# Patient Record
Sex: FEMALE | Race: AMERICAN INDIAN OR ALASKA NATIVE | HISPANIC OR LATINO | URBAN - METROPOLITAN AREA
[De-identification: names, ages, dates, MRNs, and addresses within clinical notes are randomized per-mention and may not be internally consistent; named-entity substitution may affect disease eponyms.]

---

## 2017-03-20 ENCOUNTER — EMERGENCY (EMERGENCY)
Facility: HOSPITAL | Age: 27
LOS: 1 days | Discharge: HOME | End: 2017-03-20
Attending: EMERGENCY MEDICINE | Admitting: EMERGENCY MEDICINE
Payer: COMMERCIAL

## 2017-03-20 VITALS
DIASTOLIC BLOOD PRESSURE: 86 MMHG | HEART RATE: 91 BPM | SYSTOLIC BLOOD PRESSURE: 125 MMHG | TEMPERATURE: 98 F | RESPIRATION RATE: 18 BRPM | OXYGEN SATURATION: 96 %

## 2017-03-20 VITALS
HEART RATE: 100 BPM | SYSTOLIC BLOOD PRESSURE: 110 MMHG | OXYGEN SATURATION: 97 % | RESPIRATION RATE: 18 BRPM | DIASTOLIC BLOOD PRESSURE: 62 MMHG | TEMPERATURE: 98 F | WEIGHT: 214.95 LBS

## 2017-03-20 DIAGNOSIS — M54.5 LOW BACK PAIN: ICD-10-CM

## 2017-03-20 DIAGNOSIS — Z79.899 OTHER LONG TERM (CURRENT) DRUG THERAPY: ICD-10-CM

## 2017-03-20 DIAGNOSIS — Z79.2 LONG TERM (CURRENT) USE OF ANTIBIOTICS: ICD-10-CM

## 2017-03-20 DIAGNOSIS — Z79.1 LONG TERM (CURRENT) USE OF NON-STEROIDAL ANTI-INFLAMMATORIES (NSAID): ICD-10-CM

## 2017-03-20 DIAGNOSIS — E11.9 TYPE 2 DIABETES MELLITUS WITHOUT COMPLICATIONS: ICD-10-CM

## 2017-03-20 DIAGNOSIS — H72.92 UNSPECIFIED PERFORATION OF TYMPANIC MEMBRANE, LEFT EAR: ICD-10-CM

## 2017-03-20 DIAGNOSIS — H71.92 UNSPECIFIED CHOLESTEATOMA, LEFT EAR: Chronic | ICD-10-CM

## 2017-03-20 DIAGNOSIS — J45.909 UNSPECIFIED ASTHMA, UNCOMPLICATED: ICD-10-CM

## 2017-03-20 LAB — HIV 1+2 AB+HIV1 P24 AG SERPL QL IA: SIGNIFICANT CHANGE UP

## 2017-03-20 PROCEDURE — 99283 EMERGENCY DEPT VISIT LOW MDM: CPT | Mod: 25

## 2017-03-20 PROCEDURE — 36415 COLL VENOUS BLD VENIPUNCTURE: CPT

## 2017-03-20 PROCEDURE — 99284 EMERGENCY DEPT VISIT MOD MDM: CPT

## 2017-03-20 PROCEDURE — 87389 HIV-1 AG W/HIV-1&-2 AB AG IA: CPT

## 2017-03-20 PROCEDURE — 96372 THER/PROPH/DIAG INJ SC/IM: CPT

## 2017-03-20 RX ORDER — CYCLOBENZAPRINE HYDROCHLORIDE 10 MG/1
1 TABLET, FILM COATED ORAL
Qty: 14 | Refills: 0
Start: 2017-03-20

## 2017-03-20 RX ORDER — CIPROFLOXACIN 6 MG/ML
1 SUSPENSION INTRATYMPANIC
Qty: 1 | Refills: 0
Start: 2017-03-20 | End: 2017-03-28

## 2017-03-20 RX ORDER — CYCLOBENZAPRINE HYDROCHLORIDE 10 MG/1
1 TABLET, FILM COATED ORAL
Qty: 14 | Refills: 0 | OUTPATIENT
Start: 2017-03-20

## 2017-03-20 RX ORDER — KETOROLAC TROMETHAMINE 30 MG/ML
60 SYRINGE (ML) INJECTION ONCE
Qty: 0 | Refills: 0 | Status: DISCONTINUED | OUTPATIENT
Start: 2017-03-20 | End: 2017-03-20

## 2017-03-20 RX ORDER — CIPROFLOXACIN 6 MG/ML
1 SUSPENSION INTRATYMPANIC
Qty: 1 | Refills: 0 | OUTPATIENT
Start: 2017-03-20 | End: 2017-03-27

## 2017-03-20 RX ADMIN — Medication 60 MILLIGRAM(S): at 19:05

## 2017-03-20 RX ADMIN — Medication 60 MILLIGRAM(S): at 19:32

## 2017-03-20 NOTE — ED PROVIDER NOTE - MEDICAL DECISION MAKING DETAILS
25 y/o f no pmh presents with low back pain x 1 week, fluid draining from left ear x 1 day; no neuro deficits, back pain worse with movement, likely musculoskeletal, given toradol in ED, will have pt continue ibuprofen, flexeril as outpatient.  Pt has left TM perforation with no preceding pain, hx cholesteatoma, will give course of cipro otic drops, referred to ENT and primary care for possible physical therapy.  Referral coordinator involved to expedite both appointments.

## 2017-03-20 NOTE — ED ADULT TRIAGE NOTE - CHIEF COMPLAINT QUOTE
lower back pain x1 week and left ear pain since yesterday. Patient states "I felt like something popped in my ear."

## 2017-03-20 NOTE — ED PROVIDER NOTE - MUSCULOSKELETAL, MLM
Spine appears normal, no midline tenderness, range of motion is not limited, no muscle or joint tenderness

## 2017-03-20 NOTE — ED PROVIDER NOTE - NEUROLOGICAL, MLM
Alert and oriented, no focal deficits, no motor or sensory deficits. strength 5/5 b/l upper and lower ext, sensation intact distally b/l upper and lower ext

## 2017-03-20 NOTE — ED PROVIDER NOTE - OBJECTIVE STATEMENT
27 y/o f presents c/o pain to low back for the past week.  Pt stating gradual onset, worse with movement.  Pt has been taking ibuprofen with mild improvement.  Pt also reporting fluid draining from left ear for the past day, stating she heard a "pop", has had a perforated ear drum in the past.  Pt stating she has no pmd, also asking for an HIV test, stating "it's been a while since my last one."  Pt denies numbness/tingling to ext, weakness, saddle anesthesia, bowel/bladder incontinence, ear pain, fever, throat pain, all other ROS negative.

## 2017-04-07 ENCOUNTER — APPOINTMENT (OUTPATIENT)
Dept: INTERNAL MEDICINE | Facility: CLINIC | Age: 27
End: 2017-04-07

## 2018-05-28 ENCOUNTER — EMERGENCY (EMERGENCY)
Facility: HOSPITAL | Age: 28
LOS: 1 days | Discharge: HOME | End: 2018-05-28
Admitting: EMERGENCY MEDICINE
Payer: COMMERCIAL

## 2018-05-28 VITALS
DIASTOLIC BLOOD PRESSURE: 98 MMHG | RESPIRATION RATE: 18 BRPM | OXYGEN SATURATION: 99 % | SYSTOLIC BLOOD PRESSURE: 151 MMHG | TEMPERATURE: 99 F | HEART RATE: 69 BPM

## 2018-05-28 VITALS
OXYGEN SATURATION: 99 % | RESPIRATION RATE: 18 BRPM | HEART RATE: 69 BPM | TEMPERATURE: 99 F | DIASTOLIC BLOOD PRESSURE: 98 MMHG | SYSTOLIC BLOOD PRESSURE: 151 MMHG

## 2018-05-28 DIAGNOSIS — J45.909 UNSPECIFIED ASTHMA, UNCOMPLICATED: ICD-10-CM

## 2018-05-28 DIAGNOSIS — H71.92 UNSPECIFIED CHOLESTEATOMA, LEFT EAR: Chronic | ICD-10-CM

## 2018-05-28 DIAGNOSIS — Z79.899 OTHER LONG TERM (CURRENT) DRUG THERAPY: ICD-10-CM

## 2018-05-28 DIAGNOSIS — Z79.1 LONG TERM (CURRENT) USE OF NON-STEROIDAL ANTI-INFLAMMATORIES (NSAID): ICD-10-CM

## 2018-05-28 DIAGNOSIS — H92.02 OTALGIA, LEFT EAR: ICD-10-CM

## 2018-05-28 DIAGNOSIS — Z79.2 LONG TERM (CURRENT) USE OF ANTIBIOTICS: ICD-10-CM

## 2018-05-28 DIAGNOSIS — Z79.891 LONG TERM (CURRENT) USE OF OPIATE ANALGESIC: ICD-10-CM

## 2018-05-28 DIAGNOSIS — E11.9 TYPE 2 DIABETES MELLITUS WITHOUT COMPLICATIONS: ICD-10-CM

## 2018-05-28 DIAGNOSIS — H66.92 OTITIS MEDIA, UNSPECIFIED, LEFT EAR: ICD-10-CM

## 2018-05-28 PROCEDURE — 99283 EMERGENCY DEPT VISIT LOW MDM: CPT

## 2018-05-28 PROCEDURE — 99283 EMERGENCY DEPT VISIT LOW MDM: CPT | Mod: 25

## 2018-05-28 PROCEDURE — 96372 THER/PROPH/DIAG INJ SC/IM: CPT

## 2018-05-28 RX ORDER — OXYCODONE AND ACETAMINOPHEN 5; 325 MG/1; MG/1
1 TABLET ORAL ONCE
Qty: 0 | Refills: 0 | Status: DISCONTINUED | OUTPATIENT
Start: 2018-05-28 | End: 2018-05-28

## 2018-05-28 RX ORDER — CEFTRIAXONE 500 MG/1
2000 INJECTION, POWDER, FOR SOLUTION INTRAMUSCULAR; INTRAVENOUS ONCE
Qty: 0 | Refills: 0 | Status: COMPLETED | OUTPATIENT
Start: 2018-05-28 | End: 2018-05-28

## 2018-05-28 RX ADMIN — CEFTRIAXONE 2000 MILLIGRAM(S): 500 INJECTION, POWDER, FOR SOLUTION INTRAMUSCULAR; INTRAVENOUS at 18:54

## 2018-05-28 RX ADMIN — OXYCODONE AND ACETAMINOPHEN 1 TABLET(S): 5; 325 TABLET ORAL at 18:03

## 2018-05-28 NOTE — ED PROVIDER NOTE - MEDICAL DECISION MAKING DETAILS
28 y/o female presenting with ear pain. Ear TM bulging, erythematous, pus-filled behind TM. no tragus/auricle tenderness. no mastoid tenderness. do not suspect mastoiditis. on neomycin without improvement. chronic hx of ear infection with dm - will give ceft IM. Advised ENT fu.

## 2018-05-28 NOTE — ED ADULT TRIAGE NOTE - CHIEF COMPLAINT QUOTE
pt having left ear pain x 3 days ,went to urgent care and received neomycin drops but pain is still very bad

## 2018-05-28 NOTE — ED ADULT NURSE NOTE - OBJECTIVE STATEMENT
pt having left ear pain x a few days ,is presently taking ear drops and ibuprofen but pain is persisting

## 2018-05-28 NOTE — ED PROVIDER NOTE - OBJECTIVE STATEMENT
26 y/o female with a PMHx of DM2, PCOS, cholesteatoma is present with left ear pain x3 days. Pt reports chronic hx of ear infections. Pt [place] [facility] [place] and given ear drops. Pt reports pain has worsened. She denies the following: ringing, dc, fever, chills, vertigo.

## 2018-06-02 ENCOUNTER — EMERGENCY (EMERGENCY)
Facility: HOSPITAL | Age: 28
LOS: 1 days | Discharge: HOME | End: 2018-06-02
Attending: EMERGENCY MEDICINE | Admitting: EMERGENCY MEDICINE
Payer: COMMERCIAL

## 2018-06-02 VITALS
DIASTOLIC BLOOD PRESSURE: 87 MMHG | HEART RATE: 103 BPM | TEMPERATURE: 98 F | OXYGEN SATURATION: 97 % | SYSTOLIC BLOOD PRESSURE: 126 MMHG | RESPIRATION RATE: 18 BRPM

## 2018-06-02 VITALS
DIASTOLIC BLOOD PRESSURE: 81 MMHG | OXYGEN SATURATION: 97 % | WEIGHT: 207.9 LBS | HEIGHT: 63 IN | HEART RATE: 102 BPM | SYSTOLIC BLOOD PRESSURE: 135 MMHG | TEMPERATURE: 99 F | RESPIRATION RATE: 18 BRPM

## 2018-06-02 DIAGNOSIS — H71.92 UNSPECIFIED CHOLESTEATOMA, LEFT EAR: Chronic | ICD-10-CM

## 2018-06-02 DIAGNOSIS — J45.909 UNSPECIFIED ASTHMA, UNCOMPLICATED: ICD-10-CM

## 2018-06-02 DIAGNOSIS — J40 BRONCHITIS, NOT SPECIFIED AS ACUTE OR CHRONIC: ICD-10-CM

## 2018-06-02 DIAGNOSIS — Z79.899 OTHER LONG TERM (CURRENT) DRUG THERAPY: ICD-10-CM

## 2018-06-02 DIAGNOSIS — E11.9 TYPE 2 DIABETES MELLITUS WITHOUT COMPLICATIONS: ICD-10-CM

## 2018-06-02 DIAGNOSIS — R06.2 WHEEZING: ICD-10-CM

## 2018-06-02 PROCEDURE — 93005 ELECTROCARDIOGRAM TRACING: CPT

## 2018-06-02 PROCEDURE — 99284 EMERGENCY DEPT VISIT MOD MDM: CPT | Mod: 25

## 2018-06-02 PROCEDURE — 94640 AIRWAY INHALATION TREATMENT: CPT

## 2018-06-02 PROCEDURE — 99285 EMERGENCY DEPT VISIT HI MDM: CPT | Mod: 25

## 2018-06-02 PROCEDURE — 93010 ELECTROCARDIOGRAM REPORT: CPT

## 2018-06-02 RX ORDER — AZITHROMYCIN 500 MG/1
1 TABLET, FILM COATED ORAL
Qty: 3 | Refills: 0
Start: 2018-06-02 | End: 2018-06-04

## 2018-06-02 RX ORDER — IPRATROPIUM/ALBUTEROL SULFATE 18-103MCG
3 AEROSOL WITH ADAPTER (GRAM) INHALATION ONCE
Qty: 0 | Refills: 0 | Status: COMPLETED | OUTPATIENT
Start: 2018-06-02 | End: 2018-06-02

## 2018-06-02 RX ORDER — ALPRAZOLAM 0.25 MG
0.5 TABLET ORAL ONCE
Qty: 0 | Refills: 0 | Status: DISCONTINUED | OUTPATIENT
Start: 2018-06-02 | End: 2018-06-02

## 2018-06-02 RX ADMIN — Medication 3 MILLILITER(S): at 20:08

## 2018-06-02 RX ADMIN — Medication 3 MILLILITER(S): at 21:23

## 2018-06-02 RX ADMIN — Medication 60 MILLIGRAM(S): at 19:40

## 2018-06-02 RX ADMIN — Medication 3 MILLILITER(S): at 19:43

## 2018-06-02 RX ADMIN — Medication 0.5 MILLIGRAM(S): at 19:39

## 2018-06-02 NOTE — ED PROVIDER NOTE - MEDICAL DECISION MAKING DETAILS
will contiue steroids and inhalers and add abs and refer to PCP I have discussed the discharge plan with the patient. The patient agrees with the plan, as discussed.  The patient understands Emergency Department diagnosis is a preliminary diagnosis often based on limited information and that the patient must adhere to the follow-up plan as discussed.  The patient understands that if the symptoms worsen or if prescribed medications do not have the desired/planned effect that the patient may return to the Emergency Department at any time for further evaluation and treatment.

## 2018-06-02 NOTE — ED ADULT TRIAGE NOTE - CHIEF COMPLAINT QUOTE
" I am having asthma mixed with anxiety and cough worse this morning ." History of asthma . With peak flow of 350% .

## 2018-06-02 NOTE — ED PROVIDER NOTE - OBJECTIVE STATEMENT
pt to ed with HX of asthma  co mild wheezes BL for few days using inhaler often with no relief pt compliant with maintenance inhaler and long Hx of asthma no fever no dizzy no headache no chills no NVD no chest pain no SOB no shakes no aches no other  injury no other complaints no cough no other complaints alos has anxiety

## 2018-06-02 NOTE — ED PROVIDER NOTE - ATTENDING CONTRIBUTION TO CARE
Case was discussed with me. 28yo female with h/o asthma recently in ER for possible viral symptoms presenting with asthma exacerbation/ bronchitis symptoms. Pt well appearing, lungs clear. VS unremarkable. Pt give nebs, reassessed and discharged.

## 2018-06-02 NOTE — ED ADULT NURSE NOTE - OBJECTIVE STATEMENT
Pt presents to ED c/o asthma exacerbation and cold symptoms. Pt reports ear infection last week, treated with abx ear drops and "anti inflammatory shot" with improvement presents today reporting x3 days of cough, SOB, and chest tightness. Pt reports using her home nebulizer and pump with some improvement. Pt also endorses x4 episodes clear vomiting this morning. Pt denies fevers, sore throat, CP, abdominal pain. Pt presents in NAD speaking full sentences ambulatory through triage.  in triage.

## 2018-10-19 ENCOUNTER — EMERGENCY (EMERGENCY)
Facility: HOSPITAL | Age: 28
LOS: 1 days | Discharge: HOME | End: 2018-10-19
Admitting: EMERGENCY MEDICINE
Payer: COMMERCIAL

## 2018-10-19 VITALS
DIASTOLIC BLOOD PRESSURE: 93 MMHG | RESPIRATION RATE: 16 BRPM | TEMPERATURE: 98 F | WEIGHT: 200.62 LBS | SYSTOLIC BLOOD PRESSURE: 148 MMHG | HEART RATE: 79 BPM | OXYGEN SATURATION: 98 % | HEIGHT: 63 IN

## 2018-10-19 DIAGNOSIS — H71.92 UNSPECIFIED CHOLESTEATOMA, LEFT EAR: Chronic | ICD-10-CM

## 2018-10-19 PROCEDURE — 82962 GLUCOSE BLOOD TEST: CPT

## 2018-10-19 PROCEDURE — 99284 EMERGENCY DEPT VISIT MOD MDM: CPT

## 2018-10-19 PROCEDURE — 99283 EMERGENCY DEPT VISIT LOW MDM: CPT

## 2018-10-19 RX ORDER — OFLOXACIN OTIC SOLUTION 3 MG/ML
5 SOLUTION/ DROPS AURICULAR (OTIC)
Qty: 5 | Refills: 0
Start: 2018-10-19 | End: 2018-10-25

## 2018-10-19 RX ORDER — IBUPROFEN 200 MG
600 TABLET ORAL ONCE
Qty: 0 | Refills: 0 | Status: COMPLETED | OUTPATIENT
Start: 2018-10-19 | End: 2018-10-19

## 2018-10-19 RX ORDER — CIPROFLOXACIN AND DEXAMETHASONE 3; 1 MG/ML; MG/ML
4 SUSPENSION/ DROPS AURICULAR (OTIC)
Qty: 5 | Refills: 0
Start: 2018-10-19 | End: 2018-10-25

## 2018-10-19 RX ORDER — IBUPROFEN 200 MG
1 TABLET ORAL
Qty: 12 | Refills: 0
Start: 2018-10-19 | End: 2018-10-22

## 2018-10-19 RX ADMIN — Medication 600 MILLIGRAM(S): at 15:49

## 2018-10-19 NOTE — ED ADULT NURSE NOTE - OBJECTIVE STATEMENT
PT came to ED complaining of bilateral ear pain, worse on left side, with cough x1 weeks. Pt reports chronic ear infections, denies any throat pain or swelling.  Pt reports drainage from left ear, no drainage found at this time. PT chest pain, SOB, denies fever, chills, D/N/V.  Pt is ambulatory with even gait.

## 2018-10-19 NOTE — ED ADULT TRIAGE NOTE - CHIEF COMPLAINT QUOTE
Pt CO Bilat Ear pain and dry cough x1 week.  Pt states "I'm very prone to ear infections, especially on the left side, but right now I feel pain in both."  Pt denies N/V/d, SOB, Fevers, CP and Dizziness.

## 2018-10-19 NOTE — ED ADULT NURSE NOTE - NSIMPLEMENTINTERV_GEN_ALL_ED
Implemented All Universal Safety Interventions:  [place] to call system. Call bell, personal items and telephone within reach. Instruct patient to call for assistance. Room bathroom lighting operational. Non-slip footwear when patient is off stretcher. Physically safe environment: no spills, clutter or unnecessary equipment. Stretcher in lowest position, wheels locked, appropriate side rails in place.

## 2018-10-19 NOTE — ED PROVIDER NOTE - OBJECTIVE STATEMENT
The pt is a 29 y/o F, who presents to ED c/o L ear pain x 3-4 d. Pt states pain to touch, + discharge, now R ear starting to ache, + dry cough. Has been taking tyl w/o relief. Denies fevers, chills, cp, sob, n/v, sore throat

## 2018-10-19 NOTE — ED PROVIDER NOTE - MEDICAL DECISION MAKING DETAILS
pt w/AOE of L ear, will tx w/gtts, no AOM hence no indication for po abx, supportive care discussed, f/u w/ent, pt understands and agrees w/plan

## 2018-10-19 NOTE — ED PROVIDER NOTE - ENMT, MLM
Airway patent, Nasal mucosa clear. Mouth with normal mucosa. Throat has no vesicles, no oropharyngeal exudates and uvula is midline. L ear canal w/swelling and exudates, TM clear; R ear wnl. no cervical lymphadenopathy b/l

## 2018-10-23 DIAGNOSIS — H60.92 UNSPECIFIED OTITIS EXTERNA, LEFT EAR: ICD-10-CM

## 2018-10-23 DIAGNOSIS — Z79.899 OTHER LONG TERM (CURRENT) DRUG THERAPY: ICD-10-CM

## 2018-10-23 DIAGNOSIS — E11.9 TYPE 2 DIABETES MELLITUS WITHOUT COMPLICATIONS: ICD-10-CM

## 2018-10-23 DIAGNOSIS — J45.909 UNSPECIFIED ASTHMA, UNCOMPLICATED: ICD-10-CM

## 2018-10-23 DIAGNOSIS — H92.02 OTALGIA, LEFT EAR: ICD-10-CM

## 2018-11-01 ENCOUNTER — EMERGENCY (EMERGENCY)
Facility: HOSPITAL | Age: 28
LOS: 1 days | Discharge: HOME | End: 2018-11-01
Attending: EMERGENCY MEDICINE | Admitting: EMERGENCY MEDICINE
Payer: COMMERCIAL

## 2018-11-01 VITALS
DIASTOLIC BLOOD PRESSURE: 90 MMHG | TEMPERATURE: 98 F | RESPIRATION RATE: 18 BRPM | HEART RATE: 89 BPM | SYSTOLIC BLOOD PRESSURE: 150 MMHG | OXYGEN SATURATION: 96 %

## 2018-11-01 DIAGNOSIS — R21 RASH AND OTHER NONSPECIFIC SKIN ERUPTION: ICD-10-CM

## 2018-11-01 DIAGNOSIS — J45.909 UNSPECIFIED ASTHMA, UNCOMPLICATED: ICD-10-CM

## 2018-11-01 DIAGNOSIS — H71.92 UNSPECIFIED CHOLESTEATOMA, LEFT EAR: Chronic | ICD-10-CM

## 2018-11-01 DIAGNOSIS — Z79.1 LONG TERM (CURRENT) USE OF NON-STEROIDAL ANTI-INFLAMMATORIES (NSAID): ICD-10-CM

## 2018-11-01 DIAGNOSIS — E11.9 TYPE 2 DIABETES MELLITUS WITHOUT COMPLICATIONS: ICD-10-CM

## 2018-11-01 DIAGNOSIS — Z79.2 LONG TERM (CURRENT) USE OF ANTIBIOTICS: ICD-10-CM

## 2018-11-01 DIAGNOSIS — R05 COUGH: ICD-10-CM

## 2018-11-01 DIAGNOSIS — Z79.51 LONG TERM (CURRENT) USE OF INHALED STEROIDS: ICD-10-CM

## 2018-11-01 DIAGNOSIS — Z79.891 LONG TERM (CURRENT) USE OF OPIATE ANALGESIC: ICD-10-CM

## 2018-11-01 PROCEDURE — 71046 X-RAY EXAM CHEST 2 VIEWS: CPT | Mod: 26

## 2018-11-01 PROCEDURE — 99283 EMERGENCY DEPT VISIT LOW MDM: CPT | Mod: 25

## 2018-11-01 PROCEDURE — 71046 X-RAY EXAM CHEST 2 VIEWS: CPT

## 2018-11-01 PROCEDURE — 99283 EMERGENCY DEPT VISIT LOW MDM: CPT

## 2018-11-01 NOTE — ED PROVIDER NOTE - DIAGNOSTIC INTERPRETATION
ER Physician: Cale Sung  CHEST XRAY INTERPRETATION: lungs clear, heart shadow normal, bony structures intact

## 2018-11-01 NOTE — ED ADULT NURSE NOTE - OBJECTIVE STATEMENT
29 y/o female with hx of PCOS, Depression, HTN, DM, asthma arrived to [facility] ER reporting rash to mouth, arms, and hands for the past week accompanied with productive cough. 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 Upon assessment, abdomen soft, rhonchi to lung fields, breathing is equal and unlabored, no visible injuries noted. Pt denies chest pain, headache, dizziness, blurred vision, slurred speech, nausea, vomitin 27 y/o female with hx of PCOS, Depression, HTN, DM, asthma arrived to [facility] ER reporting rash to mouth, arms, and hands for the past week accompanied with productive cough. 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 Upon assessment, abdomen soft, rhonchi to lung fields, breathing is equal and unlabored, no visible injuries noted. Pt denies chest pain, headache, dizziness, blurred vision, slurred speech, nausea, vomiting, diarrhea, fever, chills, LOC, SOB, weakness, fatigue, and palpitations. Care in progress.

## 2018-11-01 NOTE — ED PROVIDER NOTE - PHYSICAL EXAMINATION
CON: ao x 3, HENMT: clear oropharynx, soft neck, HEAD: atraumatic, CV: rrr, equal pulses b/l, RESP: cta b/l, SKIN: maculopapular rash noted to bl forearm, and anterior neck, no petechiae, no vesicles, no bullae, no erythema or lymphatic streaking, no fluctuance/induration or tenderness, MSK: no deformities, NEURO: no gross motor or sensory deficit

## 2018-11-01 NOTE — ED ADULT TRIAGE NOTE - ARRIVAL INFO ADDITIONAL COMMENTS
pt c/o 1 week of rash on inner forearms, neck and around mouth since starting a new job.  also c/o dry cough.

## 2018-11-01 NOTE — ED PROVIDER NOTE - MEDICAL DECISION MAKING DETAILS
maculopapular rash w/o tony involvement, no oropharyngeal involvement, possible exposure at work, ?contact dermatitis, d/w pt regarding avoid contact (pt states once halloween is over, she'll have less exposure from the objects), no evidence of infection, also cough x 1 mo, subacute, afebrile, lungs clear, no sx of gerd/reflux, no hx of asthma, will check xray, f/u pmd outpatient maculopapular rash w/o tony involvement, no oropharyngeal involvement, possible exposure at work, ?contact dermatitis, d/w pt regarding avoid contact (pt states once halloween is over, she'll have less exposure from the objects), no evidence of infection, also cough x 1 mo, subacute, afebrile, lungs clear, no sx of gerd/reflux, hx of asthma, ?cough variant asthma, also pt recently started acei, possibly acei cough, will check xray, f/u pmd outpatient, d/w pmd regarding htn and meds

## 2018-11-01 NOTE — ED PROVIDER NOTE - OBJECTIVE STATEMENT
28 yof pw cough x 1 mo, almost daily, worse after 5pm, improves w/ drinking some hot tea, no change w/ food or lying down.  nonproductive.  no leg swelling.  also days of rash to forearm and neck, states working at [employer] and has been carrying props/objects, not wearing long sleeves.  taking benadryl but has exposure daily at work.

## 2018-11-01 NOTE — ED ADULT NURSE NOTE - NS ED NURSE DC INFO COMPLEXITY
Detail Level: Detailed
Simple: Patient demonstrates quick and easy understanding/Verbalized Understanding
Quality 226: Preventive Care And Screening: Tobacco Use: Screening And Cessation Intervention: Patient screened for tobacco and is an ex-smoker
Quality 128: Preventive Care And Screening: Body Mass Index (Bmi) Screening And Follow-Up Plan: BMI is documented within normal parameters and no follow-up plan is required.

## 2019-01-10 ENCOUNTER — EMERGENCY (EMERGENCY)
Facility: HOSPITAL | Age: 29
LOS: 1 days | Discharge: HOME | End: 2019-01-10
Attending: EMERGENCY MEDICINE | Admitting: EMERGENCY MEDICINE
Payer: COMMERCIAL

## 2019-01-10 VITALS
SYSTOLIC BLOOD PRESSURE: 112 MMHG | OXYGEN SATURATION: 97 % | RESPIRATION RATE: 18 BRPM | HEART RATE: 70 BPM | TEMPERATURE: 99 F | DIASTOLIC BLOOD PRESSURE: 76 MMHG

## 2019-01-10 VITALS
OXYGEN SATURATION: 100 % | DIASTOLIC BLOOD PRESSURE: 81 MMHG | SYSTOLIC BLOOD PRESSURE: 134 MMHG | WEIGHT: 195.11 LBS | TEMPERATURE: 99 F | HEART RATE: 87 BPM | RESPIRATION RATE: 16 BRPM

## 2019-01-10 DIAGNOSIS — Z79.2 LONG TERM (CURRENT) USE OF ANTIBIOTICS: ICD-10-CM

## 2019-01-10 DIAGNOSIS — Z79.899 OTHER LONG TERM (CURRENT) DRUG THERAPY: ICD-10-CM

## 2019-01-10 DIAGNOSIS — R11.0 NAUSEA: ICD-10-CM

## 2019-01-10 DIAGNOSIS — N93.9 ABNORMAL UTERINE AND VAGINAL BLEEDING, UNSPECIFIED: ICD-10-CM

## 2019-01-10 DIAGNOSIS — D72.829 ELEVATED WHITE BLOOD CELL COUNT, UNSPECIFIED: ICD-10-CM

## 2019-01-10 DIAGNOSIS — Z79.1 LONG TERM (CURRENT) USE OF NON-STEROIDAL ANTI-INFLAMMATORIES (NSAID): ICD-10-CM

## 2019-01-10 DIAGNOSIS — R10.2 PELVIC AND PERINEAL PAIN: ICD-10-CM

## 2019-01-10 DIAGNOSIS — H71.92 UNSPECIFIED CHOLESTEATOMA, LEFT EAR: Chronic | ICD-10-CM

## 2019-01-10 DIAGNOSIS — Z79.891 LONG TERM (CURRENT) USE OF OPIATE ANALGESIC: ICD-10-CM

## 2019-01-10 DIAGNOSIS — E11.9 TYPE 2 DIABETES MELLITUS WITHOUT COMPLICATIONS: ICD-10-CM

## 2019-01-10 DIAGNOSIS — J45.909 UNSPECIFIED ASTHMA, UNCOMPLICATED: ICD-10-CM

## 2019-01-10 LAB
ANION GAP SERPL CALC-SCNC: 14 MMOL/L — SIGNIFICANT CHANGE UP (ref 5–17)
APPEARANCE UR: CLEAR — SIGNIFICANT CHANGE UP
BACTERIA # UR AUTO: PRESENT /HPF
BASOPHILS NFR BLD AUTO: 0.2 % — SIGNIFICANT CHANGE UP (ref 0–2)
BILIRUB UR-MCNC: NEGATIVE — SIGNIFICANT CHANGE UP
BUN SERPL-MCNC: 12 MG/DL — SIGNIFICANT CHANGE UP (ref 7–23)
CALCIUM SERPL-MCNC: 9.4 MG/DL — SIGNIFICANT CHANGE UP (ref 8.4–10.5)
CHLORIDE SERPL-SCNC: 103 MMOL/L — SIGNIFICANT CHANGE UP (ref 96–108)
CO2 SERPL-SCNC: 24 MMOL/L — SIGNIFICANT CHANGE UP (ref 22–31)
COLOR SPEC: YELLOW — SIGNIFICANT CHANGE UP
COMMENT - URINE: SIGNIFICANT CHANGE UP
CREAT SERPL-MCNC: 0.69 MG/DL — SIGNIFICANT CHANGE UP (ref 0.5–1.3)
DIFF PNL FLD: ABNORMAL
EOSINOPHIL NFR BLD AUTO: 0.4 % — SIGNIFICANT CHANGE UP (ref 0–6)
EPI CELLS # UR: SIGNIFICANT CHANGE UP /HPF (ref 0–5)
GLUCOSE SERPL-MCNC: 118 MG/DL — HIGH (ref 70–99)
GLUCOSE UR QL: NEGATIVE — SIGNIFICANT CHANGE UP
HCG UR QL: NEGATIVE — SIGNIFICANT CHANGE UP
HCT VFR BLD CALC: 45.6 % — HIGH (ref 34.5–45)
HGB BLD-MCNC: 14.9 G/DL — SIGNIFICANT CHANGE UP (ref 11.5–15.5)
KETONES UR-MCNC: NEGATIVE — SIGNIFICANT CHANGE UP
LEUKOCYTE ESTERASE UR-ACNC: NEGATIVE — SIGNIFICANT CHANGE UP
LYMPHOCYTES # BLD AUTO: 11.7 % — LOW (ref 13–44)
MCHC RBC-ENTMCNC: 29.2 PG — SIGNIFICANT CHANGE UP (ref 27–34)
MCHC RBC-ENTMCNC: 32.7 G/DL — SIGNIFICANT CHANGE UP (ref 32–36)
MCV RBC AUTO: 89.4 FL — SIGNIFICANT CHANGE UP (ref 80–100)
MONOCYTES NFR BLD AUTO: 5.7 % — SIGNIFICANT CHANGE UP (ref 2–14)
NEUTROPHILS NFR BLD AUTO: 82 % — HIGH (ref 43–77)
NITRITE UR-MCNC: NEGATIVE — SIGNIFICANT CHANGE UP
PH UR: 6 — SIGNIFICANT CHANGE UP (ref 5–8)
PLATELET # BLD AUTO: 368 K/UL — SIGNIFICANT CHANGE UP (ref 150–400)
POTASSIUM SERPL-MCNC: 4.1 MMOL/L — SIGNIFICANT CHANGE UP (ref 3.5–5.3)
POTASSIUM SERPL-SCNC: 4.1 MMOL/L — SIGNIFICANT CHANGE UP (ref 3.5–5.3)
PROT UR-MCNC: NEGATIVE MG/DL — SIGNIFICANT CHANGE UP
RBC # BLD: 5.1 M/UL — SIGNIFICANT CHANGE UP (ref 3.8–5.2)
RBC # FLD: 14.4 % — SIGNIFICANT CHANGE UP (ref 10.3–16.9)
RBC CASTS # UR COMP ASSIST: < 5 /HPF — SIGNIFICANT CHANGE UP
SODIUM SERPL-SCNC: 141 MMOL/L — SIGNIFICANT CHANGE UP (ref 135–145)
SP GR SPEC: 1.02 — SIGNIFICANT CHANGE UP (ref 1–1.03)
UROBILINOGEN FLD QL: 0.2 E.U./DL — SIGNIFICANT CHANGE UP
WBC # BLD: 13.6 K/UL — HIGH (ref 3.8–10.5)
WBC # FLD AUTO: 13.6 K/UL — HIGH (ref 3.8–10.5)
WBC UR QL: < 5 /HPF — SIGNIFICANT CHANGE UP

## 2019-01-10 PROCEDURE — 99284 EMERGENCY DEPT VISIT MOD MDM: CPT | Mod: 25

## 2019-01-10 PROCEDURE — 76856 US EXAM PELVIC COMPLETE: CPT | Mod: 26

## 2019-01-10 PROCEDURE — 80048 BASIC METABOLIC PNL TOTAL CA: CPT

## 2019-01-10 PROCEDURE — 76830 TRANSVAGINAL US NON-OB: CPT

## 2019-01-10 PROCEDURE — 96374 THER/PROPH/DIAG INJ IV PUSH: CPT | Mod: XU

## 2019-01-10 PROCEDURE — 85025 COMPLETE CBC W/AUTO DIFF WBC: CPT

## 2019-01-10 PROCEDURE — 74177 CT ABD & PELVIS W/CONTRAST: CPT | Mod: 26

## 2019-01-10 PROCEDURE — 74177 CT ABD & PELVIS W/CONTRAST: CPT

## 2019-01-10 PROCEDURE — 76830 TRANSVAGINAL US NON-OB: CPT | Mod: 26

## 2019-01-10 PROCEDURE — 81001 URINALYSIS AUTO W/SCOPE: CPT

## 2019-01-10 PROCEDURE — 36415 COLL VENOUS BLD VENIPUNCTURE: CPT

## 2019-01-10 PROCEDURE — 81025 URINE PREGNANCY TEST: CPT

## 2019-01-10 PROCEDURE — 99284 EMERGENCY DEPT VISIT MOD MDM: CPT

## 2019-01-10 PROCEDURE — 76856 US EXAM PELVIC COMPLETE: CPT

## 2019-01-10 RX ORDER — KETOROLAC TROMETHAMINE 30 MG/ML
30 SYRINGE (ML) INJECTION ONCE
Qty: 0 | Refills: 0 | Status: DISCONTINUED | OUTPATIENT
Start: 2019-01-10 | End: 2019-01-10

## 2019-01-10 RX ADMIN — Medication 30 MILLIGRAM(S): at 21:38

## 2019-01-10 NOTE — ED PROVIDER NOTE - PROGRESS NOTE DETAILS
patient refused pelvic by me and requested female.  reanna mckeon performed pelvic: scant blood in vault, no discharge, no cmt or adenexal tenderness, no masses

## 2019-01-10 NOTE — ED PROVIDER NOTE - NSFOLLOWUPINSTRUCTIONS_ED_ALL_ED_FT
Please see you primary care provider or gyn in 2-3 days.  Return to the ER if symptoms worsen or other concerns.      Dysfunctional Uterine Bleeding  Dysfunctional uterine bleeding is abnormal bleeding from the uterus. Dysfunctional uterine bleeding includes:    A period that comes earlier or later than usual.  A period that is lighter, heavier, or has blood clots.  Bleeding between periods.  Skipping one or more periods.  Bleeding after sexual intercourse.  Bleeding after menopause.    Follow these instructions at home:  Pay attention to any changes in your symptoms. Follow these instructions to help with your condition:    Eating and drinking     Eat well-balanced meals. Include foods that are high in iron, such as liver, meat, shellfish, green leafy vegetables, and eggs.  If you become constipated:    Drink plenty of water.  Eat fruits and vegetables that are high in water and fiber, such as spinach, carrots, raspberries, apples, and ari.    Medicines     Take over-the-counter and prescription medicines only as told by your health care provider.  Do not change medicines without talking with your health care provider.  Aspirin or medicines that contain aspirin may make the bleeding worse. Do not take those medicines:    During the week before your period.  During your period.    If you were prescribed iron pills, take them as told by your health care provider. Iron pills help to replace iron that your body loses because of this condition.  Activity     If you need to change your sanitary pad or tampon more than one time every 2 hours:    Lie in bed with your feet raised (elevated).  Place a cold pack on your lower abdomen.  Rest as much as possible until the bleeding stops or slows down.    Do not try to lose weight until the bleeding has stopped and your blood iron level is back to normal.  Other Instructions     For two months, write down:    When your period starts.  When your period ends.  When any abnormal bleeding occurs.  What problems you notice.    Keep all follow up visits as told by your health care provider. This is important.  Contact a health care provider if:  You get light-headed or weak.  You have nausea and vomiting.  You cannot eat or drink without vomiting.  You feel dizzy or have diarrhea while you are taking medicines.  You are taking birth control pills or hormones, and you want to change them or stop taking them.  Get help right away if:  You develop a fever or chills.  You need to change your sanitary pad or tampon more than one time per hour.  Your bleeding becomes heavier, or your flow contains clots more often.  You develop pain in your abdomen.  You lose consciousness.  You develop a rash.  This information is not intended to replace advice given to you by your health care provider. Make sure you discuss any questions you have with your health care provider.      Acute Abdominal Pain    WHAT YOU NEED TO KNOW:    The cause of your abdominal pain may not be found. If a cause is found, treatment will depend on what the cause is.     DISCHARGE INSTRUCTIONS:    Return to the emergency department if:     You vomit blood or cannot stop vomiting.      You have blood in your bowel movement or it looks like tar.       You have bleeding from your rectum.       Your abdomen is larger than usual, more painful, and hard.       You have severe pain in your abdomen.       You stop passing gas and having bowel movements.       You feel weak, dizzy, or faint.    Contact your healthcare provider if:     You have a fever.      You have new signs and symptoms.      Your symptoms do not get better with treatment.       You have questions or concerns about your condition or care.    Medicines may be given to decrease pain, treat an infection, and manage your symptoms. Take your medicine as directed. Call your healthcare provider if you think your medicine is not helping or if you have side effects. Tell him if you are allergic to any medicine. Keep a list of the medicines, vitamins, and herbs you take. Include the amounts, and when and why you take them. Bring the list or the pill bottles to follow-up visits. Carry your medicine list with you in case of an emergency.    Manage your symptoms:     Apply heat on your abdomen for 20 to 30 minutes every 2 hours for as many days as directed. Heat helps decrease pain and muscle spasms.       Manage your stress. Stress may cause abdominal pain. Your healthcare provider may recommend relaxation techniques and deep breathing exercises to help decrease your stress. Your healthcare provider may recommend you talk to someone about your stress or anxiety, such as a counselor or a trusted friend. Get plenty of sleep and exercise regularly.       Limit or do not drink alcohol. Alcohol can make your abdominal pain worse. Ask your healthcare provider if it is safe for you to drink alcohol. Also ask how much is safe for you to drink.       Do not smoke. Nicotine and other chemicals in cigarettes can damage your esophagus and stomach. Ask your healthcare provider for information if you currently smoke and need help to quit. E-cigarettes or smokeless tobacco still contain nicotine. Talk to your healthcare provider before you use these products.     Make changes to the food you eat as directed: Do not eat foods that cause abdominal pain or other symptoms. Eat small meals more often.     Eat more high-fiber foods if you are constipated. High-fiber foods include fruits, vegetables, whole-grain foods, and legumes.       Do not eat foods that cause gas if you have bloating. Examples include broccoli, cabbage, and cauliflower. Do not drink soda or carbonated drinks, because these may also cause gas.       Do not eat foods or drinks that contain sorbitol or fructose if you have diarrhea and bloating. Some examples are fruit juices, candy, jelly, and sugar-free gum.       Do not eat high-fat foods, such as fried foods, cheeseburgers, hot dogs, and desserts.      Limit or do not drink caffeine. Caffeine may make symptoms, such as heart burn or nausea, worse.       Drink plenty of liquids to prevent dehydration from diarrhea or vomiting. Ask your healthcare provider how much liquid to drink each day and which liquids are best for you.     Follow up with your healthcare provider as directed: Write down your questions so you remember to ask them during your visits.        SEEK IMMEDIATE MEDICAL CARE IF YOU HAVE ANY OF THE FOLLOWING SYMPTOMS: worsening abdominal pain, uncontrollable vomiting, profuse diarrhea, inability to have bowel movements or pass gas, black or bloody stools, fever accompanying chest pain or back pain, or fainting. These symptoms may represent a serious problem that is an emergency. Do not wait to see if the symptoms will go away. Get medical help right away. Call 911 and do not drive yourself to the hospital.

## 2019-01-10 NOTE — ED ADULT NURSE NOTE - OBJECTIVE STATEMENT
Pt states she has been having vaginal bleeding today and have been having intermittent pelvic pain for almost a week. Pt denies fever and chills. PT c/o nipples being sore and painful as well.

## 2019-01-10 NOTE — ED PROVIDER NOTE - MEDICAL DECISION MAKING DETAILS
pelvic pain and abnormal vaginal bleeding.  pelvic us done and suggestive of menses.  labs with mild leukocytosis so ct done and neg for appy/ acute patholog.y  pelvic by pa not suggestive of pid.  plan nsaids.  gyn f/u.  return precautions discussed

## 2019-01-10 NOTE — ED ADULT TRIAGE NOTE - OTHER COMPLAINTS
reports intermittent pain for a week with vaginal bleeding starting today. no lightheadedness, no dizziness, no cp, no sob.

## 2019-01-10 NOTE — ED ADULT NURSE NOTE - NSIMPLEMENTINTERV_GEN_ALL_ED
Implemented All Universal Safety Interventions:  [place] to call system. Call bell, personal items and telephone within reach. Instruct patient to call for assistance. Room bathroom lighting operational. Non-slip footwear when patient is off stretcher. Physically safe environment: no spills, clutter or unnecessary equipment. Stretcher in lowest position, wheels locked, appropriate side rails in place.

## 2019-01-10 NOTE — ED PROVIDER NOTE - NSFOLLOWUPCLINICS_GEN_ALL_ED_FT
[facility] [address] [place] [address]  Family Medicine  [address] [place] [address]  Phone: [phone]  Fax: [phone]  Follow Up Time:
Handoff

## 2019-01-10 NOTE — ED PROVIDER NOTE - OBJECTIVE STATEMENT
here with lower abdominal pain.  Initial mild for past week, intermittent, then today became more severe.  Lower abdomen, mostly in the middle.  Constant, worse with movement.  Denies fever/chills.  Has history of pcos and says "I think I ruptured a cyst."  Has not had normal menses for > 10 years but started with vaginal bleeding.  Sexually active 1 partner, no condoms.  Also some nausea in the am and nipples feel sore

## 2019-03-07 ENCOUNTER — EMERGENCY (EMERGENCY)
Facility: HOSPITAL | Age: 29
LOS: 1 days | Discharge: HOME | End: 2019-03-07
Attending: EMERGENCY MEDICINE | Admitting: EMERGENCY MEDICINE
Payer: COMMERCIAL

## 2019-03-07 VITALS
HEART RATE: 100 BPM | RESPIRATION RATE: 18 BRPM | OXYGEN SATURATION: 98 % | SYSTOLIC BLOOD PRESSURE: 138 MMHG | TEMPERATURE: 99 F | WEIGHT: 195.99 LBS | DIASTOLIC BLOOD PRESSURE: 96 MMHG

## 2019-03-07 DIAGNOSIS — J45.901 UNSPECIFIED ASTHMA WITH (ACUTE) EXACERBATION: ICD-10-CM

## 2019-03-07 DIAGNOSIS — Z79.52 LONG TERM (CURRENT) USE OF SYSTEMIC STEROIDS: ICD-10-CM

## 2019-03-07 DIAGNOSIS — Z79.1 LONG TERM (CURRENT) USE OF NON-STEROIDAL ANTI-INFLAMMATORIES (NSAID): ICD-10-CM

## 2019-03-07 DIAGNOSIS — H71.92 UNSPECIFIED CHOLESTEATOMA, LEFT EAR: Chronic | ICD-10-CM

## 2019-03-07 DIAGNOSIS — E11.9 TYPE 2 DIABETES MELLITUS WITHOUT COMPLICATIONS: ICD-10-CM

## 2019-03-07 DIAGNOSIS — Z79.2 LONG TERM (CURRENT) USE OF ANTIBIOTICS: ICD-10-CM

## 2019-03-07 DIAGNOSIS — Z79.899 OTHER LONG TERM (CURRENT) DRUG THERAPY: ICD-10-CM

## 2019-03-07 DIAGNOSIS — Z79.891 LONG TERM (CURRENT) USE OF OPIATE ANALGESIC: ICD-10-CM

## 2019-03-07 LAB
FLU A RESULT: SIGNIFICANT CHANGE UP
FLU A RESULT: SIGNIFICANT CHANGE UP
FLUAV AG NPH QL: SIGNIFICANT CHANGE UP
FLUBV AG NPH QL: SIGNIFICANT CHANGE UP
HCG UR QL: NEGATIVE — SIGNIFICANT CHANGE UP
RSV RESULT: SIGNIFICANT CHANGE UP
RSV RNA RESP QL NAA+PROBE: SIGNIFICANT CHANGE UP

## 2019-03-07 PROCEDURE — 71046 X-RAY EXAM CHEST 2 VIEWS: CPT | Mod: 26

## 2019-03-07 PROCEDURE — 94640 AIRWAY INHALATION TREATMENT: CPT

## 2019-03-07 PROCEDURE — 81025 URINE PREGNANCY TEST: CPT

## 2019-03-07 PROCEDURE — 87631 RESP VIRUS 3-5 TARGETS: CPT

## 2019-03-07 PROCEDURE — 71046 X-RAY EXAM CHEST 2 VIEWS: CPT

## 2019-03-07 PROCEDURE — 99285 EMERGENCY DEPT VISIT HI MDM: CPT | Mod: 25

## 2019-03-07 PROCEDURE — 99284 EMERGENCY DEPT VISIT MOD MDM: CPT | Mod: 25

## 2019-03-07 RX ORDER — ALBUTEROL 90 UG/1
2 AEROSOL, METERED ORAL
Qty: 1 | Refills: 0
Start: 2019-03-07 | End: 2019-03-13

## 2019-03-07 RX ORDER — IPRATROPIUM/ALBUTEROL SULFATE 18-103MCG
3 AEROSOL WITH ADAPTER (GRAM) INHALATION ONCE
Qty: 0 | Refills: 0 | Status: COMPLETED | OUTPATIENT
Start: 2019-03-07 | End: 2019-03-07

## 2019-03-07 RX ADMIN — Medication 3 MILLILITER(S): at 17:51

## 2019-03-07 RX ADMIN — Medication 60 MILLIGRAM(S): at 17:51

## 2019-03-07 NOTE — ED ADULT NURSE NOTE - NSIMPLEMENTINTERV_GEN_ALL_ED
Implemented All Universal Safety Interventions:  [place] to call system. Call bell, personal items and telephone within reach. Instruct patient to call for assistance. Room bathroom lighting operational. Non-slip footwear when patient is off stretcher. Physically safe environment: no spills, clutter or unnecessary equipment. Stretcher in lowest position, wheels locked, appropriate side rails in place.

## 2019-03-07 NOTE — ED PROVIDER NOTE - OBJECTIVE STATEMENT
29 yo F with hx of asthma- no intubations- no recent admits in the past 6 months - pos steroids hx of PCOS, DM, chronic back pain with mild  dry cough x 2 days- no walter sob-  slight wheezing - no fevers or chills- no pleuritic CP  no leg pain or calf tenderness - no walter chest pain  - 27 yo F with hx of asthma- no intubations- no recent admits in the past 6 months - pos steroids hx of PCOS, DM, chronic back pain with mild  dry cough x 2 days- no walter sob-  slight wheezing - no fevers or chills- no pleuritic CP  no leg pain or calf tenderness - no walter chest pain  no flu shot this year

## 2019-03-07 NOTE — ED ADULT TRIAGE NOTE - CHIEF COMPLAINT QUOTE
pt states " I've been coming down with a cold for the past few days, I have asthma and that just makes it worst, and its causing me chest pain, also have cramps I just got my period"

## 2019-03-07 NOTE — ED PROVIDER NOTE - CLINICAL SUMMARY MEDICAL DECISION MAKING FREE TEXT BOX
asthma exac  resolving  CXR neg  flu neg  rec fluids steroids proventil and steroid inhaler 27 yo F witj asthma exacerbation- sx  resolving no further wheeezing sats wnl /  CXR neg  flu neg -possibly bronchitis- no consilidation on CXR-  rec increased fluids steroids proventil and steroid inhaler pulm referral provided return precautions dw pt including temp >100.4F chills, chest pain diff breathing

## 2019-03-07 NOTE — ED PROVIDER NOTE - CARE PROVIDER_API CALL
Farzad Izaguirre (MD)  Critical Care Medicine; Internal Medicine; Pulmonary Disease; Sleep Medicine  [address] [id number]  Phone: [phone]  Fax: [phone]  Follow Up Time:

## 2019-03-07 NOTE — ED ADULT NURSE NOTE - OBJECTIVE STATEMENT
27 y/o female c/o chest discomfort, productive cough, and SoB with exertion x5 days. AOx3, states had cold a week ago but cough and SoB has not improved. Hx asthma and HTN, presents to ED with bilat wheezing and midsternal chest discomfort. Denies sick contacts, N/V, fevers, chills, dizziness. Plan of care explained.

## 2019-03-07 NOTE — ED PROVIDER NOTE - NSFOLLOWUPINSTRUCTIONS_ED_ALL_ED_FT
return to ER if diff breathing, chest pain, temperature > 100.4F return to ER if diff breathing, chest pain, temperature > 100.4F    Bronchospasm, Adult  ImageBronchospasm is when airways in the lungs get smaller. When this happens, it can be hard to breathe. You may cough. You may also make a whistling sound when you breathe (wheeze).    Follow these instructions at home:  Medicines     Take over-the-counter and prescription medicines only as told by your doctor.  If you need to use an inhaler or nebulizer to take your medicine, ask your doctor how to use it.  If you were given a spacer, always use it with your inhaler.  Lifestyle     Change your heating and air conditioning filter. Do this at least once a month.  Try not to use fireplaces and wood stoves.  Do not smoke. Do not allow smoking in your home.  Try not to use things that have a strong smell, like perfume.  Get rid of pests (such as roaches and mice) and their poop.  Remove any mold from your home.  Keep your house clean. Get rid of dust.   Use cleaning products that have no smell.   Replace carpet with wood, tile, or vinyl cristal.  Use allergy-proof pillows, mattress covers, and box spring covers.  Wash bed sheets and blankets every week. Use hot water. Dry them in a dryer.  Use blankets that are made of polyester or cotton.  Wash your hands often.  Keep pets out of your bedroom.  When you exercise, try not to breathe in cold air.  General instructions     Have a plan for getting medical care. Know these things:    When to call your doctor.  When to call local emergency services (911 in the [place]  Where to go in an emergency.    Stay up to date on your shots (immunizations).  When you have an episode:    Stay calm.  Relax.  Breathe slowly.    Contact a doctor if:  Your muscles ache.  Your chest hurts.  The color of the mucus you cough up (sputum) changes from clear or white to yellow, green, gray, or bloody.  The mucus you cough up gets thicker.  You have a fever.  Get help right away if:  The whistling sound gets worse, even after you take your medicines.  Your coughing gets worse.  You find it even harder to breathe.  Your chest hurts very much.  Summary  Bronchospasm is when airways in the lungs get smaller.  When this happens, it can be hard to breathe. You may cough. You may also make a whistling sound when you breathe.  Stay away from things that cause you to have episodes. These include smoke or dust.  This information is not intended to replace advice given to you by your health care provider. Make sure you discuss any questions you have with your health care provider.    Document Released: 10/15/2010 Document Revised: 12/21/2017 Document Reviewed: 12/21/2017  Narrable Interactive Patient Education © 2019 [employer]

## 2019-06-18 NOTE — ED ADULT NURSE NOTE - CAS DISCH TRANSFER METHOD
Medication:nicotine patch    Last Seen:4/3/19    Last Refilled:2/22/17    Next Appointment:7/8/19, advised 3 month follow up             Transportation service

## 2019-09-17 ENCOUNTER — EMERGENCY (EMERGENCY)
Facility: HOSPITAL | Age: 29
LOS: 1 days | Discharge: HOME | End: 2019-09-17
Attending: EMERGENCY MEDICINE | Admitting: EMERGENCY MEDICINE
Payer: MEDICAID

## 2019-09-17 VITALS
OXYGEN SATURATION: 99 % | TEMPERATURE: 98 F | HEART RATE: 72 BPM | RESPIRATION RATE: 17 BRPM | SYSTOLIC BLOOD PRESSURE: 134 MMHG | DIASTOLIC BLOOD PRESSURE: 99 MMHG

## 2019-09-17 VITALS
OXYGEN SATURATION: 99 % | HEART RATE: 68 BPM | SYSTOLIC BLOOD PRESSURE: 132 MMHG | RESPIRATION RATE: 16 BRPM | DIASTOLIC BLOOD PRESSURE: 88 MMHG | TEMPERATURE: 99 F

## 2019-09-17 DIAGNOSIS — N94.6 DYSMENORRHEA, UNSPECIFIED: ICD-10-CM

## 2019-09-17 DIAGNOSIS — H71.92 UNSPECIFIED CHOLESTEATOMA, LEFT EAR: Chronic | ICD-10-CM

## 2019-09-17 DIAGNOSIS — N93.9 ABNORMAL UTERINE AND VAGINAL BLEEDING, UNSPECIFIED: ICD-10-CM

## 2019-09-17 LAB
ALBUMIN SERPL ELPH-MCNC: 4.7 G/DL — SIGNIFICANT CHANGE UP (ref 3.3–5)
ALP SERPL-CCNC: 64 U/L — SIGNIFICANT CHANGE UP (ref 40–120)
ALT FLD-CCNC: 15 U/L — SIGNIFICANT CHANGE UP (ref 10–45)
ANION GAP SERPL CALC-SCNC: 13 MMOL/L — SIGNIFICANT CHANGE UP (ref 5–17)
APPEARANCE UR: CLEAR — SIGNIFICANT CHANGE UP
AST SERPL-CCNC: 15 U/L — SIGNIFICANT CHANGE UP (ref 10–40)
BASOPHILS # BLD AUTO: 0.05 K/UL — SIGNIFICANT CHANGE UP (ref 0–0.2)
BASOPHILS NFR BLD AUTO: 0.4 % — SIGNIFICANT CHANGE UP (ref 0–2)
BILIRUB SERPL-MCNC: 0.4 MG/DL — SIGNIFICANT CHANGE UP (ref 0.2–1.2)
BILIRUB UR-MCNC: NEGATIVE — SIGNIFICANT CHANGE UP
BUN SERPL-MCNC: 13 MG/DL — SIGNIFICANT CHANGE UP (ref 7–23)
CALCIUM SERPL-MCNC: 9.9 MG/DL — SIGNIFICANT CHANGE UP (ref 8.4–10.5)
CHLORIDE SERPL-SCNC: 105 MMOL/L — SIGNIFICANT CHANGE UP (ref 96–108)
CO2 SERPL-SCNC: 23 MMOL/L — SIGNIFICANT CHANGE UP (ref 22–31)
COLOR SPEC: YELLOW — SIGNIFICANT CHANGE UP
CREAT SERPL-MCNC: 0.7 MG/DL — SIGNIFICANT CHANGE UP (ref 0.5–1.3)
DIFF PNL FLD: ABNORMAL
EOSINOPHIL # BLD AUTO: 0.26 K/UL — SIGNIFICANT CHANGE UP (ref 0–0.5)
EOSINOPHIL NFR BLD AUTO: 2.2 % — SIGNIFICANT CHANGE UP (ref 0–6)
GLUCOSE SERPL-MCNC: 147 MG/DL — HIGH (ref 70–99)
GLUCOSE UR QL: NEGATIVE — SIGNIFICANT CHANGE UP
HCG SERPL-ACNC: <0 MIU/ML — SIGNIFICANT CHANGE UP
HCT VFR BLD CALC: 44.8 % — SIGNIFICANT CHANGE UP (ref 34.5–45)
HGB BLD-MCNC: 14.7 G/DL — SIGNIFICANT CHANGE UP (ref 11.5–15.5)
HIV 1+2 AB+HIV1 P24 AG SERPL QL IA: SIGNIFICANT CHANGE UP
IMM GRANULOCYTES NFR BLD AUTO: 0.4 % — SIGNIFICANT CHANGE UP (ref 0–1.5)
KETONES UR-MCNC: NEGATIVE — SIGNIFICANT CHANGE UP
LEUKOCYTE ESTERASE UR-ACNC: NEGATIVE — SIGNIFICANT CHANGE UP
LYMPHOCYTES # BLD AUTO: 19.4 % — SIGNIFICANT CHANGE UP (ref 13–44)
LYMPHOCYTES # BLD AUTO: 2.33 K/UL — SIGNIFICANT CHANGE UP (ref 1–3.3)
MCHC RBC-ENTMCNC: 29.2 PG — SIGNIFICANT CHANGE UP (ref 27–34)
MCHC RBC-ENTMCNC: 32.8 GM/DL — SIGNIFICANT CHANGE UP (ref 32–36)
MCV RBC AUTO: 88.9 FL — SIGNIFICANT CHANGE UP (ref 80–100)
MONOCYTES # BLD AUTO: 0.71 K/UL — SIGNIFICANT CHANGE UP (ref 0–0.9)
MONOCYTES NFR BLD AUTO: 5.9 % — SIGNIFICANT CHANGE UP (ref 2–14)
NEUTROPHILS # BLD AUTO: 8.64 K/UL — HIGH (ref 1.8–7.4)
NEUTROPHILS NFR BLD AUTO: 71.7 % — SIGNIFICANT CHANGE UP (ref 43–77)
NITRITE UR-MCNC: NEGATIVE — SIGNIFICANT CHANGE UP
NRBC # BLD: 0 /100 WBCS — SIGNIFICANT CHANGE UP (ref 0–0)
PH UR: 5.5 — SIGNIFICANT CHANGE UP (ref 5–8)
PLATELET # BLD AUTO: 383 K/UL — SIGNIFICANT CHANGE UP (ref 150–400)
POTASSIUM SERPL-MCNC: 4.1 MMOL/L — SIGNIFICANT CHANGE UP (ref 3.5–5.3)
POTASSIUM SERPL-SCNC: 4.1 MMOL/L — SIGNIFICANT CHANGE UP (ref 3.5–5.3)
PROT SERPL-MCNC: 8.8 G/DL — HIGH (ref 6–8.3)
PROT UR-MCNC: NEGATIVE MG/DL — SIGNIFICANT CHANGE UP
RBC # BLD: 5.04 M/UL — SIGNIFICANT CHANGE UP (ref 3.8–5.2)
RBC # FLD: 13.5 % — SIGNIFICANT CHANGE UP (ref 10.3–14.5)
SODIUM SERPL-SCNC: 141 MMOL/L — SIGNIFICANT CHANGE UP (ref 135–145)
SP GR SPEC: 1.02 — SIGNIFICANT CHANGE UP (ref 1–1.03)
UROBILINOGEN FLD QL: 0.2 E.U./DL — SIGNIFICANT CHANGE UP
WBC # BLD: 12.04 K/UL — HIGH (ref 3.8–10.5)
WBC # FLD AUTO: 12.04 K/UL — HIGH (ref 3.8–10.5)

## 2019-09-17 PROCEDURE — 87389 HIV-1 AG W/HIV-1&-2 AB AG IA: CPT

## 2019-09-17 PROCEDURE — 96374 THER/PROPH/DIAG INJ IV PUSH: CPT

## 2019-09-17 PROCEDURE — 84702 CHORIONIC GONADOTROPIN TEST: CPT

## 2019-09-17 PROCEDURE — 81001 URINALYSIS AUTO W/SCOPE: CPT

## 2019-09-17 PROCEDURE — 76856 US EXAM PELVIC COMPLETE: CPT | Mod: 26

## 2019-09-17 PROCEDURE — 76830 TRANSVAGINAL US NON-OB: CPT

## 2019-09-17 PROCEDURE — 36415 COLL VENOUS BLD VENIPUNCTURE: CPT

## 2019-09-17 PROCEDURE — 76856 US EXAM PELVIC COMPLETE: CPT

## 2019-09-17 PROCEDURE — 80053 COMPREHEN METABOLIC PANEL: CPT

## 2019-09-17 PROCEDURE — 87086 URINE CULTURE/COLONY COUNT: CPT

## 2019-09-17 PROCEDURE — 99284 EMERGENCY DEPT VISIT MOD MDM: CPT | Mod: 25

## 2019-09-17 PROCEDURE — 99284 EMERGENCY DEPT VISIT MOD MDM: CPT

## 2019-09-17 PROCEDURE — 76830 TRANSVAGINAL US NON-OB: CPT | Mod: 26

## 2019-09-17 PROCEDURE — 85025 COMPLETE CBC W/AUTO DIFF WBC: CPT

## 2019-09-17 RX ORDER — CIPROFLOXACIN AND DEXAMETHASONE 3; 1 MG/ML; MG/ML
4 SUSPENSION/ DROPS AURICULAR (OTIC)
Qty: 5 | Refills: 0
Start: 2019-09-17 | End: 2019-09-23

## 2019-09-17 RX ORDER — TRAMADOL HYDROCHLORIDE 50 MG/1
1 TABLET ORAL
Qty: 9 | Refills: 0
Start: 2019-09-17

## 2019-09-17 RX ORDER — IBUPROFEN 200 MG
1 TABLET ORAL
Qty: 30 | Refills: 0
Start: 2019-09-17

## 2019-09-17 RX ORDER — KETOROLAC TROMETHAMINE 30 MG/ML
30 SYRINGE (ML) INJECTION ONCE
Refills: 0 | Status: DISCONTINUED | OUTPATIENT
Start: 2019-09-17 | End: 2019-09-17

## 2019-09-17 RX ADMIN — Medication 30 MILLIGRAM(S): at 19:11

## 2019-09-17 NOTE — ED PROVIDER NOTE - CARE PROVIDER_API CALL
Jose Mejia)  Obstetrics and Gynecology  [address] [address]  Phone: [phone]  Fax: [phone]  Follow Up Time:     Bianca Keith)  [facility] Obstetrics and Gynecology  [address] [place] [id number]  Phone: [phone]  Fax: [phone]  Follow Up Time:

## 2019-09-17 NOTE — ED ADULT TRIAGE NOTE - ARRIVAL INFO ADDITIONAL COMMENTS
walked into Ed with c/o of left side cramping, vaingal bleeding ( today 5 pads with clots present. Onset was a month ago. She states her menstruals were irregular in jan, then in feb she had no menstrual period, she was spotting in may and then was bleeding continuously since august until now. HX of PCOS, asthma. Denies pregnancy. denies fever, chills, dysuria,.

## 2019-09-17 NOTE — ED PROVIDER NOTE - CARE PROVIDERS DIRECT ADDRESSES
[email or web address]

## 2019-09-17 NOTE — ED PROVIDER NOTE - NSFOLLOWUPINSTRUCTIONS_ED_ALL_ED_FT
Menorrhagia    WHAT YOU NEED TO KNOW:    Menorrhagia is heavy menstrual bleeding for more than 7 days or severe menstrual bleeding for less than 7 days. Your menstrual bleeding and cramping are so heavy that you have trouble doing your usual daily activities. Your monthly period may also occur more often, and you may bleed between periods. Menorrhagia is common in adolescence and around menopause.    DISCHARGE INSTRUCTIONS:    Call 911 for any of the following:     You have chest pain and shortness of breath.       Your heart is fluttering or beating faster than usual for you.     Return to the emergency department if:     You feel dizzy when you stand.       You feel confused.       You have severe abdominal pain, nausea, and vomiting.       Your skin or the whites of your eyes turn yellow.    Contact your healthcare provider if:     You need to change your pad or tampon more than 1 time per hour, for several hours in a row.      You feel more weak and tired than usual.       You have new coldness in your hands and feet.       You have questions or concerns about your condition or care.    Medicines: You may need any of the following:     Iron supplements may be given if your blood iron level decreases because of heavy bleeding.      NSAIDs, such as ibuprofen, treat menstrual cramps. This medicine is available with or without a doctor's order. NSAIDs can cause stomach bleeding or kidney problems in certain people. If you take blood thinner medicine, always ask your healthcare provider if NSAIDs are safe for you. Always read the medicine label and follow directions.      Hormones help slow or stop your bleeding and make your monthly periods more regular. This medicine may be given as birth control pills or an intrauterine device (IUD).      Take your medicine as directed. Contact your healthcare provider if you think your medicine is not helping or if you have side effects. Tell him of her if you are allergic to any medicine. Keep a list of the medicines, vitamins, and herbs you take. Include the amounts, and when and why you take them. Bring the list or the pill bottles to follow-up visits. Carry your medicine list with you in case of an emergency.    Manage your symptoms:     Keep a supply of pads or tampons with you at all times. If possible, stay close to a bathroom.      Apply heat on your abdomen for 20 to 30 minutes every 2 hours for as many days as directed. Heat helps decrease pain and cramps.    Follow up with your healthcare provider as directed: You may need regular pelvic exams with Pap smears to monitor your condition. Write down your questions so you remember to ask them during your visits.

## 2019-09-17 NOTE — ED PROVIDER NOTE - PATIENT PORTAL LINK FT
You can access the FollowMyHealth Patient Portal offered by Health system by registering at the following website: [email or web address]. By joining [facility]’s FollowMyHealth portal, you will also be able to view your health information using other applications (apps) compatible with our system.

## 2019-09-17 NOTE — ED PROVIDER NOTE - ATTENDING CONTRIBUTION TO CARE
28 y/o f hx PCOS presents w left pelvic pain, persistent vaginal bleeding over the past 1.5 months.  VSS,  Well appearing, nad, nc/at, lung cta, heart reg, abd soft, nt, ext no gross deformity, no gross neuro deficits plan for labs, US and reeval, if no acute process, fu with gyn.

## 2019-09-17 NOTE — ED PROVIDER NOTE - CLINICAL SUMMARY MEDICAL DECISION MAKING FREE TEXT BOX
28 y/o f hx PCOS presents c/o left pelvic pain, persistent vaginal bleeding over the past 1.5 months.  VSS, labs wnl, stable h/h, u/s shows no ovarian cyst/torsion.  Will d/c, refer to GYN as outpatient.

## 2019-09-17 NOTE — ED ADULT NURSE NOTE - OBJECTIVE STATEMENT
30 y/o female w/ hx of PCOS presents to ED c/o vaginal bleeding that began 1.5 months ago associated w/ lower abdominal pain, worsening this AM. Denies fever, chills, nausea, vomiting, diarrhea, dysuria, hematuria, and any other associated sx.

## 2019-09-17 NOTE — ED PROVIDER NOTE - OBJECTIVE STATEMENT
30 y/o f hx PCOS presents c/o vaginal bleeding over the past 1.5 months, left pelvic pain.  Pt stating pain worsening this morning, she took midol without improvement.  Denies fever, chills, n/v/d, dysuria, all other ROS negative.

## 2019-09-18 LAB
CULTURE RESULTS: SIGNIFICANT CHANGE UP
SPECIMEN SOURCE: SIGNIFICANT CHANGE UP

## 2020-01-03 ENCOUNTER — EMERGENCY (EMERGENCY)
Facility: HOSPITAL | Age: 30
LOS: 1 days | Discharge: HOME | End: 2020-01-03
Admitting: EMERGENCY MEDICINE
Payer: MEDICAID

## 2020-01-03 VITALS
SYSTOLIC BLOOD PRESSURE: 125 MMHG | OXYGEN SATURATION: 98 % | RESPIRATION RATE: 18 BRPM | TEMPERATURE: 98 F | HEART RATE: 71 BPM | DIASTOLIC BLOOD PRESSURE: 81 MMHG

## 2020-01-03 VITALS
RESPIRATION RATE: 18 BRPM | TEMPERATURE: 98 F | SYSTOLIC BLOOD PRESSURE: 119 MMHG | DIASTOLIC BLOOD PRESSURE: 81 MMHG | HEART RATE: 93 BPM

## 2020-01-03 DIAGNOSIS — H71.92 UNSPECIFIED CHOLESTEATOMA, LEFT EAR: Chronic | ICD-10-CM

## 2020-01-03 LAB
ALBUMIN SERPL ELPH-MCNC: 4.4 G/DL — SIGNIFICANT CHANGE UP (ref 3.3–5)
ALP SERPL-CCNC: 63 U/L — SIGNIFICANT CHANGE UP (ref 40–120)
ALT FLD-CCNC: 28 U/L — SIGNIFICANT CHANGE UP (ref 10–45)
ANION GAP SERPL CALC-SCNC: 16 MMOL/L — SIGNIFICANT CHANGE UP (ref 5–17)
APTT BLD: 36.5 SEC — HIGH (ref 27.5–36.3)
AST SERPL-CCNC: 24 U/L — SIGNIFICANT CHANGE UP (ref 10–40)
BASOPHILS # BLD AUTO: 0.04 K/UL — SIGNIFICANT CHANGE UP (ref 0–0.2)
BASOPHILS NFR BLD AUTO: 0.3 % — SIGNIFICANT CHANGE UP (ref 0–2)
BILIRUB SERPL-MCNC: 0.4 MG/DL — SIGNIFICANT CHANGE UP (ref 0.2–1.2)
BUN SERPL-MCNC: 12 MG/DL — SIGNIFICANT CHANGE UP (ref 7–23)
CALCIUM SERPL-MCNC: 9.4 MG/DL — SIGNIFICANT CHANGE UP (ref 8.4–10.5)
CHLORIDE SERPL-SCNC: 104 MMOL/L — SIGNIFICANT CHANGE UP (ref 96–108)
CO2 SERPL-SCNC: 21 MMOL/L — LOW (ref 22–31)
CREAT SERPL-MCNC: 0.71 MG/DL — SIGNIFICANT CHANGE UP (ref 0.5–1.3)
D DIMER BLD IA.RAPID-MCNC: <150 NG/ML DDU — SIGNIFICANT CHANGE UP
EOSINOPHIL # BLD AUTO: 0.88 K/UL — HIGH (ref 0–0.5)
EOSINOPHIL NFR BLD AUTO: 7.1 % — HIGH (ref 0–6)
GLUCOSE SERPL-MCNC: 163 MG/DL — HIGH (ref 70–99)
HCG SERPL-ACNC: <0 MIU/ML — SIGNIFICANT CHANGE UP
HCT VFR BLD CALC: 46.2 % — HIGH (ref 34.5–45)
HGB BLD-MCNC: 14.5 G/DL — SIGNIFICANT CHANGE UP (ref 11.5–15.5)
IMM GRANULOCYTES NFR BLD AUTO: 0.4 % — SIGNIFICANT CHANGE UP (ref 0–1.5)
INR BLD: 2.51 — HIGH (ref 0.88–1.16)
LYMPHOCYTES # BLD AUTO: 2.6 K/UL — SIGNIFICANT CHANGE UP (ref 1–3.3)
LYMPHOCYTES # BLD AUTO: 21.1 % — SIGNIFICANT CHANGE UP (ref 13–44)
MCHC RBC-ENTMCNC: 28 PG — SIGNIFICANT CHANGE UP (ref 27–34)
MCHC RBC-ENTMCNC: 31.4 GM/DL — LOW (ref 32–36)
MCV RBC AUTO: 89.2 FL — SIGNIFICANT CHANGE UP (ref 80–100)
MONOCYTES # BLD AUTO: 0.64 K/UL — SIGNIFICANT CHANGE UP (ref 0–0.9)
MONOCYTES NFR BLD AUTO: 5.2 % — SIGNIFICANT CHANGE UP (ref 2–14)
NEUTROPHILS # BLD AUTO: 8.12 K/UL — HIGH (ref 1.8–7.4)
NEUTROPHILS NFR BLD AUTO: 65.9 % — SIGNIFICANT CHANGE UP (ref 43–77)
NRBC # BLD: 0 /100 WBCS — SIGNIFICANT CHANGE UP (ref 0–0)
PLATELET # BLD AUTO: 364 K/UL — SIGNIFICANT CHANGE UP (ref 150–400)
POTASSIUM SERPL-MCNC: 3.8 MMOL/L — SIGNIFICANT CHANGE UP (ref 3.5–5.3)
POTASSIUM SERPL-SCNC: 3.8 MMOL/L — SIGNIFICANT CHANGE UP (ref 3.5–5.3)
PROT SERPL-MCNC: 8 G/DL — SIGNIFICANT CHANGE UP (ref 6–8.3)
PROTHROM AB SERPL-ACNC: 29.5 SEC — HIGH (ref 10–12.9)
RBC # BLD: 5.18 M/UL — SIGNIFICANT CHANGE UP (ref 3.8–5.2)
RBC # FLD: 13.3 % — SIGNIFICANT CHANGE UP (ref 10.3–14.5)
SODIUM SERPL-SCNC: 141 MMOL/L — SIGNIFICANT CHANGE UP (ref 135–145)
TROPONIN T SERPL-MCNC: <0.01 NG/ML — SIGNIFICANT CHANGE UP (ref 0–0.01)
WBC # BLD: 12.33 K/UL — HIGH (ref 3.8–10.5)
WBC # FLD AUTO: 12.33 K/UL — HIGH (ref 3.8–10.5)

## 2020-01-03 PROCEDURE — 84702 CHORIONIC GONADOTROPIN TEST: CPT

## 2020-01-03 PROCEDURE — 99285 EMERGENCY DEPT VISIT HI MDM: CPT

## 2020-01-03 PROCEDURE — 93005 ELECTROCARDIOGRAM TRACING: CPT

## 2020-01-03 PROCEDURE — 96374 THER/PROPH/DIAG INJ IV PUSH: CPT

## 2020-01-03 PROCEDURE — 93010 ELECTROCARDIOGRAM REPORT: CPT

## 2020-01-03 PROCEDURE — 84484 ASSAY OF TROPONIN QUANT: CPT

## 2020-01-03 PROCEDURE — 36415 COLL VENOUS BLD VENIPUNCTURE: CPT

## 2020-01-03 PROCEDURE — 85025 COMPLETE CBC W/AUTO DIFF WBC: CPT

## 2020-01-03 PROCEDURE — 99284 EMERGENCY DEPT VISIT MOD MDM: CPT | Mod: 25

## 2020-01-03 PROCEDURE — 71046 X-RAY EXAM CHEST 2 VIEWS: CPT

## 2020-01-03 PROCEDURE — 85610 PROTHROMBIN TIME: CPT

## 2020-01-03 PROCEDURE — 85730 THROMBOPLASTIN TIME PARTIAL: CPT

## 2020-01-03 PROCEDURE — 80053 COMPREHEN METABOLIC PANEL: CPT

## 2020-01-03 PROCEDURE — 71046 X-RAY EXAM CHEST 2 VIEWS: CPT | Mod: 26

## 2020-01-03 PROCEDURE — 85379 FIBRIN DEGRADATION QUANT: CPT

## 2020-01-03 RX ORDER — IBUPROFEN 200 MG
1 TABLET ORAL
Qty: 16 | Refills: 0
Start: 2020-01-03

## 2020-01-03 RX ORDER — KETOROLAC TROMETHAMINE 30 MG/ML
30 SYRINGE (ML) INJECTION ONCE
Refills: 0 | Status: DISCONTINUED | OUTPATIENT
Start: 2020-01-03 | End: 2020-01-03

## 2020-01-03 RX ADMIN — Medication 30 MILLIGRAM(S): at 06:59

## 2020-01-03 RX ADMIN — Medication 30 MILLIGRAM(S): at 06:47

## 2020-01-03 NOTE — ED ADULT NURSE NOTE - OBJECTIVE STATEMENT
Patient aox3 and ambulatory upon arrival. Patient c/o R upper ribcage/trunk pain when taking a deep breath or coughing/sneezing x2days. Patient states "I have had a cough for a few weeks, but on the 1st, my R ribcage started really hurting when I cough or sneeze." Patient displays the area posteriorly to R breast; nontender to touch and no deformity noted. Patient denies accident/injury/fall. Patient denies radiation of pain. Patient denies dizziness, N/V/D, CP. Patient c/o productive cough, no other symptoms associated. PMH per patient: htn, dm. Patient smokes marijuana daily.

## 2020-01-03 NOTE — ED PROVIDER NOTE - OBJECTIVE STATEMENT
28 yo fem with Hx HTN, DM, asthma, depression, PCOS  c/o pain "in the right ribs" - located specifically under right breast and lateral aspect of the right chest - for the past 3 days.  Pain only occurs with deep breath, and she denies having any SOB.  No cough or hemoptysis.  No pain in other areas of chest or in the upper abdomen.  No fever or chills, palpitations, sweats, nausea/vomiting, changes in bowel or bladder habits.  No hx of trauma.  She has taken Motrin and Tramadol which helped the pain briefly.    No hx of DVT/PE, hemoptysis, malignancy, estrogen use, or recent immobilization.    PMHx as above  PSHx cholesteatoma surgery  Meds Januvia, sertraline, albuterol, lisinopril  Social: no tobacco smoking or vaping.  occasional marijuana.  no cocaine or amphetamine use  Family: no hx of DVT/PE, CAD, or sudden death in immediate family.

## 2020-01-03 NOTE — ED ADULT NURSE NOTE - CHPI ED NUR SYMPTOMS NEG
no nausea/no tingling/no vomiting/no weakness/no dizziness/no chills/no fever/no decreased eating/drinking

## 2020-01-03 NOTE — ED PROVIDER NOTE - PATIENT PORTAL LINK FT
You can access the FollowMyHealth Patient Portal offered by [facility] by registering at the following website: [email or web address]. By joining [facility]’s FollowMyHealth portal, you will also be able to view your health information using other applications (apps) compatible with our system.

## 2020-01-03 NOTE — ED ADULT TRIAGE NOTE - CHIEF COMPLAINT QUOTE
pain to right lower rib since 2 days ago - worst if coughing/sneezing/sudden movement;  denies injury/bumping or fall; took Motrin then Tramadol PO 1 tab tonight with temporary relief

## 2020-01-03 NOTE — ED PROVIDER NOTE - CLINICAL SUMMARY MEDICAL DECISION MAKING FREE TEXT BOX
30 yo fem with right lateral chest pain only on deep breathing x 3 days.  No abdominal pain and LFTs normal.  Character and location of pain low suspicion for ACS. Normal EKG.  HEART score 2 (obesity, DM, HTN).  Low clinical suspicion for PE, PERC negative, and d-dimer negative.  CXR clear, no significant pleural effusion.  Suspect chest wall pain.  Consider shingles (no rash).

## 2020-01-03 NOTE — ED PROVIDER NOTE - NSFOLLOWUPINSTRUCTIONS_ED_ALL_ED_FT
Tylenol if needed for pain, use as directed.  Follow up with your doctor in 1-2 days.  Return to the Emergency Department if you have any new or worsening symptoms, or if you have any concerns.  ==========================  THORACIC PAIN - AfterCare(R) Instructions(ER/ED)     Thoracic Pain    WHAT YOU NEED TO KNOW:    Thoracic pain is discomfort in any area between your neck and your abdomen. Thoracic pain may be caused by health conditions that affect your gastrointestinal system, lungs, bones, or muscles. It can also be caused by trauma, panic attacks, or anxiety related to stress.    DISCHARGE INSTRUCTIONS:    Return to the emergency department if:     You have a period of thoracic pain that lasts longer than 5 minutes.       Your thoracic pain gets worse.       You have a history of angina (pressure or squeezing chest pain) and your usual medicine does not work.       You have thoracic pain with shortness of breath, sweating, dizziness, vomiting, or nausea.      You have thoracic pain that spreads to your arm, neck, back, jaw, or stomach.    Contact your healthcare provider or specialist if:     Your thoracic pain is not relieved by resting, heat, or medicines.       You have questions or concerns about your condition or care.     Medicines: You may need any of the following:     Acetaminophen decreases pain. It is available without a prescription. Ask how much to take and how often to take it. Follow directions. Acetaminophen can cause liver damage if not taken correctly.      NSAIDs, such as ibuprofen, help decrease swelling, pain, and fever. This medicine is available with or without a doctor's order. NSAIDs can cause stomach bleeding or kidney problems in certain people. If you take blood thinner medicine, always ask if NSAIDs are safe for you. Always read the medicine label and follow directions. Do not give these medicines to children under 6 months of age without direction from your child's healthcare provider.      Take your medicine as directed. Contact your healthcare provider if you think your medicine is not helping or if you have side effects. Tell him of her if you are allergic to any medicine. Keep a list of the medicines, vitamins, and herbs you take. Include the amounts, and when and why you take them. Bring the list or the pill bottles to follow-up visits. Carry your medicine list with you in case of an emergency.    Follow up with your healthcare provider or specialist as directed: Write down your questions so you remember to ask them during your visits.     Self-care:     Apply heat to the area. Heat helps decrease pain and muscle spasms. Apply heat on the area for 20 to 30 minutes every 2 hours for as many days as directed.       Limit physical activity that causes pain. Rest as needed. Ask your healthcare provider how long you should limit activity.

## 2020-01-03 NOTE — ED PROVIDER NOTE - NS ED ROS FT
CONSTITUTIONAL: No fever, chills, or weakness  NEURO: No headache, no dizziness, no syncope; No focal weakness/tingling/numbness  EYES: No visual changes  ENT: No rhinorrhea or sore throat  PULM: HPI  CV: HPI  GI: No abdominal pain, vomiting, or diarrhea  : No dysuria, hematuria, frequency  MSK: No neck pain or back pain, no joint pain  SKIN: no rash or unusual bruising

## 2020-01-09 DIAGNOSIS — Z79.52 LONG TERM (CURRENT) USE OF SYSTEMIC STEROIDS: ICD-10-CM

## 2020-01-09 DIAGNOSIS — Z79.899 OTHER LONG TERM (CURRENT) DRUG THERAPY: ICD-10-CM

## 2020-01-09 DIAGNOSIS — R07.81 PLEURODYNIA: ICD-10-CM

## 2020-01-09 DIAGNOSIS — R07.1 CHEST PAIN ON BREATHING: ICD-10-CM

## 2020-01-09 DIAGNOSIS — J45.909 UNSPECIFIED ASTHMA, UNCOMPLICATED: ICD-10-CM

## 2021-02-01 ENCOUNTER — OUTPATIENT (OUTPATIENT)
Dept: OUTPATIENT SERVICES | Facility: HOSPITAL | Age: 31
LOS: 1 days | End: 2021-02-01
Payer: MEDICAID

## 2021-02-01 DIAGNOSIS — H71.92 UNSPECIFIED CHOLESTEATOMA, LEFT EAR: Chronic | ICD-10-CM

## 2021-02-13 ENCOUNTER — TRANSCRIPTION ENCOUNTER (OUTPATIENT)
Age: 31
End: 2021-02-13

## 2021-02-14 ENCOUNTER — INPATIENT (INPATIENT)
Facility: HOSPITAL | Age: 31
LOS: 0 days | Discharge: HOME | DRG: 661 | End: 2021-02-15
Attending: UROLOGY | Admitting: UROLOGY
Payer: MEDICAID

## 2021-02-14 ENCOUNTER — TRANSCRIPTION ENCOUNTER (OUTPATIENT)
Age: 31
End: 2021-02-14

## 2021-02-14 VITALS
SYSTOLIC BLOOD PRESSURE: 188 MMHG | HEART RATE: 98 BPM | OXYGEN SATURATION: 98 % | HEIGHT: 63 IN | TEMPERATURE: 98 F | DIASTOLIC BLOOD PRESSURE: 105 MMHG | RESPIRATION RATE: 18 BRPM

## 2021-02-14 DIAGNOSIS — H71.92 UNSPECIFIED CHOLESTEATOMA, LEFT EAR: Chronic | ICD-10-CM

## 2021-02-14 DIAGNOSIS — N23 UNSPECIFIED RENAL COLIC: ICD-10-CM

## 2021-02-14 PROBLEM — I10 ESSENTIAL (PRIMARY) HYPERTENSION: Chronic | Status: ACTIVE | Noted: 2020-01-03

## 2021-02-14 LAB
ANION GAP SERPL CALC-SCNC: 13 MMOL/L — SIGNIFICANT CHANGE UP (ref 5–17)
APPEARANCE UR: CLEAR — SIGNIFICANT CHANGE UP
APTT BLD: 33.4 SEC — SIGNIFICANT CHANGE UP (ref 27.5–35.5)
BACTERIA # UR AUTO: SIGNIFICANT CHANGE UP /HPF
BASOPHILS # BLD AUTO: 0.05 K/UL — SIGNIFICANT CHANGE UP (ref 0–0.2)
BASOPHILS NFR BLD AUTO: 0.3 % — SIGNIFICANT CHANGE UP (ref 0–2)
BILIRUB UR-MCNC: NEGATIVE — SIGNIFICANT CHANGE UP
BLD GP AB SCN SERPL QL: NEGATIVE — SIGNIFICANT CHANGE UP
BUN SERPL-MCNC: 22 MG/DL — SIGNIFICANT CHANGE UP (ref 7–23)
CALCIUM SERPL-MCNC: 9.7 MG/DL — SIGNIFICANT CHANGE UP (ref 8.4–10.5)
CHLORIDE SERPL-SCNC: 102 MMOL/L — SIGNIFICANT CHANGE UP (ref 96–108)
CO2 SERPL-SCNC: 22 MMOL/L — SIGNIFICANT CHANGE UP (ref 22–31)
COLOR SPEC: YELLOW — SIGNIFICANT CHANGE UP
CREAT SERPL-MCNC: 1.12 MG/DL — SIGNIFICANT CHANGE UP (ref 0.5–1.3)
DIFF PNL FLD: ABNORMAL
EOSINOPHIL # BLD AUTO: 0.31 K/UL — SIGNIFICANT CHANGE UP (ref 0–0.5)
EOSINOPHIL NFR BLD AUTO: 1.8 % — SIGNIFICANT CHANGE UP (ref 0–6)
EPI CELLS # UR: SIGNIFICANT CHANGE UP /HPF (ref 0–5)
GLUCOSE SERPL-MCNC: 176 MG/DL — HIGH (ref 70–99)
GLUCOSE UR QL: NEGATIVE — SIGNIFICANT CHANGE UP
HCT VFR BLD CALC: 42.1 % — SIGNIFICANT CHANGE UP (ref 34.5–45)
HGB BLD-MCNC: 13.9 G/DL — SIGNIFICANT CHANGE UP (ref 11.5–15.5)
IMM GRANULOCYTES NFR BLD AUTO: 0.6 % — SIGNIFICANT CHANGE UP (ref 0–1.5)
INR BLD: 0.99 — SIGNIFICANT CHANGE UP (ref 0.88–1.16)
KETONES UR-MCNC: NEGATIVE — SIGNIFICANT CHANGE UP
LEUKOCYTE ESTERASE UR-ACNC: ABNORMAL
LYMPHOCYTES # BLD AUTO: 13.9 % — SIGNIFICANT CHANGE UP (ref 13–44)
LYMPHOCYTES # BLD AUTO: 2.42 K/UL — SIGNIFICANT CHANGE UP (ref 1–3.3)
MCHC RBC-ENTMCNC: 29.3 PG — SIGNIFICANT CHANGE UP (ref 27–34)
MCHC RBC-ENTMCNC: 33 GM/DL — SIGNIFICANT CHANGE UP (ref 32–36)
MCV RBC AUTO: 88.8 FL — SIGNIFICANT CHANGE UP (ref 80–100)
MONOCYTES # BLD AUTO: 1.19 K/UL — HIGH (ref 0–0.9)
MONOCYTES NFR BLD AUTO: 6.8 % — SIGNIFICANT CHANGE UP (ref 2–14)
NEUTROPHILS # BLD AUTO: 13.34 K/UL — HIGH (ref 1.8–7.4)
NEUTROPHILS NFR BLD AUTO: 76.6 % — SIGNIFICANT CHANGE UP (ref 43–77)
NITRITE UR-MCNC: NEGATIVE — SIGNIFICANT CHANGE UP
NRBC # BLD: 0 /100 WBCS — SIGNIFICANT CHANGE UP (ref 0–0)
PH UR: 6 — SIGNIFICANT CHANGE UP (ref 5–8)
PLATELET # BLD AUTO: 390 K/UL — SIGNIFICANT CHANGE UP (ref 150–400)
POTASSIUM SERPL-MCNC: 4.3 MMOL/L — SIGNIFICANT CHANGE UP (ref 3.5–5.3)
POTASSIUM SERPL-SCNC: 4.3 MMOL/L — SIGNIFICANT CHANGE UP (ref 3.5–5.3)
PROT UR-MCNC: NEGATIVE MG/DL — SIGNIFICANT CHANGE UP
PROTHROM AB SERPL-ACNC: 11.9 SEC — SIGNIFICANT CHANGE UP (ref 10.6–13.6)
RBC # BLD: 4.74 M/UL — SIGNIFICANT CHANGE UP (ref 3.8–5.2)
RBC # FLD: 12.2 % — SIGNIFICANT CHANGE UP (ref 10.3–14.5)
RBC CASTS # UR COMP ASSIST: < 5 /HPF — SIGNIFICANT CHANGE UP
RH IG SCN BLD-IMP: POSITIVE — SIGNIFICANT CHANGE UP
SARS-COV-2 RNA SPEC QL NAA+PROBE: SIGNIFICANT CHANGE UP
SODIUM SERPL-SCNC: 137 MMOL/L — SIGNIFICANT CHANGE UP (ref 135–145)
SP GR SPEC: 1.01 — SIGNIFICANT CHANGE UP (ref 1–1.03)
UROBILINOGEN FLD QL: 0.2 E.U./DL — SIGNIFICANT CHANGE UP
WBC # BLD: 17.42 K/UL — HIGH (ref 3.8–10.5)
WBC # FLD AUTO: 17.42 K/UL — HIGH (ref 3.8–10.5)
WBC UR QL: ABNORMAL /HPF

## 2021-02-14 PROCEDURE — G1004: CPT

## 2021-02-14 PROCEDURE — 52332 CYSTOSCOPY AND TREATMENT: CPT | Mod: RT

## 2021-02-14 PROCEDURE — 74420 UROGRAPHY RTRGR +-KUB: CPT | Mod: 26

## 2021-02-14 PROCEDURE — 99285 EMERGENCY DEPT VISIT HI MDM: CPT

## 2021-02-14 PROCEDURE — 74176 CT ABD & PELVIS W/O CONTRAST: CPT | Mod: 26,ME

## 2021-02-14 RX ORDER — ONDANSETRON 8 MG/1
4 TABLET, FILM COATED ORAL EVERY 6 HOURS
Refills: 0 | Status: DISCONTINUED | OUTPATIENT
Start: 2021-02-14 | End: 2021-02-15

## 2021-02-14 RX ORDER — PHENAZOPYRIDINE HCL 100 MG
200 TABLET ORAL THREE TIMES A DAY
Refills: 0 | Status: DISCONTINUED | OUTPATIENT
Start: 2021-02-14 | End: 2021-02-15

## 2021-02-14 RX ORDER — DEXTROSE 50 % IN WATER 50 %
12.5 SYRINGE (ML) INTRAVENOUS ONCE
Refills: 0 | Status: DISCONTINUED | OUTPATIENT
Start: 2021-02-14 | End: 2021-02-15

## 2021-02-14 RX ORDER — OXYBUTYNIN CHLORIDE 5 MG
5 TABLET ORAL ONCE
Refills: 0 | Status: COMPLETED | OUTPATIENT
Start: 2021-02-14 | End: 2021-02-14

## 2021-02-14 RX ORDER — MORPHINE SULFATE 50 MG/1
4 CAPSULE, EXTENDED RELEASE ORAL ONCE
Refills: 0 | Status: DISCONTINUED | OUTPATIENT
Start: 2021-02-14 | End: 2021-02-14

## 2021-02-14 RX ORDER — METHOCARBAMOL 500 MG/1
750 TABLET, FILM COATED ORAL ONCE
Refills: 0 | Status: COMPLETED | OUTPATIENT
Start: 2021-02-14 | End: 2021-02-14

## 2021-02-14 RX ORDER — TAMSULOSIN HYDROCHLORIDE 0.4 MG/1
1 CAPSULE ORAL
Qty: 5 | Refills: 0
Start: 2021-02-14 | End: 2021-02-18

## 2021-02-14 RX ORDER — OXYBUTYNIN CHLORIDE 5 MG
5 TABLET ORAL EVERY 8 HOURS
Refills: 0 | Status: DISCONTINUED | OUTPATIENT
Start: 2021-02-14 | End: 2021-02-15

## 2021-02-14 RX ORDER — INFLUENZA VIRUS VACCINE 15; 15; 15; 15 UG/.5ML; UG/.5ML; UG/.5ML; UG/.5ML
0.5 SUSPENSION INTRAMUSCULAR ONCE
Refills: 0 | Status: DISCONTINUED | OUTPATIENT
Start: 2021-02-14 | End: 2021-02-15

## 2021-02-14 RX ORDER — SODIUM CHLORIDE 9 MG/ML
1000 INJECTION INTRAMUSCULAR; INTRAVENOUS; SUBCUTANEOUS
Refills: 0 | Status: DISCONTINUED | OUTPATIENT
Start: 2021-02-14 | End: 2021-02-15

## 2021-02-14 RX ORDER — TAMSULOSIN HYDROCHLORIDE 0.4 MG/1
0.4 CAPSULE ORAL ONCE
Refills: 0 | Status: COMPLETED | OUTPATIENT
Start: 2021-02-14 | End: 2021-02-14

## 2021-02-14 RX ORDER — SODIUM CHLORIDE 9 MG/ML
1000 INJECTION, SOLUTION INTRAVENOUS
Refills: 0 | Status: DISCONTINUED | OUTPATIENT
Start: 2021-02-14 | End: 2021-02-15

## 2021-02-14 RX ORDER — CEFTRIAXONE 500 MG/1
1000 INJECTION, POWDER, FOR SOLUTION INTRAMUSCULAR; INTRAVENOUS ONCE
Refills: 0 | Status: COMPLETED | OUTPATIENT
Start: 2021-02-14 | End: 2021-02-14

## 2021-02-14 RX ORDER — OXYCODONE HYDROCHLORIDE 5 MG/1
10 TABLET ORAL EVERY 4 HOURS
Refills: 0 | Status: DISCONTINUED | OUTPATIENT
Start: 2021-02-14 | End: 2021-02-15

## 2021-02-14 RX ORDER — MORPHINE SULFATE 50 MG/1
4 CAPSULE, EXTENDED RELEASE ORAL EVERY 6 HOURS
Refills: 0 | Status: DISCONTINUED | OUTPATIENT
Start: 2021-02-14 | End: 2021-02-14

## 2021-02-14 RX ORDER — TRAMADOL HYDROCHLORIDE 50 MG/1
1 TABLET ORAL
Qty: 12 | Refills: 0
Start: 2021-02-14 | End: 2021-02-16

## 2021-02-14 RX ORDER — GLUCAGON INJECTION, SOLUTION 0.5 MG/.1ML
1 INJECTION, SOLUTION SUBCUTANEOUS ONCE
Refills: 0 | Status: DISCONTINUED | OUTPATIENT
Start: 2021-02-14 | End: 2021-02-15

## 2021-02-14 RX ORDER — INSULIN LISPRO 100/ML
VIAL (ML) SUBCUTANEOUS
Refills: 0 | Status: DISCONTINUED | OUTPATIENT
Start: 2021-02-14 | End: 2021-02-15

## 2021-02-14 RX ORDER — ACETAMINOPHEN 500 MG
650 TABLET ORAL EVERY 6 HOURS
Refills: 0 | Status: DISCONTINUED | OUTPATIENT
Start: 2021-02-14 | End: 2021-02-15

## 2021-02-14 RX ORDER — OXYCODONE AND ACETAMINOPHEN 5; 325 MG/1; MG/1
1 TABLET ORAL ONCE
Refills: 0 | Status: DISCONTINUED | OUTPATIENT
Start: 2021-02-14 | End: 2021-02-14

## 2021-02-14 RX ORDER — LIDOCAINE 4 G/100G
1 CREAM TOPICAL ONCE
Refills: 0 | Status: COMPLETED | OUTPATIENT
Start: 2021-02-14 | End: 2021-02-14

## 2021-02-14 RX ORDER — OXYCODONE HYDROCHLORIDE 5 MG/1
5 TABLET ORAL EVERY 4 HOURS
Refills: 0 | Status: DISCONTINUED | OUTPATIENT
Start: 2021-02-14 | End: 2021-02-15

## 2021-02-14 RX ORDER — SODIUM CHLORIDE 9 MG/ML
1000 INJECTION INTRAMUSCULAR; INTRAVENOUS; SUBCUTANEOUS ONCE
Refills: 0 | Status: COMPLETED | OUTPATIENT
Start: 2021-02-14 | End: 2021-02-14

## 2021-02-14 RX ORDER — KETOROLAC TROMETHAMINE 30 MG/ML
15 SYRINGE (ML) INJECTION ONCE
Refills: 0 | Status: DISCONTINUED | OUTPATIENT
Start: 2021-02-14 | End: 2021-02-14

## 2021-02-14 RX ORDER — IBUPROFEN 200 MG
1 TABLET ORAL
Qty: 30 | Refills: 0
Start: 2021-02-14

## 2021-02-14 RX ORDER — TAMSULOSIN HYDROCHLORIDE 0.4 MG/1
0.4 CAPSULE ORAL AT BEDTIME
Refills: 0 | Status: DISCONTINUED | OUTPATIENT
Start: 2021-02-14 | End: 2021-02-15

## 2021-02-14 RX ORDER — DEXTROSE 50 % IN WATER 50 %
25 SYRINGE (ML) INTRAVENOUS ONCE
Refills: 0 | Status: DISCONTINUED | OUTPATIENT
Start: 2021-02-14 | End: 2021-02-15

## 2021-02-14 RX ORDER — ONDANSETRON 8 MG/1
4 TABLET, FILM COATED ORAL ONCE
Refills: 0 | Status: COMPLETED | OUTPATIENT
Start: 2021-02-14 | End: 2021-02-14

## 2021-02-14 RX ORDER — DEXTROSE 50 % IN WATER 50 %
15 SYRINGE (ML) INTRAVENOUS ONCE
Refills: 0 | Status: DISCONTINUED | OUTPATIENT
Start: 2021-02-14 | End: 2021-02-15

## 2021-02-14 RX ADMIN — METHOCARBAMOL 750 MILLIGRAM(S): 500 TABLET, FILM COATED ORAL at 05:52

## 2021-02-14 RX ADMIN — ONDANSETRON 4 MILLIGRAM(S): 8 TABLET, FILM COATED ORAL at 09:01

## 2021-02-14 RX ADMIN — ONDANSETRON 4 MILLIGRAM(S): 8 TABLET, FILM COATED ORAL at 17:47

## 2021-02-14 RX ADMIN — CEFTRIAXONE 100 MILLIGRAM(S): 500 INJECTION, POWDER, FOR SOLUTION INTRAMUSCULAR; INTRAVENOUS at 12:07

## 2021-02-14 RX ADMIN — Medication 650 MILLIGRAM(S): at 17:47

## 2021-02-14 RX ADMIN — SODIUM CHLORIDE 1000 MILLILITER(S): 9 INJECTION INTRAMUSCULAR; INTRAVENOUS; SUBCUTANEOUS at 05:13

## 2021-02-14 RX ADMIN — SODIUM CHLORIDE 1000 MILLILITER(S): 9 INJECTION INTRAMUSCULAR; INTRAVENOUS; SUBCUTANEOUS at 07:12

## 2021-02-14 RX ADMIN — LIDOCAINE 1 PATCH: 4 CREAM TOPICAL at 05:52

## 2021-02-14 RX ADMIN — Medication 2: at 22:01

## 2021-02-14 RX ADMIN — MORPHINE SULFATE 4 MILLIGRAM(S): 50 CAPSULE, EXTENDED RELEASE ORAL at 08:03

## 2021-02-14 RX ADMIN — Medication 15 MILLIGRAM(S): at 06:42

## 2021-02-14 RX ADMIN — Medication 5 MILLIGRAM(S): at 18:40

## 2021-02-14 RX ADMIN — MORPHINE SULFATE 4 MILLIGRAM(S): 50 CAPSULE, EXTENDED RELEASE ORAL at 05:43

## 2021-02-14 RX ADMIN — OXYCODONE HYDROCHLORIDE 10 MILLIGRAM(S): 5 TABLET ORAL at 22:01

## 2021-02-14 RX ADMIN — TAMSULOSIN HYDROCHLORIDE 0.4 MILLIGRAM(S): 0.4 CAPSULE ORAL at 07:00

## 2021-02-14 RX ADMIN — LIDOCAINE 1 PATCH: 4 CREAM TOPICAL at 15:45

## 2021-02-14 RX ADMIN — LIDOCAINE 1 PATCH: 4 CREAM TOPICAL at 05:57

## 2021-02-14 RX ADMIN — TAMSULOSIN HYDROCHLORIDE 0.4 MILLIGRAM(S): 0.4 CAPSULE ORAL at 22:01

## 2021-02-14 RX ADMIN — OXYCODONE AND ACETAMINOPHEN 1 TABLET(S): 5; 325 TABLET ORAL at 07:00

## 2021-02-14 NOTE — ED PROVIDER NOTE - PROGRESS NOTE DETAILS
Kleabrahamfish: received s/o pending reassessment. Pain improving but still in pain. Has no fevers. given proximal 5mm stone,  consulted. Will reassess. Klepfish: Pt currently feeling better. still w/ intermittent pain and intermittent vomiting. Will admit gu for further care.  requesting antibiotics. ceftriaxone given.

## 2021-02-14 NOTE — ED PROVIDER NOTE - CARE PROVIDER_API CALL
Ron Triana)  Urology  [address]  [place], [place] [address]  Phone: [phone]  Fax: [phone]  Follow Up Time:

## 2021-02-14 NOTE — H&P ADULT - NSICDXPASTMEDICALHX_GEN_ALL_CORE_FT
PAST MEDICAL HISTORY:  Asthma     Chronic back pain     Diabetes     HTN (hypertension)     PCOS (polycystic ovarian syndrome)

## 2021-02-14 NOTE — H&P ADULT - HISTORY OF PRESENT ILLNESS
30F who presents with a chief complaint of right flank pain. She started having flank pain yesterday that has been intermittent. She has back spasms but this felt different. She has never had stones. She has been on Motrin and Amoxicillin after her wisdom tooth removal for around 10 days. Her pain after being in the ER is controlled. She has no fever, chills, nausea or vomiting.    She has no family history of any stones. She drinks Pepsi often      30F who presents with a chief complaint of right flank pain. She started having flank pain yesterday that has been intermittent. It radiates to her right flank.  She has back spasms but this felt different. She has never had stones. She has been on Motrin and Amoxicillin after her wisdom tooth removal for around 10 days. She takes the Motrin every 4-6  hours. This did not  help her pain so she came in. Her pain after being in the ER is controlled. She has no fever, chills, nausea or vomiting.    She has no family history of any stones. She drinks Pepsi often

## 2021-02-14 NOTE — DISCHARGE NOTE PROVIDER - NSDCCPCAREPLAN_GEN_ALL_CORE_FT
PRINCIPAL DISCHARGE DIAGNOSIS  Diagnosis: Ureteral colic  Assessment and Plan of Treatment:        PRINCIPAL DISCHARGE DIAGNOSIS  Diagnosis: Ureteral colic  Assessment and Plan of Treatment:       SECONDARY DISCHARGE DIAGNOSES  Diagnosis: Ureteral colic  Assessment and Plan of Treatment: Ureteral colic    Diagnosis: Diabetes  Assessment and Plan of Treatment: Diabetes

## 2021-02-14 NOTE — CONSULT NOTE ADULT - SUBJECTIVE AND OBJECTIVE BOX
Patient is a 30y old  Female who presents with a chief complaint of right flank pain. She started having flank pain yesterday  She has no fever, chills, nausea or vomiting   She has been on Motrin and Amoxicillin after her wisdom tooth removal.       Vital Signs Last 24 Hrs  T(C): 36.9 (14 Feb 2021 05:01), Max: 36.9 (14 Feb 2021 05:01)  T(F): 98.5 (14 Feb 2021 05:01), Max: 98.5 (14 Feb 2021 05:01)  HR: 77 (14 Feb 2021 06:01) (77 - 98)  BP: 116/74 (14 Feb 2021 06:01) (116/74 - 188/105)  BP(mean): --  RR: 17 (14 Feb 2021 06:01) (17 - 18)  SpO2: 98% (14 Feb 2021 06:01) (98% - 98%)  I&O's Summary      PE:  Gen: NAD  Abd: soft, nt/nd  Back: No CVAT     LABS:                        13.9   17.42 )-----------( 390      ( 14 Feb 2021 05:26 )             42.1     02-14    137  |  102  |  22  ----------------------------<  176<H>  4.3   |  22  |  1.12    Ca    9.7      14 Feb 2021 05:26      PT/INR - ( 14 Feb 2021 08:16 )   PT: 11.9 sec;   INR: 0.99          PTT - ( 14 Feb 2021 08:16 )  PTT:33.4 sec  Cultures      A/P

## 2021-02-14 NOTE — DISCHARGE NOTE PROVIDER - CARE PROVIDER_API CALL
Robb Maddox)  Urology  [address] [address]  Phone: [phone]  Fax: [phone]  Follow Up Time:

## 2021-02-14 NOTE — ED PROVIDER NOTE - OBJECTIVE STATEMENT
Pt w/ PMHx DM on Metformin, HTN no on meds p/w R sided back pain, onset yesterday. The pain is now radiating to the L back and abd. No n/v/d/c. No F/U/D or hematuria. No f/c. Pt reports it feels like a back spasm, but she's never had this one as severe. No hx renal colic. LMP 2 weeks ago, does not suspect pregnancy. No midline pain. no pain in the legs. No LE weakness / numbness, nor bowel / bladder dysfunction. No heavy lifting, no clear inciting factors. Pt reports she took NSAIDs twice s/p onset of the pain, most recently 30 min PTA, w/o any change in the pain Pt w/ PMHx DM on Metformin, HTN no on meds p/w R sided back pain, onset yesterday. The pain is now radiating to the L back and abd. No n/v/d/c. No F/U/D or hematuria. No f/c. Pt reports it feels like a back spasm, but she's never had this one as severe. No hx renal colic. LMP 2 weeks ago, does not suspect pregnancy. No midline pain. no pain in the legs. No LE weakness / numbness, nor bowel / bladder dysfunction. No heavy lifting, no clear inciting factors. Pt reports she took NSAIDs twice s/p onset of the pain yesterday, w/o any change in the pain

## 2021-02-14 NOTE — H&P ADULT - PROBLEM SELECTOR PLAN 1
Admit to [facility]  Discussed her CT scan findings, discussed rationale for stent placement.  She was advised of the risks/benefits of the procedure  NPO  IV fluids  Pre op labs  COVID testing   OOB/AMB, IS

## 2021-02-14 NOTE — ED ADULT NURSE NOTE - PMH
Asthma    Chronic back pain    Diabetes    HTN (hypertension)    PCOS (polycystic ovarian syndrome)

## 2021-02-14 NOTE — DISCHARGE NOTE PROVIDER - NSDCFUADDINST_GEN_ALL_CORE_FT
If any fever > 101, chills, nausea, severe pain, retention please call the office or go to the ER    Stone instructions  Make sure you hydrate well, enough that your urine is clear.     you have a ureteral stent in place. It is common to feel a slight amount of flank fullness, urgency to void, frequency or hematuria as a result of the stent.     Pain Control: Generally, Tylenol or Extra-Strength Tylenol or an anti-inflammatory will be sufficient to control any discomfort you may have    You will be given antibiotics to finish course of

## 2021-02-14 NOTE — DISCHARGE NOTE PROVIDER - HOSPITAL COURSE
30F here for 5mm proximal right ureteral stone with mild hydro s/p cystoscopy with right stent placement on 2/14/2021. She tolerated the procedure well. VSS hemodynamically stable for discharge. She was able to urinate and pain was improved Patient is a 30y old  Female who presents with a chief complaint of Right renal stone  (14 Feb 2021 16:54)      HPI:  30F who presents with a chief complaint of right flank pain. She started having flank pain yesterday that has been intermittent. It radiates to her right flank.  She has back spasms but this felt different. She has never had stones. She has been on Motrin and Amoxicillin after her wisdom tooth removal for around 10 days. She takes the Motrin every 4-6  hours. This did not  help her pain so she came in. Her pain after being in the ER is controlled. She has no fever, chills, nausea or vomiting.    She has no family history of any stones. She drinks Pepsi often.      (14 Feb 2021 11:40)  2/14: patient underwent Cystoscopy and right stent placement. Patient tolerated procedure well.    2/15: Patient VSS, no acute events overnight.  Patient meets discharge criteria and cleared to be sent home on Bactrim, and Pyridium, and Flomax.       Vital Signs Last 24 Hrs  T(C): 37 (15 Feb 2021 05:01), Max: 37.1 (14 Feb 2021 20:49)  T(F): 98.6 (15 Feb 2021 05:01), Max: 98.8 (14 Feb 2021 20:49)  HR: 88 (15 Feb 2021 05:01) (55 - 88)  BP: 142/84 (15 Feb 2021 05:01) (99/58 - 142/84)  BP(mean): 73 (14 Feb 2021 15:00) (73 - 91)  RR: 17 (15 Feb 2021 05:01) (14 - 21)  SpO2: 99% (15 Feb 2021 05:01) (97% - 100%)  I&O's Summary    14 Feb 2021 07:01  -  15 Feb 2021 07:00  --------------------------------------------------------  IN: 1000 mL / OUT: 1100 mL / NET: -100 mL        LABS:                        12.1   9.51  )-----------( 319      ( 15 Feb 2021 07:10 )             37.7     02-15    139  |  104  |  17  ----------------------------<  168<H>  4.5   |  25  |  0.84    Ca    9.2      15 Feb 2021 07:10  Phos  3.8     02-15  Mg     2.0     02-15      PT/INR - ( 14 Feb 2021 08:16 )   PT: 11.9 sec;   INR: 0.99          PTT - ( 14 Feb 2021 08:16 )  PTT:33.4 sec  Cultures

## 2021-02-14 NOTE — ED PROVIDER NOTE - PHYSICAL EXAMINATION
Limited PE performed in the setting of the COVID10 pandemic, in efforts to limit exposure and cross-contamination  Constitutional: Well appearing, well nourished, awake, alert, oriented to person, place, time/situation and appears uncomfortable  ENMT: Airway patent.   Eyes: Clear bilaterally  Cardiac: Normal rate, regular rhythm.   Respiratory: No increased WOB, tachypnea, hypoxia, or accessory mm use. Pt speaks in full sentences.   Gastrointestinal: Abd soft, NT, ND. No guarding, rebound, or rigidity. No pulsatile abd masses. No CVAT  Musculoskeletal: Range of motion is not limited. no midline or paraspinal mm ttp.   Neuro: Alert and oriented x 3, face symmetric and speech fluent. Nml gross motor movement, grossly non focal. normal gait.   Skin: Skin normal color for race, warm, dry and intact. No evidence of rash.  Psych: Alert and oriented to person, place, time/situation. normal mood and affect. no apparent risk to self or others.

## 2021-02-14 NOTE — ED ADULT NURSE NOTE - OBJECTIVE STATEMENT
Pt presents to ED C/O right flank pain starting about 6pm yesterday. Pt states " I took Motrin, tried walking around but the pain keeps coming back, it feels like its moving to my left side and to my abdomen" Pt denies N/V/D. Made comfortable, family at bedside, RN continuing to monitor.

## 2021-02-14 NOTE — DISCHARGE NOTE PROVIDER - NSDCCPGOAL_GEN_ALL_CORE_FT
To get better and follow your care plan as instructed. To get better and follow your care plan as instructed. Ambulation, Hydration, and return to activity of daily living.

## 2021-02-14 NOTE — ED PROVIDER NOTE - CLINICAL SUMMARY MEDICAL DECISION MAKING FREE TEXT BOX
Pt p/w R sided back pain radiating to the L and abd. No GI or  complaints. No clear reproduction of pain. DDx includes but not limited to renal colic, less likely UTI /  pyelo, MSK pain, other pathology. Check labs, UA, CT, a/p, IVF, analgesia. Dispo pending w/u and clinical status

## 2021-02-14 NOTE — PROGRESS NOTE ADULT - SUBJECTIVE AND OBJECTIVE BOX
Patient is a 30y old Female who presents with a chief complaint of Right renal stone s/p cystoscopy and right stent placement on 2/14/2021. She reports nausea and some stent discomfort. She has not tried to urinate Denies fever, chills, SOB or CP     Vital Signs Last 24 Hrs  T(C): 36.4 (14 Feb 2021 15:50), Max: 36.9 (14 Feb 2021 05:01)  T(F): 97.5 (14 Feb 2021 15:50), Max: 98.5 (14 Feb 2021 05:01)  HR: 58 (14 Feb 2021 15:50) (55 - 98)  BP: 135/85 (14 Feb 2021 15:50) (99/58 - 188/105)  BP(mean): 73 (14 Feb 2021 15:00) (73 - 91)  RR: 17 (14 Feb 2021 15:50) (14 - 21)  SpO2: 98% (14 Feb 2021 15:50) (97% - 100%)    I&O's Summary      Gen:    Abd:    :                          13.9   17.42 )-----------( 390      ( 14 Feb 2021 05:26 )             42.1     02-14    137  |  102  |  22  ----------------------------<  176<H>  4.3   |  22  |  1.12    Ca    9.7      14 Feb 2021 05:26      cultures    A/P  30y old Female who presents with a chief complaint of Right renal stone s/p cystoscopy and right stent placement on 2/14/2021.     -Pain control  -Nausea control   -Flomax, ditropan PRN   -Regular diet   -PVR   -OOB/AMB, IS Patient is a 30y old Female who presents with a chief complaint of Right renal stone s/p cystoscopy and right stent placement on 2/14/2021. She reports nausea and some stent discomfort. She has not tried to urinate Denies fever, chills, SOB or CP     2/14: Patient underwent right stent placement and cystoscopy.  She tolerated procedure well.    2/15: No acute events overnight.  She is cleared for discharge to home today.    Vital Signs Last 24 Hrs  T(C): 36.4 (14 Feb 2021 15:50), Max: 36.9 (14 Feb 2021 05:01)  T(F): 97.5 (14 Feb 2021 15:50), Max: 98.5 (14 Feb 2021 05:01)  HR: 58 (14 Feb 2021 15:50) (55 - 98)  BP: 135/85 (14 Feb 2021 15:50) (99/58 - 188/105)  BP(mean): 73 (14 Feb 2021 15:00) (73 - 91)  RR: 17 (14 Feb 2021 15:50) (14 - 21)  SpO2: 98% (14 Feb 2021 15:50) (97% - 100%)    I&O's Summary      Gen: NAD, alert and oriented x 3    Abd: Soft nt/nd. Negative CVAT B/L     : Negative SP tenderness.                           13.9   17.42 )-----------( 390      ( 14 Feb 2021 05:26 )             42.1     02-14    137  |  102  |  22  ----------------------------<  176<H>  4.3   |  22  |  1.12    Ca    9.7      14 Feb 2021 05:26      cultures    A/P  30y old Female who presents with a chief complaint of Right renal stone s/p cystoscopy and right stent placement on 2/14/2021. She is cleared for discharge to home today.

## 2021-02-14 NOTE — DISCHARGE NOTE PROVIDER - NSDCCAREPROVSEEN_GEN_ALL_CORE_FT
Pt c/o neck pain since Wednesday, and dizziness since this am. Pt is A+Ox3 clear speaking. Emergency Department Nursing Plan of Care       The Nursing Plan of Care is developed from the Nursing assessment and Emergency Department Attending provider initial evaluation. The plan of care may be reviewed in the ED Provider note.     The Plan of Care was developed with the following considerations:   Patient / Family readiness to learn indicated by:verbalized understanding  Persons(s) to be included in education: patient  Barriers to Learning/Limitations:No    Signed     Peter Robledo RN    7/19/2019   7:15 AM
Pt for DC home. Plan of care accepted by pt. Pt left unit steady gait. Patient (s)  given copy of dc instructions and 0 script(s). Patient (s)  verbalized understanding of instructions and script (s). Patient given a current medication reconciliation form and verbalized understanding of their medications. Patient (s) verbalized understanding of the importance of discussing medications with  his or her physician or clinic they will be following up with. Patient alert and oriented and in no acute distress. Patient discharged home ambulatory with self.
Dewey Nunez

## 2021-02-14 NOTE — H&P ADULT - ASSESSMENT
30F here with 5 mm proximal right ureteral calculus with mild right hydroureteronephrosis.        30F here with right flank pain with 5 mm proximal right ureteral calculus with mild right hydroureteronephrosis.

## 2021-02-14 NOTE — DISCHARGE NOTE PROVIDER - NSDCMRMEDTOKEN_GEN_ALL_CORE_FT
Azithromycin 3 Day Dose Pack 500 mg oral tablet: 1 tab(s) orally once a day   Bactrim  mg-160 mg oral tablet: 1 tab(s) orally 2 times a day  Ciprodex 0.3%-0.1% otic suspension: 4 drop(s) in each affected ear 2 times a day   Ciprodex 0.3%-0.1% otic suspension: 4 drop(s) in each affected ear 2 times a day   ciprofloxacin otic 0.2% otic solution: 1 applicatorful to each affected ear every 12 hours  Cortisporin-TC 0.3%-1%-0.33%-0.05% otic suspension: 5 drop(s) to each affected ear 4 times a day  cyclobenzaprine 10 mg oral tablet: 1 tab(s) orally 2 times a day  cyclobenzaprine 10 mg oral tablet: 1 tab(s) orally 3 times a day  Deltasone 20 mg oral tablet: 2 tab(s) orally once a day   Flomax 0.4 mg oral capsule: 1 cap(s) orally once a day    mg oral tablet: 1 tab(s) orally every 8 hours, As Needed - for moderate pain  ibuprofen 600 mg oral tablet: 1 tab(s) orally every 6 hours, As Needed for Pain  ibuprofen 600 mg oral tablet: 1 tab(s) orally 4 times a day, As Needed -for moderate pain. Take with food  ibuprofen 800 mg oral tablet: 1 tab(s) orally every 8 hours  ibuprofen 800 mg oral tablet:  orally   naproxen 375 mg oral tablet: 1 tab(s) orally 2 times a day   ofloxacin 0.3% otic solution: 5 drop(s) in each affected ear 2 times a day   oxyCODONE-acetaminophen 5 mg-325 mg oral tablet: 1 tab(s) orally 2 times a day MDD:2  predniSONE 20 mg oral tablet: 2 tab(s) orally once a day   ProAir HFA 90 mcg/inh inhalation aerosol: 2 puff(s) inhaled 4 times a day   Proventil:  inhaled   Skelaxin 800 mg oral tablet: 1 tab(s) orally 3 times a day   traMADol 50 mg oral tablet: 1 tab(s) orally every 6 hours, As Needed -for severe pain MDD:4  traMADol 50 mg oral tablet: 1 tab(s) orally 3 times a day, As Needed -for severe pain MDD:3   Bactrim  mg-160 mg oral tablet: 1 tab(s) orally 2 times a day  Bactrim  mg-160 mg oral tablet: 1 tab(s) orally every 12 hours MDD:2  Flomax 0.4 mg oral capsule: 1 cap(s) orally once a day (at bedtime) MDD:1  Flomax 0.4 mg oral capsule: 1 cap(s) orally once a day   Pyridium 100 mg oral tablet: 2 tab(s) orally every 8 hours MDD:6

## 2021-02-14 NOTE — ED PROVIDER NOTE - NS ED ROS FT
Constitutional: No fever or chills.   Eyes: No pain, blurry vision, or discharge.  ENMT: No hearing changes, pain, discharge or infections. No neck pain or stiffness.  Cardiac: No chest pain, SOB or edema. No chest pain with exertion.  Respiratory: No cough or respiratory distress. No hemoptysis. No history of asthma or RAD.  GI: No nausea, vomiting, diarrhea or abdominal pain.  : Se HPI  MS: See HPI  Neuro: No headache or weakness. No LOC.  Skin: No skin rash.   Endocrine: No history of thyroid disease or diabetes.  Except as documented in the HPI, all other systems are negative.

## 2021-02-14 NOTE — ED PROVIDER NOTE - NSFOLLOWUPINSTRUCTIONS_ED_ALL_ED_FT
You were evaluated in the ED for flank pain. Your CT showed a 5 mm stone in the proximal right ureter. Stones less than 6 mm will typically pass on their own. You may continue to have pain as the stone courses further down the ureter and into the bladder.     You are being prescribed:   1. High-dose Ibuprofen, to be taken first for pain  2. Tramadol, pain medication, to be taken for more severe pain  3. Flomax, to help facilitate passage of the stone. Once daily medication, you were given the first dose here in the ED.    Follow up with a urologist. Return to the ED for severe pain that is not relieved with the medications prescribed, fever, inability to urinate, or other concerning symptoms       Renal Colic    WHAT YOU NEED TO KNOW:    Renal colic is severe pain in your lower back or sides. The pain is usually on one side, but may be on both sides of your lower back. Renal colic may start quickly, come and go, and become worse over time. Renal colic is caused by a blockage in your urinary tract. The most common cause of a blockage is a kidney stone. Blood clots, ureter spasms, and dead tissue may also block your urinary tract.    Female Urinary System                DISCHARGE INSTRUCTIONS:    Return to the emergency department if:   •You cannot stop vomiting.      •You see new or increased bleeding when you urinate.      •You are urinating less than usual, or not at all.      •Your pain is not getting better even after treatment.      Call your doctor if:   •You have fever.      •You need to urinate more often than usual, or right away.      •You see a stone in your urine strainer after you urinate.      •You have questions or concerns about your condition or care.      Medicines:   •Medicines may help decrease pain and muscle spasms. You may also need medicine to calm your stomach and stop vomiting.      •Take your medicine as directed. Contact your healthcare provider if you think your medicine is not helping or if you have side effects. Tell him of her if you are allergic to any medicine. Keep a list of the medicines, vitamins, and herbs you take. Include the amounts, and when and why you take them. Bring the list or the pill bottles to follow-up visits. Carry your medicine list with you in case of an emergency.      Manage your symptoms:   •Drink liquids as directed. This will help decrease pain and flush blockages from your urinary tract. Ask how much liquid to drink each day and which liquids are best for you. You may need to drink about 3 liters (12 glasses) of liquids each day. Half of your total daily liquids should be water. Limit coffee, tea, and soda to 2 cups daily. Your urine should be pale and clear.      •Strain your urine every time you urinate. Urinate into a strainer (funnel with a fine mesh on the bottom) or glass jar to collect kidney stones. Give the kidney stones to your healthcare provider at your next visit.  Look for Stones in the Filter           •Eat a variety of healthy foods. Healthy foods include fruits, vegetables, whole-grain breads, low-fat dairy products, beans, lean meats, and fish. You may need to increase the amount of citrus fruit you eat, such as oranges. Ask your healthcare provider how much salt, calcium, and protein you should eat.  Healthy Foods           •Avoid activity in hot temperatures. Heat may cause you to become dehydrated and urinate less.      Follow up with your doctor as directed: You may need to return for tests to check if your blockage has cleared. Write down your questions so you remember to ask them during your visits.

## 2021-02-15 ENCOUNTER — TRANSCRIPTION ENCOUNTER (OUTPATIENT)
Age: 31
End: 2021-02-15

## 2021-02-15 VITALS
HEART RATE: 66 BPM | DIASTOLIC BLOOD PRESSURE: 91 MMHG | SYSTOLIC BLOOD PRESSURE: 144 MMHG | RESPIRATION RATE: 18 BRPM | OXYGEN SATURATION: 98 % | TEMPERATURE: 98 F

## 2021-02-15 DIAGNOSIS — E11.9 TYPE 2 DIABETES MELLITUS WITHOUT COMPLICATIONS: ICD-10-CM

## 2021-02-15 LAB
A1C WITH ESTIMATED AVERAGE GLUCOSE RESULT: 8.3 % — HIGH (ref 4–5.6)
ANION GAP SERPL CALC-SCNC: 10 MMOL/L — SIGNIFICANT CHANGE UP (ref 5–17)
BASOPHILS # BLD AUTO: 0.04 K/UL — SIGNIFICANT CHANGE UP (ref 0–0.2)
BASOPHILS NFR BLD AUTO: 0.4 % — SIGNIFICANT CHANGE UP (ref 0–2)
BUN SERPL-MCNC: 17 MG/DL — SIGNIFICANT CHANGE UP (ref 7–23)
CALCIUM SERPL-MCNC: 9.2 MG/DL — SIGNIFICANT CHANGE UP (ref 8.4–10.5)
CHLORIDE SERPL-SCNC: 104 MMOL/L — SIGNIFICANT CHANGE UP (ref 96–108)
CO2 SERPL-SCNC: 25 MMOL/L — SIGNIFICANT CHANGE UP (ref 22–31)
CREAT SERPL-MCNC: 0.84 MG/DL — SIGNIFICANT CHANGE UP (ref 0.5–1.3)
CULTURE RESULTS: NO GROWTH — SIGNIFICANT CHANGE UP
EOSINOPHIL # BLD AUTO: 0.38 K/UL — SIGNIFICANT CHANGE UP (ref 0–0.5)
EOSINOPHIL NFR BLD AUTO: 4 % — SIGNIFICANT CHANGE UP (ref 0–6)
ESTIMATED AVERAGE GLUCOSE: 192 MG/DL — HIGH (ref 68–114)
GLUCOSE SERPL-MCNC: 168 MG/DL — HIGH (ref 70–99)
HCT VFR BLD CALC: 37.7 % — SIGNIFICANT CHANGE UP (ref 34.5–45)
HGB BLD-MCNC: 12.1 G/DL — SIGNIFICANT CHANGE UP (ref 11.5–15.5)
IMM GRANULOCYTES NFR BLD AUTO: 0.4 % — SIGNIFICANT CHANGE UP (ref 0–1.5)
LYMPHOCYTES # BLD AUTO: 1.99 K/UL — SIGNIFICANT CHANGE UP (ref 1–3.3)
LYMPHOCYTES # BLD AUTO: 20.9 % — SIGNIFICANT CHANGE UP (ref 13–44)
MAGNESIUM SERPL-MCNC: 2 MG/DL — SIGNIFICANT CHANGE UP (ref 1.6–2.6)
MCHC RBC-ENTMCNC: 29.2 PG — SIGNIFICANT CHANGE UP (ref 27–34)
MCHC RBC-ENTMCNC: 32.1 GM/DL — SIGNIFICANT CHANGE UP (ref 32–36)
MCV RBC AUTO: 91.1 FL — SIGNIFICANT CHANGE UP (ref 80–100)
MONOCYTES # BLD AUTO: 0.54 K/UL — SIGNIFICANT CHANGE UP (ref 0–0.9)
MONOCYTES NFR BLD AUTO: 5.7 % — SIGNIFICANT CHANGE UP (ref 2–14)
NEUTROPHILS # BLD AUTO: 6.52 K/UL — SIGNIFICANT CHANGE UP (ref 1.8–7.4)
NEUTROPHILS NFR BLD AUTO: 68.6 % — SIGNIFICANT CHANGE UP (ref 43–77)
NRBC # BLD: 0 /100 WBCS — SIGNIFICANT CHANGE UP (ref 0–0)
PHOSPHATE SERPL-MCNC: 3.8 MG/DL — SIGNIFICANT CHANGE UP (ref 2.5–4.5)
PLATELET # BLD AUTO: 319 K/UL — SIGNIFICANT CHANGE UP (ref 150–400)
POTASSIUM SERPL-MCNC: 4.5 MMOL/L — SIGNIFICANT CHANGE UP (ref 3.5–5.3)
POTASSIUM SERPL-SCNC: 4.5 MMOL/L — SIGNIFICANT CHANGE UP (ref 3.5–5.3)
RBC # BLD: 4.14 M/UL — SIGNIFICANT CHANGE UP (ref 3.8–5.2)
RBC # FLD: 12.3 % — SIGNIFICANT CHANGE UP (ref 10.3–14.5)
SODIUM SERPL-SCNC: 139 MMOL/L — SIGNIFICANT CHANGE UP (ref 135–145)
SPECIMEN SOURCE: SIGNIFICANT CHANGE UP
WBC # BLD: 9.51 K/UL — SIGNIFICANT CHANGE UP (ref 3.8–10.5)
WBC # FLD AUTO: 9.51 K/UL — SIGNIFICANT CHANGE UP (ref 3.8–10.5)

## 2021-02-15 PROCEDURE — 36415 COLL VENOUS BLD VENIPUNCTURE: CPT

## 2021-02-15 PROCEDURE — 74176 CT ABD & PELVIS W/O CONTRAST: CPT

## 2021-02-15 PROCEDURE — 99238 HOSP IP/OBS DSCHRG MGMT 30/<: CPT

## 2021-02-15 PROCEDURE — 82962 GLUCOSE BLOOD TEST: CPT

## 2021-02-15 PROCEDURE — 96375 TX/PRO/DX INJ NEW DRUG ADDON: CPT

## 2021-02-15 PROCEDURE — 86900 BLOOD TYPING SEROLOGIC ABO: CPT

## 2021-02-15 PROCEDURE — 86850 RBC ANTIBODY SCREEN: CPT

## 2021-02-15 PROCEDURE — 96374 THER/PROPH/DIAG INJ IV PUSH: CPT

## 2021-02-15 PROCEDURE — U0005: CPT

## 2021-02-15 PROCEDURE — 76000 FLUOROSCOPY <1 HR PHYS/QHP: CPT

## 2021-02-15 PROCEDURE — 83735 ASSAY OF MAGNESIUM: CPT

## 2021-02-15 PROCEDURE — 96376 TX/PRO/DX INJ SAME DRUG ADON: CPT

## 2021-02-15 PROCEDURE — 83036 HEMOGLOBIN GLYCOSYLATED A1C: CPT

## 2021-02-15 PROCEDURE — 96361 HYDRATE IV INFUSION ADD-ON: CPT

## 2021-02-15 PROCEDURE — 85730 THROMBOPLASTIN TIME PARTIAL: CPT

## 2021-02-15 PROCEDURE — 80048 BASIC METABOLIC PNL TOTAL CA: CPT

## 2021-02-15 PROCEDURE — 87635 SARS-COV-2 COVID-19 AMP PRB: CPT

## 2021-02-15 PROCEDURE — 85610 PROTHROMBIN TIME: CPT

## 2021-02-15 PROCEDURE — C2617: CPT

## 2021-02-15 PROCEDURE — 85025 COMPLETE CBC W/AUTO DIFF WBC: CPT

## 2021-02-15 PROCEDURE — 99285 EMERGENCY DEPT VISIT HI MDM: CPT | Mod: 25

## 2021-02-15 PROCEDURE — 86901 BLOOD TYPING SEROLOGIC RH(D): CPT

## 2021-02-15 PROCEDURE — 87086 URINE CULTURE/COLONY COUNT: CPT

## 2021-02-15 PROCEDURE — 81001 URINALYSIS AUTO W/SCOPE: CPT

## 2021-02-15 PROCEDURE — 84100 ASSAY OF PHOSPHORUS: CPT

## 2021-02-15 RX ORDER — PHENAZOPYRIDINE HCL 100 MG
2 TABLET ORAL
Qty: 12 | Refills: 0
Start: 2021-02-15 | End: 2021-02-16

## 2021-02-15 RX ORDER — TAMSULOSIN HYDROCHLORIDE 0.4 MG/1
1 CAPSULE ORAL
Qty: 5 | Refills: 0
Start: 2021-02-15 | End: 2021-02-19

## 2021-02-15 RX ORDER — IBUPROFEN 200 MG
600 TABLET ORAL ONCE
Refills: 0 | Status: COMPLETED | OUTPATIENT
Start: 2021-02-15 | End: 2021-02-15

## 2021-02-15 RX ORDER — AZTREONAM 2 G
1 VIAL (EA) INJECTION
Qty: 10 | Refills: 0
Start: 2021-02-15 | End: 2021-02-19

## 2021-02-15 RX ADMIN — Medication 650 MILLIGRAM(S): at 05:26

## 2021-02-15 RX ADMIN — Medication 2: at 09:29

## 2021-02-15 RX ADMIN — Medication 600 MILLIGRAM(S): at 09:51

## 2021-02-15 NOTE — PROGRESS NOTE ADULT - SUBJECTIVE AND OBJECTIVE BOX
INTERVAL HPI/OVERNIGHT EVENTS:  No acute events overnight.    VITALS:    T(F): 98.6 (02-15-21 @ 05:01), Max: 98.8 (21 @ 20:49)  HR: 88 (02-15-21 @ 05:01) (55 - 88)  BP: 142/84 (02-15-21 @ 05:01) (99/58 - 142/84)  RR: 17 (02-15-21 @ 05:01) (14 - 21)  SpO2: 99% (02-15-21 @ 05:01) (97% - 100%)  Wt(kg): --    I&O's Detail    2021 07:01  -  15 Feb 2021 05:53  --------------------------------------------------------  IN:    sodium chloride 0.9%: 1000 mL  Total IN: 1000 mL    OUT:    Voided (mL): 1100 mL  Total OUT: 1100 mL    Total NET: -100 mL          MEDICATIONS:    ANTIBIOTICS:      PAIN CONTROL:  acetaminophen   Tablet .. 650 milliGRAM(s) Oral every 6 hours PRN  ondansetron Injectable 4 milliGRAM(s) IV Push every 6 hours PRN  oxyCODONE    IR 5 milliGRAM(s) Oral every 4 hours PRN  oxyCODONE    IR 10 milliGRAM(s) Oral every 4 hours PRN       MEDS:  oxybutynin 5 milliGRAM(s) Oral every 8 hours PRN  phenazopyridine 200 milliGRAM(s) Oral three times a day PRN      HEME/ONC        PHYSICAL EXAM:  General: No acute distress.  Alert and Oriented  Abdominal Exam: soft, NT, ND   Exam: no SP discomfort      LABS:                        13.9   17.42 )-----------( 390      ( 2021 05:26 )             42.1     02-    137  |  102  |  22  ----------------------------<  176<H>  4.3   |  22  |  1.12    Ca    9.7      2021 05:26      PT/INR - ( 2021 08:16 )   PT: 11.9 sec;   INR: 0.99          PTT - ( 2021 08:16 )  PTT:33.4 sec  Urinalysis Basic - ( 2021 05:27 )    Color: Yellow / Appearance: Clear / S.010 / pH: x  Gluc: x / Ketone: NEGATIVE  / Bili: Negative / Urobili: 0.2 E.U./dL   Blood: x / Protein: NEGATIVE mg/dL / Nitrite: NEGATIVE   Leuk Esterase: Small / RBC: < 5 /HPF / WBC 5-10 /HPF   Sq Epi: x / Non Sq Epi: 0-5 /HPF / Bacteria: None /HPF        RADIOLOGY & ADDITIONAL TESTS:

## 2021-02-15 NOTE — DISCHARGE NOTE NURSING/CASE MANAGEMENT/SOCIAL WORK - NURSING SECTION COMPLETE
Patient/Caregiver provided printed discharge information.
Clear bilaterally, pupils equal, round and reactive to light.

## 2021-02-15 NOTE — DISCHARGE NOTE NURSING/CASE MANAGEMENT/SOCIAL WORK - PATIENT PORTAL LINK FT
You can access the FollowMyHealth Patient Portal offered by [facility] by registering at the following website: [email or web address]. By joining [facility]’s FollowMyHealth portal, you will also be able to view your health information using other applications (apps) compatible with our system.

## 2021-02-16 NOTE — HISTORY OF PRESENT ILLNESS
[FreeTextEntry1] : 30 year old woman with history of diabetes presented to ED with right flank pain found to have a 5 mm rihgt ureteral stone. She was afebrile with clean UA, however, she had persistent leukocytosis. So, a right ureteral stent was placed without issue. Patient was discharged once her WBC count improved. Her urine culture were negative. No fevers, chills, dysuria, hematuria or flank pain.

## 2021-02-17 ENCOUNTER — APPOINTMENT (OUTPATIENT)
Dept: UROLOGY | Facility: CLINIC | Age: 31
End: 2021-02-17
Payer: COMMERCIAL

## 2021-02-17 VITALS — HEART RATE: 90 BPM | SYSTOLIC BLOOD PRESSURE: 162 MMHG | TEMPERATURE: 98 F | DIASTOLIC BLOOD PRESSURE: 99 MMHG

## 2021-02-17 DIAGNOSIS — Z00.00 ENCOUNTER FOR GENERAL ADULT MEDICAL EXAMINATION W/OUT ABNORMAL FINDINGS: ICD-10-CM

## 2021-02-17 PROCEDURE — 99072 ADDL SUPL MATRL&STAF TM PHE: CPT

## 2021-02-17 PROCEDURE — 99214 OFFICE O/P EST MOD 30 MIN: CPT

## 2021-02-17 RX ORDER — TAMSULOSIN HYDROCHLORIDE 0.4 MG/1
0.4 CAPSULE ORAL
Refills: 0 | Status: ACTIVE | COMMUNITY

## 2021-02-17 RX ORDER — PHENAZOPYRIDINE 200 MG/1
TABLET, FILM COATED ORAL
Refills: 0 | Status: ACTIVE | COMMUNITY

## 2021-02-17 RX ORDER — TAMSULOSIN HCL 0.4 MG
0.4 CAPSULE ORAL
Refills: 0 | Status: ACTIVE | COMMUNITY

## 2021-02-17 RX ORDER — SULFAMETHOXAZOLE AND TRIMETHOPRIM 800; 160 MG/1; MG/1
800-160 TABLET ORAL
Refills: 0 | Status: ACTIVE | COMMUNITY

## 2021-02-17 NOTE — HISTORY OF PRESENT ILLNESS
[FreeTextEntry1] : 30 year old woman with history of diabetes presented to ED with right flank pain found to have a 5 mm rihgt ureteral stone. She was afebrile with clean UA, however, she had persistent leukocytosis. So, a right ureteral stent was placed without issue. Patient was discharged once her WBC count improved. Her urine culture was negative. No fevers, chills, dysuria, hematuria or flank pain. She has moderate stent related urinary symptoms.

## 2021-02-19 DIAGNOSIS — Z71.89 OTHER SPECIFIED COUNSELING: ICD-10-CM

## 2021-02-19 LAB
APPEARANCE: ABNORMAL
BACTERIA UR CULT: NORMAL
BILIRUB UR QL STRIP: NORMAL
BILIRUBIN URINE: NEGATIVE
BLOOD URINE: ABNORMAL
CLARITY UR: NORMAL
COLLECTION METHOD: NORMAL
COLOR: ABNORMAL
GLUCOSE QUALITATIVE U: NEGATIVE
GLUCOSE UR-MCNC: NORMAL
HCG UR QL: 0.2 EU/DL
HGB UR QL STRIP.AUTO: NORMAL
KETONES UR-MCNC: NORMAL
KETONES URINE: NEGATIVE
LEUKOCYTE ESTERASE UR QL STRIP: NORMAL
LEUKOCYTE ESTERASE URINE: ABNORMAL
NITRITE UR QL STRIP: POSITIVE
NITRITE URINE: NEGATIVE
PH UR STRIP: 5.5
PH URINE: 6
PROT UR STRIP-MCNC: 100
PROTEIN URINE: ABNORMAL
SP GR UR STRIP: 1.02
SPECIFIC GRAVITY URINE: 1.02
UROBILINOGEN URINE: NORMAL

## 2021-02-25 ENCOUNTER — APPOINTMENT (OUTPATIENT)
Dept: INTERNAL MEDICINE | Facility: CLINIC | Age: 31
End: 2021-02-25
Payer: COMMERCIAL

## 2021-02-25 VITALS
HEIGHT: 65 IN | HEART RATE: 103 BPM | TEMPERATURE: 97.5 F | WEIGHT: 192 LBS | OXYGEN SATURATION: 98 % | BODY MASS INDEX: 31.99 KG/M2 | SYSTOLIC BLOOD PRESSURE: 116 MMHG | DIASTOLIC BLOOD PRESSURE: 81 MMHG

## 2021-02-25 DIAGNOSIS — Z11.3 ENCOUNTER FOR SCREENING FOR INFECTIONS WITH A PREDOMINANTLY SEXUAL MODE OF TRANSMISSION: ICD-10-CM

## 2021-02-25 DIAGNOSIS — R63.4 ABNORMAL WEIGHT LOSS: ICD-10-CM

## 2021-02-25 DIAGNOSIS — R53.83 OTHER FATIGUE: ICD-10-CM

## 2021-02-25 PROCEDURE — 99204 OFFICE O/P NEW MOD 45 MIN: CPT | Mod: 25

## 2021-02-25 PROCEDURE — 99072 ADDL SUPL MATRL&STAF TM PHE: CPT

## 2021-02-26 ENCOUNTER — NON-APPOINTMENT (OUTPATIENT)
Age: 31
End: 2021-02-26

## 2021-02-26 DIAGNOSIS — E28.2 POLYCYSTIC OVARIAN SYNDROME: ICD-10-CM

## 2021-02-26 DIAGNOSIS — Z79.84 LONG TERM (CURRENT) USE OF ORAL HYPOGLYCEMIC DRUGS: ICD-10-CM

## 2021-02-26 DIAGNOSIS — E11.9 TYPE 2 DIABETES MELLITUS WITHOUT COMPLICATIONS: ICD-10-CM

## 2021-02-26 DIAGNOSIS — E66.9 OBESITY, UNSPECIFIED: ICD-10-CM

## 2021-02-26 DIAGNOSIS — N13.2 HYDRONEPHROSIS WITH RENAL AND URETERAL CALCULOUS OBSTRUCTION: ICD-10-CM

## 2021-02-26 DIAGNOSIS — M54.9 DORSALGIA, UNSPECIFIED: ICD-10-CM

## 2021-02-26 DIAGNOSIS — G89.29 OTHER CHRONIC PAIN: ICD-10-CM

## 2021-02-26 DIAGNOSIS — I10 ESSENTIAL (PRIMARY) HYPERTENSION: ICD-10-CM

## 2021-02-26 DIAGNOSIS — J45.909 UNSPECIFIED ASTHMA, UNCOMPLICATED: ICD-10-CM

## 2021-03-01 ENCOUNTER — TRANSCRIPTION ENCOUNTER (OUTPATIENT)
Age: 31
End: 2021-03-01

## 2021-03-01 LAB
ALBUMIN SERPL ELPH-MCNC: 4.4 G/DL
ALP BLD-CCNC: 76 U/L
ALT SERPL-CCNC: 15 U/L
ANION GAP SERPL CALC-SCNC: 14 MMOL/L
AST SERPL-CCNC: 14 U/L
BASOPHILS # BLD AUTO: 0.06 K/UL
BASOPHILS NFR BLD AUTO: 0.5 %
BILIRUB SERPL-MCNC: 0.2 MG/DL
BUN SERPL-MCNC: 16 MG/DL
CALCIUM SERPL-MCNC: 9.6 MG/DL
CHLORIDE SERPL-SCNC: 104 MMOL/L
CHOLEST SERPL-MCNC: 158 MG/DL
CO2 SERPL-SCNC: 20 MMOL/L
CREAT SERPL-MCNC: 0.82 MG/DL
CREAT SPEC-SCNC: 137 MG/DL
EOSINOPHIL # BLD AUTO: 0.62 K/UL
EOSINOPHIL NFR BLD AUTO: 5 %
ESTIMATED AVERAGE GLUCOSE: 206 MG/DL
GLUCOSE SERPL-MCNC: 273 MG/DL
HBA1C MFR BLD HPLC: 8.8 %
HCT VFR BLD CALC: 42.7 %
HDLC SERPL-MCNC: 34 MG/DL
HGB BLD-MCNC: 13.8 G/DL
HIV1+2 AB SPEC QL IA.RAPID: NONREACTIVE
IMM GRANULOCYTES NFR BLD AUTO: 0.5 %
LDLC SERPL CALC-MCNC: 88 MG/DL
LYMPHOCYTES # BLD AUTO: 2.32 K/UL
LYMPHOCYTES NFR BLD AUTO: 18.6 %
MAN DIFF?: NORMAL
MCHC RBC-ENTMCNC: 29.7 PG
MCHC RBC-ENTMCNC: 32.3 GM/DL
MCV RBC AUTO: 92 FL
MICROALBUMIN 24H UR DL<=1MG/L-MCNC: 56.9 MG/DL
MICROALBUMIN/CREAT 24H UR-RTO: 415 MG/G
MONOCYTES # BLD AUTO: 0.59 K/UL
MONOCYTES NFR BLD AUTO: 4.7 %
NEUTROPHILS # BLD AUTO: 8.85 K/UL
NEUTROPHILS NFR BLD AUTO: 70.7 %
NONHDLC SERPL-MCNC: 125 MG/DL
PLATELET # BLD AUTO: 395 K/UL
POTASSIUM SERPL-SCNC: 4.2 MMOL/L
PROT SERPL-MCNC: 7.8 G/DL
RBC # BLD: 4.64 M/UL
RBC # FLD: 12.6 %
SODIUM SERPL-SCNC: 138 MMOL/L
T PALLIDUM AB SER QL IA: NEGATIVE
TRIGL SERPL-MCNC: 181 MG/DL
TSH SERPL-ACNC: 0.99 UIU/ML
WBC # FLD AUTO: 12.5 K/UL

## 2021-03-01 RX ORDER — METFORMIN HYDROCHLORIDE 500 MG/1
500 TABLET, COATED ORAL
Qty: 14 | Refills: 0 | Status: ACTIVE | COMMUNITY
Start: 2021-03-01 | End: 1900-01-01

## 2021-03-03 ENCOUNTER — TRANSCRIPTION ENCOUNTER (OUTPATIENT)
Age: 31
End: 2021-03-03

## 2021-03-03 LAB — SARS-COV-2 N GENE NPH QL NAA+PROBE: NOT DETECTED

## 2021-03-04 ENCOUNTER — OUTPATIENT (OUTPATIENT)
Dept: OUTPATIENT SERVICES | Facility: HOSPITAL | Age: 31
LOS: 1 days | Discharge: HOME | End: 2021-03-04
Payer: MEDICAID

## 2021-03-04 ENCOUNTER — APPOINTMENT (OUTPATIENT)
Dept: UROLOGY | Facility: AMBULATORY SURGERY CENTER | Age: 31
End: 2021-03-04

## 2021-03-04 DIAGNOSIS — H71.92 UNSPECIFIED CHOLESTEATOMA, LEFT EAR: Chronic | ICD-10-CM

## 2021-03-04 LAB
C TRACH RRNA SPEC QL NAA+PROBE: NOT DETECTED
GLUCOSE BLDC GLUCOMTR-MCNC: 188 MG/DL — HIGH (ref 70–99)
N GONORRHOEA RRNA SPEC QL NAA+PROBE: NOT DETECTED
SOURCE AMPLIFICATION: NORMAL

## 2021-03-04 PROCEDURE — 52356 CYSTO/URETERO W/LITHOTRIPSY: CPT | Mod: RT

## 2021-03-04 PROCEDURE — 74420 UROGRAPHY RTRGR +-KUB: CPT | Mod: 26,RT

## 2021-03-04 RX ORDER — DIAZEPAM 5 MG
1 TABLET ORAL
Qty: 6 | Refills: 0
Start: 2021-03-04 | End: 2021-03-05

## 2021-03-04 RX ORDER — ACETAMINOPHEN 500 MG
1 TABLET ORAL
Qty: 20 | Refills: 0
Start: 2021-03-04 | End: 2021-03-08

## 2021-03-04 RX ORDER — DOCUSATE SODIUM 100 MG
1 CAPSULE ORAL
Qty: 42 | Refills: 0
Start: 2021-03-04 | End: 2021-03-24

## 2021-03-05 ENCOUNTER — APPOINTMENT (OUTPATIENT)
Dept: UROLOGY | Facility: CLINIC | Age: 31
End: 2021-03-05
Payer: MEDICAID

## 2021-03-05 VITALS
DIASTOLIC BLOOD PRESSURE: 79 MMHG | OXYGEN SATURATION: 98 % | HEART RATE: 84 BPM | SYSTOLIC BLOOD PRESSURE: 116 MMHG | TEMPERATURE: 98.7 F

## 2021-03-05 PROCEDURE — 99213 OFFICE O/P EST LOW 20 MIN: CPT

## 2021-03-05 PROCEDURE — 99072 ADDL SUPL MATRL&STAF TM PHE: CPT

## 2021-03-05 NOTE — ASSESSMENT
[FreeTextEntry1] : 31 yo female s/p right URS.  She will follow up with Dr. Maddox next week for stent removal.

## 2021-03-05 NOTE — PHYSICAL EXAM
[General Appearance - Well Developed] : well developed [Normal Appearance] : normal appearance [Abdomen Soft] : soft [Skin Color & Pigmentation] : normal skin color and pigmentation [Heart Rate And Rhythm] : Heart rate and rhythm were normal [] : no respiratory distress [Oriented To Time, Place, And Person] : oriented to person, place, and time [Normal Station and Gait] : the gait and station were normal for the patient's age [No Focal Deficits] : no focal deficits

## 2021-03-05 NOTE — HISTORY OF PRESENT ILLNESS
[FreeTextEntry1] : 31 yo female POD # 1 s/p right URS for definitive treatment of right ureteral stone with Dr. Maddox.  She had a beckham catheter left in place after her procedure and she presents today for beckham removal and TOV.  SHe has been feeling relatively well with only mild Beckham discomfort. \par \par TOV:\par -180 cc sterile salin instilled in bladder\par -Catheter removed in its entirety\par -Patient promptly voided 250 cc of urine without difficulty

## 2021-03-10 ENCOUNTER — APPOINTMENT (OUTPATIENT)
Dept: UROLOGY | Facility: CLINIC | Age: 31
End: 2021-03-10

## 2021-03-12 ENCOUNTER — APPOINTMENT (OUTPATIENT)
Dept: UROLOGY | Facility: CLINIC | Age: 31
End: 2021-03-12
Payer: MEDICAID

## 2021-03-12 PROCEDURE — 52310 CYSTOSCOPY AND TREATMENT: CPT

## 2021-03-12 PROCEDURE — 99072 ADDL SUPL MATRL&STAF TM PHE: CPT

## 2021-03-16 ENCOUNTER — RESULT CHARGE (OUTPATIENT)
Age: 31
End: 2021-03-16

## 2021-03-16 LAB
BILIRUB UR QL STRIP: NORMAL
CLARITY UR: CLEAR
COLLECTION METHOD: NORMAL
GLUCOSE UR-MCNC: NORMAL
HCG UR QL: 0.2 EU/DL
HGB UR QL STRIP.AUTO: NORMAL
KETONES UR-MCNC: NORMAL
LEUKOCYTE ESTERASE UR QL STRIP: NORMAL
NITRITE UR QL STRIP: NORMAL
PH UR STRIP: 6
PROT UR STRIP-MCNC: 300
SP GR UR STRIP: 1.03

## 2021-04-26 ENCOUNTER — APPOINTMENT (OUTPATIENT)
Dept: INTERNAL MEDICINE | Facility: CLINIC | Age: 31
End: 2021-04-26
Payer: COMMERCIAL

## 2021-04-26 VITALS
TEMPERATURE: 98.2 F | WEIGHT: 197 LBS | BODY MASS INDEX: 32.78 KG/M2 | DIASTOLIC BLOOD PRESSURE: 92 MMHG | SYSTOLIC BLOOD PRESSURE: 137 MMHG | OXYGEN SATURATION: 98 % | HEART RATE: 74 BPM

## 2021-04-26 DIAGNOSIS — Z51.89 ENCOUNTER FOR OTHER SPECIFIED AFTERCARE: ICD-10-CM

## 2021-04-26 DIAGNOSIS — M54.32 SCIATICA, LEFT SIDE: ICD-10-CM

## 2021-04-26 DIAGNOSIS — M54.5 LOW BACK PAIN: ICD-10-CM

## 2021-04-26 PROCEDURE — 99072 ADDL SUPL MATRL&STAF TM PHE: CPT

## 2021-04-26 PROCEDURE — 99214 OFFICE O/P EST MOD 30 MIN: CPT | Mod: GC,25

## 2021-04-26 PROCEDURE — 93000 ELECTROCARDIOGRAM COMPLETE: CPT

## 2021-04-27 LAB
ALBUMIN SERPL ELPH-MCNC: 4.5 G/DL
ALP BLD-CCNC: 69 U/L
ALT SERPL-CCNC: 16 U/L
ANION GAP SERPL CALC-SCNC: 13 MMOL/L
APPEARANCE: ABNORMAL
AST SERPL-CCNC: 14 U/L
BASOPHILS # BLD AUTO: 0.04 K/UL
BASOPHILS NFR BLD AUTO: 0.4 %
BILIRUB SERPL-MCNC: 0.2 MG/DL
BILIRUBIN URINE: NEGATIVE
BLOOD URINE: NEGATIVE
BUN SERPL-MCNC: 13 MG/DL
CALCIUM SERPL-MCNC: 9.8 MG/DL
CHLORIDE SERPL-SCNC: 104 MMOL/L
CO2 SERPL-SCNC: 22 MMOL/L
COLOR: NORMAL
CREAT SERPL-MCNC: 0.65 MG/DL
EOSINOPHIL # BLD AUTO: 0.75 K/UL
EOSINOPHIL NFR BLD AUTO: 8 %
ESTIMATED AVERAGE GLUCOSE: 186 MG/DL
GLUCOSE QUALITATIVE U: ABNORMAL
GLUCOSE SERPL-MCNC: 228 MG/DL
HBA1C MFR BLD HPLC: 8.1 %
HCT VFR BLD CALC: 43.9 %
HGB BLD-MCNC: 14.3 G/DL
IMM GRANULOCYTES NFR BLD AUTO: 0.5 %
KETONES URINE: NEGATIVE
LEUKOCYTE ESTERASE URINE: NEGATIVE
LYMPHOCYTES # BLD AUTO: 1.61 K/UL
LYMPHOCYTES NFR BLD AUTO: 17.2 %
MAN DIFF?: NORMAL
MCHC RBC-ENTMCNC: 29.9 PG
MCHC RBC-ENTMCNC: 32.6 GM/DL
MCV RBC AUTO: 91.8 FL
MONOCYTES # BLD AUTO: 0.48 K/UL
MONOCYTES NFR BLD AUTO: 5.1 %
NEUTROPHILS # BLD AUTO: 6.41 K/UL
NEUTROPHILS NFR BLD AUTO: 68.8 %
NITRITE URINE: NEGATIVE
PH URINE: 5.5
PLATELET # BLD AUTO: 358 K/UL
POTASSIUM SERPL-SCNC: 4.5 MMOL/L
PROT SERPL-MCNC: 7.7 G/DL
PROTEIN URINE: NEGATIVE
RBC # BLD: 4.78 M/UL
RBC # FLD: 12.9 %
SODIUM SERPL-SCNC: 139 MMOL/L
SPECIFIC GRAVITY URINE: 1.02
TSH SERPL-ACNC: 1.3 UIU/ML
UROBILINOGEN URINE: NORMAL
WBC # FLD AUTO: 9.34 K/UL

## 2021-05-03 ENCOUNTER — APPOINTMENT (OUTPATIENT)
Dept: OTOLARYNGOLOGY | Facility: CLINIC | Age: 31
End: 2021-05-03
Payer: COMMERCIAL

## 2021-05-03 VITALS
SYSTOLIC BLOOD PRESSURE: 147 MMHG | OXYGEN SATURATION: 98 % | HEIGHT: 65 IN | DIASTOLIC BLOOD PRESSURE: 88 MMHG | HEART RATE: 75 BPM | WEIGHT: 197 LBS | BODY MASS INDEX: 32.82 KG/M2 | TEMPERATURE: 98.1 F

## 2021-05-03 DIAGNOSIS — H90.72 MIXED CONDUCTIVE AND SENSORINEURAL HEARING LOSS, UNILATERAL, LEFT EAR, WITH UNRESTRICTED HEARING ON THE CONTRALATERAL SIDE: ICD-10-CM

## 2021-05-03 DIAGNOSIS — E04.9 NONTOXIC GOITER, UNSPECIFIED: ICD-10-CM

## 2021-05-03 DIAGNOSIS — H71.92 UNSPECIFIED CHOLESTEATOMA, LEFT EAR: ICD-10-CM

## 2021-05-03 PROCEDURE — 99072 ADDL SUPL MATRL&STAF TM PHE: CPT

## 2021-05-03 PROCEDURE — 92550 TYMPANOMETRY & REFLEX THRESH: CPT

## 2021-05-03 PROCEDURE — 99203 OFFICE O/P NEW LOW 30 MIN: CPT | Mod: 25

## 2021-05-03 PROCEDURE — 92557 COMPREHENSIVE HEARING TEST: CPT

## 2021-05-03 PROCEDURE — 69220 CLEAN OUT MASTOID CAVITY: CPT

## 2021-05-06 ENCOUNTER — RESULT REVIEW (OUTPATIENT)
Age: 31
End: 2021-05-06

## 2021-05-06 ENCOUNTER — APPOINTMENT (OUTPATIENT)
Dept: ULTRASOUND IMAGING | Facility: CLINIC | Age: 31
End: 2021-05-06
Payer: COMMERCIAL

## 2021-05-06 ENCOUNTER — OUTPATIENT (OUTPATIENT)
Dept: OUTPATIENT SERVICES | Facility: HOSPITAL | Age: 31
LOS: 1 days | End: 2021-05-06

## 2021-05-06 DIAGNOSIS — H71.92 UNSPECIFIED CHOLESTEATOMA, LEFT EAR: Chronic | ICD-10-CM

## 2021-05-06 PROBLEM — E04.9 THYROID ENLARGED: Status: ACTIVE | Noted: 2021-04-26

## 2021-05-06 PROBLEM — H90.72 MIXED CONDUCTIVE AND SENSORINEURAL HEARING LOSS OF LEFT EAR WITH UNRESTRICTED HEARING OF RIGHT EAR: Status: ACTIVE | Noted: 2021-05-06

## 2021-05-06 PROCEDURE — 76536 US EXAM OF HEAD AND NECK: CPT | Mod: 26

## 2021-05-06 NOTE — DATA REVIEWED
[de-identified] : no old copies available\par today: R nl, L mild-mod/sev CHL; type A AD, As AS, % AU

## 2021-05-06 NOTE — HISTORY OF PRESENT ILLNESS
[de-identified] : Has had surgery on the L ear for cholesteatoma at [facility] (Dr. Frankel) ~2013; has had some hearing loss afterwards which seems to be worsening in the last 2 yrs. She's had chronic yellow malodorous drainage ever since surgery and is concerned that the cholesteatoma has recurred; hasn't had a cleaning in several years. Hasn't tried any abx lately. \par Also has a pending sono for an enlarged thyroid on exam; denies a hx of thyroid probs.

## 2021-05-06 NOTE — PHYSICAL EXAM
[Binocular Microscopic Exam] : Binocular microscopic exam was performed [Normal] : no rashes [de-identified] : generous thyroid [FreeTextEntry7] : well healed postauric incision [FreeTextEntry8] : cleared of wet cerumen w/ suction [FreeTextEntry9] : shallow mastoid bowl cleared of wet debris w/ suction [de-identified] : shallow pars flaccida retraction [de-identified] : amorphous w/ ? posterior cartilage graft; no perf

## 2021-05-06 NOTE — ASSESSMENT
[FreeTextEntry1] : # Reassured her that she doesn't seem to have a cholesteatoma recurrence; keep ear dry & avoid instrumenting the bowl. Explained the need for regular mastoid bowl cleanings. \par \par # agree w/ neha, RTC as directed by Dr. Shaver

## 2021-05-06 NOTE — CONSULT LETTER
[Dear  ___] : Dear  [unfilled], [Consult Letter:] : I had the pleasure of evaluating your patient, [unfilled]. [Please see my note below.] : Please see my note below. [Consult Closing:] : Thank you very much for allowing me to participate in the care of this patient.  If you have any questions, please do not hesitate to contact me. [Sincerely,] : Sincerely, [FreeTextEntry3] : SINDY Holley Jr, MD, FAAOHNS\par Otolaryngologist\par [facility]

## 2021-06-01 PROCEDURE — G9005: CPT

## 2021-06-15 ENCOUNTER — APPOINTMENT (OUTPATIENT)
Dept: INTERNAL MEDICINE | Facility: CLINIC | Age: 31
End: 2021-06-15

## 2021-06-22 ENCOUNTER — APPOINTMENT (OUTPATIENT)
Dept: INTERNAL MEDICINE | Facility: CLINIC | Age: 31
End: 2021-06-22

## 2021-06-29 ENCOUNTER — APPOINTMENT (OUTPATIENT)
Dept: UROLOGY | Facility: CLINIC | Age: 31
End: 2021-06-29

## 2021-07-01 ENCOUNTER — APPOINTMENT (OUTPATIENT)
Dept: INTERNAL MEDICINE | Facility: CLINIC | Age: 31
End: 2021-07-01

## 2021-07-03 NOTE — ED PROVIDER NOTE - PRINCIPAL DIAGNOSIS
Pt c/o low back pain that radiates down right hip x1 week. Pt denies pain during urination.
Back pain

## 2021-10-21 ENCOUNTER — EMERGENCY (EMERGENCY)
Facility: HOSPITAL | Age: 31
LOS: 1 days | Discharge: HOME | End: 2021-10-21
Attending: EMERGENCY MEDICINE | Admitting: EMERGENCY MEDICINE
Payer: MEDICAID

## 2021-10-21 VITALS
DIASTOLIC BLOOD PRESSURE: 81 MMHG | WEIGHT: 188.94 LBS | TEMPERATURE: 99 F | HEIGHT: 63 IN | SYSTOLIC BLOOD PRESSURE: 123 MMHG | OXYGEN SATURATION: 99 % | RESPIRATION RATE: 16 BRPM | HEART RATE: 91 BPM

## 2021-10-21 DIAGNOSIS — E28.2 POLYCYSTIC OVARIAN SYNDROME: ICD-10-CM

## 2021-10-21 DIAGNOSIS — E78.5 HYPERLIPIDEMIA, UNSPECIFIED: ICD-10-CM

## 2021-10-21 DIAGNOSIS — J45.909 UNSPECIFIED ASTHMA, UNCOMPLICATED: ICD-10-CM

## 2021-10-21 DIAGNOSIS — E11.9 TYPE 2 DIABETES MELLITUS WITHOUT COMPLICATIONS: ICD-10-CM

## 2021-10-21 DIAGNOSIS — I10 ESSENTIAL (PRIMARY) HYPERTENSION: ICD-10-CM

## 2021-10-21 DIAGNOSIS — K57.92 DIVERTICULITIS OF INTESTINE, PART UNSPECIFIED, WITHOUT PERFORATION OR ABSCESS WITHOUT BLEEDING: ICD-10-CM

## 2021-10-21 DIAGNOSIS — H71.92 UNSPECIFIED CHOLESTEATOMA, LEFT EAR: Chronic | ICD-10-CM

## 2021-10-21 DIAGNOSIS — R10.32 LEFT LOWER QUADRANT PAIN: ICD-10-CM

## 2021-10-21 PROCEDURE — 99285 EMERGENCY DEPT VISIT HI MDM: CPT

## 2021-10-21 NOTE — ED ADULT TRIAGE NOTE - CHIEF COMPLAINT QUOTE
pt c/o lower back pain, lower abd pain/ "ovary pain" x 2 weeks.  1 episode emesis this am. denies vaginal bleeding/ urinary symptoms. pt with hx of kidney stones, reports pain feels similar.

## 2021-10-22 LAB
ALBUMIN SERPL ELPH-MCNC: 4.3 G/DL — SIGNIFICANT CHANGE UP (ref 3.3–5)
ALP SERPL-CCNC: 63 U/L — SIGNIFICANT CHANGE UP (ref 40–120)
ALT FLD-CCNC: 15 U/L — SIGNIFICANT CHANGE UP (ref 10–45)
ANION GAP SERPL CALC-SCNC: 11 MMOL/L — SIGNIFICANT CHANGE UP (ref 5–17)
APPEARANCE UR: CLEAR — SIGNIFICANT CHANGE UP
AST SERPL-CCNC: 14 U/L — SIGNIFICANT CHANGE UP (ref 10–40)
BACTERIA # UR AUTO: PRESENT /HPF
BASOPHILS # BLD AUTO: 0.04 K/UL — SIGNIFICANT CHANGE UP (ref 0–0.2)
BASOPHILS NFR BLD AUTO: 0.3 % — SIGNIFICANT CHANGE UP (ref 0–2)
BILIRUB SERPL-MCNC: 0.2 MG/DL — SIGNIFICANT CHANGE UP (ref 0.2–1.2)
BILIRUB UR-MCNC: NEGATIVE — SIGNIFICANT CHANGE UP
BUN SERPL-MCNC: 15 MG/DL — SIGNIFICANT CHANGE UP (ref 7–23)
CALCIUM SERPL-MCNC: 9.1 MG/DL — SIGNIFICANT CHANGE UP (ref 8.4–10.5)
CHLORIDE SERPL-SCNC: 100 MMOL/L — SIGNIFICANT CHANGE UP (ref 96–108)
CO2 SERPL-SCNC: 25 MMOL/L — SIGNIFICANT CHANGE UP (ref 22–31)
COLOR SPEC: YELLOW — SIGNIFICANT CHANGE UP
COMMENT - URINE: SIGNIFICANT CHANGE UP
CREAT SERPL-MCNC: 0.76 MG/DL — SIGNIFICANT CHANGE UP (ref 0.5–1.3)
DIFF PNL FLD: ABNORMAL
EOSINOPHIL # BLD AUTO: 0.2 K/UL — SIGNIFICANT CHANGE UP (ref 0–0.5)
EOSINOPHIL NFR BLD AUTO: 1.3 % — SIGNIFICANT CHANGE UP (ref 0–6)
EPI CELLS # UR: ABNORMAL /HPF (ref 0–5)
GLUCOSE SERPL-MCNC: 224 MG/DL — HIGH (ref 70–99)
GLUCOSE UR QL: NEGATIVE — SIGNIFICANT CHANGE UP
HCT VFR BLD CALC: 40.2 % — SIGNIFICANT CHANGE UP (ref 34.5–45)
HGB BLD-MCNC: 13.4 G/DL — SIGNIFICANT CHANGE UP (ref 11.5–15.5)
HYALINE CASTS # UR AUTO: ABNORMAL /LPF (ref 0–2)
IMM GRANULOCYTES NFR BLD AUTO: 0.4 % — SIGNIFICANT CHANGE UP (ref 0–1.5)
KETONES UR-MCNC: ABNORMAL MG/DL
LEUKOCYTE ESTERASE UR-ACNC: ABNORMAL
LIDOCAIN IGE QN: 41 U/L — SIGNIFICANT CHANGE UP (ref 7–60)
LYMPHOCYTES # BLD AUTO: 13.4 % — SIGNIFICANT CHANGE UP (ref 13–44)
LYMPHOCYTES # BLD AUTO: 2.02 K/UL — SIGNIFICANT CHANGE UP (ref 1–3.3)
MCHC RBC-ENTMCNC: 30.2 PG — SIGNIFICANT CHANGE UP (ref 27–34)
MCHC RBC-ENTMCNC: 33.3 GM/DL — SIGNIFICANT CHANGE UP (ref 32–36)
MCV RBC AUTO: 90.7 FL — SIGNIFICANT CHANGE UP (ref 80–100)
MONOCYTES # BLD AUTO: 0.73 K/UL — SIGNIFICANT CHANGE UP (ref 0–0.9)
MONOCYTES NFR BLD AUTO: 4.9 % — SIGNIFICANT CHANGE UP (ref 2–14)
NEUTROPHILS # BLD AUTO: 12 K/UL — HIGH (ref 1.8–7.4)
NEUTROPHILS NFR BLD AUTO: 79.7 % — HIGH (ref 43–77)
NITRITE UR-MCNC: NEGATIVE — SIGNIFICANT CHANGE UP
NRBC # BLD: 0 /100 WBCS — SIGNIFICANT CHANGE UP (ref 0–0)
PH UR: 6 — SIGNIFICANT CHANGE UP (ref 5–8)
PLATELET # BLD AUTO: 365 K/UL — SIGNIFICANT CHANGE UP (ref 150–400)
POTASSIUM SERPL-MCNC: 3.6 MMOL/L — SIGNIFICANT CHANGE UP (ref 3.5–5.3)
POTASSIUM SERPL-SCNC: 3.6 MMOL/L — SIGNIFICANT CHANGE UP (ref 3.5–5.3)
PROT SERPL-MCNC: 7.6 G/DL — SIGNIFICANT CHANGE UP (ref 6–8.3)
PROT UR-MCNC: NEGATIVE MG/DL — SIGNIFICANT CHANGE UP
RBC # BLD: 4.43 M/UL — SIGNIFICANT CHANGE UP (ref 3.8–5.2)
RBC # FLD: 13.2 % — SIGNIFICANT CHANGE UP (ref 10.3–14.5)
RBC CASTS # UR COMP ASSIST: < 5 /HPF — SIGNIFICANT CHANGE UP
SODIUM SERPL-SCNC: 136 MMOL/L — SIGNIFICANT CHANGE UP (ref 135–145)
SP GR SPEC: 1.02 — SIGNIFICANT CHANGE UP (ref 1–1.03)
UROBILINOGEN FLD QL: 0.2 E.U./DL — SIGNIFICANT CHANGE UP
WBC # BLD: 15.05 K/UL — HIGH (ref 3.8–10.5)
WBC # FLD AUTO: 15.05 K/UL — HIGH (ref 3.8–10.5)
WBC UR QL: < 5 /HPF — SIGNIFICANT CHANGE UP

## 2021-10-22 PROCEDURE — 74176 CT ABD & PELVIS W/O CONTRAST: CPT | Mod: 26,ME

## 2021-10-22 PROCEDURE — 96375 TX/PRO/DX INJ NEW DRUG ADDON: CPT

## 2021-10-22 PROCEDURE — 96374 THER/PROPH/DIAG INJ IV PUSH: CPT

## 2021-10-22 PROCEDURE — 36415 COLL VENOUS BLD VENIPUNCTURE: CPT

## 2021-10-22 PROCEDURE — 87086 URINE CULTURE/COLONY COUNT: CPT

## 2021-10-22 PROCEDURE — 99284 EMERGENCY DEPT VISIT MOD MDM: CPT | Mod: 25

## 2021-10-22 PROCEDURE — 85025 COMPLETE CBC W/AUTO DIFF WBC: CPT

## 2021-10-22 PROCEDURE — G1004: CPT

## 2021-10-22 PROCEDURE — 74176 CT ABD & PELVIS W/O CONTRAST: CPT | Mod: ME

## 2021-10-22 PROCEDURE — 83690 ASSAY OF LIPASE: CPT

## 2021-10-22 PROCEDURE — 81001 URINALYSIS AUTO W/SCOPE: CPT

## 2021-10-22 PROCEDURE — 80053 COMPREHEN METABOLIC PANEL: CPT

## 2021-10-22 RX ORDER — SODIUM CHLORIDE 9 MG/ML
1000 INJECTION INTRAMUSCULAR; INTRAVENOUS; SUBCUTANEOUS ONCE
Refills: 0 | Status: COMPLETED | OUTPATIENT
Start: 2021-10-22 | End: 2021-10-22

## 2021-10-22 RX ORDER — METRONIDAZOLE 500 MG
1 TABLET ORAL
Qty: 14 | Refills: 0
Start: 2021-10-22 | End: 2021-10-28

## 2021-10-22 RX ORDER — ONDANSETRON 8 MG/1
4 TABLET, FILM COATED ORAL ONCE
Refills: 0 | Status: COMPLETED | OUTPATIENT
Start: 2021-10-22 | End: 2021-10-22

## 2021-10-22 RX ORDER — KETOROLAC TROMETHAMINE 30 MG/ML
30 SYRINGE (ML) INJECTION ONCE
Refills: 0 | Status: DISCONTINUED | OUTPATIENT
Start: 2021-10-22 | End: 2021-10-22

## 2021-10-22 RX ORDER — METRONIDAZOLE 500 MG
500 TABLET ORAL ONCE
Refills: 0 | Status: COMPLETED | OUTPATIENT
Start: 2021-10-22 | End: 2021-10-22

## 2021-10-22 RX ADMIN — Medication 1 TABLET(S): at 04:08

## 2021-10-22 RX ADMIN — Medication 500 MILLIGRAM(S): at 04:08

## 2021-10-22 RX ADMIN — ONDANSETRON 4 MILLIGRAM(S): 8 TABLET, FILM COATED ORAL at 02:29

## 2021-10-22 RX ADMIN — Medication 30 MILLIGRAM(S): at 02:29

## 2021-10-22 RX ADMIN — SODIUM CHLORIDE 1000 MILLILITER(S): 9 INJECTION INTRAMUSCULAR; INTRAVENOUS; SUBCUTANEOUS at 02:29

## 2021-10-22 NOTE — ED PROVIDER NOTE - CARE PROVIDER_API CALL
aPl Richter)  Gastroenterology  [address], [address]  [place] [address]  Phone: [phone]  Fax: [phone]  Follow Up Time:     Keri Bullard  GASTROENTEROLOGY  [address] [place] [address], [address]  [place] [address]  Phone: [phone]  Fax: [phone]  Follow Up Time:     Justin Morales)  Medicine  [address], [address] [place] [address]  Phone: [phone]  Fax: [phone]  Follow Up Time:

## 2021-10-22 NOTE — ED PROVIDER NOTE - CARE PROVIDERS DIRECT ADDRESSES
[email or web address]

## 2021-10-22 NOTE — ED PROVIDER NOTE - PROGRESS NOTE DETAILS
diverticulitis on CT, will treat with ABX, recommend f/u with GI  I have discussed the discharge plan with the patient. The patient agrees with the plan, as discussed.  The patient understands Emergency Department diagnosis is a preliminary diagnosis often based on limited information and that the patient must adhere to the follow-up plan as discussed.  The patient understands that if the symptoms worsen or if prescribed medications do not have the desired/planned effect that the patient may return to the Emergency Department at any time for further evaluation and treatment.

## 2021-10-22 NOTE — ED PROVIDER NOTE - CLINICAL SUMMARY MEDICAL DECISION MAKING FREE TEXT BOX
left flank pain, radiating to LLQ, afebrile, no guarding, no rebound  -check labs  -ivf, toradol, zofran  -CT a/p

## 2021-10-22 NOTE — ED PROVIDER NOTE - NSFOLLOWUPINSTRUCTIONS_ED_ALL_ED_FT
Follow-up with gastroenterologist      Diverticulitis    WHAT YOU NEED TO KNOW:    Diverticulitis is a condition that causes small pockets along your intestine called diverticula to become inflamed or infected. This is caused by hard bowel movements, food, or bacteria that get stuck in the pockets.    DISCHARGE INSTRUCTIONS:    Return to the emergency department if:   •You have bowel movement or foul-smelling discharge leaking from your vagina or in your urine.    •You have severe diarrhea.    •You urinate less than usual or not at all.    •You are not able to have a bowel movement.    •You cannot stop vomiting.    •You have severe abdominal pain, a fever, and your abdomen is larger than usual.    •You have new or increased blood in your bowel movements.    Call your doctor if:   •You have pain when you urinate.    •Your symptoms get worse or do not go away.    •You have questions or concerns about your condition or care.    Medicines:   •Antibiotics may be given to help treat a bacterial infection.    •Prescription pain medicine may be given. Ask your healthcare provider how to take this medicine safely. Some prescription pain medicines contain acetaminophen. Do not take other medicines that contain acetaminophen without talking to your healthcare provider. Too much acetaminophen may cause liver damage. Prescription pain medicine may cause constipation. Ask your healthcare provider how to prevent or treat constipation.     •Take your medicine as directed. Contact your healthcare provider if you think your medicine is not helping or if you have side effects. Tell him or her if you are allergic to any medicine. Keep a list of the medicines, vitamins, and herbs you take. Include the amounts, and when and why you take them. Bring the list or the pill bottles to follow-up visits. Carry your medicine list with you in case of an emergency.    Clear liquid diet: A clear liquid diet includes any liquids that you can see through. Examples include water, ginger-ar, cranberry or apple juice, frozen fruit ice, or broth. Stay on a clear liquid diet until your symptoms are gone, or as directed.    Follow up with your doctor as directed: You may need to return for a colonoscopy. When your symptoms are gone, you may need a low-fat, high-fiber diet to prevent diverticulitis from developing again. Your healthcare provider or dietitian can help you create meal plans. Write down your questions so you remember to ask them during your visits.

## 2021-10-22 NOTE — ED PROVIDER NOTE - OBJECTIVE STATEMENT
31F hx htn, dm, asthma, pcos, c/o left flank pain. pt states pain ongoing for past 2 weeks. pt states pain radiates to left lower quadrant. no fevers.  no dysuria. no hematuria. no trauma. states vomited once this morning. no diarrhea. pt states has had kidney stone in the past with similar symptoms.

## 2021-10-22 NOTE — ED ADULT NURSE NOTE - HISTORY OF COVID-19 VACCINATION
Anesthesia ROS/Med Hx    Overall Review:  Pts. EKG was reviewed     Pulmonary Review:  The patient is a smoker.    End/Other Review:    Pt. positive for obesity class III - 40.00 - 49.99    Anesthesia Plan  ASA Status: 3  Anesthesia Type: General  Preferred Airway Type: LMA  Reviewed: Lab Results, EKG, Medications, Problem List, NPO Status, Patient Summary, Past Med History and Allergies  The proposed anesthetic plan, including its risks and benefits, have been discussed with the Patient - along with the risks and benefits of alternatives.  Questions were encouraged and answered and the patient and/or representative agrees to proceed.      Physical Exam  Mallampati: II  TM Distance: >3 FB  Neck ROM: Full  Patient Demonstrates:  Obese      
No

## 2021-10-22 NOTE — ED ADULT NURSE NOTE - OBJECTIVE STATEMENT
patient c/o bilateral flank pain and left lower adnominal pain. Abdomen soft, non distended, non tender. Denies complaint otherwise

## 2021-10-22 NOTE — ED PROVIDER NOTE - PATIENT PORTAL LINK FT
You can access the FollowMyHealth Patient Portal offered by [facility] by registering at the following website: [email or web address]. By joining [employer]’s FollowMyHealth portal, you will also be able to view your health information using other applications (apps) compatible with our system.

## 2021-10-22 NOTE — ED PROVIDER NOTE - PROVIDER TOKENS
PROVIDER:[TOKEN:[[id number]]],PROVIDER:[TOKEN:[[id number]]],PROVIDER:[TOKEN:[[id number]]]

## 2021-10-23 LAB
CULTURE RESULTS: SIGNIFICANT CHANGE UP
SPECIMEN SOURCE: SIGNIFICANT CHANGE UP

## 2021-12-16 ENCOUNTER — APPOINTMENT (OUTPATIENT)
Dept: GASTROENTEROLOGY | Facility: CLINIC | Age: 31
End: 2021-12-16

## 2022-01-08 ENCOUNTER — EMERGENCY (EMERGENCY)
Facility: HOSPITAL | Age: 32
LOS: 1 days | Discharge: HOME | End: 2022-01-08
Attending: EMERGENCY MEDICINE | Admitting: EMERGENCY MEDICINE
Payer: MEDICAID

## 2022-01-08 VITALS
HEIGHT: 63 IN | TEMPERATURE: 97 F | DIASTOLIC BLOOD PRESSURE: 84 MMHG | RESPIRATION RATE: 18 BRPM | OXYGEN SATURATION: 98 % | WEIGHT: 186.07 LBS | HEART RATE: 96 BPM | SYSTOLIC BLOOD PRESSURE: 133 MMHG

## 2022-01-08 VITALS
SYSTOLIC BLOOD PRESSURE: 116 MMHG | DIASTOLIC BLOOD PRESSURE: 80 MMHG | OXYGEN SATURATION: 99 % | RESPIRATION RATE: 16 BRPM | HEART RATE: 78 BPM | TEMPERATURE: 98 F

## 2022-01-08 DIAGNOSIS — N92.0 EXCESSIVE AND FREQUENT MENSTRUATION WITH REGULAR CYCLE: ICD-10-CM

## 2022-01-08 DIAGNOSIS — O26.851 SPOTTING COMPLICATING PREGNANCY, FIRST TRIMESTER: ICD-10-CM

## 2022-01-08 DIAGNOSIS — O98.511 OTHER VIRAL DISEASES COMPLICATING PREGNANCY, FIRST TRIMESTER: ICD-10-CM

## 2022-01-08 DIAGNOSIS — O99.891 OTHER SPECIFIED DISEASES AND CONDITIONS COMPLICATING PREGNANCY: ICD-10-CM

## 2022-01-08 DIAGNOSIS — H71.92 UNSPECIFIED CHOLESTEATOMA, LEFT EAR: Chronic | ICD-10-CM

## 2022-01-08 DIAGNOSIS — O16.1 UNSPECIFIED MATERNAL HYPERTENSION, FIRST TRIMESTER: ICD-10-CM

## 2022-01-08 DIAGNOSIS — Z3A.01 LESS THAN 8 WEEKS GESTATION OF PREGNANCY: ICD-10-CM

## 2022-01-08 DIAGNOSIS — U07.1 COVID-19: ICD-10-CM

## 2022-01-08 DIAGNOSIS — R10.32 LEFT LOWER QUADRANT PAIN: ICD-10-CM

## 2022-01-08 LAB
ALBUMIN SERPL ELPH-MCNC: 4.7 G/DL — SIGNIFICANT CHANGE UP (ref 3.3–5)
ALP SERPL-CCNC: 73 U/L — SIGNIFICANT CHANGE UP (ref 40–120)
ALT FLD-CCNC: 13 U/L — SIGNIFICANT CHANGE UP (ref 10–45)
ANION GAP SERPL CALC-SCNC: 15 MMOL/L — SIGNIFICANT CHANGE UP (ref 5–17)
APPEARANCE UR: CLEAR — SIGNIFICANT CHANGE UP
AST SERPL-CCNC: 12 U/L — SIGNIFICANT CHANGE UP (ref 10–40)
BASOPHILS # BLD AUTO: 0.05 K/UL — SIGNIFICANT CHANGE UP (ref 0–0.2)
BASOPHILS NFR BLD AUTO: 0.3 % — SIGNIFICANT CHANGE UP (ref 0–2)
BILIRUB SERPL-MCNC: 0.2 MG/DL — SIGNIFICANT CHANGE UP (ref 0.2–1.2)
BILIRUB UR-MCNC: NEGATIVE — SIGNIFICANT CHANGE UP
BLD GP AB SCN SERPL QL: NEGATIVE — SIGNIFICANT CHANGE UP
BUN SERPL-MCNC: 10 MG/DL — SIGNIFICANT CHANGE UP (ref 7–23)
CALCIUM SERPL-MCNC: 9.2 MG/DL — SIGNIFICANT CHANGE UP (ref 8.4–10.5)
CHLORIDE SERPL-SCNC: 104 MMOL/L — SIGNIFICANT CHANGE UP (ref 96–108)
CO2 SERPL-SCNC: 20 MMOL/L — LOW (ref 22–31)
COLOR SPEC: YELLOW — SIGNIFICANT CHANGE UP
CREAT SERPL-MCNC: 0.7 MG/DL — SIGNIFICANT CHANGE UP (ref 0.5–1.3)
DIFF PNL FLD: NEGATIVE — SIGNIFICANT CHANGE UP
EOSINOPHIL # BLD AUTO: 0.22 K/UL — SIGNIFICANT CHANGE UP (ref 0–0.5)
EOSINOPHIL NFR BLD AUTO: 1.5 % — SIGNIFICANT CHANGE UP (ref 0–6)
GLUCOSE SERPL-MCNC: 256 MG/DL — HIGH (ref 70–99)
GLUCOSE UR QL: 500
HCG SERPL-ACNC: 535 MIU/ML — HIGH
HCT VFR BLD CALC: 47.4 % — HIGH (ref 34.5–45)
HGB BLD-MCNC: 15.4 G/DL — SIGNIFICANT CHANGE UP (ref 11.5–15.5)
IMM GRANULOCYTES NFR BLD AUTO: 0.8 % — SIGNIFICANT CHANGE UP (ref 0–1.5)
KETONES UR-MCNC: ABNORMAL MG/DL
LEUKOCYTE ESTERASE UR-ACNC: NEGATIVE — SIGNIFICANT CHANGE UP
LYMPHOCYTES # BLD AUTO: 1.65 K/UL — SIGNIFICANT CHANGE UP (ref 1–3.3)
LYMPHOCYTES # BLD AUTO: 11.1 % — LOW (ref 13–44)
MCHC RBC-ENTMCNC: 29.2 PG — SIGNIFICANT CHANGE UP (ref 27–34)
MCHC RBC-ENTMCNC: 32.5 GM/DL — SIGNIFICANT CHANGE UP (ref 32–36)
MCV RBC AUTO: 89.8 FL — SIGNIFICANT CHANGE UP (ref 80–100)
MONOCYTES # BLD AUTO: 0.75 K/UL — SIGNIFICANT CHANGE UP (ref 0–0.9)
MONOCYTES NFR BLD AUTO: 5.1 % — SIGNIFICANT CHANGE UP (ref 2–14)
NEUTROPHILS # BLD AUTO: 12.01 K/UL — HIGH (ref 1.8–7.4)
NEUTROPHILS NFR BLD AUTO: 81.2 % — HIGH (ref 43–77)
NITRITE UR-MCNC: NEGATIVE — SIGNIFICANT CHANGE UP
NRBC # BLD: 0 /100 WBCS — SIGNIFICANT CHANGE UP (ref 0–0)
PH UR: 5.5 — SIGNIFICANT CHANGE UP (ref 5–8)
PLATELET # BLD AUTO: 392 K/UL — SIGNIFICANT CHANGE UP (ref 150–400)
POTASSIUM SERPL-MCNC: 3.9 MMOL/L — SIGNIFICANT CHANGE UP (ref 3.5–5.3)
POTASSIUM SERPL-SCNC: 3.9 MMOL/L — SIGNIFICANT CHANGE UP (ref 3.5–5.3)
PROT SERPL-MCNC: 8.3 G/DL — SIGNIFICANT CHANGE UP (ref 6–8.3)
PROT UR-MCNC: NEGATIVE MG/DL — SIGNIFICANT CHANGE UP
RBC # BLD: 5.28 M/UL — HIGH (ref 3.8–5.2)
RBC # FLD: 12.7 % — SIGNIFICANT CHANGE UP (ref 10.3–14.5)
RH IG SCN BLD-IMP: POSITIVE — SIGNIFICANT CHANGE UP
SARS-COV-2 RNA SPEC QL NAA+PROBE: DETECTED
SODIUM SERPL-SCNC: 139 MMOL/L — SIGNIFICANT CHANGE UP (ref 135–145)
SP GR SPEC: 1.02 — SIGNIFICANT CHANGE UP (ref 1–1.03)
UROBILINOGEN FLD QL: 0.2 E.U./DL — SIGNIFICANT CHANGE UP
WBC # BLD: 14.8 K/UL — HIGH (ref 3.8–10.5)
WBC # FLD AUTO: 14.8 K/UL — HIGH (ref 3.8–10.5)

## 2022-01-08 PROCEDURE — 86901 BLOOD TYPING SEROLOGIC RH(D): CPT

## 2022-01-08 PROCEDURE — 99285 EMERGENCY DEPT VISIT HI MDM: CPT

## 2022-01-08 PROCEDURE — 84702 CHORIONIC GONADOTROPIN TEST: CPT

## 2022-01-08 PROCEDURE — 93005 ELECTROCARDIOGRAM TRACING: CPT

## 2022-01-08 PROCEDURE — 99284 EMERGENCY DEPT VISIT MOD MDM: CPT | Mod: 25

## 2022-01-08 PROCEDURE — 81003 URINALYSIS AUTO W/O SCOPE: CPT

## 2022-01-08 PROCEDURE — U0005: CPT

## 2022-01-08 PROCEDURE — 86900 BLOOD TYPING SEROLOGIC ABO: CPT

## 2022-01-08 PROCEDURE — 76817 TRANSVAGINAL US OBSTETRIC: CPT

## 2022-01-08 PROCEDURE — 85025 COMPLETE CBC W/AUTO DIFF WBC: CPT

## 2022-01-08 PROCEDURE — 80053 COMPREHEN METABOLIC PANEL: CPT

## 2022-01-08 PROCEDURE — 76801 OB US < 14 WKS SINGLE FETUS: CPT

## 2022-01-08 PROCEDURE — 76817 TRANSVAGINAL US OBSTETRIC: CPT | Mod: 26,59

## 2022-01-08 PROCEDURE — 36415 COLL VENOUS BLD VENIPUNCTURE: CPT

## 2022-01-08 PROCEDURE — 86850 RBC ANTIBODY SCREEN: CPT

## 2022-01-08 PROCEDURE — U0003: CPT

## 2022-01-08 PROCEDURE — 76801 OB US < 14 WKS SINGLE FETUS: CPT | Mod: 26

## 2022-01-08 RX ORDER — ONDANSETRON 8 MG/1
4 TABLET, FILM COATED ORAL ONCE
Refills: 0 | Status: COMPLETED | OUTPATIENT
Start: 2022-01-08 | End: 2022-01-08

## 2022-01-08 RX ORDER — ACETAMINOPHEN 500 MG
650 TABLET ORAL ONCE
Refills: 0 | Status: COMPLETED | OUTPATIENT
Start: 2022-01-08 | End: 2022-01-08

## 2022-01-08 RX ADMIN — ONDANSETRON 4 MILLIGRAM(S): 8 TABLET, FILM COATED ORAL at 18:50

## 2022-01-08 RX ADMIN — Medication 650 MILLIGRAM(S): at 14:42

## 2022-01-08 NOTE — ED ADULT TRIAGE NOTE - CHIEF COMPLAINT QUOTE
Pt states she had a pos pregnancy test at home and would like to confirm it with blood work. Para 0  0. pt also reports she had pfizer covid vaccine since monday and since then has been experiencing left sided chest pain. pt denies any SOB, cough.

## 2022-01-08 NOTE — CONSULT NOTE ADULT - ASSESSMENT
31y  at 7w2d by LMP presenting with 2 weeks of intermittent LLQ cramping. Found to have pregnancy of unknown location here.  - Pregnancy of unknown location: No IUP or EUP visualized on US. Given bHCT of 535, patient may be having a normal IUP, an abnormal IUP, or ectopic pregnancy that is too early to be visualized. Patient is currently hemodynamically stable with VSS, hgb reassuring 15.4, no active VB, and benign abd exam, making a ruptured ectopic pregnancy unlikely at this time.  - patient is RH positive, no rhogam needed.  - patient safe for d/c from GYN perspective. Patient instructed to f/u in 2 days at  ED for repeat bHCG and pelvic US.  - d/w Dr. Ortega

## 2022-01-08 NOTE — ED PROVIDER NOTE - OBJECTIVE STATEMENT
here with + pregnancy test at home today. LMP 12/5/21 was mild spotting x3 days, history of irregular menses. Has been having llq pain for a few weeks now, constant, non radiating. No fever/chills. Got her covid pfizer first dose on Monday and has been having chest pain intermittently since. Mild, left sided, not currently present. No sob. Described as sharp.

## 2022-01-08 NOTE — ED PROVIDER NOTE - CLINICAL SUMMARY MEDICAL DECISION MAKING FREE TEXT BOX
llq pain x2 weeks with pregnancy positive at home today. no bleeding. concern for tab vs ectopic. labs/us ordered. no iup/eup seen on imaging. gyn consulted for pregnancy unknown location. felt low suspicion ectopic. rec f/u 48 hrs for repeat labs/us. abdomen soft, non tender on exam making recurrent diverticulitis/ abscess unlikely. ct scan deferred given + pregnancy test.   also with chest discomfort post covid shot. vitals stable, lungs clear, ecg nsr no ischemia. unlikely acs. prn tylenol

## 2022-01-08 NOTE — ED PROVIDER NOTE - PATIENT PORTAL LINK FT
You can access the FollowMyHealth Patient Portal offered by [facility] by registering at the following website: [email or web address]. By joining [employer]’s FollowMyHealth portal, you will also be able to view your health information using other applications (apps) compatible with our system.

## 2022-01-08 NOTE — ED PROVIDER NOTE - NSFOLLOWUPINSTRUCTIONS_ED_ALL_ED_FT
Please return in 48 hours for repeat blood work (hcg) and possible us.  Return to the ER if symptoms worsen or other concerns.      DISCHARGE INSTRUCTIONS:    Return to the emergency department if:     You feel weak or faint.       Your pain or cramping in your abdomen or back gets worse.       You have vaginal bleeding that soaks 1 or more pads in an hour.       You pass material that looks like tissue or large clots.     Contact your healthcare provider or obstetrician if:     You have a fever.       You have trouble urinating, burning when you urinate, or feel a need to urinate often.      You have new or worsening vaginal bleeding.      You have vaginal pain or itching, or vaginal discharge that is yellow, green, or foul-smelling.       You have questions or concerns about your condition or care.    Self-care: The following may help you manage your symptoms and decrease your risk for a miscarriage:     Do not put anything in your vagina. Do not have sex, douche, or use tampons. These actions may increase your risk for infection and miscarriage.       Rest as directed. Do not exercise or do strenuous activities. These activities may cause  labor or miscarriage. Ask your healthcare provider what activities are okay to do.     Stay healthy during pregnancy:     Eat a variety of healthy foods. Healthy foods can help you get extra protein, water, and calories that you need while you are pregnant. Healthy foods include fruits, vegetables, whole-grain breads, low-fat dairy products, beans, lean meats, and fish. Avoid raw or undercooked meat and fish. Ask your healthcare provider if you need a special diet.       Take prenatal vitamins as directed. These help you get the right amount of vitamins and minerals. They may also decrease the risk of certain birth defects.      Do not drink alcohol or use illegal drugs. These can increase your risk for a miscarriage or harm your baby.       Do not smoke. Nicotine and other chemicals in cigarettes and cigars can harm your baby and cause miscarriage or  labor. Ask your healthcare provider for information if you currently smoke and need help to quit. E-cigarettes or smokeless tobacco still contain nicotine. Do not use these products.       Decrease your risk for an infection. Always wash your hands before eating or preparing meals. Do not spend time with people who are sick. Ask your healthcare provider if you need immunizations such as the flu or hepatitis B vaccine. Immunizations may decrease your risk for infections that could cause a miscarriage.       Manage your medical conditions. Keep your blood pressure and blood sugars under control. Maintain a healthy weight during pregnancy.     Follow up with your obstetrician as directed: You may need to see your obstetrician frequently for ultrasounds or blood tests. Write down your questions so you remember to ask them during your visits.

## 2022-01-08 NOTE — ED ADULT NURSE NOTE - OBJECTIVE STATEMENT
Pt. had pos. pregnancy test at home and here to confirm. Also reporting chest pain x days. Hx. asthma. Breathing comfortably on room air. In no apparent distress.

## 2022-01-08 NOTE — CONSULT NOTE ADULT - SUBJECTIVE AND OBJECTIVE BOX
31y  at 7w2d by LMP presenting with 2 weeks of intermittent LLQ cramping. Patient took a UPT at home today and was found to be positive. She also had LLQ cramping that comes and goes, although today it is minimal. Does not recall alleviating or aggravating factors. She had some vaginal spotting on  for 3 days. No current VB.  Patient has been actively trying to conceive. This is a desired pregnancy. Denies CP, SOB, lightheadedness/dizziness, palpitations, abnormal vaginal d/c, f/c, issues with ambulation, PO intake, voiding, or having BM.    OB H/x:  SAB    GYN H/x: hx of PCOS (resolved per patient)    MED H/x: asthma (hospitalized as a child, never intubated), HTN, T2DM, depression, anxiety, PTSD    SURG H/x: L ear surgery x2,  right lithotripsy for renal stone    Medications: patient prescribed metformin, lisinopril, lexapro, albuterol prn, seroquel, and hydroxizine. However patient said she is "not good at taking meds" and has not been taking these.  Allergies: nkda   social: marijuana use qd    Vital Signs Last 24 Hrs  T(C): 36.9 (2022 18:52), Max: 36.9 (2022 18:52)  T(F): 98.4 (2022 18:52), Max: 98.4 (2022 18:52)  HR: 78 (2022 18:52) (78 - 96)  BP: 116/80 (2022 18:52) (116/80 - 133/84)  BP(mean): --  RR: 16 (2022 18:52) (16 - 18)  SpO2: 99% (2022 18:52) (98% - 99%)    Physical Exam:  Gen: NAD, comfortable  GI: soft, nontender, nondistended + BS, no rebound no guarding  BME: normal anteverted uterus, no adnexal masses appreciated , No cervical motion tenderness, no adnexal tenderness  Spec: normal vaginal vault, closed cervix, no bleeding noted    LABS:                        15.4   14.80 )-----------( 392      ( 2022 15:09 )             47.4         139  |  104  |  10  ----------------------------<  256<H>  3.9   |  20<L>  |  0.70    Ca    9.2      2022 15:09    TPro  8.3  /  Alb  4.7  /  TBili  0.2  /  DBili  x   /  AST  12  /  ALT  13  /  AlkPhos  73        Urinalysis Basic - ( 2022 15:09 )    Color: Yellow / Appearance: Clear / S.020 / pH: x  Gluc: x / Ketone: Trace mg/dL  / Bili: Negative / Urobili: 0.2 E.U./dL   Blood: x / Protein: NEGATIVE mg/dL / Nitrite: NEGATIVE   Leuk Esterase: NEGATIVE / RBC: x / WBC x   Sq Epi: x / Non Sq Epi: x / Bacteria: x        RADIOLOGY & ADDITIONAL STUDIES:    ACC: [id number] EXAM:  US OB LES THAN 14 WKS 1ST GEST                        ACC: [id number] EXAM:  US OB TRANSVAGINAL                          PROCEDURE DATE:  2022          INTERPRETATION:  OBSTETRICAL ULTRASOUND - FIRST TRIMESTER dated 2022   4:35 PM    INDICATION: First trimester pregnancy with LLQ pain. LMP: 2021.   Serum hC    TECHNIQUE: Transabdominal views of the pelvis were obtained followed by   transvaginal views for better visualization of the endometrial cavity.    PRIOR STUDIES: Multiple prior pelvic ultrasounds, most recent dated   2019.    FINDINGS:  By dates, the estimated gestational age is 7 weeks and 2 days.  No intrauterine or extrauterine gestational sac identified.    The cervix is closed with alength of 2.2 cm.    The uterus is anteverted. The uterus is 7.2 x 4.5 x 4.9 cm.  No   myometrial abnormalities are seen. The endometrium is heterogeneous and   thickened measuring up to 2.1 cm with some vascularity.    The right ovary is normal in size, measuring 3.7 x 2.4 x 2.0 cm. No right   ovarian masses are seen. The left ovary is normal in size, measuring 4.4   x 2.6 x 4.2 cm. There is a 1.8 x 2.5 x 2.1 cm complex cystic left ovarian   lesion without detectable vascularity, which may represent a corpus   luteum.    Doppler evaluation demonstrates flow to both ovaries with no evidence of   torsion.    IMPRESSION:  No intrauterine or extrauterine gestational sac identified at this time.   Differential diagnosis includes early intrauterine gestation, gestational   failure or ectopic pregnancy. Recommend correlation with serial beta-hCG   and repeat ultrasound in 7-10 days.    --- End of Report ---          ANTONIA FREITAS MD; Resident Radiologist  This document has been electronically signed.  MICHAEL ARMENDARIZ MD; Attending Radiologist  This document has been electronically signed. 2022  6:19PM

## 2022-01-10 ENCOUNTER — EMERGENCY (EMERGENCY)
Facility: HOSPITAL | Age: 32
LOS: 1 days | Discharge: HOME | End: 2022-01-10
Attending: EMERGENCY MEDICINE | Admitting: EMERGENCY MEDICINE
Payer: MEDICAID

## 2022-01-10 VITALS
TEMPERATURE: 98 F | HEIGHT: 63 IN | RESPIRATION RATE: 16 BRPM | DIASTOLIC BLOOD PRESSURE: 88 MMHG | SYSTOLIC BLOOD PRESSURE: 153 MMHG | HEART RATE: 81 BPM | OXYGEN SATURATION: 99 % | WEIGHT: 186.07 LBS

## 2022-01-10 VITALS
HEART RATE: 66 BPM | DIASTOLIC BLOOD PRESSURE: 94 MMHG | SYSTOLIC BLOOD PRESSURE: 138 MMHG | TEMPERATURE: 98 F | OXYGEN SATURATION: 97 % | RESPIRATION RATE: 16 BRPM

## 2022-01-10 DIAGNOSIS — H71.92 UNSPECIFIED CHOLESTEATOMA, LEFT EAR: Chronic | ICD-10-CM

## 2022-01-10 DIAGNOSIS — E11.9 TYPE 2 DIABETES MELLITUS WITHOUT COMPLICATIONS: ICD-10-CM

## 2022-01-10 DIAGNOSIS — O99.891 OTHER SPECIFIED DISEASES AND CONDITIONS COMPLICATING PREGNANCY: ICD-10-CM

## 2022-01-10 DIAGNOSIS — J45.909 UNSPECIFIED ASTHMA, UNCOMPLICATED: ICD-10-CM

## 2022-01-10 DIAGNOSIS — O36.80X0 PREGNANCY WITH INCONCLUSIVE FETAL VIABILITY, NOT APPLICABLE OR UNSPECIFIED: ICD-10-CM

## 2022-01-10 DIAGNOSIS — I10 ESSENTIAL (PRIMARY) HYPERTENSION: ICD-10-CM

## 2022-01-10 DIAGNOSIS — O98.511 OTHER VIRAL DISEASES COMPLICATING PREGNANCY, FIRST TRIMESTER: ICD-10-CM

## 2022-01-10 DIAGNOSIS — U07.1 COVID-19: ICD-10-CM

## 2022-01-10 DIAGNOSIS — R10.32 LEFT LOWER QUADRANT PAIN: ICD-10-CM

## 2022-01-10 DIAGNOSIS — Z3A.01 LESS THAN 8 WEEKS GESTATION OF PREGNANCY: ICD-10-CM

## 2022-01-10 LAB — HCG SERPL-ACNC: 1348 MIU/ML — HIGH

## 2022-01-10 PROCEDURE — 99284 EMERGENCY DEPT VISIT MOD MDM: CPT

## 2022-01-10 PROCEDURE — 36415 COLL VENOUS BLD VENIPUNCTURE: CPT

## 2022-01-10 PROCEDURE — 76817 TRANSVAGINAL US OBSTETRIC: CPT | Mod: 26

## 2022-01-10 PROCEDURE — 84702 CHORIONIC GONADOTROPIN TEST: CPT

## 2022-01-10 PROCEDURE — 99284 EMERGENCY DEPT VISIT MOD MDM: CPT | Mod: 25

## 2022-01-10 PROCEDURE — 76817 TRANSVAGINAL US OBSTETRIC: CPT

## 2022-01-10 NOTE — CHART NOTE - NSCHARTNOTEFT_GEN_A_CORE
Called pt to confirm she will be coming to ED today for repeat bHCG and TVUS. Pt states she will be coming this afternoon. All questions/concerns addressed. Pt expressed understanding.

## 2022-01-10 NOTE — CONSULT NOTE ADULT - ASSESSMENT
31y  at 7w4d by LMP initially presented on  with LLQ pain, found to have pregnancy of unknown location at that time at  ED with bHCG of 535, with bHCG 1348 today 1/10. 31y  at 7w4d by LMP initially presented on  with LLQ pain, found to have pregnancy of unknown location at that time at  ED with bHCG of 535, with bHCG 1348 today 1/10 and ultrasound continue to showing pregnancy of unknown location  - bHCG reassuring, as it has more than doubled in the last 48 hours. Given this and the fact that patient is covid positive, although ultrasound still shows pregnancy of unknown location, patient may return in 1 week to  ED for repeat pelvic US and serum bHCG.  - patient instructed to return to  ED if she develops severe abd/pelvic pain, heavy vaginal bleeding, fevers/chills, lightheadedness/dizziness, nausea/vomiting, inability to tolerate PO.  - patient safe for d/c from GYN standpoint  - d/w Dr. Strickland

## 2022-01-10 NOTE — ED ADULT NURSE NOTE - CHIEF COMPLAINT QUOTE
pt here for repeat blood work and ultrasound. pt states " they said I'm about 7 week pregnant and also they just texted and my covid test was positive". . LMP 21

## 2022-01-10 NOTE — ED PROVIDER NOTE - ATTENDING CONTRIBUTION TO CARE
Pt w/ PMHx DM2, HTN, currently 7 weeks 4 days gestation by LMP, , here for repeat HCG and US> Pt seen on  for LLQ pain w/ + home UCG. HCG at that time 535, no IUP or EUP was seen on TVUS. PT seen by gyn, advised to RTC in 2 days for repeat testing. PT w/ continued mild b/l lower abd pain. No vag bleeding or discharge. Pt also known to be COVID+  HCG 1300+  Pending US, gyn consult Pt w/ PMHx DM2, HTN, currently 7 weeks 4 days gestation by LMP, , here for repeat HCG and US> Pt seen on  for LLQ pain w/ + home UCG. HCG at that time 535, no IUP or EUP was seen on TVUS. PT seen by gyn, advised to RTC in 2 days for repeat testing. PT w/ continued mild b/l lower abd pain. No vag bleeding or discharge. Pt also known to be COVID+  HCG 1300+  Pending US, gyn consult  Limited PE performed in the setting of the COVID10 pandemic, in efforts to limit exposure and cross-contamination  Constitutional: Well appearing, awake, alert, oriented to person, place, time/situation and in no apparent distress.  ENMT: Airway patent.   Eyes: Clear bilaterally  Cardiac: Normal rate, regular rhythm.   Respiratory: No increased WOB, tachypnea, hypoxia, or accessory mm use. Pt speaks in full sentences.   Gastrointestinal: Abd soft, NT, ND. No guarding, rebound, or rigidity.   Musculoskeletal: Range of motion is not limited  Neuro: Alert and oriented x 3, face symmetric and speech fluent. Nml gross motor movement, grossly non focal   Skin: Skin normal color for race, warm, dry and intact. No evidence of rash.  Psych: Alert and oriented to person, place, time/situation. normal mood and affect. no apparent risk to self or others.   Known COVID+, pregnant, here for repeat US / HCG in the setting of preg of unknown location.  HCG rising appropriately. Early preg vs ectopic, gyn consulted, feels more likely early, but still needs serial HCG / US. Pt to RTC in 1 week for f/u w/ strict return precautions outlined by myself and both gyn. Pt demonstrates understanding of plan and teach back performed.

## 2022-01-10 NOTE — ED PROVIDER NOTE - PATIENT PORTAL LINK FT
You can access the FollowMyHealth Patient Portal offered by [facility] by registering at the following website: [email or web address]. By joining [employer]’s FollowMyHealth portal, you will also be able to view your health information using other applications (apps) compatible with our system.

## 2022-01-10 NOTE — ED PROVIDER NOTE - NSFOLLOWUPINSTRUCTIONS_ED_ALL_ED_FT
Your blood pregnancy levels have increased appropriately.    Your ultrasound showed ***.    You were evaluated by our gynecologists today. They recommend ***.      Return to the Emergency Department if you develop worsening lower abdominal pain, inability to eat or drink due to vomiting, vaginal bleeding or any other concerns. Your blood pregnancy levels have increased appropriately.    Your ultrasound showed likely an early gestational sac, without a yolk sac. This is still considered a pregnancy of unknown location.    You were evaluated by our gynecologists today. They recommend you return to the ED on Monday 1/17 for repeat HCG and ultrasound. You should return immediately if you develop  severe abd/pelvic pain, heavy vaginal bleeding, fevers/chills, lightheadedness/dizziness, nausea/vomiting, inability to tolerate oral intake Your blood pregnancy levels have increased appropriately.    Your ultrasound showed likely an early gestational sac, without a yolk sac. This is still considered a pregnancy of unknown location.    You were evaluated by our gynecologists today. They recommend you return to the ED on  for repeat HCG and ultrasound. You should return immediately if you develop  severe abd/pelvic pain, heavy vaginal bleeding, fevers/chills, lightheadedness/dizziness, nausea/vomiting, inability to tolerate oral intake      Abdominal Pain in Pregnancy    WHAT YOU NEED TO KNOW:    Abdominal pain during pregnancy is common. Some of the causes include heartburn, constipation, gas, false labor, and round ligament pain. Round ligament pain is caused by stretching of the ligaments that support your uterus. Abdominal pain may be caused by a health problem, such as a stomach virus or appendicitis (inflammation of the appendix). The pain may also be caused by a problem with your pregnancy, such as a threatened miscarriage or  labor.    DISCHARGE INSTRUCTIONS:    Call your local emergency number (911 in the ) if:   •You have a fast heartbeat.      •You have shortness of breath.      •You feel lightheaded or faint.      Return to the emergency department if:   •You have sudden, severe pain or cramps that are so bad that you cannot walk or talk.      •You have vaginal bleeding or discharge.      •You have nausea, vomiting, fever, and severe pain on your right side.      Call your obstetrician if:   •You have light vaginal bleeding or spotting.      •You continue to have abdominal pain that cannot be relieved.      •You have a fever.      •You have questions or concerns about your condition or care.      Medicines: Ask your healthcare provider before you take any medicine during pregnancy, including over-the-counter pain medicines.  •Acetaminophen may be recommended. Ask how much to take and how often to take it. Follow directions. Acetaminophen can cause liver damage if not taken correctly. Do not use more than 4 grams (4,000 milligrams) total of acetaminophen in 1 day. Acetaminophen can cause liver damage. Your provider will tell you how much is safe to take each day during pregnancy. Too much medicine can be harmful to your baby. Read the labels of all other medicines you are using to see if they also contain acetaminophen, or ask your doctor or pharmacist.      •Take your medicine as directed. Contact your healthcare provider if you think your medicine is not helping or if you have side effects. Tell him or her if you are allergic to any medicine. Keep a list of the medicines, vitamins, and herbs you take. Include the amounts, and when and why you take them. Bring the list or the pill bottles to follow-up visits. Carry your medicine list with you in case of an emergency.      Self-care:   •Rest as needed. Rest may help to relieve pain. Your healthcare provider may recommend that you rest on your side instead of on your back. He or she may tell you to lie on your left side, if possible. Place a pillow under your abdomen. Keep another pillow between your knees. Ask your provider about other ways to relieve this pain, such as a supportive belt or pregnancy exercises.      •Do not lie flat in bed or bend over if you have heartburn. Ask your obstetrician if you should make any changes to the foods you eat. Ask if you can take any medicines for heartburn.  Prevent GERD           •Move slowly. Avoid quick changes in position or movements that cause pain.      •Exercise as directed. Gentle exercise can keep the ligaments loose and strengthen core (abdominal) muscles. An example is swimming, or a yoga program designed for pregnancy. Ask your healthcare provider which exercises are safe for you and how often to exercise. For most healthy women, a good goal is to try to get at least 30 minutes of exercise every day. If activity causes pain, try not to walk too long or too far at one time. Break your exercise up into short amounts.  Walking During Pregnancy           •Apply a warm compress to the area. Warmth can relieve pain and muscle spasms. Ask your healthcare provider if you can take a warm bath or use a heating pad. Keep all heat settings low. High heat can be dangerous for your baby. Do not sit in a hot tub or use hot water in your bath. You may also be able to massage the area gently while you are applying heat. Massage can help relieve pain.      •Eat more fiber and drink more liquids to relieve constipation. Fiber is found in fruits, vegetables, and whole-grain foods, such as whole-wheat bread and cereals. Ask how much liquid to drink each day and which liquids are best for you.             Follow up with your obstetrician within 3 days or as directed: Write down your questions so you remember to ask them during your visits.    COVID-19    RETURN TO THE EMERGENCY DEPARTMENT FOR DIFFICULTY SPEAKING IN FULL SENTENCES, FEELING SHORT OF BREATH WALKING ROOM TO ROOM AT HOME OR UPSTAIRS, OR OTHER CONCERNING SYMPTOMS.    DO NOT LEAVE HOME. DO NOT TAKE PUBLIC TRANSPORTATION. IF YOU HAVE OTHERS IN YOUR HOME REMAIN 6-10 FEET FROM THEM AND USE THE MASK AT HOME. IF YOU MUST LEAVE THE HOUSE, USE THE MASK.     For isolation and quarantine of the general population, follow the CDC recommendations.   CDC Updated and Shortened Recommended Isolation and Quarantine Period for General Population are as follows:  •Isolate for 5 days, where day 0 is the day of symptom onset or (if asymptomatic) the day of collection of the first positive specimen.  •If asymptomatic at the end of 5 days or if symptoms are resolving, isolation ends and the individual should wear a well-fitting mask while around others for an additional 5 days.  •Individuals who are moderately-severely immunocompromised should continue to follow standard (i.e., not shortened) Isolation Guidance. Individuals who are unable to wear a well-fitting mask for 5 days after a 5-day isolation should also follow standard(i.e., not shortened) Isolation Guidance.    If exposed to COVID-19, quarantine as follows, where day 0 is the last date of exposure:   •If not fully vaccinated or fully vaccinated and eligible for a booster but not yet boosted, quarantine for 5 days and wear a well-fitting mask while around others for an additional 5 days.  •If fully vaccinated and booster (with the booster at least 2 weeks before the first date of exposure) or not yet eligible for a booster, no quarantine is required but these individuals should wear a well-fitting mask while around others for 10 days after the last date of exposure. If possible, test at day 5 with either a nucleic acid amplification test (NAAT, e.g., PCR) or antigen test.   •If symptoms appear, quarantine and seek testing. In this situation, quarantine would end when the test is negative. If testing is not done, isolate according to the guidance above.     Additional guidance from [facility] for schools is expected in coming days. [facility] will review that guidance when it becomes available. For the time being, schools should follow current [facility] school guidance regarding school attendance unless the local health department (LHD) issues alternative guidance. In the event the LHD issues alternative guidance, adhere to the LHD guidance for the school community.    Additional guidance from [facility] for congregate care settings is expected in coming days. [facility] will review that guidance when it becomes available. Many congregate care settings are at high risk for rapid transmission and/or poor outcomes. Congregate care settings include corrections, shelters, childcare, and group homes and other residential care settings not included in healthcare guidance. Congregate healthcare settings should follow [facility] guidance for healthcare personnel return.    Other congregate care settings should follow guidance issued by the D or, if none:   •In the absence of staff shortages, continue to follow standard (i.e., not shortened) guidance for [id number] for infected or exposed personnel for the setting.   •In staff shortage situations, as defined by an inability to provide essential services as determined by the entity, such congregate living settings may allow infected or exposed staff to return to work based on the durations for general population isolation and quarantine above.    Note that work restriction (if an individual works in health care or a congregate care setting as above) or school attendancer equirements (depending on [facility] guidance) for an individual might be different from isolation or quarantine requirements for that individual. Work restrictions pertain to only when the individual may return to work; isolation or quarantine requirements pertain to the individual’s other day-to-day activities in the community    As more information becomes available about appropriate isolation and quarantine durations with the Omicron variant, and as formal CDC guidance becomes available, [facility] will evaluate and update state guidance accordingly.    Check the CDC website (cdc.gov) for updates.    General information for spread of infection:   •Avoid close contact with people who are sick.  •Avoid touching your eyes, nose, and mouth.  •Stay home when you are sick.  •Cover your cough or sneeze with a tissue, then throw the tissue in the trash.  •Clean and disinfect frequently touched objects and surfaces using a regular household cleaning spray or wipe.  •Follow CDC’s recommendations for using a facemask.  •Wash your hands often with soap and water for at least 20 seconds, especially after going to the bathroom; before eating; and after blowing your nose, coughing, or sneezing.  •If soap and water are not readily available, use an alcohol-based hand  with at least 60% alcohol. Always wash hands with soap and water if hands are visibly dirty.

## 2022-01-10 NOTE — ED ADULT TRIAGE NOTE - CHIEF COMPLAINT QUOTE
Patient sarah removed and shredded  Patient Education        Learning About Alcohol Use Disorder  What is alcohol use disorder? Alcohol use disorder means that a person drinks alcohol even though it causes harm to themselves or others. It can range from mild to severe. The more signs of this disorder you have, the more severe it may be. Moderate to severe alcohol use disorder is sometimes called addiction. People who have it may find it hard to control their use of alcohol. People who have this disorder may argue with others about how much they're drinking. Their job may be affected because of drinking. They may drink when it's dangerous or illegal, such as when they drive. They also may have a strong need, or craving, to drink. They may feel like they must drink just to get by. Their drinking may increase their risk of getting hurt or being in a car crash. Over time, drinking too much alcohol may cause health problems. These may include high blood pressure, liver problems, or problems with digestion. What are the signs? Maybe you've wondered about your alcohol habits, or how to tell if your drinking is becoming a problem. Here are some of the signs of alcohol use disorder. You may have it if you have two or more of the following signs:  · You drink larger amounts of alcohol than you ever meant to. Or you've been drinking for a longer time than you ever meant to. · You can't cut down or control your use. Or you constantly wish you could cut down. · You spend a lot of time getting or drinking alcohol or recovering from its effects. · You have strong cravings for alcohol. · You can no longer do your main jobs at work, at school, or at home. · You keep drinking alcohol, even though your use hurts your relationships. · You have stopped doing important activities because of your alcohol use. · You drink alcohol in situations where doing so is dangerous.   · You keep drinking alcohol even though you know it's causing health problems. · You need more and more alcohol to get the same effect, or you get less effect from the same amount over time. This is called tolerance. · You have uncomfortable symptoms when you stop drinking alcohol or use less. This is called withdrawal.  Alcohol use disorder can range from mild to severe. The more signs you have, the more severe the disorder may be. Moderate to severe alcohol use disorder is sometimes called addiction. You might not realize that your drinking is a problem. You might not drink large amounts when you drink. Or you might go for days or weeks between drinking episodes. But even if you don't drink very often, your drinking could still be harmful and put you at risk. How is alcohol use disorder treated? Getting help is up to you. But you don't have to do it alone. There are many people and kinds of treatments that can help. Treatment for alcohol use disorder can include:  · Group therapy, one or more types of counseling, and alcohol education. · Medicines that help to:  ? Reduce withdrawal symptoms and help you safely stop drinking. ? Reduce cravings for alcohol. · Support groups. These groups include Alcoholics Anonymous and [employer] (Self-Management and Recovery Training). Some people are able to stop or cut back on drinking with help from a counselor. People who have moderate to severe alcohol use disorder may need medical treatment. They may need to stay in a hospital or treatment center. You may have a treatment team to help you. This team may include a psychologist or psychiatrist, counselors, doctors, social workers, nurses, and a . A  helps plan and manage your treatment. Follow-up care is a key part of your treatment and safety. Be sure to make and go to all appointments, and call your doctor if you are having problems. It's also a good idea to know your test results and keep a list of the medicines you take.   Where can you learn more? Go to http://jose-holli.info/. Enter [id number] in the search box to learn more about \"Learning About Alcohol Use Disorder. \"  Current as of: February 5, 2019  Content Version: 12.2  © 6444-2545 [employer]. Care instructions adapted under license by [employer] (which disclaims liability or warranty for this information). If you have questions about a medical condition or this instruction, always ask your healthcare professional. [place] [address] any warranty or liability for your use of this information. Patient Education        Learning About Alcohol Use Disorder  What is alcohol use disorder? Alcohol use disorder means that a person drinks alcohol even though it causes harm to themselves or others. It can range from mild to severe. The more signs of this disorder you have, the more severe it may be. Moderate to severe alcohol use disorder is sometimes called addiction. People who have it may find it hard to control their use of alcohol. People who have this disorder may argue with others about how much they're drinking. Their job may be affected because of drinking. They may drink when it's dangerous or illegal, such as when they drive. They also may have a strong need, or craving, to drink. They may feel like they must drink just to get by. Their drinking may increase their risk of getting hurt or being in a car crash. Over time, drinking too much alcohol may cause health problems. These may include high blood pressure, liver problems, or problems with digestion. What are the signs? Maybe you've wondered about your alcohol habits, or how to tell if your drinking is becoming a problem. Here are some of the signs of alcohol use disorder. You may have it if you have two or more of the following signs:  · You drink larger amounts of alcohol than you ever meant to.  Or you've been drinking for a longer time than you ever meant to.  · You can't cut down or control your use. Or you constantly wish you could cut down. · You spend a lot of time getting or drinking alcohol or recovering from its effects. · You have strong cravings for alcohol. · You can no longer do your main jobs at work, at school, or at home. · You keep drinking alcohol, even though your use hurts your relationships. · You have stopped doing important activities because of your alcohol use. · You drink alcohol in situations where doing so is dangerous. · You keep drinking alcohol even though you know it's causing health problems. · You need more and more alcohol to get the same effect, or you get less effect from the same amount over time. This is called tolerance. · You have uncomfortable symptoms when you stop drinking alcohol or use less. This is called withdrawal.  Alcohol use disorder can range from mild to severe. The more signs you have, the more severe the disorder may be. Moderate to severe alcohol use disorder is sometimes called addiction. You might not realize that your drinking is a problem. You might not drink large amounts when you drink. Or you might go for days or weeks between drinking episodes. But even if you don't drink very often, your drinking could still be harmful and put you at risk. How is alcohol use disorder treated? Getting help is up to you. But you don't have to do it alone. There are many people and kinds of treatments that can help. Treatment for alcohol use disorder can include:  · Group therapy, one or more types of counseling, and alcohol education. · Medicines that help to:  ? Reduce withdrawal symptoms and help you safely stop drinking. ? Reduce cravings for alcohol. · Support groups. These groups include Alcoholics Anonymous and [employer] (Self-Management and Recovery Training). Some people are able to stop or cut back on drinking with help from a counselor.  People who have moderate to severe alcohol use disorder may need medical treatment. They may need to stay in a hospital or treatment center. You may have a treatment team to help you. This team may include a psychologist or psychiatrist, counselors, doctors, social workers, nurses, and a . A  helps plan and manage your treatment. Follow-up care is a key part of your treatment and safety. Be sure to make and go to all appointments, and call your doctor if you are having problems. It's also a good idea to know your test results and keep a list of the medicines you take. Where can you learn more? Go to http://joseEdge Therapeuticsholli.info/. Enter [id number] in the search box to learn more about \"Learning About Alcohol Use Disorder. \"  Current as of: February 5, 2019  Content Version: 12.2  © 2862-7551 [employer]. Care instructions adapted under license by [employer] (which disclaims liability or warranty for this information). If you have questions about a medical condition or this instruction, always ask your healthcare professional. [address] any warranty or liability for your use of this information. [employer] Activation    Thank you for requesting access to [employer]. Please follow the instructions below to securely access and download your online medical record. [employer] allows you to send messages to your doctor, view your test results, renew your prescriptions, schedule appointments, and more. How Do I Sign Up? 1. In your internet browser, go to [email or web address]  2. Click on the First Time User? Click Here link in the Sign In box. You will be redirect to the New Member Sign Up page. 3. Enter your [employer] Access Code exactly as it appears below. You will not need to use this code after youve completed the sign-up process. If you do not sign up before the expiration date, you must request a new code. [employer] Access Code:  Activation code not generated  Current [employer] Status: Active (This is the date your Voxy access code will )    4. Enter the last four digits of your Social Security Number (xxxx) and Date of Birth (mm/dd/yyyy) as indicated and click Submit. You will be taken to the next sign-up page. 5. Create a Voxy ID. This will be your Voxy login ID and cannot be changed, so think of one that is secure and easy to remember. 6. Create a Voxy password. You can change your password at any time. 7. Enter your Password Reset Question and Answer. This can be used at a later time if you forget your password. 8. Enter your e-mail address. You will receive e-mail notification when new information is available in [address] 9. Click Sign Up. You can now view and download portions of your medical record. 10. Click the Download Summary menu link to download a portable copy of your medical information. Additional Information    If you have questions, please visit the Frequently Asked Questions section of the Voxy website at [email or web address] [email or web address] Remember, Voxy is NOT to be used for urgent needs. For medical emergencies, dial 911. As part of the discharge instructions, medications already given today were discussed with the patient. The next dose due of all ordered meds was highlighted as part of the medication discharge instructions. Discussed with the patient the importance of taking medications as directed, as well as the side effects and adverse reactions to medications ordered. DISCHARGE SUMMARY from Nurse    PATIENT INSTRUCTIONS:    After general anesthesia or intravenous sedation, for 24 hours or while taking prescription Narcotics:  · Limit your activities  · Do not drive and operate hazardous machinery  · Do not make important personal or business decisions  · Do  not drink alcoholic beverages  · If you have not urinated within 8 hours after discharge, please contact your surgeon on call.     Report the following to your surgeon:  · Excessive pain, swelling, redness or odor of or around the surgical area  · Temperature over 100.5  · Nausea and vomiting lasting longer than 4 hours or if unable to take medications  · Any signs of decreased circulation or nerve impairment to extremity: change in color, persistent  numbness, tingling, coldness or increase pain  · Any questions    What to do at Home:  Recommended activity: Activity as tolerated,     If you experience any of the following symptoms confusion, blurred vision, double vision. Increased dizziness. Temp l01 or above, redness, swelling, or drainage from stapes in head, please follow up with Emergency Department or Primary Md. *  Please give a list of your current medications to your Primary Care Provider. *  Please update this list whenever your medications are discontinued, doses are      changed, or new medications (including over-the-counter products) are added. *  Please carry medication information at all times in case of emergency situations. These are general instructions for a healthy lifestyle:    No smoking/ No tobacco products/ Avoid exposure to second hand smoke  Surgeon General's Warning:  Quitting smoking now greatly reduces serious risk to your health. Obesity, smoking, and sedentary lifestyle greatly increases your risk for illness    A healthy diet, regular physical exercise & weight monitoring are important for maintaining a healthy lifestyle    You may be retaining fluid if you have a history of heart failure or if you experience any of the following symptoms:  Weight gain of 3 pounds or more overnight or 5 pounds in a week, increased swelling in our hands or feet or shortness of breath while lying flat in bed. Please call your doctor as soon as you notice any of these symptoms; do not wait until your next office visit. The discharge information has been reviewed with the patient and spouse.   The patient and spouse verbalized pt here for repeat blood work and ultrasound. pt states " they said I'm about 7 week pregnant and also they just texted and my covid test was positive". . LMP 21 understanding. Discharge medications reviewed with the patient and spouse and appropriate educational materials and side effects teaching were provided.   ___________________________________________________________________________________________________________________________________

## 2022-01-10 NOTE — CONSULT NOTE ADULT - SUBJECTIVE AND OBJECTIVE BOX
31y  at 7w4d by LMP initially presented on  with LLQ pain, found to have pregnancy of unknown location at that time at  ED with bHCG of 535. Patient has been actively trying to conceive. This is a desired pregnancy. She returns for repeat bHCG and pelvic US. Has intermittent RLQ and LLQ pain, which was rated 6/10 around 1pm, resolved by 3pm. No complaints of pain currently. No current VB.  Some "upset stomach", nausea, but tolerating PO, no vomiting. Covid positive recently. Denies CP, SOB, lightheadedness/dizziness, palpitations, abnormal vaginal d/c, f/c, issues with ambulation, voiding, or having BM.    OB H/x:  SAB    GYN H/x: hx of PCOS (resolved per patient)    MED H/x: asthma (hospitalized as a child, never intubated), HTN, T2DM, depression, anxiety, PTSD    SURG H/x: L ear surgery x2,  right lithotripsy for renal stone    Medications: patient prescribed metformin, lisinopril, lexapro, albuterol prn, seroquel, and hydroxizine. However patient said she is "not good at taking meds" and has not been taking these.  Allergies: nkda   social: marijuana use qd     Vital Signs Last 24 Hrs  T(C): 36.7 (10 Nehemias 2022 17:22), Max: 36.7 (10 Nehemias 2022 17:22)  T(F): 98.1 (10 Nehemias 2022 17:22), Max: 98.1 (10 Nehemias 2022 17:22)  HR: 81 (10 Nehemias 2022 17:22) (81 - 81)  BP: 153/88 (10 Nehemias 2022 17:22) (153/88 - 153/88)  BP(mean): --  RR: 16 (10 Nehemias 2022 17:22) (16 - 16)  SpO2: 99% (10 Nehemias 2022 17:22) (99% - 99%)    Physical Exam:  Gen: NAD, comfortable  GI: soft, nontender, nondistended + BS, no rebound no guarding  BME: normal anteverted uterus, no adnexal masses appreciated , No cervical motion tenderness   Spec:   Ext: no edema, erythema, tenderness     LABS:    [facility] 1/10: 1348          RADIOLOGY & ADDITIONAL STUDIES:   31y  at 7w4d by LMP initially presented on  with LLQ pain, found to have pregnancy of unknown location at that time at  ED with bHCG of 535. Patient has been actively trying to conceive. This is a desired pregnancy. She returns for repeat bHCG and pelvic US. Has intermittent RLQ and LLQ pain, which was rated 6/10 around 1pm, resolved by 3pm. No complaints of pain currently. No current VB.  Some "upset stomach", nausea, but tolerating PO, no vomiting. Covid positive recently. Denies CP, SOB, lightheadedness/dizziness, palpitations, abnormal vaginal d/c, f/c, issues with ambulation, voiding, or having BM.    OB H/x:  SAB    GYN H/x: hx of PCOS (resolved per patient)    MED H/x: asthma (hospitalized as a child, never intubated), HTN, T2DM, depression, anxiety, PTSD    SURG H/x: L ear surgery x2,  right lithotripsy for renal stone    Medications: patient prescribed metformin, lisinopril, lexapro, albuterol prn, seroquel, and hydroxizine. However patient said she is "not good at taking meds" and has not been taking these.  Allergies: nkda   social: marijuana use qd     Vital Signs Last 24 Hrs  T(C): 36.7 (10 Nehemias 2022 17:22), Max: 36.7 (10 Nehemias 2022 17:22)  T(F): 98.1 (10 Nehemias 2022 17:22), Max: 98.1 (10 Nehemias 2022 17:22)  HR: 81 (10 Nehemias 2022 17:22) (81 - 81)  BP: 153/88 (10 Nehemias 2022 17:22) (153/88 - 153/88)  BP(mean): --  RR: 16 (10 Nehemias 2022 17:22) (16 - 16)  SpO2: 99% (10 Nehemias 2022 17:22) (99% - 99%)    Physical Exam:  Gen: NAD, comfortable  GI: soft, nontender, nondistended + BS, no rebound no guarding  Ext: no edema, erythema, tenderness     LABS:    [facility] 1/10: 1348          RADIOLOGY & ADDITIONAL STUDIES:    ******PRELIMINARY REPORT******      ******PRELIMINARY REPORT******       ACC: [id number] EXAM:  US OB TRANSVAGINAL                          PROCEDURE DATE:  01/10/2022    ******PRELIMINARY REPORT******      ******PRELIMINARY REPORT******           INTERPRETATION:  OBSTETRICAL ULTRASOUND - FIRST TRIMESTER dated 1/10/2022   9:12 PM    INDICATION: First trimester pregnancy with follow-up exam. LMP: 2021    TECHNIQUE: Transabdominal views of the pelvis were obtained followed by   transvaginal views for better visualization of the endometrial cavity.    PRIOR STUDIES: Pelvic ultrasound from 2022 and CT of abdomen and   pelvis from 10/22/2021.    FINDINGS:    Beta-hC, previously measuring at 535.    By dates, the estimated gestational age is 7 weeks and 4 days.  A single intrauterine gestation is visible with an average ultrasound age   of 4 weeks and 5 days.  Mean sac diameter is 0.3 cm, corresponding to a gestational age of 4   weeks 5 days.  Yolk sac is not visible.    No subchorionic bleed is visible. The cervix is closed with a length of   2.3 cm.    The uterus is anteverted. The uterus is 6.7 x 4.3 x 4.5 cm. No myometrial   abnormalities are seen.    The right ovary is normal in size, measuring 3.3 x 2.6 x 1.8 cm. No right   ovarian masses are seen. The left ovary is normal in size, measuring 3.8   x 2.9 x 3.9 cm. No left ovarian masses are seen. Left ovarian corpus   luteum cyst.    Doppler evaluation demonstrates flow to both ovaries with no evidence of   torsion.    IMPRESSION:  Likely anterior to gestational sac. Differential diagnosis includes early   intrauterine gestation, gestational failure or ectopic pregnancy.   Recommend correlation with serial beta-hCG and repeat ultrasound in 7-10   days.        ******PRELIMINARY REPORT******      ******PRELIMINARY REPORT******       KHALED AL TAWIL MD; Resident Radiologist

## 2022-01-10 NOTE — ED PROVIDER NOTE - NS ED MD DISPO DISCHARGE CCDA
Conjuntivae and eyelids appear normal, Sclerae : White without injection
Patient/Caregiver provided printed discharge information.

## 2022-01-10 NOTE — ED PROVIDER NOTE - OBJECTIVE STATEMENT
31y  at 7w4d by LMP with pmhx of DM2, HTN, PTSD presenting for repeat HCG and ultrasound. Pt seen 2 days ago for LLQ pain after +home UPT. Seen by gyn at that time after ultrasound showed pregnancy of unknown location with . Pt instructed to follow up in ED today to trend HCG and repeat ultrasound. Pt reports persistent LLQ as well as mild RLQ pain. She denies fever, nausea/vomiting, urinary symptoms, vaginal discharge, or vaginal bleeding.

## 2022-01-10 NOTE — ED PROVIDER NOTE - PROGRESS NOTE DETAILS
Pt seen and examined by gyn, US reviewed:  bHCG reassuring, as it has more than doubled in the last 48 hours. Given this and the fact that patient is covid positive, although ultrasound still shows pregnancy of unknown location, patient may return in 1 week to  ED for repeat pelvic US and serum bHCG.  - patient instructed to return to  ED if she develops severe abd/pelvic pain, heavy vaginal bleeding, fevers/chills, lightheadedness/dizziness, nausea/vomiting, inability to tolerate PO.  - patient safe for d/c from GYN standpoint  - d/w Dr. Strickland  Pt demonstrates understanding of plan

## 2022-01-10 NOTE — ED PROVIDER NOTE - PRINCIPAL DIAGNOSIS
Bilateral lower abdominal pain Statement Selected Statement Selected Statement Selected Statement Selected Statement Selected Statement Selected Pregnancy of unknown anatomic location

## 2022-01-10 NOTE — ED ADULT NURSE NOTE - OBJECTIVE STATEMENT
31y female presents to ED for repeat HCG and ultrasound. Pt seen in this ED on Saturday for suprapubic pain and was told 7 weeks pregnant. Denies vaginal bleeding, n/v/d. Covid +. PMH of diabetes, anxiety. A&Ox4.

## 2022-01-10 NOTE — ED PROVIDER NOTE - CARE PLAN
Principal Discharge DX:	Bilateral lower abdominal pain   1 Principal Discharge DX:	Pregnancy of unknown anatomic location   Principal Discharge DX:	Pregnancy of unknown anatomic location  Secondary Diagnosis:	2019 novel coronavirus disease (COVID-19)

## 2022-01-10 NOTE — ED PROVIDER NOTE - CLINICAL SUMMARY MEDICAL DECISION MAKING FREE TEXT BOX
Pt afebrile and hemodynamically stable. Minimal abdominal tenderness. Pain well controlled without medication in ED. Here for repeat HCG and US pelvis. HCG uptrended from 535 to 1348. US pelvis pending. GYN aware of pt and evaluated her in ED. Will provide recommendations pending imaging.

## 2022-01-10 NOTE — ED ADULT TRIAGE NOTE - DATE OF LAST VACCINATION
Chief Complaint   Patient presents with   • Other       HISTORY OF PRESENT ILLNESS: Patient is a 14 m.o. male who presents today with his mother because of a small red nodule on the top of his head that has been there for couple weeks.  She was told by early intervention that it may be a cluster of blood vessels.  Nonetheless it popped and drained a couple days ago but it is still red in the area.    Patient Active Problem List    Diagnosis Date Noted   • Developmental delay 06/19/2018   • FTT (failure to thrive) in infant 06/19/2018   • Microcephaly (HCC) 06/19/2018       Allergies:Patient has no known allergies.    Current Outpatient Prescriptions Ordered in [facility]   Medication Sig Dispense Refill   • mupirocin (BACTROBAN) 2 % Ointment Apply 1 Application to affected area(s) 2 times a day. 1 Tube 0     No current Epic-ordered facility-administered medications on file.        History reviewed. No pertinent past medical history.         No family status information on file.   History reviewed. No pertinent family history.    ROS:  Review of Systems   Constitutional: Negative for fever, reduction in appetite, reduction in activity level.   HENT: Negative for ear pulling, nosebleeds, congestion.    Eyes: Negative for ocular drainage.   Respiratory: Negative for cough, visible sputum production, signs of respiratory distress or wheezing.    Cardiovascular: Negative for cyanosis or syncope.   Gastrointestinal: Negative for nausea, vomiting or diarrhea. No change in bowel pattern.   Genitourinary: No change in urinary pattern    Exam:  Pulse 108, temperature 36.9 °C (98.5 °F), temperature source Temporal, resp. rate 40, weight 7.144 kg (15 lb 12 oz), SpO2 96 %.  General:  Well nourished, well developed male in NAD  Head:Normocephalic, atraumatic left of midline on the top of his head there is a 1 cm diameter area of mildly indurated erythema without any fluctuance, no current drainage.  Small petechial scabbed over lesion  located centrally  Eyes: PERRLA, EOM within normal limits, no conjunctival injection or drainage, no scleral icterus.  Extremities: no clubbing, cyanosis, or edema.    Please note that this dictation was created using voice recognition software. I have made every reasonable attempt to correct obvious errors, but I expect that there are errors of grammar and possibly content that I did not discover before finalizing the note.    Assessment/Plan:  1. Skin infection  mupirocin (BACTROBAN) 2 % Ointment     Followup with primary care in the next 7-10 days if not significantly improving, return to the urgent care or go to the emergency room sooner for any worsening of symptoms.          03-Jan-2022

## 2022-01-15 ENCOUNTER — EMERGENCY (EMERGENCY)
Facility: HOSPITAL | Age: 32
LOS: 1 days | Discharge: HOME | End: 2022-01-15
Attending: EMERGENCY MEDICINE | Admitting: STUDENT IN AN ORGANIZED HEALTH CARE EDUCATION/TRAINING PROGRAM
Payer: MEDICAID

## 2022-01-15 VITALS
TEMPERATURE: 98 F | RESPIRATION RATE: 18 BRPM | HEIGHT: 63 IN | WEIGHT: 184.97 LBS | HEART RATE: 90 BPM | SYSTOLIC BLOOD PRESSURE: 130 MMHG | DIASTOLIC BLOOD PRESSURE: 90 MMHG | OXYGEN SATURATION: 99 %

## 2022-01-15 VITALS
HEART RATE: 76 BPM | TEMPERATURE: 98 F | SYSTOLIC BLOOD PRESSURE: 138 MMHG | DIASTOLIC BLOOD PRESSURE: 95 MMHG | RESPIRATION RATE: 18 BRPM | OXYGEN SATURATION: 96 %

## 2022-01-15 DIAGNOSIS — O98.511 OTHER VIRAL DISEASES COMPLICATING PREGNANCY, FIRST TRIMESTER: ICD-10-CM

## 2022-01-15 DIAGNOSIS — H71.92 UNSPECIFIED CHOLESTEATOMA, LEFT EAR: Chronic | ICD-10-CM

## 2022-01-15 DIAGNOSIS — O99.891 OTHER SPECIFIED DISEASES AND CONDITIONS COMPLICATING PREGNANCY: ICD-10-CM

## 2022-01-15 DIAGNOSIS — R10.32 LEFT LOWER QUADRANT PAIN: ICD-10-CM

## 2022-01-15 DIAGNOSIS — U07.1 COVID-19: ICD-10-CM

## 2022-01-15 LAB
APPEARANCE UR: CLEAR — SIGNIFICANT CHANGE UP
BILIRUB UR-MCNC: NEGATIVE — SIGNIFICANT CHANGE UP
COLOR SPEC: YELLOW — SIGNIFICANT CHANGE UP
DIFF PNL FLD: NEGATIVE — SIGNIFICANT CHANGE UP
GLUCOSE UR QL: 250
HCG SERPL-ACNC: 7722 MIU/ML — HIGH
KETONES UR-MCNC: ABNORMAL MG/DL
LEUKOCYTE ESTERASE UR-ACNC: NEGATIVE — SIGNIFICANT CHANGE UP
NITRITE UR-MCNC: NEGATIVE — SIGNIFICANT CHANGE UP
PH UR: 6 — SIGNIFICANT CHANGE UP (ref 5–8)
PROT UR-MCNC: NEGATIVE MG/DL — SIGNIFICANT CHANGE UP
SARS-COV-2 RNA SPEC QL NAA+PROBE: DETECTED
SP GR SPEC: >=1.03 — SIGNIFICANT CHANGE UP (ref 1–1.03)
UROBILINOGEN FLD QL: 0.2 E.U./DL — SIGNIFICANT CHANGE UP

## 2022-01-15 PROCEDURE — 76801 OB US < 14 WKS SINGLE FETUS: CPT

## 2022-01-15 PROCEDURE — 87086 URINE CULTURE/COLONY COUNT: CPT

## 2022-01-15 PROCEDURE — U0005: CPT

## 2022-01-15 PROCEDURE — 76801 OB US < 14 WKS SINGLE FETUS: CPT | Mod: 26

## 2022-01-15 PROCEDURE — 76817 TRANSVAGINAL US OBSTETRIC: CPT

## 2022-01-15 PROCEDURE — 99284 EMERGENCY DEPT VISIT MOD MDM: CPT | Mod: 25

## 2022-01-15 PROCEDURE — 81003 URINALYSIS AUTO W/O SCOPE: CPT

## 2022-01-15 PROCEDURE — 99285 EMERGENCY DEPT VISIT HI MDM: CPT

## 2022-01-15 PROCEDURE — 76817 TRANSVAGINAL US OBSTETRIC: CPT | Mod: 26

## 2022-01-15 PROCEDURE — 84702 CHORIONIC GONADOTROPIN TEST: CPT

## 2022-01-15 PROCEDURE — U0003: CPT

## 2022-01-15 PROCEDURE — 36415 COLL VENOUS BLD VENIPUNCTURE: CPT

## 2022-01-15 NOTE — ED PROVIDER NOTE - OBJECTIVE STATEMENT
31y  at 8+2 by LMP presents for repeat b-hcg and TVUS for POUL. Pt initially presented on  with LLQ pain, found to have pregnancy of unknown location at that time at  ED with bHCG of 535. Pt returned 1/10, at which point her b-hcg was 1348 and still no confirmed IUP or EUP.  She was supposed to return in two days from today, , but reports that she continues to feel the same intermittent sharp episodes of left groin pain over the course of over a week, which was making her anxious given her POUL, so she came in early.  Denies current pain or vaginal bleeding. Denies n/v. Patient has been actively trying to conceive. This is a desired pregnancy. Denies vaginal bleeding. Covid positive as of . Denies CP, SOB, lightheadedness/dizziness, palpitations, abnormal vaginal d/c, f/c, issues with ambulation, voiding, or having BM.

## 2022-01-15 NOTE — CONSULT NOTE ADULT - ASSESSMENT
31y  at 5+6 by 1st TM US, presented for repeat b-hcg and TVUS for POUL, found to have an IUP.  -VSS   -b-hcg uptrending appropriately  -Yolk sac and gestational sac seen on TVUS, corresponding to gestational age of 5+6   31y  at 5+6 by 1st TM US, presented for repeat b-hcg and TVUS for POUL, found to have an IUP.  -VSS   -b-hcg uptrending appropriately  -Yolk sac and gestational sac seen on TVUS, corresponding to gestational age of 5+6  -Advised pt to resume taking her metformin as prescribed, as it is safe in pregnancy, and uncontrolled blood glucose can put her at increased risk of miscarriage. Pt expressed understanding   -Pt reports that she has not been taking her lisinopril recently for her cHTN, pt told not to resume the lisinopril - a new BP medication that is safe in pregnancy can be prescribed in the office   -Pt should come for f/u appointment in two weeks at the [facility]  -Given precautions to return to ED for severe abdominal pain or vaginal bleeding    [facility]  [address] [place] [address] [phone]    Discussed with Dr. Mejia

## 2022-01-15 NOTE — ED ADULT NURSE NOTE - NSIMPLEMENTINTERV_GEN_ALL_ED
Implemented All Universal Safety Interventions:  [place] to call system. Call bell, personal items and telephone within reach. Instruct patient to call for assistance. Room bathroom lighting operational. Non-slip footwear when patient is off stretcher. Physically safe environment: no spills, clutter or unnecessary equipment. Stretcher in lowest position, wheels locked, appropriate side rails in place.

## 2022-01-15 NOTE — ED PROVIDER NOTE - NSFOLLOWUPINSTRUCTIONS_ED_ALL_ED_FT
Please follow up at our [facility]  [address]  [phone]    Make an appointment for 2 weeks from now

## 2022-01-15 NOTE — ED PROVIDER NOTE - CLINICAL SUMMARY MEDICAL DECISION MAKING FREE TEXT BOX
pain likely 2/2 corpus luteal cyst, clinically no concern torsion  IUP visualized on sono, to f/u in clinic, SD home in NAD with strict return precautions given.

## 2022-01-15 NOTE — ED PROVIDER NOTE - PATIENT PORTAL LINK FT
You can access the FollowMyHealth Patient Portal offered by [facility] by registering at the following website: [email or web address]. By joining [employer]’s FollowMyHealth portal, you will also be able to view your health information using other applications (apps) compatible with our system.

## 2022-01-17 LAB
CULTURE RESULTS: SIGNIFICANT CHANGE UP
SPECIMEN SOURCE: SIGNIFICANT CHANGE UP

## 2022-01-20 ENCOUNTER — NON-APPOINTMENT (OUTPATIENT)
Age: 32
End: 2022-01-20

## 2022-01-23 ENCOUNTER — EMERGENCY (EMERGENCY)
Facility: HOSPITAL | Age: 32
LOS: 1 days | Discharge: HOME | End: 2022-01-23
Attending: EMERGENCY MEDICINE | Admitting: EMERGENCY MEDICINE
Payer: MEDICAID

## 2022-01-23 VITALS
OXYGEN SATURATION: 99 % | WEIGHT: 184.97 LBS | TEMPERATURE: 98 F | RESPIRATION RATE: 17 BRPM | DIASTOLIC BLOOD PRESSURE: 77 MMHG | HEIGHT: 63 IN | SYSTOLIC BLOOD PRESSURE: 125 MMHG | HEART RATE: 86 BPM

## 2022-01-23 VITALS
TEMPERATURE: 98 F | DIASTOLIC BLOOD PRESSURE: 75 MMHG | SYSTOLIC BLOOD PRESSURE: 118 MMHG | OXYGEN SATURATION: 96 % | HEART RATE: 84 BPM | RESPIRATION RATE: 16 BRPM

## 2022-01-23 DIAGNOSIS — O99.891 OTHER SPECIFIED DISEASES AND CONDITIONS COMPLICATING PREGNANCY: ICD-10-CM

## 2022-01-23 DIAGNOSIS — N83.202 UNSPECIFIED OVARIAN CYST, LEFT SIDE: ICD-10-CM

## 2022-01-23 DIAGNOSIS — I10 ESSENTIAL (PRIMARY) HYPERTENSION: ICD-10-CM

## 2022-01-23 DIAGNOSIS — O34.81 MATERNAL CARE FOR OTHER ABNORMALITIES OF PELVIC ORGANS, FIRST TRIMESTER: ICD-10-CM

## 2022-01-23 DIAGNOSIS — H71.92 UNSPECIFIED CHOLESTEATOMA, LEFT EAR: Chronic | ICD-10-CM

## 2022-01-23 DIAGNOSIS — E11.9 TYPE 2 DIABETES MELLITUS WITHOUT COMPLICATIONS: ICD-10-CM

## 2022-01-23 DIAGNOSIS — R10.2 PELVIC AND PERINEAL PAIN: ICD-10-CM

## 2022-01-23 DIAGNOSIS — J45.909 UNSPECIFIED ASTHMA, UNCOMPLICATED: ICD-10-CM

## 2022-01-23 DIAGNOSIS — Z3A.01 LESS THAN 8 WEEKS GESTATION OF PREGNANCY: ICD-10-CM

## 2022-01-23 LAB
ALBUMIN SERPL ELPH-MCNC: 4.2 G/DL — SIGNIFICANT CHANGE UP (ref 3.3–5)
ALP SERPL-CCNC: 70 U/L — SIGNIFICANT CHANGE UP (ref 40–120)
ALT FLD-CCNC: 11 U/L — SIGNIFICANT CHANGE UP (ref 10–45)
ANION GAP SERPL CALC-SCNC: 12 MMOL/L — SIGNIFICANT CHANGE UP (ref 5–17)
APPEARANCE UR: CLEAR — SIGNIFICANT CHANGE UP
APTT BLD: 33.1 SEC — SIGNIFICANT CHANGE UP (ref 27.5–35.5)
AST SERPL-CCNC: 12 U/L — SIGNIFICANT CHANGE UP (ref 10–40)
BASOPHILS # BLD AUTO: 0.04 K/UL — SIGNIFICANT CHANGE UP (ref 0–0.2)
BASOPHILS NFR BLD AUTO: 0.3 % — SIGNIFICANT CHANGE UP (ref 0–2)
BILIRUB SERPL-MCNC: 0.2 MG/DL — SIGNIFICANT CHANGE UP (ref 0.2–1.2)
BILIRUB UR-MCNC: NEGATIVE — SIGNIFICANT CHANGE UP
BUN SERPL-MCNC: 10 MG/DL — SIGNIFICANT CHANGE UP (ref 7–23)
CALCIUM SERPL-MCNC: 9.5 MG/DL — SIGNIFICANT CHANGE UP (ref 8.4–10.5)
CHLORIDE SERPL-SCNC: 103 MMOL/L — SIGNIFICANT CHANGE UP (ref 96–108)
CO2 SERPL-SCNC: 23 MMOL/L — SIGNIFICANT CHANGE UP (ref 22–31)
COLOR SPEC: YELLOW — SIGNIFICANT CHANGE UP
CREAT SERPL-MCNC: 0.75 MG/DL — SIGNIFICANT CHANGE UP (ref 0.5–1.3)
DIFF PNL FLD: NEGATIVE — SIGNIFICANT CHANGE UP
EOSINOPHIL # BLD AUTO: 0.26 K/UL — SIGNIFICANT CHANGE UP (ref 0–0.5)
EOSINOPHIL NFR BLD AUTO: 1.8 % — SIGNIFICANT CHANGE UP (ref 0–6)
GLUCOSE SERPL-MCNC: 261 MG/DL — HIGH (ref 70–99)
GLUCOSE UR QL: 500
HCG SERPL-ACNC: HIGH MIU/ML
HCT VFR BLD CALC: 41.3 % — SIGNIFICANT CHANGE UP (ref 34.5–45)
HGB BLD-MCNC: 14.3 G/DL — SIGNIFICANT CHANGE UP (ref 11.5–15.5)
IMM GRANULOCYTES NFR BLD AUTO: 0.6 % — SIGNIFICANT CHANGE UP (ref 0–1.5)
INR BLD: 0.97 — SIGNIFICANT CHANGE UP (ref 0.88–1.16)
KETONES UR-MCNC: ABNORMAL MG/DL
LEUKOCYTE ESTERASE UR-ACNC: NEGATIVE — SIGNIFICANT CHANGE UP
LYMPHOCYTES # BLD AUTO: 1.72 K/UL — SIGNIFICANT CHANGE UP (ref 1–3.3)
LYMPHOCYTES # BLD AUTO: 12.1 % — LOW (ref 13–44)
MCHC RBC-ENTMCNC: 31.4 PG — SIGNIFICANT CHANGE UP (ref 27–34)
MCHC RBC-ENTMCNC: 34.6 GM/DL — SIGNIFICANT CHANGE UP (ref 32–36)
MCV RBC AUTO: 90.6 FL — SIGNIFICANT CHANGE UP (ref 80–100)
MONOCYTES # BLD AUTO: 0.7 K/UL — SIGNIFICANT CHANGE UP (ref 0–0.9)
MONOCYTES NFR BLD AUTO: 4.9 % — SIGNIFICANT CHANGE UP (ref 2–14)
NEUTROPHILS # BLD AUTO: 11.41 K/UL — HIGH (ref 1.8–7.4)
NEUTROPHILS NFR BLD AUTO: 80.3 % — HIGH (ref 43–77)
NITRITE UR-MCNC: NEGATIVE — SIGNIFICANT CHANGE UP
NRBC # BLD: 0 /100 WBCS — SIGNIFICANT CHANGE UP (ref 0–0)
PH UR: 6 — SIGNIFICANT CHANGE UP (ref 5–8)
PLATELET # BLD AUTO: 349 K/UL — SIGNIFICANT CHANGE UP (ref 150–400)
POTASSIUM SERPL-MCNC: 4.4 MMOL/L — SIGNIFICANT CHANGE UP (ref 3.5–5.3)
POTASSIUM SERPL-SCNC: 4.4 MMOL/L — SIGNIFICANT CHANGE UP (ref 3.5–5.3)
PROT SERPL-MCNC: 7.7 G/DL — SIGNIFICANT CHANGE UP (ref 6–8.3)
PROT UR-MCNC: NEGATIVE MG/DL — SIGNIFICANT CHANGE UP
PROTHROM AB SERPL-ACNC: 11.7 SEC — SIGNIFICANT CHANGE UP (ref 10.6–13.6)
RBC # BLD: 4.56 M/UL — SIGNIFICANT CHANGE UP (ref 3.8–5.2)
RBC # FLD: 13 % — SIGNIFICANT CHANGE UP (ref 10.3–14.5)
SODIUM SERPL-SCNC: 138 MMOL/L — SIGNIFICANT CHANGE UP (ref 135–145)
SP GR SPEC: >=1.03 — SIGNIFICANT CHANGE UP (ref 1–1.03)
UROBILINOGEN FLD QL: 0.2 E.U./DL — SIGNIFICANT CHANGE UP
WBC # BLD: 14.22 K/UL — HIGH (ref 3.8–10.5)
WBC # FLD AUTO: 14.22 K/UL — HIGH (ref 3.8–10.5)

## 2022-01-23 PROCEDURE — 85025 COMPLETE CBC W/AUTO DIFF WBC: CPT

## 2022-01-23 PROCEDURE — 99285 EMERGENCY DEPT VISIT HI MDM: CPT

## 2022-01-23 PROCEDURE — 76817 TRANSVAGINAL US OBSTETRIC: CPT | Mod: 26

## 2022-01-23 PROCEDURE — 84702 CHORIONIC GONADOTROPIN TEST: CPT

## 2022-01-23 PROCEDURE — 96374 THER/PROPH/DIAG INJ IV PUSH: CPT

## 2022-01-23 PROCEDURE — 99284 EMERGENCY DEPT VISIT MOD MDM: CPT | Mod: 25

## 2022-01-23 PROCEDURE — 81003 URINALYSIS AUTO W/O SCOPE: CPT

## 2022-01-23 PROCEDURE — 85610 PROTHROMBIN TIME: CPT

## 2022-01-23 PROCEDURE — 80053 COMPREHEN METABOLIC PANEL: CPT

## 2022-01-23 PROCEDURE — 85730 THROMBOPLASTIN TIME PARTIAL: CPT

## 2022-01-23 PROCEDURE — 96375 TX/PRO/DX INJ NEW DRUG ADDON: CPT

## 2022-01-23 PROCEDURE — 76801 OB US < 14 WKS SINGLE FETUS: CPT | Mod: 26

## 2022-01-23 PROCEDURE — 76817 TRANSVAGINAL US OBSTETRIC: CPT

## 2022-01-23 PROCEDURE — 76801 OB US < 14 WKS SINGLE FETUS: CPT

## 2022-01-23 PROCEDURE — 36415 COLL VENOUS BLD VENIPUNCTURE: CPT

## 2022-01-23 RX ORDER — SODIUM CHLORIDE 9 MG/ML
1000 INJECTION INTRAMUSCULAR; INTRAVENOUS; SUBCUTANEOUS ONCE
Refills: 0 | Status: COMPLETED | OUTPATIENT
Start: 2022-01-23 | End: 2022-01-23

## 2022-01-23 RX ORDER — METOCLOPRAMIDE HCL 10 MG
10 TABLET ORAL ONCE
Refills: 0 | Status: COMPLETED | OUTPATIENT
Start: 2022-01-23 | End: 2022-01-23

## 2022-01-23 RX ORDER — ACETAMINOPHEN 500 MG
1000 TABLET ORAL ONCE
Refills: 0 | Status: COMPLETED | OUTPATIENT
Start: 2022-01-23 | End: 2022-01-23

## 2022-01-23 RX ADMIN — Medication 400 MILLIGRAM(S): at 12:05

## 2022-01-23 RX ADMIN — SODIUM CHLORIDE 1000 MILLILITER(S): 9 INJECTION INTRAMUSCULAR; INTRAVENOUS; SUBCUTANEOUS at 14:13

## 2022-01-23 RX ADMIN — SODIUM CHLORIDE 1000 MILLILITER(S): 9 INJECTION INTRAMUSCULAR; INTRAVENOUS; SUBCUTANEOUS at 12:05

## 2022-01-23 RX ADMIN — Medication 104 MILLIGRAM(S): at 14:41

## 2022-01-23 NOTE — ED ADULT TRIAGE NOTE - CHIEF COMPLAINT QUOTE
Pt is , LMP 12/5, 7 weeks pregnant reports pelvic pain since yesterday, denies vaginal bleeding. Has hx of left sided ovarian cyst.

## 2022-01-23 NOTE — ED PROVIDER NOTE - CLINICAL SUMMARY MEDICAL DECISION MAKING FREE TEXT BOX
, gravid at 7 wks, c/o lower abd pain x 1 d, no vag bleeding, pt was seen multiple times over past few wks and beta was trended and us finally showed iup, pt has not had any outpatient f/u as of yet, well appearing, hemodynamically stable, benign abd, given ivf and iv tyl w/good pain relief, labs  , us , gravid at 7 wks, c/o lower abd pain x 1 d, no vag bleeding, pt was seen multiple times over past few wks and beta was trended and us finally showed iup, pt has not had any outpatient f/u as of yet, well appearing, hemodynamically stable, benign abd, given ivf and iv tyl w/good pain relief, labs w/rising beta hcg, us w/+ iup w/FH and L ovarian cyst - no torsion, pt feels well and ready to go home - has f/u in place w/obgyn, strict return precautions given, pt understands and agrees w/plan

## 2022-01-23 NOTE — ED ADULT NURSE NOTE - OBJECTIVE STATEMENT
Patient is a 32yo female, , reporting pelvic cramping x2 days. Pt reports hx of left ovarian cyst. Denies vaginal bleeding, fevers, chills.

## 2022-01-23 NOTE — ED PROVIDER NOTE - PATIENT PORTAL LINK FT
You can access the FollowMyHealth Patient Portal offered by [facility] by registering at the following website: [email or web address]. By joining [employer]’s FollowMyHealth portal, you will also be able to view your health information using other applications (apps) compatible with our system.

## 2022-01-23 NOTE — ED PROVIDER NOTE - ATTENDING CONTRIBUTION TO CARE
vitals wnl, exam as above. Well appearing. Benign abdomen. WBC 14 (at baseline), hcg increased to [address], UA unchanged w/ trace ketones, no e/o infection, US showing "1.  Since 1/15/2022, appropriate growth of single live intrauterine gestation with average ultrasound age of 6 weeks 3 days and a fetal heart rate of 119 bpm. 2.  Unchanged 0.9 x 0.3 x 0.8 cm subchorionic hemorrhage."   Received IVF, tylenol, reglan. Symptoms improved.   Discussed importance of outpt follow up and return precautions. Clinically no indication for further emergent ED workup or hospitalization at this time. Comfortable for dc, outpt f/u.

## 2022-01-23 NOTE — ED PROVIDER NOTE - OBJECTIVE STATEMENT
The pt is a 30 y/o F, , gravid at 7wks, who presents to ED c/o lower abd pain since last night. Pt was seen in ED over past few wks for early preg vs threatened ab -- last us showed IUP and beta has been rising, however pt has not had any outpatient f/u yet. Known L ovarian cyst, pain starts on L and radiates across, 8/10, constant and dull, took a tyl w/good relief last night, no pain meds taken today. Denies vaginal bleeding, n/v/d, fevers, chills, cp, sob, dizziness, syncope, dysuria, flank pain

## 2022-01-23 NOTE — ED PROVIDER NOTE - NSFOLLOWUPINSTRUCTIONS_ED_ALL_ED_FT
take tylenol as needed, please follow up with your ob gyn as scheduled, return for any worsening or concerning symptoms  Abdominal Pain in Pregnancy  WHAT YOU NEED TO KNOW:  Abdominal pain during pregnancy is common. Some of the causes include heartburn, constipation, gas, false labor, and round ligament pain. Round ligament pain is caused by stretching of the ligaments that support your uterus. Abdominal pain may be caused by a health problem, such as a stomach virus or appendicitis (inflammation of the appendix). The pain may also be caused by a problem with your pregnancy, such as a threatened miscarriage or  labor.  DISCHARGE INSTRUCTIONS:  Call your local emergency number (911 in the ) if:   •You have a fast heartbeat.  •You have shortness of breath.  •You feel lightheaded or faint.  Return to the emergency department if:   •You have sudden, severe pain or cramps that are so bad that you cannot walk or talk.  •You have vaginal bleeding or discharge.  •You have nausea, vomiting, fever, and severe pain on your right side.  Call your obstetrician if:   •You have light vaginal bleeding or spotting.  •You continue to have abdominal pain that cannot be relieved.  •You have a fever.  •You have questions or concerns about your condition or care.  Medicines: Ask your healthcare provider before you take any medicine during pregnancy, including over-the-counter pain medicines.  •Acetaminophen may be recommended. Ask how much to take and how often to take it. Follow directions. Acetaminophen can cause liver damage if not taken correctly. Do not use more than 4 grams (4,000 milligrams) total of acetaminophen in 1 day. Acetaminophen can cause liver damage. Your provider will tell you how much is safe to take each day during pregnancy. Too much medicine can be harmful to your baby. Read the labels of all other medicines you are using to see if they also contain acetaminophen, or ask your doctor or pharmacist.  •Take your medicine as directed. Contact your healthcare provider if you think your medicine is not helping or if you have side effects. Tell him or her if you are allergic to any medicine. Keep a list of the medicines, vitamins, and herbs you take. Include the amounts, and when and why you take them. Bring the list or the pill bottles to follow-up visits. Carry your medicine list with you in case of an emergency.  Self-care:   •Rest as needed. Rest may help to relieve pain. Your healthcare provider may recommend that you rest on your side instead of on your back. He or she may tell you to lie on your left side, if possible. Place a pillow under your abdomen. Keep another pillow between your knees. Ask your provider about other ways to relieve this pain, such as a supportive belt or pregnancy exercises.  •Do not lie flat in bed or bend over if you have heartburn. Ask your obstetrician if you should make any changes to the foods you eat. Ask if you can take any medicines for heartburn.  Prevent GERD  •Move slowly. Avoid quick changes in position or movements that cause pain.  •Exercise as directed. Gentle exercise can keep the ligaments loose and strengthen core (abdominal) muscles. An example is swimming, or a yoga program designed for pregnancy. Ask your healthcare provider which exercises are safe for you and how often to exercise. For most healthy women, a good goal is to try to get at least 30 minutes of exercise every day. If activity causes pain, try not to walk too long or too far at one time. Break your exercise up into short amounts.  Walking During Pregnancy  •Apply a warm compress to the area. Warmth can relieve pain and muscle spasms. Ask your healthcare provider if you can take a warm bath or use a heating pad. Keep all heat settings low. High heat can be dangerous for your baby. Do not sit in a hot tub or use hot water in your bath. You may also be able to massage the area gently while you are applying heat. Massage can help relieve pain.  •Eat more fiber and drink more liquids to relieve constipation. Fiber is found in fruits, vegetables, and whole-grain foods, such as whole-wheat bread and cereals. Ask how much liquid to drink each day and which liquids are best for you.  Follow up with your obstetrician within 3 days or as directed: Write down your questions so you remember to ask them during your visits.

## 2022-02-08 ENCOUNTER — RESULT CHARGE (OUTPATIENT)
Age: 32
End: 2022-02-08

## 2022-02-08 ENCOUNTER — LABORATORY RESULT (OUTPATIENT)
Age: 32
End: 2022-02-08

## 2022-02-08 ENCOUNTER — APPOINTMENT (OUTPATIENT)
Dept: OBGYN | Facility: CLINIC | Age: 32
End: 2022-02-08
Payer: MEDICAID

## 2022-02-08 ENCOUNTER — APPOINTMENT (OUTPATIENT)
Dept: INTERNAL MEDICINE | Facility: CLINIC | Age: 32
End: 2022-02-08
Payer: MEDICAID

## 2022-02-08 VITALS
SYSTOLIC BLOOD PRESSURE: 102 MMHG | OXYGEN SATURATION: 98 % | BODY MASS INDEX: 30.16 KG/M2 | DIASTOLIC BLOOD PRESSURE: 70 MMHG | HEART RATE: 71 BPM | WEIGHT: 181 LBS | HEIGHT: 65 IN

## 2022-02-08 VITALS
WEIGHT: 181 LBS | BODY MASS INDEX: 30.16 KG/M2 | SYSTOLIC BLOOD PRESSURE: 102 MMHG | HEIGHT: 65 IN | HEART RATE: 71 BPM | DIASTOLIC BLOOD PRESSURE: 70 MMHG | OXYGEN SATURATION: 98 %

## 2022-02-08 DIAGNOSIS — Z34.90 ENCOUNTER FOR SUPERVISION OF NORMAL PREGNANCY, UNSPECIFIED, UNSPECIFIED TRIMESTER: ICD-10-CM

## 2022-02-08 DIAGNOSIS — F41.9 ANXIETY DISORDER, UNSPECIFIED: ICD-10-CM

## 2022-02-08 DIAGNOSIS — F31.9 OTHER MENTAL DISORDERS COMPLICATING PREGNANCY, UNSPECIFIED TRIMESTER: ICD-10-CM

## 2022-02-08 DIAGNOSIS — F32.A ANXIETY DISORDER, UNSPECIFIED: ICD-10-CM

## 2022-02-08 DIAGNOSIS — O99.340 OTHER MENTAL DISORDERS COMPLICATING PREGNANCY, UNSPECIFIED TRIMESTER: ICD-10-CM

## 2022-02-08 DIAGNOSIS — Z81.8 FAMILY HISTORY OF OTHER MENTAL AND BEHAVIORAL DISORDERS: ICD-10-CM

## 2022-02-08 DIAGNOSIS — Z83.3 FAMILY HISTORY OF DIABETES MELLITUS: ICD-10-CM

## 2022-02-08 DIAGNOSIS — Z82.61 FAMILY HISTORY OF ARTHRITIS: ICD-10-CM

## 2022-02-08 DIAGNOSIS — Z87.891 PERSONAL HISTORY OF NICOTINE DEPENDENCE: ICD-10-CM

## 2022-02-08 DIAGNOSIS — Z82.49 FAMILY HISTORY OF ISCHEMIC HEART DISEASE AND OTHER DISEASES OF THE CIRCULATORY SYSTEM: ICD-10-CM

## 2022-02-08 DIAGNOSIS — F43.10 POST-TRAUMATIC STRESS DISORDER, UNSPECIFIED: ICD-10-CM

## 2022-02-08 LAB — HBA1C MFR BLD HPLC: 7.8

## 2022-02-08 PROCEDURE — 99213 OFFICE O/P EST LOW 20 MIN: CPT | Mod: GC,25

## 2022-02-08 PROCEDURE — 81002 URINALYSIS NONAUTO W/O SCOPE: CPT

## 2022-02-08 PROCEDURE — 76817 TRANSVAGINAL US OBSTETRIC: CPT

## 2022-02-08 PROCEDURE — 99212 OFFICE O/P EST SF 10 MIN: CPT

## 2022-02-08 PROCEDURE — 36415 COLL VENOUS BLD VENIPUNCTURE: CPT

## 2022-02-08 PROCEDURE — 83036 HEMOGLOBIN GLYCOSYLATED A1C: CPT | Mod: QW

## 2022-02-08 PROCEDURE — 81025 URINE PREGNANCY TEST: CPT

## 2022-02-08 RX ORDER — METFORMIN HYDROCHLORIDE 1000 MG/1
1000 TABLET, COATED ORAL TWICE DAILY
Qty: 60 | Refills: 3 | Status: ACTIVE | COMMUNITY
Start: 2021-03-01 | End: 1900-01-01

## 2022-02-11 LAB
ABO + RH PNL BLD: NORMAL
ALBUMIN SERPL ELPH-MCNC: 4.9 G/DL
ALP BLD-CCNC: 65 U/L
ALT SERPL-CCNC: 8 U/L
ANION GAP SERPL CALC-SCNC: 17 MMOL/L
AR GENE MUT ANL BLD/T: NORMAL
AST SERPL-CCNC: 11 U/L
B19V IGG SER QL IA: 0.4 INDEX
B19V IGG+IGM SER-IMP: NEGATIVE
B19V IGG+IGM SER-IMP: NORMAL
B19V IGM FLD-ACNC: 0.08 INDEX
B19V IGM SER-ACNC: NEGATIVE
BACTERIA UR CULT: NORMAL
BASOPHILS # BLD AUTO: 0.04 K/UL
BASOPHILS NFR BLD AUTO: 0.3 %
BILIRUB SERPL-MCNC: 0.2 MG/DL
BLD GP AB SCN SERPL QL: NORMAL
BUN SERPL-MCNC: 11 MG/DL
C TRACH RRNA SPEC QL NAA+PROBE: NOT DETECTED
CALCIUM SERPL-MCNC: 10.8 MG/DL
CHLORIDE SERPL-SCNC: 99 MMOL/L
CMV IGG SERPL QL: <0.2 U/ML
CMV IGG SERPL-IMP: NEGATIVE
CMV IGM SERPL QL: <8 AU/ML
CMV IGM SERPL QL: NEGATIVE
CO2 SERPL-SCNC: 19 MMOL/L
CREAT SERPL-MCNC: 0.57 MG/DL
EOSINOPHIL # BLD AUTO: 0.22 K/UL
EOSINOPHIL NFR BLD AUTO: 1.7 %
GLUCOSE SERPL-MCNC: 139 MG/DL
HBV SURFACE AG SER QL: NONREACTIVE
HCT VFR BLD CALC: 50.7 %
HCV AB SER QL: NONREACTIVE
HCV S/CO RATIO: 0.31 S/CO
HGB A MFR BLD: 97.3 %
HGB A2 MFR BLD: 2.7 %
HGB BLD-MCNC: 15.3 G/DL
HGB FRACT BLD-IMP: NORMAL
HIV1+2 AB SPEC QL IA.RAPID: NONREACTIVE
HPV HIGH+LOW RISK DNA PNL CVX: NOT DETECTED
IMM GRANULOCYTES NFR BLD AUTO: 0.6 %
LYMPHOCYTES # BLD AUTO: 1.56 K/UL
LYMPHOCYTES NFR BLD AUTO: 12.3 %
MAN DIFF?: NORMAL
MCHC RBC-ENTMCNC: 29.1 PG
MCHC RBC-ENTMCNC: 30.2 GM/DL
MCV RBC AUTO: 96.4 FL
MEV IGG FLD QL IA: 138 AU/ML
MEV IGG+IGM SER-IMP: POSITIVE
MONOCYTES # BLD AUTO: 0.68 K/UL
MONOCYTES NFR BLD AUTO: 5.4 %
MUV AB SER-ACNC: POSITIVE
MUV IGG SER QL IA: 57.8 AU/ML
N GONORRHOEA RRNA SPEC QL NAA+PROBE: NOT DETECTED
NEUTROPHILS # BLD AUTO: 10.1 K/UL
NEUTROPHILS NFR BLD AUTO: 79.7 %
PLATELET # BLD AUTO: 383 K/UL
POTASSIUM SERPL-SCNC: 4.3 MMOL/L
PROT SERPL-MCNC: 8.1 G/DL
RBC # BLD: 5.26 M/UL
RBC # FLD: 13.8 %
RUBV IGG FLD-ACNC: 1.8 INDEX
RUBV IGG SER-IMP: POSITIVE
SODIUM SERPL-SCNC: 135 MMOL/L
SOURCE TP AMPLIFICATION: NORMAL
T GONDII AB SER-IMP: NEGATIVE
T GONDII AB SER-IMP: NEGATIVE
T GONDII IGG SER QL: 6.1 IU/ML
T GONDII IGM SER QL: 7.3 AU/ML
T PALLIDUM AB SER QL IA: NEGATIVE
TSH SERPL-ACNC: 0.85 UIU/ML
VZV AB TITR SER: POSITIVE
VZV IGG SER IF-ACNC: 805.9 INDEX
WBC # FLD AUTO: 12.67 K/UL

## 2022-02-15 ENCOUNTER — APPOINTMENT (OUTPATIENT)
Dept: OTOLARYNGOLOGY | Facility: CLINIC | Age: 32
End: 2022-02-15
Payer: MEDICAID

## 2022-02-15 ENCOUNTER — NON-APPOINTMENT (OUTPATIENT)
Age: 32
End: 2022-02-15

## 2022-02-15 ENCOUNTER — ASOB RESULT (OUTPATIENT)
Age: 32
End: 2022-02-15

## 2022-02-15 ENCOUNTER — APPOINTMENT (OUTPATIENT)
Dept: ANTEPARTUM | Facility: CLINIC | Age: 32
End: 2022-02-15
Payer: MEDICAID

## 2022-02-15 VITALS
DIASTOLIC BLOOD PRESSURE: 80 MMHG | HEART RATE: 96 BPM | HEIGHT: 65 IN | TEMPERATURE: 97.5 F | SYSTOLIC BLOOD PRESSURE: 125 MMHG | BODY MASS INDEX: 30.16 KG/M2 | WEIGHT: 181 LBS

## 2022-02-15 DIAGNOSIS — H90.12 CONDUCTIVE HEARING LOSS, UNILATERAL, LEFT EAR, WITH UNRESTRICTED HEARING ON THE CONTRALATERAL SIDE: ICD-10-CM

## 2022-02-15 DIAGNOSIS — H71.90 UNSPECIFIED CHOLESTEATOMA, UNSPECIFIED EAR: ICD-10-CM

## 2022-02-15 DIAGNOSIS — J31.0 CHRONIC RHINITIS: ICD-10-CM

## 2022-02-15 DIAGNOSIS — H60.60 UNSPECIFIED CHRONIC OTITIS EXTERNA, UNSPECIFIED EAR: ICD-10-CM

## 2022-02-15 DIAGNOSIS — T48.5X5A CHRONIC RHINITIS: ICD-10-CM

## 2022-02-15 PROBLEM — Z82.49 FAMILY HISTORY OF HYPERTENSION: Status: ACTIVE | Noted: 2022-02-08

## 2022-02-15 PROBLEM — F43.10 PTSD (POST-TRAUMATIC STRESS DISORDER): Status: ACTIVE | Noted: 2022-02-08

## 2022-02-15 PROBLEM — F41.9 ANXIETY AND DEPRESSION: Status: ACTIVE | Noted: 2022-02-08

## 2022-02-15 PROBLEM — Z82.61 FAMILY HISTORY OF ARTHRITIS: Status: ACTIVE | Noted: 2022-02-08

## 2022-02-15 PROBLEM — Z81.8 FAMILY HISTORY OF DEPRESSION: Status: ACTIVE | Noted: 2022-02-08

## 2022-02-15 PROBLEM — Z87.891 FORMER SMOKER: Status: ACTIVE | Noted: 2022-02-08

## 2022-02-15 PROBLEM — Z83.3 FAMILY HISTORY OF DIABETES MELLITUS: Status: ACTIVE | Noted: 2022-02-08

## 2022-02-15 LAB
CREAT 24H UR-MCNC: 1 G/24 H
CREAT ?TM UR-MCNC: 80 MG/DL
FMR1 GENE MUT ANL BLD/T: NORMAL
PROT 24H UR-MRATE: 6 MG/DL
PROT ?TM UR-MCNC: 24 HR
PROT UR-MCNC: 72 MG/24 H
SPECIMEN VOL 24H UR: 1200 ML

## 2022-02-15 PROCEDURE — 99214 OFFICE O/P EST MOD 30 MIN: CPT | Mod: 25

## 2022-02-15 PROCEDURE — 31231 NASAL ENDOSCOPY DX: CPT

## 2022-02-15 PROCEDURE — 76801 OB US < 14 WKS SINGLE FETUS: CPT

## 2022-02-15 PROCEDURE — 69220 CLEAN OUT MASTOID CAVITY: CPT

## 2022-02-15 RX ORDER — OFLOXACIN OTIC 3 MG/ML
0.3 SOLUTION AURICULAR (OTIC) TWICE DAILY
Qty: 1 | Refills: 0 | Status: ACTIVE | COMMUNITY
Start: 2022-02-15 | End: 1900-01-01

## 2022-02-15 RX ORDER — DEXAMETHASONE SODIUM PHOSPHATE 1 MG/ML
0.1 SOLUTION/ DROPS OPHTHALMIC TWICE DAILY
Qty: 2 | Refills: 2 | Status: ACTIVE | COMMUNITY
Start: 2022-02-15 | End: 1900-01-01

## 2022-02-15 RX ORDER — CIPROFLOXACIN AND DEXAMETHASONE 3; 1 MG/ML; MG/ML
0.3-0.1 SUSPENSION/ DROPS AURICULAR (OTIC)
Qty: 2 | Refills: 2 | Status: ACTIVE | COMMUNITY
Start: 2022-02-15 | End: 1900-01-01

## 2022-02-15 NOTE — PHYSICAL EXAM
[Normal] : mucosa is normal [Midline] : trachea located in midline position [de-identified] : +evidence of left  mastoidectomy, post auricular incision c/d/i [de-identified] : significant debris left ear, suctioned clean, foreign body removed, evidence of previous tympanoplasty

## 2022-02-15 NOTE — PHYSICAL EXAM
[Chaperone Present] : A chaperone was present in the examining room during all aspects of the physical examination [Appropriately responsive] : appropriately responsive [Alert] : alert [No Acute Distress] : no acute distress [Soft] : soft [Non-tender] : non-tender [Non-distended] : non-distended [No HSM] : No HSM [No Lesions] : no lesions [No Mass] : no mass [Labia Majora] : normal [Labia Minora] : normal [Normal] : normal [Enlarged ___ wks] : enlarged [unfilled] ~Uweeks [Uterine Adnexae] : normal [FreeTextEntry7] : obese [Tenderness] : nontender

## 2022-02-15 NOTE — HISTORY OF PRESENT ILLNESS
[de-identified] : 30 yo female who with hx of cholesteatoma, Sx x 2 last one with the last 10 years.  Today presenting with worsening on the left, as well as drainage.  Hearing has been deteriorating over the past few years.  No issues on the right.  Will have intermittent high pitched non pulsatile tinnitus.  No otalgia currently.  Last hearing test was in May 2021 when she was seen by Dr. Holley.  No imaging has been done.\par \par Pt today also complains of chronic rhinitis.  She has a history of congestion.  She uses afrin 2 x per day.  No facial pain.  Smell and taste are normal.\par \par \par rhinitis

## 2022-02-15 NOTE — HISTORY OF PRESENT ILLNESS
[Menarche Age ____] : age at menarche was [unfilled] [Definite ___ (Date)] : the last menstrual period was [unfilled] [Irregular Cycle Intervals] : are  irregular [Irregular Duration] : has been irregular [N] : Patient does not use contraception [Monogamous (Male Partner)] : is monogamous with a male partner [Y] : Positive pregnancy history [Menarche Age: ____] : age at menarche was [unfilled] [Currently Active] : currently active [Men] : men [No] : No [PapSmeardate] : 5 years ago [LMPDate] : 12/5/21 [MensesLength] : 7-10 [PGHxTotal] : 2 [PGHxABSpont] : 1 [FreeTextEntry1] : 12/5/21

## 2022-02-15 NOTE — ASSESSMENT
[FreeTextEntry1] : 30 yo female with hx of left sided cholesteatoma, now with hearing loss and drainage.  Left ear was debrided.  Will plan to use ciprodex x 10 days and follow up to review.  Once stable and at baseline will plan audio/tymp/.\par \par Nasal exam today is normal, and symptoms likely related to afrin use/rhinitis medicamentosa.  I advised discontinuing afrin and instead use nasal saline nasal steroids.\par \par fu 2 weeks for repeat eval. no

## 2022-02-15 NOTE — PROCEDURE
[FreeTextEntry3] : -\par Left mastoid cleaning\par Pre-operative Diagnosis: Left otitis externa, with mastoidectomy\par Post-operative Diagnosis: Same\par Procedure:  Binocular microscopy with cerumen removal- 08662\par Procedure Details:  \par The patient was placed in the supine position.  The operating microscope was positioned.  I then placed the ear speculum in the Left EAC.  Cerumen was then removed using a mixture of otologic curettes, and suction.  The TM was noted to be intact.  The patient tolerated procedure well.\par \par Findings: \par Bilateral Ear Canal - normal\par Bilateral Tympanic Membrane - normal\par \par Recommendations: Debrox\par Complications: None\par \par  [FreeTextEntry6] : -\par Procedure Note\par   \par Pre-operative Diagnosis: nasal congestion, rhinitis\par Post-operative Diagnosis: rhinitis medicamentosa\par Anesthesia: none\par Procedure: Bilateral nasal endoscopy\par   \par Procedure Details: \par , the patient was placed in the supine position. The telescope was passed along the left nasal floor to the nasopharynx. It was then passed into the region of the middle meatus, middle turbinate, and the sphenoethmoid region.  An identical procedure was performed on the right side. \par   \par Findings: \par Mucosa: 	                normal	\par Nasal septum: 	midline 	\par Discharge: 	none	\par Turbinates: 	normal	\par Adenoid: 	                normal	\par Posterior choanae: 	normal	\par Eustachian tubes: 	normal	\par Mucous stranding: 	normal 	\par Lesions: 	                Not present	\par   \par Comments: \par Condition: Stable. Patient tolerated procedure well.\par Complications: None\par \par

## 2022-02-22 LAB — CFTR MUT TESTED BLD/T: NEGATIVE

## 2022-02-23 ENCOUNTER — NON-APPOINTMENT (OUTPATIENT)
Age: 32
End: 2022-02-23

## 2022-02-23 ENCOUNTER — APPOINTMENT (OUTPATIENT)
Dept: OBGYN | Facility: CLINIC | Age: 32
End: 2022-02-23
Payer: MEDICAID

## 2022-02-23 VITALS
WEIGHT: 184 LBS | DIASTOLIC BLOOD PRESSURE: 70 MMHG | BODY MASS INDEX: 30.62 KG/M2 | SYSTOLIC BLOOD PRESSURE: 110 MMHG | OXYGEN SATURATION: 97 %

## 2022-02-23 LAB — CYTOLOGY CVX/VAG DOC THIN PREP: ABNORMAL

## 2022-02-23 PROCEDURE — 99205 OFFICE O/P NEW HI 60 MIN: CPT

## 2022-02-24 NOTE — DISCUSSION/SUMMARY
[FreeTextEntry1] : \par Pre-gestational Type 2 Diabetes: \par Type 1 and type 2 pregestational diabetes carry a significantly elevated risk of adverse maternal and fetal outcomes, including congenital malformations, miscarriage,  delivery, preeclampsia, macrosomia, and  mortality. Hyperglycemia is the primary  of these risks, and studies repeatedly show that tight glycemic control in the periconception period and throughout pregnancy is associated with improved outcomes. Studies also suggest that early glucose levels correlate with risk of late pregnancy outcomes, including macrosomia, preeclampsia, and  mortality. \par \par Even though women with type 2 diabetes generally have a milder glycemic disturbance and shorter duration of disease than those with type 1 diabetes, they do not appear to have better  outcomes (major congenital malformations,  mortality) than those with type 1 diabetes. Women with type 2 diabetes may be less likely to prepare for pregnancy and achieve good glycemic control compared with those with type 1 diabetes. They are more likely to be obese, which may interfere with acceptable metabolic control or have independent adverse effects on pregnancy outcomes. These factors and others (eg, higher age and parity) may account for their equivalent outcomes.  \par \par The risk of congenital malformations (diabetic embryopathy) in women with pregestational diabetes is consistently reported to be 2 to 4 fold higher than that in women without diabetes and is strongly related to the degree of hyperglycemia in the periconceptional period. The most common abnormality is congenital heart disease, followed by central nervous system defects and defects in the urogenital system.  \par \par Women with pregestational diabetes mellitus are at significantly higher risk of both medically/obstetrically-indicated  delivery and spontaneous  delivery compared with women without diabetes. Tight glucose control periconceptionally and in the first trimester seems to have a greater impact on reducing the risk of macrosomia than late pregnancy glycemic control, but glycemic control throughout pregnancy is important. Obesity is associated with diabetes, but obesity is also an independent risk factor for macrosomia. \par \par Uncommonly, pregestational diabetes is associated with failure of the fetus to achieve its genetic growth potential, or fetal growth restriction. Women with type 1 diabetes with preexisting microvascular complications or hypertension have a 6 to 10 fold higher risk of growth restriction compared with women without preexisting vascular disease. \par \par We reviewed a periconception A1c goal of < 6.5 percent, while also avoiding significant hypoglycemia. Diet is one of the most important behavioral aspects of diabetes treatment. Referral to a registered dietitian is usually of benefit for women with diabetes who are planning pregnancy. Women who are overweight or obese should be encouraged to lose weight prior to conception. In addition to improving prepregnancy glycemic control and potential benefits on metabolic profile (hypertension, hyperlipidemia, fatty liver disease), weight reduction prior to pregnancy may decrease the risk of pregnancy complications associated with obesity. Regular exercise is important to improve glycemic control, assist with weight maintenance, and reduce the risk of cardiovascular disease and overall mortality. \par \par Recommendations:\par 1. The patient was offered inpatient admission for rapid assessment of glycemic control and insulin titration. The patient declined admission. A glucometer and all supplies were called into the pharmacy. \par 2. NIPT collected today \par 3. Patient with NT scheduled for 3/2. Patient to RTC also on 3/2 to review FS and establish insulin regimen. The patient will need an early and late anatomy scan. \par 4. Recommend appointment with ophthalmology.

## 2022-02-24 NOTE — HISTORY OF PRESENT ILLNESS
[FreeTextEntry1] : 32yo yo at 11w3d (VENKATESH 9/11/22) presents for MFM consultation or T2DM.\par \par Antepartum: 24hr urine protein performed 2/11/22 of 72mg. Last A1c 7.8 on 2/11. \par ObHx: 2011 SAB\par Gynhx: left ovarian cyst dx this pregnancy (size and type unknown) with pelvic pain intermittently\par PMH: \par - T2DM (metformin 1000 BID. Patient suppose to take FS TID but only has been doing that QD upon returning from work. Values around 130-140). Patient having difficulty avoiding sugars/carbs. Patient obtained referral to endocrine by medicine team (appt made for 3/16). \par - Thyroid nodule (known to her for a year. Per medicine note, US performed, visualized 2 thyroid nodules, most recent TSH wnl. Patient referred to endocrine for this. Patient with cholesteatoma (with drainage, progressive L side hearing lost) for which she got 2 surgeries around 2011. patient currently seeing ENT for this.\par - hx of right nephrolithiasis (s/p lithotripsy 2021). Bipolar disorder (no meds currently. In process to see therapist but not currently seeing a psychiatrist. Episode of hallucination last year when mother passed),\par - cHTN (dx around 2021, not currently on meds but was on lisinopril). \par - Hx anxiety (on Lexapro, Seroquel, hydroxycine, but stopped when she got pregnant). PTSD. \par - Hx of HA early this pregnancy (no hx migraines). \par - Asthma (albuterol use once a month. Hospitalized as a child, no intubation).\par PSH: ear surgery x2, lithotripsy\par meds:( Lexapro, Seroquel, hydroxycine, Lisinopril 10mg qd, all stopped when she got pregnant), albuterol prn, metformin 1000mg BID, pnv\par all: nkda\par social: hx of marijuana smoking (stopped when she got pregnant)\par

## 2022-03-02 ENCOUNTER — ASOB RESULT (OUTPATIENT)
Age: 32
End: 2022-03-02

## 2022-03-02 ENCOUNTER — NON-APPOINTMENT (OUTPATIENT)
Age: 32
End: 2022-03-02

## 2022-03-02 ENCOUNTER — APPOINTMENT (OUTPATIENT)
Dept: OBGYN | Facility: CLINIC | Age: 32
End: 2022-03-02
Payer: MEDICAID

## 2022-03-02 ENCOUNTER — APPOINTMENT (OUTPATIENT)
Dept: ANTEPARTUM | Facility: CLINIC | Age: 32
End: 2022-03-02
Payer: MEDICAID

## 2022-03-02 VITALS
OXYGEN SATURATION: 98 % | SYSTOLIC BLOOD PRESSURE: 120 MMHG | DIASTOLIC BLOOD PRESSURE: 70 MMHG | WEIGHT: 179 LBS | BODY MASS INDEX: 29.82 KG/M2 | HEIGHT: 65 IN

## 2022-03-02 PROCEDURE — 76813 OB US NUCHAL MEAS 1 GEST: CPT

## 2022-03-02 PROCEDURE — 99213 OFFICE O/P EST LOW 20 MIN: CPT

## 2022-03-02 PROCEDURE — 76801 OB US < 14 WKS SINGLE FETUS: CPT | Mod: 59

## 2022-03-03 NOTE — DISCUSSION/SUMMARY
[FreeTextEntry1] : 32yo yo at 12w3d (VENKATESH 22) presents for M f/u visit for T2DM\par \par Pre-gestational Type 2 Diabetes: \par Type 1 and type 2 pregestational diabetes carry a significantly elevated risk of adverse maternal and fetal outcomes, including congenital malformations, miscarriage,  delivery, preeclampsia, macrosomia, and  mortality. Hyperglycemia is the primary  of these risks, and studies repeatedly show that tight glycemic control in the periconception period and throughout pregnancy is associated with improved outcomes. Studies also suggest that early glucose levels correlate with risk of late pregnancy outcomes, including macrosomia, preeclampsia, and  mortality. \par \par Even though women with type 2 diabetes generally have a milder glycemic disturbance and shorter duration of disease than those with type 1 diabetes, they do not appear to have better  outcomes (major congenital malformations,  mortality) than those with type 1 diabetes. Women with type 2 diabetes may be less likely to prepare for pregnancy and achieve good glycemic control compared with those with type 1 diabetes. They are more likely to be obese, which may interfere with acceptable metabolic control or have independent adverse effects on pregnancy outcomes. These factors and others (eg, higher age and parity) may account for their equivalent outcomes. \par \par The risk of congenital malformations (diabetic embryopathy) in women with pregestational diabetes is consistently reported to be 2 to 4 fold higher than that in women without diabetes and is strongly related to the degree of hyperglycemia in the periconceptional period. The most common abnormality is congenital heart disease, followed by central nervous system defects and defects in the urogenital system. \par \par Women with pregestational diabetes mellitus are at significantly higher risk of both medically/obstetrically-indicated  delivery and spontaneous  delivery compared with women without diabetes. Tight glucose control periconceptionally and in the first trimester seems to have a greater impact on reducing the risk of macrosomia than late pregnancy glycemic control, but glycemic control throughout pregnancy is important. Obesity is associated with diabetes, but obesity is also an independent risk factor for macrosomia. \par \par Uncommonly, pregestational diabetes is associated with failure of the fetus to achieve its genetic growth potential, or fetal growth restriction. Women with type 1 diabetes with preexisting microvascular complications or hypertension have a 6 to 10 fold higher risk of growth restriction compared with women without preexisting vascular disease. \par \par We reviewed a periconception A1c goal of < 6.5 percent, while also avoiding significant hypoglycemia. Diet is one of the most important behavioral aspects of diabetes treatment. Referral to a registered dietitian is usually of benefit for women with diabetes who are planning pregnancy. Women who are overweight or obese should be encouraged to lose weight prior to conception. In addition to improving prepregnancy glycemic control and potential benefits on metabolic profile (hypertension, hyperlipidemia, fatty liver disease), weight reduction prior to pregnancy may decrease the risk of pregnancy complications associated with obesity. Regular exercise is important to improve glycemic control, assist with weight maintenance, and reduce the risk of cardiovascular disease and overall mortality. \par \par Recommendations:\par - patient will start  lantus 20U QHS, 6U admelog with meals. Patient to stop taking metformin.\par - Patient scheduled for NT later today. She will need an early and late anatomy scan. \par - Recommend appointment with ophthalmology. \par - RTC 2 weeks. \par d/w Dr. Thakkar

## 2022-03-03 NOTE — HISTORY OF PRESENT ILLNESS
[FreeTextEntry1] : 32yo yo at 12w3d (VENKATESH 9/[birth year]) presents for [facility] f/u visit for T2DM. Patient previous instructed to obtain fasting and 1 hour postprandial fingersticks so that she can be started on an insulin regimen for this pregnancy. Fasting fingerstick has been 139-148, 1hr postprandials have been 105-183. Patient currently still taking metformin 1000  bid.  Recent 24hr urine protein collection wnl at 72mg. Denies ctx, vb, lof. No issues with po intake or voiding.\par

## 2022-03-04 ENCOUNTER — APPOINTMENT (OUTPATIENT)
Dept: OTOLARYNGOLOGY | Facility: CLINIC | Age: 32
End: 2022-03-04

## 2022-03-07 ENCOUNTER — NON-APPOINTMENT (OUTPATIENT)
Age: 32
End: 2022-03-07

## 2022-03-08 ENCOUNTER — APPOINTMENT (OUTPATIENT)
Dept: OBGYN | Facility: CLINIC | Age: 32
End: 2022-03-08
Payer: MEDICAID

## 2022-03-08 VITALS
WEIGHT: 180 LBS | DIASTOLIC BLOOD PRESSURE: 70 MMHG | OXYGEN SATURATION: 98 % | SYSTOLIC BLOOD PRESSURE: 100 MMHG | BODY MASS INDEX: 29.95 KG/M2

## 2022-03-08 PROCEDURE — 99213 OFFICE O/P EST LOW 20 MIN: CPT | Mod: TH

## 2022-03-16 ENCOUNTER — APPOINTMENT (OUTPATIENT)
Dept: OBGYN | Facility: CLINIC | Age: 32
End: 2022-03-16
Payer: MEDICAID

## 2022-03-16 ENCOUNTER — APPOINTMENT (OUTPATIENT)
Dept: ENDOCRINOLOGY | Facility: CLINIC | Age: 32
End: 2022-03-16
Payer: MEDICAID

## 2022-03-16 VITALS
WEIGHT: 179 LBS | DIASTOLIC BLOOD PRESSURE: 75 MMHG | HEART RATE: 76 BPM | BODY MASS INDEX: 29.79 KG/M2 | SYSTOLIC BLOOD PRESSURE: 112 MMHG

## 2022-03-16 VITALS
WEIGHT: 181 LBS | OXYGEN SATURATION: 98 % | DIASTOLIC BLOOD PRESSURE: 70 MMHG | BODY MASS INDEX: 30.12 KG/M2 | SYSTOLIC BLOOD PRESSURE: 100 MMHG

## 2022-03-16 DIAGNOSIS — E11.65 TYPE 2 DIABETES MELLITUS WITH HYPERGLYCEMIA: ICD-10-CM

## 2022-03-16 DIAGNOSIS — Z3A.15 15 WEEKS GESTATION OF PREGNANCY: ICD-10-CM

## 2022-03-16 PROCEDURE — 82962 GLUCOSE BLOOD TEST: CPT

## 2022-03-16 PROCEDURE — 99215 OFFICE O/P EST HI 40 MIN: CPT

## 2022-03-16 PROCEDURE — 99205 OFFICE O/P NEW HI 60 MIN: CPT | Mod: 25

## 2022-03-16 NOTE — PHYSICAL EXAM
[Alert] : alert [Normal Sclera/Conjunctiva] : normal sclera/conjunctiva [Normal Outer Ear/Nose] : the ears and nose were normal in appearance [No Neck Mass] : no neck mass was observed [No Respiratory Distress] : no respiratory distress [Normal Rate] : heart rate was normal [Regular Rhythm] : with a regular rhythm [No Edema] : no peripheral edema [Spine Straight] : spine straight [No Stigmata of Cushings Syndrome] : no stigmata of Cushings Syndrome [Normal Gait] : normal gait [No Rash] : no rash [Normal Reflexes] : deep tendon reflexes were 2+ and symmetric [Oriented x3] : oriented to person, place, and time

## 2022-03-16 NOTE — REASON FOR VISIT
[Initial Evaluation] : an initial evaluation [DM Type 2] : DM Type 2 [Thyroid nodule/ MNG] : thyroid nodule/ MNG

## 2022-03-17 NOTE — ED ADULT NURSE NOTE - ALCOHOL PRE SCREEN (AUDIT - C)
Large Joint Aspiration/Injection: L knee    Date/Time: 3/17/2022 8:30 AM  Performed by: Rossy Camacho PA-C  Authorized by: Rossy Camacho PA-C     Consent Done?:  Yes (Verbal)  Timeout: prior to procedure the correct patient, procedure, and site was verified    Location:  Knee  Site:  L knee  Medications:  40 mg triamcinolone acetonide 40 mg/mL     PROCEDURE NOTE:  LEFT KNEE INJECTION    I have explained the risks, benefits, and alternatives of the procedure in detail.  The patient voices understanding and all questions have been answered.  The patient agrees to proceed as planned.    After a sterile prep of the skin using chloraprep one step, the area was sprayed with local topical anesthetic and then cleaned with alcohol. The LEFT knee was injected through an inferior lateral approach with a combination of 2 cc 1% plain xylocaine and 40mg triamcinolone.  The patient is cautioned that immediate relief of pain is secondary to the local anesthetic and will be temporary. After the anesthetic wears off there may be a increase in pain that may last for a few hours or a few days and they should use ice to help alleviate this this pain.     If patient is diabetic, post injection elevation of blood sugar was discussed. Patient is to check blood sugar regularly and call PCP with any issues.     Patient tolerated the procedure well.             
Statement Selected

## 2022-03-18 LAB
ALBUMIN SERPL ELPH-MCNC: 4.2 G/DL
ALP BLD-CCNC: 51 U/L
ALT SERPL-CCNC: 9 U/L
ANION GAP SERPL CALC-SCNC: 16 MMOL/L
AST SERPL-CCNC: 10 U/L
BILIRUB SERPL-MCNC: 0.2 MG/DL
BUN SERPL-MCNC: 11 MG/DL
CALCIUM SERPL-MCNC: 9.4 MG/DL
CHLORIDE SERPL-SCNC: 102 MMOL/L
CHOLEST SERPL-MCNC: 152 MG/DL
CO2 SERPL-SCNC: 19 MMOL/L
CREAT SERPL-MCNC: 0.6 MG/DL
EGFR: 122 ML/MIN/1.73M2
ESTIMATED AVERAGE GLUCOSE: 148 MG/DL
FOLATE SERPL-MCNC: 18.7 NG/ML
GLUCOSE BLDC GLUCOMTR-MCNC: 117
GLUCOSE SERPL-MCNC: 122 MG/DL
HBA1C MFR BLD HPLC: 6.8 %
HDLC SERPL-MCNC: 50 MG/DL
LDLC SERPL CALC-MCNC: 79 MG/DL
NONHDLC SERPL-MCNC: 102 MG/DL
POTASSIUM SERPL-SCNC: 4.4 MMOL/L
PROT SERPL-MCNC: 7.2 G/DL
SODIUM SERPL-SCNC: 136 MMOL/L
TRIGL SERPL-MCNC: 115 MG/DL
TSH SERPL-ACNC: 0.9 UIU/ML
VIT B12 SERPL-MCNC: 524 PG/ML

## 2022-03-18 NOTE — ASSESSMENT
[Carbohydrate Consistent Diet] : carbohydrate consistent diet [Importance of Diet and Exercise] : importance of diet and exercise to improve glycemic control, achieve weight loss and improve cardiovascular health [Exercise/Effect on Glucose] : exercise/effect on glucose [Self Monitoring of Blood Glucose] : self monitoring of blood glucose [FreeTextEntry1] : 31 year y/o F pt with:\par \par 1. T2DM diagnosed 6 yrs ago:\par Pt presents today, 14 week pregnant. \par 02/08/22: POCT A1c 7.8% (H), s.creat 0.57, Glucose 139 (H), ALT 8 (L), \par She had a funduscopic exam early last year and was not informed of DM retinopathy. \par She started on MDI insulin 2 weeks ago, after her conception. She states that FBS and PPBS are in target .\par Overview on DM during pregnancy, including potential maternal fetus complications that can occur in pts with uncontrolled DM, was explained to pt. \par Pt was also instructed that as pregnancy progresses, levels of insulin resistance will increase. Therefore, we need to adjust insulin doses accordingly. \par Meet with RD to discuss and obtain guidances to manage hyperglycemias during her pregnancy. \par In addition, refer to see an ophthalmologist and nephrologist for increased risk for eclampsia ( hx HTN). \par Labs today. \par Refer to NP, Ms. Daksha Matute. \par \par 2. Hx of Thyroid nodules discovered last year:\par Pt is clinically euthyroid. TSH 0.85.\par As per pt, she was recommended to complete FNA biopsy for thyroid nodules detected last year. She met with her medical team 2 months ago and was recommended FNA once again. \par Pt has no family history of thyroid CA and no history of radiation exposure up to age 19. \par Refer for FNA biopsy. \par  \par Return in: 2 months. \par \par \par \par

## 2022-03-18 NOTE — END OF VISIT
[FreeTextEntry3] : All medical record entries made by the Scribe were at my, Dr. Parker Amaya, direction and personally dictated by me on 03/16/2022. I have reviewed the chart and agree that the record accurately reflects my personal performance of the history, physical exam, assessment and plan. I have also personally directed, reviewed and agreed with the chart.  [Time Spent: ___ minutes] : I have spent [unfilled] minutes of time on the encounter.

## 2022-03-18 NOTE — DISCUSSION/SUMMARY
[FreeTextEntry1] : Recommendations:\par DM2\par - Patient will start lantus 20U QHS, lantus 10u AM, 6U admelog with meals. \par - mood much improved, pt seems motivated to control blood sugars\par - discussed how pregnancy is a insulin resistant state and pt should not to get discouraged if blood glucose values are hard to control and insulin requirements climb\par - Recommend appointment with ophthalmology. \par \par cHTN not on medication - currently controlled\par Anxiety well controlled current. mood stable and much more positive than at her last visit. \par Thyroid nodules, following with endocrinology, they are requesting pt receive biopsy\par Asthma well controlled, not requiring medication currently\par \par RTC 2 weeks\par

## 2022-03-18 NOTE — DATA REVIEWED
[FreeTextEntry1] : Laboratory: \par - 02/08/22: POCT A1c 7.8% (H), s.creat 0.57, Glucose 139 (H), ALT 8 (L), TSH 0.85, Ca 10.8, Albumin 4.9\par - 04/26/21: A1c 8.1% (H), s.creat 0.65, Glucose 228 (H), urine no protein, TSH 1.30\par - 02/25/21: A1c 8.8% (H), s.creat  TSH 0.99, LDL-c 88,  (H), urine 30 mg/dL protein. \par \par Imaging: \par - 05/06/21 Thyroid US: R lobe (measuring 5.6 x 2.1 x 2.7 cm, homogeneous with normal vascularity) contains 2 nodules. 1) R lower pole solid nodule measuring 2.6 x 1.9 x 2.4 cm. 2) R lower pole, solid nodule measuring 1.8 x 1.0 x 1.9 cm. No nodules in L lobe. No nodules in the isthmus. Impression: Two R thyroid nodules meet the criteria for FNA biopsy.

## 2022-03-18 NOTE — HISTORY OF PRESENT ILLNESS
[FreeTextEntry1] : 30yo yo at 14w3d (VENKATESH 22) presents for [facility] f/u visit for T2DM. Patient reports fingersticks as below. No values below 90. Pt currently taking  20 units at bedtime, 6 with meals. 24hr urine protein collection wnl at 72mg. Denies ctx, vb, lof today. No issues with po intake or voiding.\par \par Pt has a 11 am - 8 pm work schedule and thus eats dinner quite late. 10:00am breakfast, 4:00pm lunch, 9:00pm dinner\par \par Fastin, 129, 132, 114, 144\par Breakfast: 140, 116\par Lunch: 134, 132, \par Dinner: 178, 134, 102, \par \par Pt motivated to improve her fingersticks as her mother passed away from complications from uncontrolled diabetes type 1. \par Pt has an active lifestyle (walks her dog after work) and loves to eat salads. Pt has met with Endocrinology and in the process of scheduling an appointment with Nutritionist.

## 2022-03-23 ENCOUNTER — EMERGENCY (EMERGENCY)
Facility: HOSPITAL | Age: 32
LOS: 1 days | Discharge: HOME | End: 2022-03-23
Attending: EMERGENCY MEDICINE | Admitting: EMERGENCY MEDICINE
Payer: COMMERCIAL

## 2022-03-23 VITALS
SYSTOLIC BLOOD PRESSURE: 125 MMHG | WEIGHT: 179.02 LBS | HEIGHT: 63 IN | DIASTOLIC BLOOD PRESSURE: 85 MMHG | TEMPERATURE: 98 F | RESPIRATION RATE: 18 BRPM | HEART RATE: 81 BPM | OXYGEN SATURATION: 99 %

## 2022-03-23 VITALS
RESPIRATION RATE: 18 BRPM | OXYGEN SATURATION: 98 % | TEMPERATURE: 98 F | DIASTOLIC BLOOD PRESSURE: 67 MMHG | SYSTOLIC BLOOD PRESSURE: 104 MMHG | HEART RATE: 87 BPM

## 2022-03-23 DIAGNOSIS — Z3A.15 15 WEEKS GESTATION OF PREGNANCY: ICD-10-CM

## 2022-03-23 DIAGNOSIS — R10.32 LEFT LOWER QUADRANT PAIN: ICD-10-CM

## 2022-03-23 DIAGNOSIS — R10.2 PELVIC AND PERINEAL PAIN: ICD-10-CM

## 2022-03-23 DIAGNOSIS — O99.282 ENDOCRINE, NUTRITIONAL AND METABOLIC DISEASES COMPLICATING PREGNANCY, SECOND TRIMESTER: ICD-10-CM

## 2022-03-23 DIAGNOSIS — O24.912 UNSPECIFIED DIABETES MELLITUS IN PREGNANCY, SECOND TRIMESTER: ICD-10-CM

## 2022-03-23 DIAGNOSIS — O99.891 OTHER SPECIFIED DISEASES AND CONDITIONS COMPLICATING PREGNANCY: ICD-10-CM

## 2022-03-23 DIAGNOSIS — H71.92 UNSPECIFIED CHOLESTEATOMA, LEFT EAR: Chronic | ICD-10-CM

## 2022-03-23 DIAGNOSIS — E28.2 POLYCYSTIC OVARIAN SYNDROME: ICD-10-CM

## 2022-03-23 DIAGNOSIS — O10.912 UNSPECIFIED PRE-EXISTING HYPERTENSION COMPLICATING PREGNANCY, SECOND TRIMESTER: ICD-10-CM

## 2022-03-23 DIAGNOSIS — J45.909 UNSPECIFIED ASTHMA, UNCOMPLICATED: ICD-10-CM

## 2022-03-23 DIAGNOSIS — O99.512 DISEASES OF THE RESPIRATORY SYSTEM COMPLICATING PREGNANCY, SECOND TRIMESTER: ICD-10-CM

## 2022-03-23 LAB
ALBUMIN SERPL ELPH-MCNC: 3.8 G/DL — SIGNIFICANT CHANGE UP (ref 3.3–5)
ALP SERPL-CCNC: 62 U/L — SIGNIFICANT CHANGE UP (ref 40–120)
ALT FLD-CCNC: <5 U/L — LOW (ref 10–45)
ANION GAP SERPL CALC-SCNC: 8 MMOL/L — SIGNIFICANT CHANGE UP (ref 5–17)
APPEARANCE UR: CLEAR — SIGNIFICANT CHANGE UP
AST SERPL-CCNC: 10 U/L — SIGNIFICANT CHANGE UP (ref 10–40)
BASOPHILS # BLD AUTO: 0.05 K/UL — SIGNIFICANT CHANGE UP (ref 0–0.2)
BASOPHILS NFR BLD AUTO: 0.3 % — SIGNIFICANT CHANGE UP (ref 0–2)
BILIRUB SERPL-MCNC: <0.2 MG/DL — SIGNIFICANT CHANGE UP (ref 0.2–1.2)
BILIRUB UR-MCNC: NEGATIVE — SIGNIFICANT CHANGE UP
BLD GP AB SCN SERPL QL: NEGATIVE — SIGNIFICANT CHANGE UP
BUN SERPL-MCNC: 13 MG/DL — SIGNIFICANT CHANGE UP (ref 7–23)
CALCIUM SERPL-MCNC: 9.5 MG/DL — SIGNIFICANT CHANGE UP (ref 8.4–10.5)
CHLORIDE SERPL-SCNC: 106 MMOL/L — SIGNIFICANT CHANGE UP (ref 96–108)
CO2 SERPL-SCNC: 19 MMOL/L — LOW (ref 22–31)
COLOR SPEC: YELLOW — SIGNIFICANT CHANGE UP
CREAT SERPL-MCNC: 0.5 MG/DL — SIGNIFICANT CHANGE UP (ref 0.5–1.3)
DIFF PNL FLD: NEGATIVE — SIGNIFICANT CHANGE UP
EGFR: 128 ML/MIN/1.73M2 — SIGNIFICANT CHANGE UP
EOSINOPHIL # BLD AUTO: 0.27 K/UL — SIGNIFICANT CHANGE UP (ref 0–0.5)
EOSINOPHIL NFR BLD AUTO: 1.7 % — SIGNIFICANT CHANGE UP (ref 0–6)
GLUCOSE SERPL-MCNC: 101 MG/DL — HIGH (ref 70–99)
GLUCOSE UR QL: 250
HCG SERPL-ACNC: HIGH MIU/ML
HCT VFR BLD CALC: 39.4 % — SIGNIFICANT CHANGE UP (ref 34.5–45)
HGB BLD-MCNC: 13.1 G/DL — SIGNIFICANT CHANGE UP (ref 11.5–15.5)
IMM GRANULOCYTES NFR BLD AUTO: 0.8 % — SIGNIFICANT CHANGE UP (ref 0–1.5)
KETONES UR-MCNC: ABNORMAL MG/DL
LEUKOCYTE ESTERASE UR-ACNC: NEGATIVE — SIGNIFICANT CHANGE UP
LIDOCAIN IGE QN: 34 U/L — SIGNIFICANT CHANGE UP (ref 7–60)
LYMPHOCYTES # BLD AUTO: 1.8 K/UL — SIGNIFICANT CHANGE UP (ref 1–3.3)
LYMPHOCYTES # BLD AUTO: 11.6 % — LOW (ref 13–44)
MCHC RBC-ENTMCNC: 30.2 PG — SIGNIFICANT CHANGE UP (ref 27–34)
MCHC RBC-ENTMCNC: 33.2 GM/DL — SIGNIFICANT CHANGE UP (ref 32–36)
MCV RBC AUTO: 90.8 FL — SIGNIFICANT CHANGE UP (ref 80–100)
MONOCYTES # BLD AUTO: 0.76 K/UL — SIGNIFICANT CHANGE UP (ref 0–0.9)
MONOCYTES NFR BLD AUTO: 4.9 % — SIGNIFICANT CHANGE UP (ref 2–14)
NEUTROPHILS # BLD AUTO: 12.54 K/UL — HIGH (ref 1.8–7.4)
NEUTROPHILS NFR BLD AUTO: 80.7 % — HIGH (ref 43–77)
NITRITE UR-MCNC: NEGATIVE — SIGNIFICANT CHANGE UP
NRBC # BLD: 0 /100 WBCS — SIGNIFICANT CHANGE UP (ref 0–0)
PH UR: 6 — SIGNIFICANT CHANGE UP (ref 5–8)
PLATELET # BLD AUTO: 345 K/UL — SIGNIFICANT CHANGE UP (ref 150–400)
POTASSIUM SERPL-MCNC: 4.4 MMOL/L — SIGNIFICANT CHANGE UP (ref 3.5–5.3)
POTASSIUM SERPL-SCNC: 4.4 MMOL/L — SIGNIFICANT CHANGE UP (ref 3.5–5.3)
PROT SERPL-MCNC: 7.6 G/DL — SIGNIFICANT CHANGE UP (ref 6–8.3)
PROT UR-MCNC: NEGATIVE MG/DL — SIGNIFICANT CHANGE UP
RBC # BLD: 4.34 M/UL — SIGNIFICANT CHANGE UP (ref 3.8–5.2)
RBC # FLD: 13.2 % — SIGNIFICANT CHANGE UP (ref 10.3–14.5)
RH IG SCN BLD-IMP: POSITIVE — SIGNIFICANT CHANGE UP
SODIUM SERPL-SCNC: 133 MMOL/L — LOW (ref 135–145)
SP GR SPEC: 1.02 — SIGNIFICANT CHANGE UP (ref 1–1.03)
UROBILINOGEN FLD QL: 0.2 E.U./DL — SIGNIFICANT CHANGE UP
WBC # BLD: 15.55 K/UL — HIGH (ref 3.8–10.5)
WBC # FLD AUTO: 15.55 K/UL — HIGH (ref 3.8–10.5)

## 2022-03-23 PROCEDURE — 83690 ASSAY OF LIPASE: CPT

## 2022-03-23 PROCEDURE — 84702 CHORIONIC GONADOTROPIN TEST: CPT

## 2022-03-23 PROCEDURE — 81003 URINALYSIS AUTO W/O SCOPE: CPT

## 2022-03-23 PROCEDURE — 86900 BLOOD TYPING SEROLOGIC ABO: CPT

## 2022-03-23 PROCEDURE — 80053 COMPREHEN METABOLIC PANEL: CPT

## 2022-03-23 PROCEDURE — 99284 EMERGENCY DEPT VISIT MOD MDM: CPT | Mod: 25

## 2022-03-23 PROCEDURE — 76805 OB US >/= 14 WKS SNGL FETUS: CPT

## 2022-03-23 PROCEDURE — 86901 BLOOD TYPING SEROLOGIC RH(D): CPT

## 2022-03-23 PROCEDURE — 76805 OB US >/= 14 WKS SNGL FETUS: CPT | Mod: 26

## 2022-03-23 PROCEDURE — 76817 TRANSVAGINAL US OBSTETRIC: CPT | Mod: 26

## 2022-03-23 PROCEDURE — 87086 URINE CULTURE/COLONY COUNT: CPT

## 2022-03-23 PROCEDURE — 86850 RBC ANTIBODY SCREEN: CPT

## 2022-03-23 PROCEDURE — 76817 TRANSVAGINAL US OBSTETRIC: CPT

## 2022-03-23 PROCEDURE — 96374 THER/PROPH/DIAG INJ IV PUSH: CPT

## 2022-03-23 PROCEDURE — 99285 EMERGENCY DEPT VISIT HI MDM: CPT

## 2022-03-23 PROCEDURE — 36415 COLL VENOUS BLD VENIPUNCTURE: CPT

## 2022-03-23 PROCEDURE — 85025 COMPLETE CBC W/AUTO DIFF WBC: CPT

## 2022-03-23 RX ORDER — SODIUM CHLORIDE 9 MG/ML
1000 INJECTION INTRAMUSCULAR; INTRAVENOUS; SUBCUTANEOUS ONCE
Refills: 0 | Status: COMPLETED | OUTPATIENT
Start: 2022-03-23 | End: 2022-03-23

## 2022-03-23 RX ORDER — ACETAMINOPHEN 500 MG
1000 TABLET ORAL ONCE
Refills: 0 | Status: COMPLETED | OUTPATIENT
Start: 2022-03-23 | End: 2022-03-23

## 2022-03-23 RX ADMIN — SODIUM CHLORIDE 1000 MILLILITER(S): 9 INJECTION INTRAMUSCULAR; INTRAVENOUS; SUBCUTANEOUS at 15:40

## 2022-03-23 RX ADMIN — Medication 400 MILLIGRAM(S): at 15:40

## 2022-03-23 NOTE — ED PROVIDER NOTE - CLINICAL SUMMARY MEDICAL DECISION MAKING FREE TEXT BOX
31 F pmh dm, htn; , approx 15 weeks 3 days preg (LMP 21) p/w constant progressively worsening LLQ abd pain since last night, no vag bleeding/discharge.  no urinary sxs, normal BMs.  on exam + ttp LLQ/L pelvic region, no cvat.  will obtain labs, urine, sonogram and give tylenol then reassess

## 2022-03-23 NOTE — ED PROVIDER NOTE - PATIENT PORTAL LINK FT
You can access the FollowMyHealth Patient Portal offered by [facility] by registering at the following website: [email or web address]. By joining [facility]’s FollowMyHealth portal, you will also be able to view your health information using other applications (apps) compatible with our system.

## 2022-03-23 NOTE — ED PROVIDER NOTE - PROGRESS NOTE DETAILS
labs w/ baseline leukocytosis, UA no infection, Rh + (no bleeding).  sonogram w/ 15 week 2 day IUP w/ FHR, no acute pathology noted.  feels well after tylenol.  will f/u with her OB.  discussed strict return parameters

## 2022-03-23 NOTE — ED ADULT TRIAGE NOTE - HEIGHT IN CM
Left message for patient to return call
Please schedule
Scheduled for Monday at 11:20 with Cassidy Medina Did not want the 1 with Dr. Aziza Sauer, said it was too early.
160.02

## 2022-03-23 NOTE — ED PROVIDER NOTE - ATTENDING CONTRIBUTION TO CARE
Vitals wnl, exam as above. Well appearing.  UA w/ trace ketone, no infection. WBC 15.5, Na 133, elevated hcg, other labs grossly wnl.   Given IVF, tylenol  US prelim showing "Limited sonographic obstetrical examination for the basis of emergency evaluation. This exam is not in lieu of a formal anatomical obstetrical scan. Single live intrauterine gestation with an average ultrasound age of 15 weeks 2 days and fetal heart rate of 149 bpm."  Feeling much better. benign abdomen.  Discussed importance of outpt follow up and return precautions. Clinically no indication for further emergent ED workup or hospitalization at this time. Comfortable for dc, outpt f/u.

## 2022-03-23 NOTE — ED ADULT TRIAGE NOTE - CHIEF COMPLAINT QUOTE
Pt presents to ED c/o pregnancy problem. , 15 weeks pregnant. Reporting LLQ pain since last night. Denies vaginal bleeding.

## 2022-03-23 NOTE — ED ADULT NURSE NOTE - EXTENSIONS OF SELF_ADULT
I reviewed the H&P, I examined the patient, and there are no changes in the patient's condition.    
None

## 2022-03-23 NOTE — ED PROVIDER NOTE - NS ED ATTENDING STATEMENT MOD
Attending with This was a shared visit with the DELON. I reviewed and verified the documentation and independently performed the documented:

## 2022-03-23 NOTE — ED PROVIDER NOTE - NSFOLLOWUPINSTRUCTIONS_ED_ALL_ED_FT
Take tylenol every 4-6 hours as needed for pain     Call to arrange follow up with your OB doctor this week       Abdominal Pain During Pregnancy      Belly (abdominal) pain is common during pregnancy. There are many possible causes. Some causes are more serious than others. Sometimes the cause is not known. Always tell your doctor if you have belly pain.      Follow these instructions at home:     • Do not have sex or put anything in your vagina until your pain goes away completely.      •Get plenty of rest until your pain gets better.      •Drink enough fluid to keep your pee (urine) pale yellow.      •Take over-the-counter and prescription medicines only as told by your doctor.      •Keep all follow-up visits.        Contact a doctor if:    •You keep having pain after resting.      •Your pain gets worse after resting.    •You have lower belly pain that:  •Comes and goes at regular times.      •Spreads to your back.      •Feels like menstrual cramps.        •You have pain or burning when you pee (urinate).        Get help right away if:    •You have a fever or chills.      •You feel like it is hard to breathe.      •You have bleeding from your vagina.      •You are leaking fluid or tissue from your vagina.      •You vomit for more than 24 hours.      •You have watery poop (diarrhea) for more than 24 hours.      •Your baby is moving less than usual.      •You feel very weak or faint.      •You have very bad pain in your upper belly.        Summary    •Belly pain is common during pregnancy. There are many possible causes.      •If you have belly pain during pregnancy, tell your doctor right away.      •Keep all follow-up visits.      This information is not intended to replace advice given to you by your health care provider. Make sure you discuss any questions you have with your health care provider.

## 2022-03-23 NOTE — ED PROVIDER NOTE - OBJECTIVE STATEMENT
31 F pmh dm, htn; , approx 15 weeks 3 days preg (LMP 21) p/w LLQ abd pain since last night.  pt reports constant LLQ/L pelvic pain radiating to L lower back, progressively worsening since last night.  took tylenol last night without much improvement.  denies f/c, headache, dizziness, fainting, chest pain, sob, nvd, constipation, urinary sxs, vaginal bleeding/dsicharge, trauma

## 2022-03-23 NOTE — ED PROVIDER NOTE - PHYSICAL EXAMINATION
Vitals reviewed  Gen: comfortable appearing, nad, speaking in full sentences  Skin: wwp, no rash/lesions  HEENT: ncat, eomi, mmm  CV: rrr, no audible m/r/g  Resp: symmetrical expansion, ctab, no w/r/r  Abd: obese, nondistended, soft, + LLQ/L pelvic ttp, no rebound/guarding, no cvat  Ext: FROM throughout, no peripheral edema  Neuro: alert/oriented, no focal deficits, steady gait

## 2022-03-23 NOTE — ED ADULT NURSE NOTE - NSIMPLEMENTINTERV_GEN_ALL_ED
Implemented All Universal Safety Interventions:  [place] to call system. Call bell, personal items and telephone within reach. Instruct patient to call for assistance. Room bathroom lighting operational. Non-slip footwear when patient is off stretcher. Physically safe environment: no spills, clutter or unnecessary equipment. Stretcher in lowest position, wheels locked, appropriate side rails in place.

## 2022-03-25 LAB
CULTURE RESULTS: SIGNIFICANT CHANGE UP
SPECIMEN SOURCE: SIGNIFICANT CHANGE UP

## 2022-03-26 NOTE — HISTORY OF PRESENT ILLNESS
[FreeTextEntry1] : 31 year y/o F pt, with Hx of T2DM diagnosed in ___ , and Thyroid nodules referred by Dr. ELIANE RIVAS, presents today to establish endocrine care with me.  \par Patient with no information of DM related complications (HTN, Hypercholesterolemia, obesity)\par Home glucose test :  \par FBS: PM:  HS:\par Last Funduscopic visit:\par DM retinopathy: Y/N\par Other PMHx: No PMHx of: \par FHx: Mother:Father:Sibling:No family Hx of: \par Social Hx: Non smoker. EtOH use:  x servings per week. Yes/No recreational drug use. Works__. Lives with . \par Lifestyle: Active/Sedentary: x days / week. Meals:  Eats _ meals a day.\par Vaccine:\par Dental visit:\par Last RD visit: \par Last Endocrinologist visit:\par Recent Hospitalization in 2-3 years: No\par NKDA\par \par 03/16/2022\par Patient presents today, referred by ___ to see endocrinologist for endocrine visit.\par \par Patient presents today with POCT __, BP__ and BMI __, with the chief complaint of (JUST ONE SYMPTOM)\par She endorses ___ (ALL OTHER SYMPTOMS)\par  \par ALL SPECIALTIES\par \par Denies/Has nocturia, weight loss, blurred vision, pins and needles sensation, shooting pain, numbness in lower extremities. \par \par Current Medications:\par \par Laboratory: \par - 02/08/22: POCT A1c 7.8% (H), s.creat 0.57, Ca 10.8 (H), ALT 8 (L), TSHX 0.85\par - 04/26/21: A1c 8.1% (H)\par - 02/25/21: A1c 8.8% (H)\par A1c     , LDL-c,      , s.creatinine,  urine a/c ratio

## 2022-03-29 ENCOUNTER — ASOB RESULT (OUTPATIENT)
Age: 32
End: 2022-03-29

## 2022-03-29 ENCOUNTER — APPOINTMENT (OUTPATIENT)
Dept: ANTEPARTUM | Facility: CLINIC | Age: 32
End: 2022-03-29
Payer: MEDICAID

## 2022-03-29 PROCEDURE — 76805 OB US >/= 14 WKS SNGL FETUS: CPT

## 2022-03-29 PROCEDURE — 76817 TRANSVAGINAL US OBSTETRIC: CPT | Mod: 59

## 2022-03-30 ENCOUNTER — APPOINTMENT (OUTPATIENT)
Dept: OBGYN | Facility: CLINIC | Age: 32
End: 2022-03-30
Payer: MEDICAID

## 2022-03-30 VITALS
BODY MASS INDEX: 30.79 KG/M2 | SYSTOLIC BLOOD PRESSURE: 120 MMHG | OXYGEN SATURATION: 98 % | DIASTOLIC BLOOD PRESSURE: 70 MMHG | WEIGHT: 185 LBS

## 2022-03-30 PROCEDURE — 99213 OFFICE O/P EST LOW 20 MIN: CPT

## 2022-03-31 ENCOUNTER — OUTPATIENT (OUTPATIENT)
Dept: OUTPATIENT SERVICES | Facility: HOSPITAL | Age: 32
LOS: 1 days | End: 2022-03-31
Payer: COMMERCIAL

## 2022-03-31 ENCOUNTER — RESULT REVIEW (OUTPATIENT)
Age: 32
End: 2022-03-31

## 2022-03-31 ENCOUNTER — APPOINTMENT (OUTPATIENT)
Dept: ULTRASOUND IMAGING | Facility: HOSPITAL | Age: 32
End: 2022-03-31
Payer: MEDICAID

## 2022-03-31 ENCOUNTER — INPATIENT (INPATIENT)
Facility: HOSPITAL | Age: 32
LOS: 4 days | Discharge: HOME | DRG: 832 | End: 2022-04-05
Attending: OBSTETRICS & GYNECOLOGY | Admitting: OBSTETRICS & GYNECOLOGY
Payer: COMMERCIAL

## 2022-03-31 VITALS
SYSTOLIC BLOOD PRESSURE: 115 MMHG | TEMPERATURE: 98 F | OXYGEN SATURATION: 94 % | WEIGHT: 184.97 LBS | DIASTOLIC BLOOD PRESSURE: 76 MMHG | HEIGHT: 63 IN | HEART RATE: 94 BPM | RESPIRATION RATE: 16 BRPM

## 2022-03-31 DIAGNOSIS — H71.92 UNSPECIFIED CHOLESTEATOMA, LEFT EAR: Chronic | ICD-10-CM

## 2022-03-31 LAB
ALBUMIN SERPL ELPH-MCNC: 4.2 G/DL — SIGNIFICANT CHANGE UP (ref 3.3–5)
ALP SERPL-CCNC: 62 U/L — SIGNIFICANT CHANGE UP (ref 40–120)
ALT FLD-CCNC: 9 U/L — LOW (ref 10–45)
ANION GAP SERPL CALC-SCNC: 11 MMOL/L — SIGNIFICANT CHANGE UP (ref 5–17)
APTT BLD: 28.1 SEC — SIGNIFICANT CHANGE UP (ref 27.5–35.5)
AST SERPL-CCNC: 11 U/L — SIGNIFICANT CHANGE UP (ref 10–40)
BASOPHILS # BLD AUTO: 0.05 K/UL — SIGNIFICANT CHANGE UP (ref 0–0.2)
BASOPHILS NFR BLD AUTO: 0.3 % — SIGNIFICANT CHANGE UP (ref 0–2)
BILIRUB SERPL-MCNC: 0.2 MG/DL — SIGNIFICANT CHANGE UP (ref 0.2–1.2)
BLD GP AB SCN SERPL QL: NEGATIVE — SIGNIFICANT CHANGE UP
BUN SERPL-MCNC: 10 MG/DL — SIGNIFICANT CHANGE UP (ref 7–23)
CALCIUM SERPL-MCNC: 9.7 MG/DL — SIGNIFICANT CHANGE UP (ref 8.4–10.5)
CHLORIDE SERPL-SCNC: 104 MMOL/L — SIGNIFICANT CHANGE UP (ref 96–108)
CO2 SERPL-SCNC: 21 MMOL/L — LOW (ref 22–31)
CREAT SERPL-MCNC: 0.47 MG/DL — LOW (ref 0.5–1.3)
EGFR: 130 ML/MIN/1.73M2 — SIGNIFICANT CHANGE UP
EOSINOPHIL # BLD AUTO: 0.42 K/UL — SIGNIFICANT CHANGE UP (ref 0–0.5)
EOSINOPHIL NFR BLD AUTO: 2.5 % — SIGNIFICANT CHANGE UP (ref 0–6)
FIBRINOGEN PPP-MCNC: 482 MG/DL — HIGH (ref 258–438)
GLUCOSE BLDC GLUCOMTR-MCNC: 217 MG/DL — HIGH (ref 70–99)
GLUCOSE BLDC GLUCOMTR-MCNC: 90 MG/DL — SIGNIFICANT CHANGE UP (ref 70–99)
GLUCOSE SERPL-MCNC: 164 MG/DL — HIGH (ref 70–99)
HCT VFR BLD CALC: 41.6 % — SIGNIFICANT CHANGE UP (ref 34.5–45)
HGB BLD-MCNC: 13.6 G/DL — SIGNIFICANT CHANGE UP (ref 11.5–15.5)
IMM GRANULOCYTES NFR BLD AUTO: 0.8 % — SIGNIFICANT CHANGE UP (ref 0–1.5)
LYMPHOCYTES # BLD AUTO: 1.96 K/UL — SIGNIFICANT CHANGE UP (ref 1–3.3)
LYMPHOCYTES # BLD AUTO: 11.8 % — LOW (ref 13–44)
MCHC RBC-ENTMCNC: 29.6 PG — SIGNIFICANT CHANGE UP (ref 27–34)
MCHC RBC-ENTMCNC: 32.7 GM/DL — SIGNIFICANT CHANGE UP (ref 32–36)
MCV RBC AUTO: 90.4 FL — SIGNIFICANT CHANGE UP (ref 80–100)
MONOCYTES # BLD AUTO: 0.79 K/UL — SIGNIFICANT CHANGE UP (ref 0–0.9)
MONOCYTES NFR BLD AUTO: 4.8 % — SIGNIFICANT CHANGE UP (ref 2–14)
NEUTROPHILS # BLD AUTO: 13.21 K/UL — HIGH (ref 1.8–7.4)
NEUTROPHILS NFR BLD AUTO: 79.8 % — HIGH (ref 43–77)
NRBC # BLD: 0 /100 WBCS — SIGNIFICANT CHANGE UP (ref 0–0)
PLATELET # BLD AUTO: 347 K/UL — SIGNIFICANT CHANGE UP (ref 150–400)
POTASSIUM SERPL-MCNC: 4.1 MMOL/L — SIGNIFICANT CHANGE UP (ref 3.5–5.3)
POTASSIUM SERPL-SCNC: 4.1 MMOL/L — SIGNIFICANT CHANGE UP (ref 3.5–5.3)
PROT SERPL-MCNC: 8 G/DL — SIGNIFICANT CHANGE UP (ref 6–8.3)
RBC # BLD: 4.6 M/UL — SIGNIFICANT CHANGE UP (ref 3.8–5.2)
RBC # FLD: 13.2 % — SIGNIFICANT CHANGE UP (ref 10.3–14.5)
RH IG SCN BLD-IMP: POSITIVE — SIGNIFICANT CHANGE UP
SARS-COV-2 RNA SPEC QL NAA+PROBE: SIGNIFICANT CHANGE UP
SODIUM SERPL-SCNC: 136 MMOL/L — SIGNIFICANT CHANGE UP (ref 135–145)
URATE SERPL-MCNC: 4.1 MG/DL — SIGNIFICANT CHANGE UP (ref 2.5–7)
WBC # BLD: 16.56 K/UL — HIGH (ref 3.8–10.5)
WBC # FLD AUTO: 16.56 K/UL — HIGH (ref 3.8–10.5)

## 2022-03-31 PROCEDURE — 99222 1ST HOSP IP/OBS MODERATE 55: CPT | Mod: GC

## 2022-03-31 PROCEDURE — 10006 FNA BX W/US GDN EA ADDL: CPT

## 2022-03-31 PROCEDURE — 99285 EMERGENCY DEPT VISIT HI MDM: CPT

## 2022-03-31 PROCEDURE — 10005 FNA BX W/US GDN 1ST LES: CPT

## 2022-03-31 RX ORDER — GLUCAGON INJECTION, SOLUTION 0.5 MG/.1ML
1 INJECTION, SOLUTION SUBCUTANEOUS ONCE
Refills: 0 | Status: DISCONTINUED | OUTPATIENT
Start: 2022-03-31 | End: 2022-04-05

## 2022-03-31 RX ORDER — INSULIN LISPRO 100/ML
VIAL (ML) SUBCUTANEOUS
Refills: 0 | Status: DISCONTINUED | OUTPATIENT
Start: 2022-03-31 | End: 2022-04-05

## 2022-03-31 RX ORDER — DEXTROSE 50 % IN WATER 50 %
15 SYRINGE (ML) INTRAVENOUS ONCE
Refills: 0 | Status: DISCONTINUED | OUTPATIENT
Start: 2022-03-31 | End: 2022-04-05

## 2022-03-31 RX ORDER — INSULIN GLARGINE 100 [IU]/ML
45 INJECTION, SOLUTION SUBCUTANEOUS AT BEDTIME
Refills: 0 | Status: DISCONTINUED | OUTPATIENT
Start: 2022-03-31 | End: 2022-04-01

## 2022-03-31 RX ORDER — SODIUM CHLORIDE 9 MG/ML
1000 INJECTION, SOLUTION INTRAVENOUS
Refills: 0 | Status: DISCONTINUED | OUTPATIENT
Start: 2022-03-31 | End: 2022-04-05

## 2022-03-31 RX ORDER — FERROUS SULFATE 325(65) MG
325 TABLET ORAL DAILY
Refills: 0 | Status: DISCONTINUED | OUTPATIENT
Start: 2022-03-31 | End: 2022-04-02

## 2022-03-31 RX ORDER — DEXTROSE 50 % IN WATER 50 %
12.5 SYRINGE (ML) INTRAVENOUS ONCE
Refills: 0 | Status: DISCONTINUED | OUTPATIENT
Start: 2022-03-31 | End: 2022-04-05

## 2022-03-31 RX ORDER — DEXTROSE 50 % IN WATER 50 %
25 SYRINGE (ML) INTRAVENOUS ONCE
Refills: 0 | Status: DISCONTINUED | OUTPATIENT
Start: 2022-03-31 | End: 2022-04-05

## 2022-03-31 RX ORDER — INSULIN LISPRO 100/ML
14 VIAL (ML) SUBCUTANEOUS
Refills: 0 | Status: DISCONTINUED | OUTPATIENT
Start: 2022-03-31 | End: 2022-04-01

## 2022-03-31 RX ORDER — FOLIC ACID 0.8 MG
1 TABLET ORAL DAILY
Refills: 0 | Status: DISCONTINUED | OUTPATIENT
Start: 2022-03-31 | End: 2022-04-02

## 2022-03-31 RX ADMIN — INSULIN GLARGINE 45 UNIT(S): 100 INJECTION, SOLUTION SUBCUTANEOUS at 22:13

## 2022-03-31 NOTE — ED ADULT TRIAGE NOTE - CHIEF COMPLAINT QUOTE
Pt presents to ED sent by OBGYN for hyperglycemia. Pt 16 weeks pregnant . No other complaints at this time. States, " My Dr. told me to come in because my BG won't go down".  in triage. Pt presents to ED sent by OBGYN for hyperglycemia. Pt 16 weeks pregnant . C/O intermittent cramping. Denies vaginal discharge, bleeding. States, " My DrDaniel told me to come in because my BG won't go down".  in Triage.

## 2022-03-31 NOTE — ED PROVIDER NOTE - OBJECTIVE STATEMENT
32 y/o F , 16 weeks pregnant, PMHx of DM, HTN, presents with complaints of elevated glucose to above 100 ever since she has been pregnant for the past 4 months. Patient has had her insulin dose increase, however glucose still hovering above 100.  in ED. Patient is complaining of increased thirst lately. Patient with chronic urinary frequency that is unchanged, no dysuria, abdominal pain, vaginal pain, discharge. Patient first felt baby move yesterday. Patient first referred to the ED for admission to increase insulin doses after supervision. 30 y/o F , 16 weeks pregnant, PMHx of DM, HTN, presents with complaints of elevated glucose since the beginning of her pregnancy.d Patient has had her insulin dose increased, however glucose still hovering in the 100-200 range.  in ED. Patient is complaining of increased thirst lately. + chronic urinary frequency that is unchanged, no dysuria, abdominal pain, vaginal bleeding, discharge, water leak from vagina. Patient first felt baby move yesterday. Patient referred to the ED for admission for glucose control w/ plan to increase insulin dose under supervision.

## 2022-03-31 NOTE — H&P ADULT - NSHPPOADEEPVENOUSTHROMB_GEN_A_CORE
Final Anesthesia Post-op Assessment    Patient: Allison GoddardWoohalle  Procedure(s) Performed: CLOSURE, ILEOSTOMY  - TAKEDOWN  Anesthesia type: General    Vitals Value Taken Time   Temp 36.7 °C (98.1 °F) 6/17/2020  9:30 AM   Pulse 62 6/17/2020  9:40 AM   Resp 12 6/17/2020  9:40 AM   SpO2 100 % 6/17/2020  9:40 AM   /62 6/17/2020  9:40 AM       Last 24 I/O:     Intake/Output Summary (Last 24 hours) at 6/17/2020 0958  Last data filed at 6/17/2020 0908  Gross per 24 hour   Intake 500 ml   Output 10 ml   Net 490 ml         Patient Location: Phase II  Post-op Vital Signs:stable  Level of Consciousness: participates in exam, awake, alert and oriented  Respiratory Status: spontaneous ventilation and unassisted  Cardiovascular blood pressure returned to baseline  Hydration: euvolemic  Pain Management: well controlled  Handoff: Handoff to receiving clinician was performed and questions were answered  Nausea: None  Airway Patency:patent  Post-op Assessment: awake, alert, appropriately conversant, or baseline, no complications, patient tolerated procedure well with no complications and evidence of recall      
no

## 2022-03-31 NOTE — DISCUSSION/SUMMARY
[FreeTextEntry1] : Recommendations:\par DM2\par - Patient is on basaglar 20U QHS, basaglar 10u AM, 6u admelog with meals.\par - Plan to admit to inpatient antepartum service for accelerated insulin titration given severely uncontrolled diabetes\par - Will obtain endocrine consultation in house\par - Patient is planned to have a biopsy of her thyroid nodule at [facility] tomorrow. Will plan for direct admission after.\par \par cHTN not on medication - currently controlled\par Anxiety well controlled current.\par Asthma well controlled, not requiring medication currently\par \par Patient should f/u in clinic in 2 weeks\par \par Peace Wiggins MD PGY2\par ESSIE Thakkar MD

## 2022-03-31 NOTE — HISTORY OF PRESENT ILLNESS
[FreeTextEntry1] : 31 y.o. @16w3d (VENKATESH 9[birth year]) presents for M f/u visit for T2DM, SuhailTN. Patient states that her glucose has been poorly controlled on her current regimen of Basaglar 10 AM/20 PM with admelog 6u with meals. She states her fasting fingersticks are usually 120-140 and that she's never seen a value below 100. Her 1h post-prandial fingersticks are 160-170, sometimes 200. BPs at home 110s-120/70s-80 per patient. Not on antihypertensives. Patient endorses some cramping for which she was seen in the ED on 3/23. Sonogram was WNL and patient's pain improved with Tylenol, so she was discharged. The cramping has not worsened since then and she states she feels better. She denies LOF, vaginal bleeding. Has not yet felt fetal movement.\par \par \par Pt motivated to improve her fingersticks as her mother passed away from complications from uncontrolled diabetes type 1. \par Pt has an active lifestyle (walks her dog after work) and loves to eat salads. Pt has met with Endocrinology and in the process of scheduling an appointment with Nutritionist. \par

## 2022-03-31 NOTE — CONSULT NOTE ADULT - NS ATTEST RISK GEN_ALL_CORE
Risk Statement (NON-critical care)
29 y/o M pt w/ LLQ pain and vomiting. Will get basic labs, r.o diverticulitis, reassess.

## 2022-03-31 NOTE — H&P ADULT - ASSESSMENT
31 y.o. @16w4d (VENKATESH 9/11/22) with known cHTN and T2DM admitted for glucose control.    T2DM  - Patient is on basaglar 20U QHS, basaglar 10u AM, 6u admelog with meals.  - Admit to inpatient antepartum service for accelerated insulin titration given severely uncontrolled diabetes  - Endocrine consult: final recs pending, but can increase lantus to 20 AM and 20 PM, maintain admelog at 6u for now    cHTN   - Not on medication - currently controlled    Peace Wiggins MD PGY2  ESSIE Thakkar MD    31 y.o. @16w4d (VENKATESH 9/11/22) with known cHTN and T2DM admitted for glucose control.    T2DM  - Patient is currently on basaglar 20U QHS, basaglar 10u AM, 6u admelog with meals.  - Admit to inpatient antepartum service for accelerated insulin titration given severely uncontrolled diabetes  - Endocrine consult: f/u recs    cHTN   - Not on medication - currently controlled    Peace Wiggins MD PGY2  ESSIE Thakkar MD    31 y.o.  @16w4d (VENKATESH 22) with known cHTN and T2DM admitted for glucose control.    T2DM  - Patient is currently on basaglar 20U QHS, basaglar 10u AM, 6u admelog with meals.  - Admit to inpatient antepartum service for accelerated insulin titration given severely uncontrolled diabetes  - Endocrine consult: f/u recs    cHTN   - Not on medication - currently controlled    Peace Wiggins MD PGY2  ESSIE Thakkar MD

## 2022-03-31 NOTE — CONSULT NOTE ADULT - ATTENDING COMMENTS
Pt seen on rounds this afternoon.  31-yo woman with type 2 DM, now at 16 weeks gestation, admitted for poorly controlled blood glucoses.  Is currently taking Basaglar 10 units qAM, 20 units qhs, plus premeal lispro 6 units TID.  Glucoses have been running 120-140 in the morning, 160-200 post-prandially.  Review of her diet suggests that the hyperglycemia is not primarily diet-related---she appear to need an increase in insulin dose.  A1c level is 6.9%  Of note is that she has not taken any insulin for the past 2-3 days.  Says that when he went to refill he insulin she was told that she was "too early"--(likely because of an increase in dose since the insulin was first started)  Also has a thyroid nodule which was biopsied today.  On exam, nodule is palpable in the midline but is indistinct  FS now is 237 (likely because of lack of insulin)  To restart the insulin, increase the Lantus to a single hs dose of 35 units, premeal lispro of 14 units

## 2022-03-31 NOTE — CONSULT NOTE ADULT - SUBJECTIVE AND OBJECTIVE BOX
HPI: 31 y.o.  @16w4d (VENKATESH 22) with known cHTN and T2DM admitted for glucose control after clinic visit for MFM yesterday showed continued uncontrolled T2DM with outpatient management. Patient states that her glucose has been poorly controlled on her current regimen of Basaglar 10 AM/20 PM with admelog 6u with meals. She states her fasting fingersticks are usually 120-140 and that she's never seen a value below 100. Her 1h post-prandial fingersticks are 160-170, sometimes 200. BPs at home 110s-120/70s-80 per patient. Not on antihypertensives. Patient endorses some intermittent cramping for which she was seen in the ED on 3/23. Sonogram was WNL and patient's pain improved with Tylenol, so she was discharged. The cramping has improved since then and she states she feels better. She denies LOF, vaginal bleeding. Has not yet felt fetal movement.    Endocrinology consulted for uncontrolled gestational diabetes.   Patient was diagnosed with diabetes type 2 at the age of 25, was started on metformin. She has only been on insulin since becoming pregnant.   Currently therapy: 10u Basaglar AM, 20u Basaglar PM, 6u Admelog AM. (dose adjusted by OB)  Patient was seen by Dr. Amaya  to establish care, at the time patient was on Basaglar 16 and Admelog 4.   A1C: 6.8% - March  Diet: she reports eating low carb diet, only carbs are brown rice or quinoa.   FSG:  Fasting 130-140  Postprandial- 118-230s    Despite diet she has been unable too blood sugars at range for GDM.     PMH & Surgical Hx: as per HPI  FH: mom() and dad  SH: smokes marijuana, no alcohol       Current Meds:  dextrose 5%. 1000 milliLiter(s) IV Continuous <Continuous>  dextrose 5%. 1000 milliLiter(s) IV Continuous <Continuous>  dextrose 50% Injectable 25 Gram(s) IV Push once  dextrose 50% Injectable 12.5 Gram(s) IV Push once  dextrose 50% Injectable 25 Gram(s) IV Push once  dextrose Oral Gel 15 Gram(s) Oral once PRN  ferrous    sulfate 325 milliGRAM(s) Oral daily  folic acid 1 milliGRAM(s) Oral daily  glucagon  Injectable 1 milliGRAM(s) IntraMuscular once  insulin glargine Injectable (LANTUS) 45 Unit(s) SubCutaneous at bedtime  insulin lispro (ADMELOG) corrective regimen sliding scale   SubCutaneous Before meals and at bedtime  insulin lispro Injectable (ADMELOG) 14 Unit(s) SubCutaneous three times a day before meals  prenatal multivitamin 1 Tablet(s) Oral daily      Allergies:  No Known Allergies      ROS:  Denies the following except as indicated.    General: weight loss/weight gain, decreased appetite, fatigue  Eyes: Blurry vision, double vision, visual changes  ENT: Throat pain, changes in voice,   CV: palpitations, SOB, CP, cough  GI: NVD, difficulty swallowing, abdominal pain  : polyuria, dysuria  Endo: abnormal menses, temperature intolerance, decreased libido  MSK: weakness, joint pain  Skin: rash, dryness, diaphoresis  Heme: Easy bruising,bleeding  Neuro: HA, dizziness, lightheadedness, numbness tingling  Psych: Anxiety, Depression    Vital Signs Last 24 Hrs  T(C): 36.6 (31 Mar 2022 15:34), Max: 36.6 (31 Mar 2022 15:34)  T(F): 97.8 (31 Mar 2022 15:34), Max: 97.8 (31 Mar 2022 15:34)  HR: 94 (31 Mar 2022 15:34) (94 - 94)  BP: 115/76 (31 Mar 2022 15:34) (115/76 - 115/76)  BP(mean): --  RR: 16 (31 Mar 2022 15:34) (16 - 16)  SpO2: 94% (31 Mar 2022 15:34) (94% - 94%)  Height (cm): 160 ( @ 15:34)  Weight (kg): 83.9 ( @ 15:34)  BMI (kg/m2): 32.8 ( @ 15:34)      Constitutional: NAD.   HEENT: NCAT, MMM, OP clear, EOMI, , no proptosis or lid retraction  Neck: no thyromegaly or palpable thyroid nodules   Respiratory: lungs CTAB.  Cardiovascular: regular rhythm, normal S1 and S2, no audible murmurs, no peripheral edema  GI: soft, NT/ND, no masses/HSM appreciated.  Neurology: no tremors, DTR 2+  Skin: no visible rashes/lesions  Psychiatric: AAO x 3, normal affect/mood.  Ext: radial pulses intact, DP pulses intact, extremities warm, no cyanosis, clubbing or edema.       LABS:                        13.6   16.56 )-----------( 347      ( 31 Mar 2022 19:16 )             41.6         136  |  104  |  10  ----------------------------<  164<H>  4.1   |  21<L>  |  0.47<L>    Ca    9.7      31 Mar 2022 19:16    TPro  8.0  /  Alb  4.2  /  TBili  0.2  /  DBili  x   /  AST  11  /  ALT  9<L>  /  AlkPhos  62      PTT - ( 31 Mar 2022 19:16 )  PTT:28.1 sec          RADIOLOGY & ADDITIONAL STUDIES:  CAPILLARY BLOOD GLUCOSE      POCT Blood Glucose.: 217 mg/dL (31 Mar 2022 16:51)  POCT Blood Glucose.: 237 mg/dL (31 Mar 2022 15:35)        A/P:31y Female type 2 DM with increased insulin resistance in pregnancy, presenting with uncontrolled GDM    1.  DM type 2- GDM uncontrolled     Please continue lantus 45 units at night.    Please continue lispro 14  units before each meal.    Please continue lispro moderate dose sliding scale four times daily with meals and at bedtime.    Pt's fingerstick glucose goal is Premeal: 95 or less , One hour postprandial <140, two hour postprandial <120    Will continue to monitor     For discharge, pt can continue insulin therapy, regimen TBD    Pt can follow up at discharge with Dr. Amaya   Will discuss case with Dr. Mattson and update primary team HPI: 31 y.o.  @16w4d (VENKATESH 22) with known cHTN and T2DM admitted for glucose control after clinic visit for MFM yesterday showed continued uncontrolled T2DM with outpatient management. Patient states that her glucose has been poorly controlled on her current regimen of Basaglar 10 AM/20 PM with admelog 6u with meals. She states her fasting fingersticks are usually 120-140 and that she's never seen a value below 100. Her 1h post-prandial fingersticks are 160-170, sometimes 200. BPs at home 110s-120/70s-80 per patient. Not on antihypertensives. Patient endorses some intermittent cramping for which she was seen in the ED on 3/23. Sonogram was WNL and patient's pain improved with Tylenol, so she was discharged. The cramping has improved since then and she states she feels better. She denies LOF, vaginal bleeding. Has not yet felt fetal movement.    Endocrinology consulted for uncontrolled gestational diabetes.   Patient was diagnosed with diabetes type 2 at the age of 25, was started on metformin. She has only been on insulin since becoming pregnant.   Currently therapy: 10u Basaglar AM, 20u Basaglar PM, 6u Admelog AM. (dose adjusted by OB)  Patient was seen by Dr. Amaya  to establish care, at the time patient was on Basaglar 16 and Admelog 4.   A1C: 6.8% - March  Diet: she reports eating low carb diet, only carbs are brown rice or quinoa.   FSG:  Fasting 130-140  Postprandial- 118-230s    Despite diet she has been unable too blood sugars at range for GDM.     PMH & Surgical Hx: as per HPI  FH: mom() and dad  SH: smokes marijuana, no alcohol       Current Meds:  dextrose 5%. 1000 milliLiter(s) IV Continuous <Continuous>  dextrose 5%. 1000 milliLiter(s) IV Continuous <Continuous>  dextrose 50% Injectable 25 Gram(s) IV Push once  dextrose 50% Injectable 12.5 Gram(s) IV Push once  dextrose 50% Injectable 25 Gram(s) IV Push once  dextrose Oral Gel 15 Gram(s) Oral once PRN  ferrous    sulfate 325 milliGRAM(s) Oral daily  folic acid 1 milliGRAM(s) Oral daily  glucagon  Injectable 1 milliGRAM(s) IntraMuscular once  insulin glargine Injectable (LANTUS) 45 Unit(s) SubCutaneous at bedtime  insulin lispro (ADMELOG) corrective regimen sliding scale   SubCutaneous Before meals and at bedtime  insulin lispro Injectable (ADMELOG) 14 Unit(s) SubCutaneous three times a day before meals  prenatal multivitamin 1 Tablet(s) Oral daily      Allergies:  No Known Allergies      ROS:  Denies the following except as indicated.    General: weight loss/weight gain, decreased appetite, fatigue  Eyes: Blurry vision, double vision, visual changes  ENT: Throat pain, changes in voice,   CV: palpitations, SOB, CP, cough  GI: NVD, difficulty swallowing, abdominal pain  : polyuria, dysuria  Endo: abnormal menses, temperature intolerance, decreased libido  MSK: weakness, joint pain  Skin: rash, dryness, diaphoresis  Heme: Easy bruising,bleeding  Neuro: HA, dizziness, lightheadedness, numbness tingling  Psych: Anxiety, Depression    Vital Signs Last 24 Hrs  T(C): 36.6 (31 Mar 2022 15:34), Max: 36.6 (31 Mar 2022 15:34)  T(F): 97.8 (31 Mar 2022 15:34), Max: 97.8 (31 Mar 2022 15:34)  HR: 94 (31 Mar 2022 15:34) (94 - 94)  BP: 115/76 (31 Mar 2022 15:34) (115/76 - 115/76)  BP(mean): --  RR: 16 (31 Mar 2022 15:34) (16 - 16)  SpO2: 94% (31 Mar 2022 15:34) (94% - 94%)  Height (cm): 160 ( @ 15:34)  Weight (kg): 83.9 ( @ 15:34)  BMI (kg/m2): 32.8 ( @ 15:34)      Constitutional: NAD.   HEENT: NCAT, MMM, OP clear, EOMI, , no proptosis or lid retraction  Neck: no thyromegaly or palpable thyroid nodules   Respiratory: lungs CTAB.  Cardiovascular: regular rhythm, normal S1 and S2, no audible murmurs, no peripheral edema  GI: soft, NT/ND, no masses/HSM appreciated.  Neurology: no tremors, DTR 2+  Skin: no visible rashes/lesions  Psychiatric: AAO x 3, normal affect/mood.  Ext: radial pulses intact, DP pulses intact, extremities warm, no cyanosis, clubbing or edema.       LABS:                        13.6   16.56 )-----------( 347      ( 31 Mar 2022 19:16 )             41.6         136  |  104  |  10  ----------------------------<  164<H>  4.1   |  21<L>  |  0.47<L>    Ca    9.7      31 Mar 2022 19:16    TPro  8.0  /  Alb  4.2  /  TBili  0.2  /  DBili  x   /  AST  11  /  ALT  9<L>  /  AlkPhos  62      PTT - ( 31 Mar 2022 19:16 )  PTT:28.1 sec          RADIOLOGY & ADDITIONAL STUDIES:  CAPILLARY BLOOD GLUCOSE      POCT Blood Glucose.: 217 mg/dL (31 Mar 2022 16:51)  POCT Blood Glucose.: 237 mg/dL (31 Mar 2022 15:35)        A/P:31y Female type 2 DM with increased insulin resistance in pregnancy, presenting with uncontrolled GDM  A1C: 6.8%- outpatient  Wt:83.9kg  Cr:0.47  GFR:130  Home regimen: 10u Basaglar AM, 20u Basaglar PM, 6u Admelog AM  1.  DM type 2- GDM uncontrolled     Please continue lantus 45 units at night.    Please continue lispro 14  units before each meal.    Please continue lispro moderate dose sliding scale four times daily with meals and at bedtime.    Pt's fingerstick glucose goal is Premeal: 95 or less , One hour postprandial <140, two hour postprandial <120    Will continue to monitor     For discharge, pt can continue insulin therapy, regimen TBD    Pt can follow up at discharge with Dr. Amaya   Will discuss case with Dr. Mattson and update primary team

## 2022-03-31 NOTE — H&P ADULT - HISTORY OF PRESENT ILLNESS
31 y.o. @16w4d (VENKATESH 9/11/22) with known cHTN and T2DM admitted for glucose control after clinic visit for MFM yesterday showed continued uncontrolled T2DM with outpatient management. Patient states that her glucose has been poorly controlled on her current regimen of Basaglar 10 AM/20 PM with admelog 6u with meals. She states her fasting fingersticks are usually 120-140 and that she's never seen a value below 100. Her 1h post-prandial fingersticks are 160-170, sometimes 200. BPs at home 110s-120/70s-80 per patient. Not on antihypertensives. Patient endorses some intermittent cramping for which she was seen in the ED on 3/23. Sonogram was WNL and patient's pain improved with Tylenol, so she was discharged. The cramping has not worsened since then and she states she feels better. She denies LOF, vaginal bleeding. Has not yet felt fetal movement.    Pt motivated to improve her fingersticks as her mother passed away from complications from uncontrolled diabetes type 1.       Ob hx:   Gyn hx:  PMH:   Meds:  PSH:   Allergies:       Vital Signs Last 24 Hrs  T(C): 36.6 (31 Mar 2022 15:34), Max: 36.6 (31 Mar 2022 15:34)  T(F): 97.8 (31 Mar 2022 15:34), Max: 97.8 (31 Mar 2022 15:34)  HR: 94 (31 Mar 2022 15:34) (94 - 94)  BP: 115/76 (31 Mar 2022 15:34) (115/76 - 115/76)  BP(mean): --  RR: 16 (31 Mar 2022 15:34) (16 - 16)  SpO2: 94% (31 Mar 2022 15:34) (94% - 94%)      Physical Exam  Gen: Well-appearing. NAD.  Resp: Breathing comfortably on RA.  Abd: Soft, non tender, non-distended, no rebound or guarding.  Pelvic exam:   Ext: No calf tenderness.   31 y.o. @16w4d (VENKATESH 9/11/22) with known cHTN and T2DM admitted for glucose control after clinic visit for MFM yesterday showed continued uncontrolled T2DM with outpatient management. Patient states that her glucose has been poorly controlled on her current regimen of Basaglar 10 AM/20 PM with admelog 6u with meals. She states her fasting fingersticks are usually 120-140 and that she's never seen a value below 100. Her 1h post-prandial fingersticks are 160-170, sometimes 200. BPs at home 110s-120/70s-80 per patient. Not on antihypertensives. Patient endorses some intermittent cramping for which she was seen in the ED on 3/23. Sonogram was WNL and patient's pain improved with Tylenol, so she was discharged. The cramping has improved since then and she states she feels better. She denies LOF, vaginal bleeding. Has not yet felt fetal movement.    Pt motivated to improve her fingersticks as her mother passed away from complications from uncontrolled diabetes type 1.       Ob hx: SAb x1  Gyn hx: Denies  PMH: cHTN, T2DM, asthma (last used albuterol 1 year ago), PTSD, anxiety  Meds: Basaglar 10u AM/20u PM, Admelog 6u with meals, PNV  PSH: 2021 R lithotripsy, 2014 and 2015 L ear cholesteatoma removal  Allergies: NKDA      Vital Signs Last 24 Hrs  T(C): 36.6 (31 Mar 2022 15:34), Max: 36.6 (31 Mar 2022 15:34)  T(F): 97.8 (31 Mar 2022 15:34), Max: 97.8 (31 Mar 2022 15:34)  HR: 94 (31 Mar 2022 15:34) (94 - 94)  BP: 115/76 (31 Mar 2022 15:34) (115/76 - 115/76)  BP(mean): --  RR: 16 (31 Mar 2022 15:34) (16 - 16)  SpO2: 94% (31 Mar 2022 15:34) (94% - 94%)      Physical Exam  Gen: Well-appearing. NAD.  Resp: Breathing comfortably on RA.  Abd: Soft, non tender, non-distended, no rebound or guarding.  Ext: No calf tenderness.      Admission FS glucose: 237, 217 31 y.o.  @16w4d (VENKATESH 22) with known cHTN and T2DM admitted for glucose control after clinic visit for MFM yesterday showed continued uncontrolled T2DM with outpatient management. Patient states that her glucose has been poorly controlled on her current regimen of Basaglar 10 AM/20 PM with admelog 6u with meals. She states her fasting fingersticks are usually 120-140 and that she's never seen a value below 100. Her 1h post-prandial fingersticks are 160-170, sometimes 200. BPs at home 110s-120/70s-80 per patient. Not on antihypertensives. Patient endorses some intermittent cramping for which she was seen in the ED on 3/23. Sonogram was WNL and patient's pain improved with Tylenol, so she was discharged. The cramping has improved since then and she states she feels better. She denies LOF, vaginal bleeding. Has not yet felt fetal movement.    Pt motivated to improve her fingersticks as her mother passed away from complications from uncontrolled diabetes type 1.       Ob hx: SAb x1  Gyn hx: Denies  PMH: cHTN, T2DM, asthma (last used albuterol 1 year ago), PTSD, anxiety  Meds: Basaglar 10u AM/20u PM, Admelog 6u with meals, PNV  PSH:  R lithotripsy,  and  L ear cholesteatoma removal  Allergies: NKDA      Vital Signs Last 24 Hrs  T(C): 36.6 (31 Mar 2022 15:34), Max: 36.6 (31 Mar 2022 15:34)  T(F): 97.8 (31 Mar 2022 15:34), Max: 97.8 (31 Mar 2022 15:34)  HR: 94 (31 Mar 2022 15:34) (94 - 94)  BP: 115/76 (31 Mar 2022 15:34) (115/76 - 115/76)  BP(mean): --  RR: 16 (31 Mar 2022 15:34) (16 - 16)  SpO2: 94% (31 Mar 2022 15:34) (94% - 94%)      Physical Exam  Gen: Well-appearing. NAD.  Resp: Breathing comfortably on RA.  Abd: Soft, non tender, non-distended, no rebound or guarding.  Ext: No calf tenderness.  Sono: , +FM      Admission FS glucose: 237, 217

## 2022-03-31 NOTE — ED PROVIDER NOTE - CLINICAL SUMMARY MEDICAL DECISION MAKING FREE TEXT BOX
Impression: , 16 wks preg, referred to ed for hyperglycemia despite increases in insulin dosing at home. No abd pain, vag bleeding. Abd exam benign. AVSS. Case d/w ob- will admit to L&D for further work up and management.

## 2022-04-01 ENCOUNTER — RESULT REVIEW (OUTPATIENT)
Age: 32
End: 2022-04-01

## 2022-04-01 LAB
COVID-19 SPIKE DOMAIN AB INTERP: POSITIVE
COVID-19 SPIKE DOMAIN ANTIBODY RESULT: 58.7 U/ML — HIGH
GLUCOSE BLDC GLUCOMTR-MCNC: 106 MG/DL — HIGH (ref 70–99)
GLUCOSE BLDC GLUCOMTR-MCNC: 106 MG/DL — HIGH (ref 70–99)
GLUCOSE BLDC GLUCOMTR-MCNC: 107 MG/DL — HIGH (ref 70–99)
GLUCOSE BLDC GLUCOMTR-MCNC: 138 MG/DL — HIGH (ref 70–99)
GLUCOSE BLDC GLUCOMTR-MCNC: 92 MG/DL — SIGNIFICANT CHANGE UP (ref 70–99)
SARS-COV-2 IGG+IGM SERPL QL IA: 58.7 U/ML — HIGH
SARS-COV-2 IGG+IGM SERPL QL IA: POSITIVE
T PALLIDUM AB TITR SER: NEGATIVE — SIGNIFICANT CHANGE UP

## 2022-04-01 PROCEDURE — 88305 TISSUE EXAM BY PATHOLOGIST: CPT | Mod: 26

## 2022-04-01 PROCEDURE — 10006 FNA BX W/US GDN EA ADDL: CPT

## 2022-04-01 PROCEDURE — 88173 CYTOPATH EVAL FNA REPORT: CPT | Mod: 26

## 2022-04-01 PROCEDURE — 88305 TISSUE EXAM BY PATHOLOGIST: CPT

## 2022-04-01 PROCEDURE — 99231 SBSQ HOSP IP/OBS SF/LOW 25: CPT | Mod: GC

## 2022-04-01 PROCEDURE — 88173 CYTOPATH EVAL FNA REPORT: CPT

## 2022-04-01 PROCEDURE — 10005 FNA BX W/US GDN 1ST LES: CPT

## 2022-04-01 RX ORDER — ACETAMINOPHEN 500 MG
650 TABLET ORAL EVERY 6 HOURS
Refills: 0 | Status: DISCONTINUED | OUTPATIENT
Start: 2022-04-01 | End: 2022-04-05

## 2022-04-01 RX ORDER — INSULIN LISPRO 100/ML
12 VIAL (ML) SUBCUTANEOUS
Refills: 0 | Status: DISCONTINUED | OUTPATIENT
Start: 2022-04-01 | End: 2022-04-02

## 2022-04-01 RX ORDER — SODIUM CHLORIDE 0.65 %
1 AEROSOL, SPRAY (ML) NASAL
Refills: 0 | Status: DISCONTINUED | OUTPATIENT
Start: 2022-04-01 | End: 2022-04-05

## 2022-04-01 RX ORDER — INSULIN GLARGINE 100 [IU]/ML
50 INJECTION, SOLUTION SUBCUTANEOUS AT BEDTIME
Refills: 0 | Status: DISCONTINUED | OUTPATIENT
Start: 2022-04-01 | End: 2022-04-02

## 2022-04-01 RX ADMIN — Medication 325 MILLIGRAM(S): at 13:04

## 2022-04-01 RX ADMIN — INSULIN GLARGINE 50 UNIT(S): 100 INJECTION, SOLUTION SUBCUTANEOUS at 22:36

## 2022-04-01 RX ADMIN — Medication 1 SPRAY(S): at 22:35

## 2022-04-01 RX ADMIN — Medication 14 UNIT(S): at 13:16

## 2022-04-01 RX ADMIN — Medication 650 MILLIGRAM(S): at 21:14

## 2022-04-01 RX ADMIN — Medication 14 UNIT(S): at 09:32

## 2022-04-01 RX ADMIN — Medication 1 MILLIGRAM(S): at 13:04

## 2022-04-01 RX ADMIN — Medication 650 MILLIGRAM(S): at 13:44

## 2022-04-01 RX ADMIN — Medication 12 UNIT(S): at 18:32

## 2022-04-01 RX ADMIN — Medication 1 TABLET(S): at 13:04

## 2022-04-01 RX ADMIN — Medication 650 MILLIGRAM(S): at 14:14

## 2022-04-01 RX ADMIN — Medication 650 MILLIGRAM(S): at 20:14

## 2022-04-01 NOTE — PROGRESS NOTE ADULT - SUBJECTIVE AND OBJECTIVE BOX
INTERVAL HPI/OVERNIGHT EVENTS:    Patient is a 31y old  Female who presents with a chief complaint of uncontrolled GDM.   Patient is eating meals with sufficient carbs. No postprandial fingersticks noted today but blood sugar appear to be adequately controlled. She has no complaints.     Pt reports the following symptoms:    CONSTITUTIONAL:  Negative fever or chills, feels well, good appetite  EYES:  Negative  blurry vision or double vision  CARDIOVASCULAR:  Negative for chest pain or palpitations  RESPIRATORY:  Negative for cough, wheezing, or SOB   GASTROINTESTINAL:  Negative for nausea, vomiting, diarrhea, constipation, or abdominal pain  GENITOURINARY:  Negative frequency, urgency or dysuria  NEUROLOGIC:  No headache, confusion, dizziness, lightheadedness    MEDICATIONS  (STANDING):  dextrose 5%. 1000 milliLiter(s) (100 mL/Hr) IV Continuous <Continuous>  dextrose 5%. 1000 milliLiter(s) (50 mL/Hr) IV Continuous <Continuous>  dextrose 50% Injectable 25 Gram(s) IV Push once  dextrose 50% Injectable 12.5 Gram(s) IV Push once  dextrose 50% Injectable 25 Gram(s) IV Push once  ferrous    sulfate 325 milliGRAM(s) Oral daily  folic acid 1 milliGRAM(s) Oral daily  glucagon  Injectable 1 milliGRAM(s) IntraMuscular once  insulin glargine Injectable (LANTUS) 50 Unit(s) SubCutaneous at bedtime  insulin lispro (ADMELOG) corrective regimen sliding scale   SubCutaneous Before meals and at bedtime  insulin lispro Injectable (ADMELOG) 12 Unit(s) SubCutaneous three times a day before meals  prenatal multivitamin 1 Tablet(s) Oral daily    MEDICATIONS  (PRN):  acetaminophen     Tablet .. 650 milliGRAM(s) Oral every 6 hours PRN Mild Pain (1 - 3)  dextrose Oral Gel 15 Gram(s) Oral once PRN Blood Glucose LESS THAN 70 milliGRAM(s)/deciliter      PHYSICAL EXAM  Vital Signs Last 24 Hrs  T(C): 37 (01 Apr 2022 17:02), Max: 37.1 (01 Apr 2022 08:01)  T(F): 98.6 (01 Apr 2022 17:02), Max: 98.7 (01 Apr 2022 08:01)  HR: 78 (01 Apr 2022 17:02) (72 - 79)  BP: 110/67 (01 Apr 2022 17:02) (100/67 - 118/77)  BP(mean): --  RR: 17 (01 Apr 2022 17:02) (16 - 18)  SpO2: 98% (01 Apr 2022 17:02) (98% - 99%)    Constitutional: NAD.   HEENT: NCAT, MMM, OP clear, EOMI, , no proptosis or lid retraction  Neck: no thyromegaly or palpable thyroid nodules   Respiratory: lungs CTAB.  Cardiovascular: regular rhythm, normal S1 and S2, no audible murmurs, no peripheral edema  GI: soft, NT/ND, no masses/HSM appreciated.  Neurology: no tremors, DTR 2+  Skin: no visible rashes/lesions  Psychiatric: AAO x 3, normal affect/mood.    LABS:                        13.6   16.56 )-----------( 347      ( 31 Mar 2022 19:16 )             41.6     03-31    136  |  104  |  10  ----------------------------<  164<H>  4.1   |  21<L>  |  0.47<L>    Ca    9.7      31 Mar 2022 19:16    TPro  8.0  /  Alb  4.2  /  TBili  0.2  /  DBili  x   /  AST  11  /  ALT  9<L>  /  AlkPhos  62  03-31    PTT - ( 31 Mar 2022 19:16 )  PTT:28.1 sec        HbA1C:         Insulin Sliding Scale requirements X 24 Hours:      RADIOLOGY & ADDITIONAL TESTS:      A/P:31y Female type 2 DM with increased insulin resistance in pregnancy, presenting with uncontrolled GDM  A1C: 6.8%- outpatient  Wt:83.9kg  Cr:0.47  GFR:130  Home regimen: 10u Basaglar AM, 20u Basaglar PM, 6u Admelog AM  1.  DM type 2- GDM uncontrolled     Please continue lantus 50 units at night.    Please continue lispro 12  units before each meal.    Please continue lispro moderate dose sliding scale four times daily with meals and at bedtime.  Please get fingersticks before meals and 1 hour after meals      Pt's fingerstick glucose goal is Premeal: 95 or less , One hour postprandial <140, two hour postprandial <120    Will continue to monitor     For discharge, pt can continue insulin therapy, regimen TBD    Pt can follow up at discharge with Dr. Amaya   Will discuss case with Dr. Mattson and update primary team

## 2022-04-01 NOTE — PROGRESS NOTE ADULT - SUBJECTIVE AND OBJECTIVE BOX
Patient evaluated at bedside. No acute events overnight. Patient denies ctx, LOF, vaginal bleeding. Has started to feel small amounts of fetal movement.      T(C): 37.1 (04-01-22 @ 08:01), Max: 37.1 (04-01-22 @ 08:01)  HR: 79 (04-01-22 @ 08:01) (75 - 94)  BP: 106/69 (04-01-22 @ 08:01) (105/68 - 118/77)  RR: 16 (04-01-22 @ 08:01) (16 - 18)  SpO2: 99% (04-01-22 @ 08:01) (94% - 99%)  Wt(kg): --    Gen: Well-appearing. NAD.  Resp: Breathing comfortably on RA.  Abd: Gravid uterus. Soft, non-tender.  Ext: No calf tenderness        03-31 @ 07:01  -  04-01 @ 07:00  --------------------------------------------------------  IN: 0 mL / OUT: 150 mL / NET: -150 mL    04-01 @ 07:01  -  04-01 @ 09:52  --------------------------------------------------------  IN: 0 mL / OUT: 250 mL / NET: -250 mL            dextrose 5%. 1000 milliLiter(s) IV Continuous <Continuous>  dextrose 5%. 1000 milliLiter(s) IV Continuous <Continuous>  dextrose 50% Injectable 25 Gram(s) IV Push once  dextrose 50% Injectable 12.5 Gram(s) IV Push once  dextrose 50% Injectable 25 Gram(s) IV Push once  dextrose Oral Gel 15 Gram(s) Oral once PRN  ferrous    sulfate 325 milliGRAM(s) Oral daily  folic acid 1 milliGRAM(s) Oral daily  glucagon  Injectable 1 milliGRAM(s) IntraMuscular once  insulin glargine Injectable (LANTUS) 45 Unit(s) SubCutaneous at bedtime  insulin lispro (ADMELOG) corrective regimen sliding scale   SubCutaneous Before meals and at bedtime  insulin lispro Injectable (ADMELOG) 14 Unit(s) SubCutaneous three times a day before meals  prenatal multivitamin 1 Tablet(s) Oral daily             Patient evaluated at bedside. No acute events overnight. Patient denies ctx, LOF, vaginal bleeding. Has started to feel small amounts of fetal movement.      T(C): 37.1 (04-01-22 @ 08:01), Max: 37.1 (04-01-22 @ 08:01)  HR: 79 (04-01-22 @ 08:01) (75 - 94)  BP: 106/69 (04-01-22 @ 08:01) (105/68 - 118/77)  RR: 16 (04-01-22 @ 08:01) (16 - 18)  SpO2: 99% (04-01-22 @ 08:01) (94% - 99%)  Wt(kg): --    Gen: Well-appearing. NAD.  Resp: Breathing comfortably on RA.  Abd: Gravid uterus. Soft, non-tender.  Ext: No calf tenderness  Doppler:  152        03-31 @ 07:01  -  04-01 @ 07:00  --------------------------------------------------------  IN: 0 mL / OUT: 150 mL / NET: -150 mL    04-01 @ 07:01  -  04-01 @ 09:52  --------------------------------------------------------  IN: 0 mL / OUT: 250 mL / NET: -250 mL            dextrose 5%. 1000 milliLiter(s) IV Continuous <Continuous>  dextrose 5%. 1000 milliLiter(s) IV Continuous <Continuous>  dextrose 50% Injectable 25 Gram(s) IV Push once  dextrose 50% Injectable 12.5 Gram(s) IV Push once  dextrose 50% Injectable 25 Gram(s) IV Push once  dextrose Oral Gel 15 Gram(s) Oral once PRN  ferrous    sulfate 325 milliGRAM(s) Oral daily  folic acid 1 milliGRAM(s) Oral daily  glucagon  Injectable 1 milliGRAM(s) IntraMuscular once  insulin glargine Injectable (LANTUS) 45 Unit(s) SubCutaneous at bedtime  insulin lispro (ADMELOG) corrective regimen sliding scale   SubCutaneous Before meals and at bedtime  insulin lispro Injectable (ADMELOG) 14 Unit(s) SubCutaneous three times a day before meals  prenatal multivitamin 1 Tablet(s) Oral daily

## 2022-04-02 LAB
COLLECT DURATION TIME UR: 24 HR — SIGNIFICANT CHANGE UP
GLUCOSE BLDC GLUCOMTR-MCNC: 102 MG/DL — HIGH (ref 70–99)
GLUCOSE BLDC GLUCOMTR-MCNC: 115 MG/DL — HIGH (ref 70–99)
GLUCOSE BLDC GLUCOMTR-MCNC: 115 MG/DL — HIGH (ref 70–99)
GLUCOSE BLDC GLUCOMTR-MCNC: 117 MG/DL — HIGH (ref 70–99)
GLUCOSE BLDC GLUCOMTR-MCNC: 136 MG/DL — HIGH (ref 70–99)
GLUCOSE BLDC GLUCOMTR-MCNC: 197 MG/DL — HIGH (ref 70–99)
PROT 24H UR-MRATE: 100 MG/24 H — SIGNIFICANT CHANGE UP (ref 50–100)
TOTAL VOLUME - 24 HOUR: 2500 ML — SIGNIFICANT CHANGE UP
URINE CREATININE CALCULATION: 1.1 G/24 H — SIGNIFICANT CHANGE UP (ref 0.8–1.8)

## 2022-04-02 RX ORDER — INSULIN GLARGINE 100 [IU]/ML
55 INJECTION, SOLUTION SUBCUTANEOUS AT BEDTIME
Refills: 0 | Status: DISCONTINUED | OUTPATIENT
Start: 2022-04-02 | End: 2022-04-04

## 2022-04-02 RX ORDER — INSULIN LISPRO 100/ML
16 VIAL (ML) SUBCUTANEOUS
Refills: 0 | Status: DISCONTINUED | OUTPATIENT
Start: 2022-04-02 | End: 2022-04-03

## 2022-04-02 RX ORDER — DIPHENHYDRAMINE HCL 50 MG
25 CAPSULE ORAL ONCE
Refills: 0 | Status: COMPLETED | OUTPATIENT
Start: 2022-04-02 | End: 2022-04-02

## 2022-04-02 RX ADMIN — Medication 16 UNIT(S): at 18:22

## 2022-04-02 RX ADMIN — Medication 1 TABLET(S): at 12:00

## 2022-04-02 RX ADMIN — Medication 325 MILLIGRAM(S): at 12:00

## 2022-04-02 RX ADMIN — Medication 25 MILLIGRAM(S): at 12:00

## 2022-04-02 RX ADMIN — Medication 650 MILLIGRAM(S): at 22:34

## 2022-04-02 RX ADMIN — Medication 1 MILLIGRAM(S): at 12:00

## 2022-04-02 RX ADMIN — INSULIN GLARGINE 55 UNIT(S): 100 INJECTION, SOLUTION SUBCUTANEOUS at 22:34

## 2022-04-02 RX ADMIN — Medication 650 MILLIGRAM(S): at 23:34

## 2022-04-02 RX ADMIN — Medication 12 UNIT(S): at 09:49

## 2022-04-02 RX ADMIN — Medication 12 UNIT(S): at 14:07

## 2022-04-02 NOTE — PROGRESS NOTE ADULT - SUBJECTIVE AND OBJECTIVE BOX
[facility] Progress Note  HD#3    Patient seen and examined at bedside.  No acute events overnight. No acute complaints. Ambulating and tolerating a regular diet. Voiding spontaneously; denies any obstetrical complaints.   Denies CP, SOB, N/V, fevers, and chills, vaginal bleeding       Vital Signs Last 24 Hours  T(C): 36.6 (04-02-22 @ 00:09), Max: 37.1 (04-01-22 @ 08:01)  HR: 73 (04-02-22 @ 00:09) (72 - 80)  BP: 111/72 (04-02-22 @ 00:09) (100/67 - 121/79)  RR: 16 (04-02-22 @ 00:09) (16 - 17)  SpO2: 100% (04-02-22 @ 00:09) (98% - 100%)    I&O's Summary    31 Mar 2022 07:01  -  01 Apr 2022 07:00  --------------------------------------------------------  IN: 0 mL / OUT: 150 mL / NET: -150 mL    01 Apr 2022 07:01  -  02 Apr 2022 05:11  --------------------------------------------------------  IN: 450 mL / OUT: 350 mL / NET: 100 mL        Physical Exam:  General: NAD  Abdomen: Soft, non tender to palpation, gravid uterus   Ext: warm and well perfused, no edema     Labs:                        13.6   16.56 )-----------( 347      ( 31 Mar 2022 19:16 )             41.6   baso 0.3    eos 2.5    imm gran 0.8    lymph 11.8   mono 4.8    poly 79.8       MEDICATIONS  (STANDING):  dextrose 5%. 1000 milliLiter(s) (100 mL/Hr) IV Continuous <Continuous>  dextrose 5%. 1000 milliLiter(s) (50 mL/Hr) IV Continuous <Continuous>  dextrose 50% Injectable 25 Gram(s) IV Push once  dextrose 50% Injectable 12.5 Gram(s) IV Push once  dextrose 50% Injectable 25 Gram(s) IV Push once  ferrous    sulfate 325 milliGRAM(s) Oral daily  folic acid 1 milliGRAM(s) Oral daily  glucagon  Injectable 1 milliGRAM(s) IntraMuscular once  insulin glargine Injectable (LANTUS) 50 Unit(s) SubCutaneous at bedtime  insulin lispro (ADMELOG) corrective regimen sliding scale   SubCutaneous Before meals and at bedtime  insulin lispro Injectable (ADMELOG) 12 Unit(s) SubCutaneous three times a day before meals  prenatal multivitamin 1 Tablet(s) Oral daily    MEDICATIONS  (PRN):  acetaminophen     Tablet .. 650 milliGRAM(s) Oral every 6 hours PRN Mild Pain (1 - 3)  dextrose Oral Gel 15 Gram(s) Oral once PRN Blood Glucose LESS THAN 70 milliGRAM(s)/deciliter  sodium chloride 0.65% Nasal 1 Spray(s) Both Nostrils four times a day PRN Nasal Congestion

## 2022-04-02 NOTE — PROGRESS NOTE ADULT - SUBJECTIVE AND OBJECTIVE BOX
INTERVAL HPI/OVERNIGHT EVENTS:    Patient is a 31y old  Female who presents with a chief complaint of GDM.  Patient eating well, reports some congestions overnight. No additional acute complaints. Blood sugar control improved but not at goal. AM glucose 115.    Pt reports the following symptoms:    CONSTITUTIONAL:  Negative fever or chills, feels well, good appetite  EYES:  Negative  blurry vision or double vision  CARDIOVASCULAR:  Negative for chest pain or palpitations  RESPIRATORY:  Negative for cough, wheezing, or SOB   GASTROINTESTINAL:  Negative for nausea, vomiting, diarrhea, constipation, or abdominal pain  GENITOURINARY:  Negative frequency, urgency or dysuria  NEUROLOGIC:  No headache, confusion, dizziness, lightheadedness    MEDICATIONS  (STANDING):  dextrose 5%. 1000 milliLiter(s) (100 mL/Hr) IV Continuous <Continuous>  dextrose 5%. 1000 milliLiter(s) (50 mL/Hr) IV Continuous <Continuous>  dextrose 50% Injectable 25 Gram(s) IV Push once  dextrose 50% Injectable 12.5 Gram(s) IV Push once  dextrose 50% Injectable 25 Gram(s) IV Push once  glucagon  Injectable 1 milliGRAM(s) IntraMuscular once  insulin glargine Injectable (LANTUS) 50 Unit(s) SubCutaneous at bedtime  insulin lispro (ADMELOG) corrective regimen sliding scale   SubCutaneous Before meals and at bedtime  insulin lispro Injectable (ADMELOG) 12 Unit(s) SubCutaneous three times a day before meals  prenatal multivitamin 1 Tablet(s) Oral daily    MEDICATIONS  (PRN):  acetaminophen     Tablet .. 650 milliGRAM(s) Oral every 6 hours PRN Mild Pain (1 - 3)  dextrose Oral Gel 15 Gram(s) Oral once PRN Blood Glucose LESS THAN 70 milliGRAM(s)/deciliter  sodium chloride 0.65% Nasal 1 Spray(s) Both Nostrils four times a day PRN Nasal Congestion      PHYSICAL EXAM  Vital Signs Last 24 Hrs  T(C): 37.2 (02 Apr 2022 17:06), Max: 37.2 (02 Apr 2022 17:06)  T(F): 99 (02 Apr 2022 17:06), Max: 99 (02 Apr 2022 17:06)  HR: 85 (02 Apr 2022 17:06) (73 - 85)  BP: 106/69 (02 Apr 2022 17:06) (100/64 - 121/79)  BP(mean): --  RR: 17 (02 Apr 2022 17:06) (16 - 18)  SpO2: 97% (02 Apr 2022 17:06) (95% - 100%)    Constitutional: NAD.   HEENT: NCAT, MMM, OP clear, EOMI, , no proptosis or lid retraction  Neck: no thyromegaly or palpable thyroid nodules   Respiratory: lungs CTAB.  Cardiovascular: regular rhythm, normal S1 and S2, no audible murmurs, no peripheral edema  GI: soft, NT/ND, no masses/HSM appreciated.  Neurology: no tremors, DTR 2+  Skin: no visible rashes/lesions  Psychiatric: AAO x 3, normal affect/mood.    LABS:                        13.6   16.56 )-----------( 347      ( 31 Mar 2022 19:16 )             41.6     03-31    136  |  104  |  10  ----------------------------<  164<H>  4.1   |  21<L>  |  0.47<L>    Ca    9.7      31 Mar 2022 19:16    TPro  8.0  /  Alb  4.2  /  TBili  0.2  /  DBili  x   /  AST  11  /  ALT  9<L>  /  AlkPhos  62  03-31    PTT - ( 31 Mar 2022 19:16 )  PTT:28.1 sec        HbA1C:         Insulin Sliding Scale requirements X 24 Hours:      RADIOLOGY & ADDITIONAL TESTS:      A/P:31y Female type 2 DM with increased insulin resistance in pregnancy, presenting with uncontrolled GDM  A1C: 6.8%- outpatient  Wt:83.9kg  Cr:0.47  GFR:130  Home regimen: 10u Basaglar AM, 20u Basaglar PM, 6u Admelog AM  1.  DM type 2- GDM uncontrolled     Please continue lantus 55 units at night.    Please continue lispro 16  units before each meal.    Please continue lispro moderate dose sliding scale four times daily with meals and at bedtime.  Please get fingersticks before meals and 1 hour after meals      Pt's fingerstick glucose goal is Premeal: 95 or less , One hour postprandial <140, two hour postprandial <120    Will continue to monitor     For discharge, pt can continue insulin therapy, regimen TBD    Pt can follow up at discharge with Dr. Amaya   Will discuss case with Dr. Mcclure and update primary team

## 2022-04-03 LAB
ALBUMIN SERPL ELPH-MCNC: 3.7 G/DL — SIGNIFICANT CHANGE UP (ref 3.3–5)
ALP SERPL-CCNC: 51 U/L — SIGNIFICANT CHANGE UP (ref 40–120)
ALT FLD-CCNC: 6 U/L — LOW (ref 10–45)
ANION GAP SERPL CALC-SCNC: 10 MMOL/L — SIGNIFICANT CHANGE UP (ref 5–17)
ANION GAP SERPL CALC-SCNC: 12 MMOL/L — SIGNIFICANT CHANGE UP (ref 5–17)
AST SERPL-CCNC: 9 U/L — LOW (ref 10–40)
BILIRUB SERPL-MCNC: 0.2 MG/DL — SIGNIFICANT CHANGE UP (ref 0.2–1.2)
BLD GP AB SCN SERPL QL: NEGATIVE — SIGNIFICANT CHANGE UP
BUN SERPL-MCNC: 13 MG/DL — SIGNIFICANT CHANGE UP (ref 7–23)
BUN SERPL-MCNC: 15 MG/DL — SIGNIFICANT CHANGE UP (ref 7–23)
CALCIUM SERPL-MCNC: 9.2 MG/DL — SIGNIFICANT CHANGE UP (ref 8.4–10.5)
CALCIUM SERPL-MCNC: 9.9 MG/DL — SIGNIFICANT CHANGE UP (ref 8.4–10.5)
CHLORIDE SERPL-SCNC: 104 MMOL/L — SIGNIFICANT CHANGE UP (ref 96–108)
CHLORIDE SERPL-SCNC: 104 MMOL/L — SIGNIFICANT CHANGE UP (ref 96–108)
CO2 SERPL-SCNC: 21 MMOL/L — LOW (ref 22–31)
CO2 SERPL-SCNC: 22 MMOL/L — SIGNIFICANT CHANGE UP (ref 22–31)
CREAT SERPL-MCNC: 0.64 MG/DL — SIGNIFICANT CHANGE UP (ref 0.5–1.3)
CREAT SERPL-MCNC: 0.72 MG/DL — SIGNIFICANT CHANGE UP (ref 0.5–1.3)
EGFR: 114 ML/MIN/1.73M2 — SIGNIFICANT CHANGE UP
EGFR: 120 ML/MIN/1.73M2 — SIGNIFICANT CHANGE UP
GLUCOSE BLDC GLUCOMTR-MCNC: 105 MG/DL — HIGH (ref 70–99)
GLUCOSE BLDC GLUCOMTR-MCNC: 131 MG/DL — HIGH (ref 70–99)
GLUCOSE BLDC GLUCOMTR-MCNC: 135 MG/DL — HIGH (ref 70–99)
GLUCOSE BLDC GLUCOMTR-MCNC: 143 MG/DL — HIGH (ref 70–99)
GLUCOSE BLDC GLUCOMTR-MCNC: 152 MG/DL — HIGH (ref 70–99)
GLUCOSE BLDC GLUCOMTR-MCNC: 88 MG/DL — SIGNIFICANT CHANGE UP (ref 70–99)
GLUCOSE BLDC GLUCOMTR-MCNC: 95 MG/DL — SIGNIFICANT CHANGE UP (ref 70–99)
GLUCOSE SERPL-MCNC: 119 MG/DL — HIGH (ref 70–99)
GLUCOSE SERPL-MCNC: 86 MG/DL — SIGNIFICANT CHANGE UP (ref 70–99)
HCT VFR BLD CALC: 37.5 % — SIGNIFICANT CHANGE UP (ref 34.5–45)
HGB BLD-MCNC: 12.4 G/DL — SIGNIFICANT CHANGE UP (ref 11.5–15.5)
MAGNESIUM SERPL-MCNC: 1.9 MG/DL — SIGNIFICANT CHANGE UP (ref 1.6–2.6)
MCHC RBC-ENTMCNC: 29.7 PG — SIGNIFICANT CHANGE UP (ref 27–34)
MCHC RBC-ENTMCNC: 33.1 GM/DL — SIGNIFICANT CHANGE UP (ref 32–36)
MCV RBC AUTO: 89.7 FL — SIGNIFICANT CHANGE UP (ref 80–100)
NRBC # BLD: 0 /100 WBCS — SIGNIFICANT CHANGE UP (ref 0–0)
PHOSPHATE SERPL-MCNC: 5.2 MG/DL — HIGH (ref 2.5–4.5)
PLATELET # BLD AUTO: 305 K/UL — SIGNIFICANT CHANGE UP (ref 150–400)
POTASSIUM SERPL-MCNC: 4 MMOL/L — SIGNIFICANT CHANGE UP (ref 3.5–5.3)
POTASSIUM SERPL-MCNC: 4.2 MMOL/L — SIGNIFICANT CHANGE UP (ref 3.5–5.3)
POTASSIUM SERPL-SCNC: 4 MMOL/L — SIGNIFICANT CHANGE UP (ref 3.5–5.3)
POTASSIUM SERPL-SCNC: 4.2 MMOL/L — SIGNIFICANT CHANGE UP (ref 3.5–5.3)
PROT SERPL-MCNC: 6.8 G/DL — SIGNIFICANT CHANGE UP (ref 6–8.3)
RBC # BLD: 4.18 M/UL — SIGNIFICANT CHANGE UP (ref 3.8–5.2)
RBC # FLD: 13.2 % — SIGNIFICANT CHANGE UP (ref 10.3–14.5)
RH IG SCN BLD-IMP: POSITIVE — SIGNIFICANT CHANGE UP
SODIUM SERPL-SCNC: 136 MMOL/L — SIGNIFICANT CHANGE UP (ref 135–145)
SODIUM SERPL-SCNC: 137 MMOL/L — SIGNIFICANT CHANGE UP (ref 135–145)
WBC # BLD: 14.52 K/UL — HIGH (ref 3.8–10.5)
WBC # FLD AUTO: 14.52 K/UL — HIGH (ref 3.8–10.5)

## 2022-04-03 RX ORDER — ASPIRIN/CALCIUM CARB/MAGNESIUM 324 MG
81 TABLET ORAL DAILY
Refills: 0 | Status: DISCONTINUED | OUTPATIENT
Start: 2022-04-03 | End: 2022-04-03

## 2022-04-03 RX ORDER — MAGNESIUM OXIDE 400 MG ORAL TABLET 241.3 MG
400 TABLET ORAL ONCE
Refills: 0 | Status: COMPLETED | OUTPATIENT
Start: 2022-04-03 | End: 2022-04-03

## 2022-04-03 RX ORDER — ASPIRIN/CALCIUM CARB/MAGNESIUM 324 MG
81 TABLET ORAL EVERY 12 HOURS
Refills: 0 | Status: DISCONTINUED | OUTPATIENT
Start: 2022-04-04 | End: 2022-04-04

## 2022-04-03 RX ORDER — INSULIN LISPRO 100/ML
18 VIAL (ML) SUBCUTANEOUS
Refills: 0 | Status: DISCONTINUED | OUTPATIENT
Start: 2022-04-03 | End: 2022-04-04

## 2022-04-03 RX ADMIN — Medication 650 MILLIGRAM(S): at 16:05

## 2022-04-03 RX ADMIN — Medication 18 UNIT(S): at 18:01

## 2022-04-03 RX ADMIN — Medication 81 MILLIGRAM(S): at 13:44

## 2022-04-03 RX ADMIN — Medication 16 UNIT(S): at 09:03

## 2022-04-03 RX ADMIN — Medication 650 MILLIGRAM(S): at 16:09

## 2022-04-03 RX ADMIN — INSULIN GLARGINE 55 UNIT(S): 100 INJECTION, SOLUTION SUBCUTANEOUS at 22:07

## 2022-04-03 RX ADMIN — MAGNESIUM OXIDE 400 MG ORAL TABLET 400 MILLIGRAM(S): 241.3 TABLET ORAL at 13:44

## 2022-04-03 RX ADMIN — Medication 18 UNIT(S): at 13:51

## 2022-04-03 RX ADMIN — Medication 1 TABLET(S): at 12:09

## 2022-04-03 NOTE — PROGRESS NOTE ADULT - SUBJECTIVE AND OBJECTIVE BOX
SUBJECTIVE: Patient evaluated at bedside. She has no complaints. She denies any abdominal pain. Denies vaginal bleeding, leaking of fluid.     OBJECTIVE:  VITALS  T(C): 36.8 (04-03-22 @ 04:53), Max: 36.8 (04-03-22 @ 04:53)  HR: 76 (04-03-22 @ 04:53) (76 - 76)  BP: 103/68 (04-03-22 @ 04:53) (103/68 - 111/72)  RR: 18 (04-03-22 @ 04:53) (17 - 18)  SpO2: 97% (04-03-22 @ 04:53) (97% - 98%)    PHYSICAL EXAM   GA: NAD   Resp: normal respiratory effort  Abd: soft, non tender   Extrem: SCDs in place, no LE edema, no LE tenderness    LABS  04-02 @ 07:01  -  04-03 @ 06:45  --------------------------------------------------------  IN: 860 mL / OUT: 800 mL / NET: 60 mL      MEDICATIONS  acetaminophen     Tablet .. 650 milliGRAM(s) Oral every 6 hours PRN  dextrose 5%. 1000 milliLiter(s) IV Continuous <Continuous>  dextrose 5%. 1000 milliLiter(s) IV Continuous <Continuous>  dextrose 50% Injectable 25 Gram(s) IV Push once  dextrose 50% Injectable 12.5 Gram(s) IV Push once  dextrose 50% Injectable 25 Gram(s) IV Push once  dextrose Oral Gel 15 Gram(s) Oral once PRN  glucagon  Injectable 1 milliGRAM(s) IntraMuscular once  insulin glargine Injectable (LANTUS) 55 Unit(s) SubCutaneous at bedtime  insulin lispro (ADMELOG) corrective regimen sliding scale   SubCutaneous Before meals and at bedtime  insulin lispro Injectable (ADMELOG) 16 Unit(s) SubCutaneous three times a day before meals  prenatal multivitamin 1 Tablet(s) Oral daily  sodium chloride 0.65% Nasal 1 Spray(s) Both Nostrils four times a day PRN

## 2022-04-03 NOTE — PROGRESS NOTE ADULT - NSPROGADDITIONALINFOA_GEN_ALL_CORE
Agree with above  improved, but not perfect glycemic control  can increase lantus to 55 units at bedtime, increase lispro to 20 units tid with meals  will follow

## 2022-04-03 NOTE — PROGRESS NOTE ADULT - SUBJECTIVE AND OBJECTIVE BOX
INTERVAL HPI/OVERNIGHT EVENTS:    Patient is a 31y old  Female who presents with a chief complaint of     Pt reports the following symptoms:    CONSTITUTIONAL:  Negative fever or chills, feels well, good appetite  EYES:  Negative  blurry vision or double vision  CARDIOVASCULAR:  Negative for chest pain or palpitations  RESPIRATORY:  Negative for cough, wheezing, or SOB   GASTROINTESTINAL:  Negative for nausea, vomiting, diarrhea, constipation, or abdominal pain  GENITOURINARY:  Negative frequency, urgency or dysuria  NEUROLOGIC:  No headache, confusion, dizziness, lightheadedness    MEDICATIONS  (STANDING):  dextrose 5%. 1000 milliLiter(s) (100 mL/Hr) IV Continuous <Continuous>  dextrose 5%. 1000 milliLiter(s) (50 mL/Hr) IV Continuous <Continuous>  dextrose 50% Injectable 25 Gram(s) IV Push once  dextrose 50% Injectable 12.5 Gram(s) IV Push once  dextrose 50% Injectable 25 Gram(s) IV Push once  glucagon  Injectable 1 milliGRAM(s) IntraMuscular once  insulin glargine Injectable (LANTUS) 55 Unit(s) SubCutaneous at bedtime  insulin lispro (ADMELOG) corrective regimen sliding scale   SubCutaneous Before meals and at bedtime  insulin lispro Injectable (ADMELOG) 16 Unit(s) SubCutaneous three times a day before meals  prenatal multivitamin 1 Tablet(s) Oral daily    MEDICATIONS  (PRN):  acetaminophen     Tablet .. 650 milliGRAM(s) Oral every 6 hours PRN Mild Pain (1 - 3)  dextrose Oral Gel 15 Gram(s) Oral once PRN Blood Glucose LESS THAN 70 milliGRAM(s)/deciliter  sodium chloride 0.65% Nasal 1 Spray(s) Both Nostrils four times a day PRN Nasal Congestion      PHYSICAL EXAM  Vital Signs Last 24 Hrs  T(C): 36.7 (03 Apr 2022 12:49), Max: 37.2 (02 Apr 2022 17:06)  T(F): 98 (03 Apr 2022 12:49), Max: 99 (02 Apr 2022 17:06)  HR: 84 (03 Apr 2022 12:49) (67 - 85)  BP: 119/74 (03 Apr 2022 12:49) (103/68 - 143/97)  BP(mean): 85 (02 Apr 2022 20:27) (85 - 85)  RR: 17 (03 Apr 2022 12:49) (17 - 18)  SpO2: 98% (03 Apr 2022 12:49) (97% - 98%)    Constitutional: wn/wd in NAD.   HEENT: NCAT, MMM, OP clear, EOMI, , no proptosis or lid retraction  Neck: no thyromegaly or palpable thyroid nodules   Respiratory: lungs CTAB.  Cardiovascular: regular rhythm, normal S1 and S2, no audible murmurs, no peripheral edema  GI: soft, NT/ND, no masses/HSM appreciated.  Neurology: no tremors, DTR 2+  Skin: no visible rashes/lesions  Psychiatric: AAO x 3, normal affect/mood.    LABS:                        12.4   14.52 )-----------( 305      ( 03 Apr 2022 07:17 )             37.5     04-03    136  |  104  |  15  ----------------------------<  119<H>  4.2   |  22  |  0.64    Ca    9.9      03 Apr 2022 12:18  Phos  5.2     04-03  Mg     1.9     04-03    TPro  6.8  /  Alb  3.7  /  TBili  0.2  /  DBili  x   /  AST  9<L>  /  ALT  6<L>  /  AlkPhos  51  04-03            HbA1C:         Insulin Sliding Scale requirements X 24 Hours:      RADIOLOGY & ADDITIONAL TESTS:      A/P:31y Female type 2 DM with increased insulin resistance in pregnancy, presenting with uncontrolled GDM  A1C: 6.8%- outpatient  Wt:83.9kg  Cr:0.47  GFR:130  Home regimen: 10u Basaglar AM, 20u Basaglar PM, 6u Admelog AM  1.  DM type 2- GDM uncontrolled     Please continue lantus 55 units at night.    Please continue lispro 18  units before each meal.    Please continue lispro moderate dose sliding scale four times daily with meals and at bedtime.  Please get fingersticks before meals and 1 hour after meals      Pt's fingerstick glucose goal is Premeal: 95 or less , One hour postprandial <140, two hour postprandial <120    Will continue to monitor     For discharge, pt can continue insulin therapy, regimen TBD    Pt can follow up at discharge with Dr. Amaya   Will discuss case with Dr. Mccluer and update primary team     INTERVAL HPI/OVERNIGHT EVENTS:    Patient is a 31y old  Female who presents with a chief complaint of GDM.  Patient with no complaints today. Eating well. Postprandial blood sugars remain above goal.     Pt reports the following symptoms:    CONSTITUTIONAL:  Negative fever or chills, feels well, good appetite  EYES:  Negative  blurry vision or double vision  CARDIOVASCULAR:  Negative for chest pain or palpitations  RESPIRATORY:  Negative for cough, wheezing, or SOB   GASTROINTESTINAL:  Negative for nausea, vomiting, diarrhea, constipation, or abdominal pain  GENITOURINARY:  Negative frequency, urgency or dysuria  NEUROLOGIC:  No headache, confusion, dizziness, lightheadedness    MEDICATIONS  (STANDING):  dextrose 5%. 1000 milliLiter(s) (100 mL/Hr) IV Continuous <Continuous>  dextrose 5%. 1000 milliLiter(s) (50 mL/Hr) IV Continuous <Continuous>  dextrose 50% Injectable 25 Gram(s) IV Push once  dextrose 50% Injectable 12.5 Gram(s) IV Push once  dextrose 50% Injectable 25 Gram(s) IV Push once  glucagon  Injectable 1 milliGRAM(s) IntraMuscular once  insulin glargine Injectable (LANTUS) 55 Unit(s) SubCutaneous at bedtime  insulin lispro (ADMELOG) corrective regimen sliding scale   SubCutaneous Before meals and at bedtime  insulin lispro Injectable (ADMELOG) 16 Unit(s) SubCutaneous three times a day before meals  prenatal multivitamin 1 Tablet(s) Oral daily    MEDICATIONS  (PRN):  acetaminophen     Tablet .. 650 milliGRAM(s) Oral every 6 hours PRN Mild Pain (1 - 3)  dextrose Oral Gel 15 Gram(s) Oral once PRN Blood Glucose LESS THAN 70 milliGRAM(s)/deciliter  sodium chloride 0.65% Nasal 1 Spray(s) Both Nostrils four times a day PRN Nasal Congestion      PHYSICAL EXAM  Vital Signs Last 24 Hrs  T(C): 36.7 (03 Apr 2022 12:49), Max: 37.2 (02 Apr 2022 17:06)  T(F): 98 (03 Apr 2022 12:49), Max: 99 (02 Apr 2022 17:06)  HR: 84 (03 Apr 2022 12:49) (67 - 85)  BP: 119/74 (03 Apr 2022 12:49) (103/68 - 143/97)  BP(mean): 85 (02 Apr 2022 20:27) (85 - 85)  RR: 17 (03 Apr 2022 12:49) (17 - 18)  SpO2: 98% (03 Apr 2022 12:49) (97% - 98%)    Constitutional: wn/wd in NAD.   HEENT: NCAT, MMM, OP clear, EOMI, , no proptosis or lid retraction  Neck: no thyromegaly or palpable thyroid nodules   Respiratory: lungs CTAB.  Cardiovascular: regular rhythm, normal S1 and S2, no audible murmurs, no peripheral edema  GI: soft, NT/ND, no masses/HSM appreciated.  Neurology: no tremors, DTR 2+  Skin: no visible rashes/lesions  Psychiatric: AAO x 3, normal affect/mood.    LABS:                        12.4   14.52 )-----------( 305      ( 03 Apr 2022 07:17 )             37.5     04-03    136  |  104  |  15  ----------------------------<  119<H>  4.2   |  22  |  0.64    Ca    9.9      03 Apr 2022 12:18  Phos  5.2     04-03  Mg     1.9     04-03    TPro  6.8  /  Alb  3.7  /  TBili  0.2  /  DBili  x   /  AST  9<L>  /  ALT  6<L>  /  AlkPhos  51  04-03            HbA1C:         Insulin Sliding Scale requirements X 24 Hours:      RADIOLOGY & ADDITIONAL TESTS:      A/P:31y Female type 2 DM with increased insulin resistance in pregnancy, presenting with uncontrolled GDM  A1C: 6.8%- outpatient  Wt:83.9kg  Cr:0.47  GFR:130  Home regimen: 10u Basaglar AM, 20u Basaglar PM, 6u Admelog AM  1.  DM type 2- GDM uncontrolled     Please continue lantus 55 units at night.    Please continue lispro 20  units before each meal.    Please continue lispro moderate dose sliding scale four times daily with meals and at bedtime.  Please get fingersticks before meals and 1 hour after meals      Pt's fingerstick glucose goal is Premeal: 95 or less , One hour postprandial <140, two hour postprandial <120    Will continue to monitor     For discharge, pt can continue insulin therapy, regimen TBD    Pt can follow up at discharge with Dr. Amaya   Will discuss case with Dr. Mcclure and update primary team

## 2022-04-04 LAB
GLUCOSE BLDC GLUCOMTR-MCNC: 100 MG/DL — HIGH (ref 70–99)
GLUCOSE BLDC GLUCOMTR-MCNC: 104 MG/DL — HIGH (ref 70–99)
GLUCOSE BLDC GLUCOMTR-MCNC: 112 MG/DL — HIGH (ref 70–99)
GLUCOSE BLDC GLUCOMTR-MCNC: 134 MG/DL — HIGH (ref 70–99)
GLUCOSE BLDC GLUCOMTR-MCNC: 141 MG/DL — HIGH (ref 70–99)
GLUCOSE BLDC GLUCOMTR-MCNC: 151 MG/DL — HIGH (ref 70–99)
GLUCOSE BLDC GLUCOMTR-MCNC: 209 MG/DL — HIGH (ref 70–99)

## 2022-04-04 PROCEDURE — 93306 TTE W/DOPPLER COMPLETE: CPT | Mod: 26

## 2022-04-04 PROCEDURE — 99231 SBSQ HOSP IP/OBS SF/LOW 25: CPT | Mod: GC

## 2022-04-04 RX ORDER — INSULIN GLARGINE 100 [IU]/ML
60 INJECTION, SOLUTION SUBCUTANEOUS AT BEDTIME
Refills: 0 | Status: DISCONTINUED | OUTPATIENT
Start: 2022-04-04 | End: 2022-04-05

## 2022-04-04 RX ORDER — INSULIN LISPRO 100/ML
20 VIAL (ML) SUBCUTANEOUS
Refills: 0 | Status: DISCONTINUED | OUTPATIENT
Start: 2022-04-04 | End: 2022-04-04

## 2022-04-04 RX ORDER — ASPIRIN/CALCIUM CARB/MAGNESIUM 324 MG
162 TABLET ORAL EVERY 24 HOURS
Refills: 0 | Status: DISCONTINUED | OUTPATIENT
Start: 2022-04-04 | End: 2022-04-05

## 2022-04-04 RX ORDER — INSULIN LISPRO 100/ML
25 VIAL (ML) SUBCUTANEOUS
Refills: 0 | Status: DISCONTINUED | OUTPATIENT
Start: 2022-04-04 | End: 2022-04-05

## 2022-04-04 RX ORDER — INSULIN LISPRO 100/ML
22 VIAL (ML) SUBCUTANEOUS
Refills: 0 | Status: DISCONTINUED | OUTPATIENT
Start: 2022-04-04 | End: 2022-04-04

## 2022-04-04 RX ADMIN — INSULIN GLARGINE 60 UNIT(S): 100 INJECTION, SOLUTION SUBCUTANEOUS at 21:35

## 2022-04-04 RX ADMIN — Medication 1 TABLET(S): at 17:44

## 2022-04-04 RX ADMIN — Medication 22 UNIT(S): at 13:52

## 2022-04-04 RX ADMIN — Medication 81 MILLIGRAM(S): at 08:48

## 2022-04-04 RX ADMIN — Medication 22 UNIT(S): at 17:44

## 2022-04-04 RX ADMIN — Medication 18 UNIT(S): at 09:25

## 2022-04-04 NOTE — PROGRESS NOTE ADULT - SUBJECTIVE AND OBJECTIVE BOX
SUBJECTIVE: Patient evaluated at bedside. She has no complaints. She denies any abdominal pain. Denies vaginal bleeding, leaking of fluid.       Vital Signs Last 24 Hrs  T(C): 36.2 (04 Apr 2022 05:02), Max: 36.8 (03 Apr 2022 16:31)  T(F): 97.2 (04 Apr 2022 05:02), Max: 98.3 (03 Apr 2022 16:31)  HR: 74 (04 Apr 2022 05:02) (74 - 87)  BP: 103/65 (04 Apr 2022 05:02) (94/58 - 119/74)  BP(mean): --  RR: 18 (04 Apr 2022 05:02) (17 - 18)  SpO2: 97% (04 Apr 2022 05:02) (95% - 98%)    PHYSICAL EXAM   GA: NAD   Resp: normal respiratory effort  Abd: soft, non tender   Extrem: SCDs in place, no LE edema, no LE tenderness    FH Check: 147 bpm       MEDICATIONS  (STANDING):  aspirin enteric coated 81 milliGRAM(s) Oral every 12 hours  dextrose 5%. 1000 milliLiter(s) (100 mL/Hr) IV Continuous <Continuous>  dextrose 5%. 1000 milliLiter(s) (50 mL/Hr) IV Continuous <Continuous>  dextrose 50% Injectable 25 Gram(s) IV Push once  dextrose 50% Injectable 12.5 Gram(s) IV Push once  dextrose 50% Injectable 25 Gram(s) IV Push once  glucagon  Injectable 1 milliGRAM(s) IntraMuscular once  insulin glargine Injectable (LANTUS) 55 Unit(s) SubCutaneous at bedtime  insulin lispro (ADMELOG) corrective regimen sliding scale   SubCutaneous Before meals and at bedtime  insulin lispro Injectable (ADMELOG) 18 Unit(s) SubCutaneous three times a day before meals  prenatal multivitamin 1 Tablet(s) Oral daily    MEDICATIONS  (PRN):  acetaminophen     Tablet .. 650 milliGRAM(s) Oral every 6 hours PRN Mild Pain (1 - 3)  dextrose Oral Gel 15 Gram(s) Oral once PRN Blood Glucose LESS THAN 70 milliGRAM(s)/deciliter  sodium chloride 0.65% Nasal 1 Spray(s) Both Nostrils four times a day PRN Nasal Congestion

## 2022-04-04 NOTE — PROGRESS NOTE ADULT - ATTENDING COMMENTS
Pt seen on rounds this afternoon.  Had no new complaints.  Glucoses have been well-controlled, though the pre-breakfast fingerstick (107) was slight above the target of 95 mg%.  Was 90 mg% at bedtime last night and 106 pre-lunch.  No post-prandial fingersticks have been done  --Given the slightly elevated AM FS, increase the Lantus to 50 units hs  --Monitor 2 hr pp fingersticks  --Decrease the premeal lispro slightly to 12 units
Pt seen at bedside  Agree with above  insulin administration reviewed  pt reports eating well  can continue 55 units lantus at bedtime, lispro 16 units tid with meals  will follow
Pt seen on rounds this afternoon and events of the weekend reviewed.  Glucoses have been relatively stable, though pre-breakfast fingerstick today was above target at 104 mg% after 55 units of Lantus last night--will increase the Lantus to 60 units  Essentially all of the 2-hr post-prandials are above target with 18 units pre-meal lispro, so will increase this to 25 units

## 2022-04-05 ENCOUNTER — TRANSCRIPTION ENCOUNTER (OUTPATIENT)
Age: 32
End: 2022-04-05

## 2022-04-05 ENCOUNTER — APPOINTMENT (OUTPATIENT)
Dept: OBGYN | Facility: CLINIC | Age: 32
End: 2022-04-05

## 2022-04-05 VITALS
DIASTOLIC BLOOD PRESSURE: 74 MMHG | SYSTOLIC BLOOD PRESSURE: 111 MMHG | TEMPERATURE: 98 F | RESPIRATION RATE: 17 BRPM | OXYGEN SATURATION: 95 % | HEART RATE: 91 BPM

## 2022-04-05 DIAGNOSIS — E11.9 TYPE 2 DIABETES MELLITUS WITHOUT COMPLICATIONS: ICD-10-CM

## 2022-04-05 DIAGNOSIS — O24.419 GESTATIONAL DIABETES MELLITUS IN PREGNANCY, UNSPECIFIED CONTROL: ICD-10-CM

## 2022-04-05 LAB
GLUCOSE BLDC GLUCOMTR-MCNC: 134 MG/DL — HIGH (ref 70–99)
GLUCOSE BLDC GLUCOMTR-MCNC: 154 MG/DL — HIGH (ref 70–99)
GLUCOSE BLDC GLUCOMTR-MCNC: 164 MG/DL — HIGH (ref 70–99)
GLUCOSE BLDC GLUCOMTR-MCNC: 69 MG/DL — LOW (ref 70–99)
GLUCOSE BLDC GLUCOMTR-MCNC: 82 MG/DL — SIGNIFICANT CHANGE UP (ref 70–99)
GLUCOSE BLDC GLUCOMTR-MCNC: 86 MG/DL — SIGNIFICANT CHANGE UP (ref 70–99)

## 2022-04-05 PROCEDURE — 84156 ASSAY OF PROTEIN URINE: CPT

## 2022-04-05 PROCEDURE — 85025 COMPLETE CBC W/AUTO DIFF WBC: CPT

## 2022-04-05 PROCEDURE — 36415 COLL VENOUS BLD VENIPUNCTURE: CPT

## 2022-04-05 PROCEDURE — 86780 TREPONEMA PALLIDUM: CPT

## 2022-04-05 PROCEDURE — 86850 RBC ANTIBODY SCREEN: CPT

## 2022-04-05 PROCEDURE — 86900 BLOOD TYPING SEROLOGIC ABO: CPT

## 2022-04-05 PROCEDURE — 87635 SARS-COV-2 COVID-19 AMP PRB: CPT

## 2022-04-05 PROCEDURE — 86769 SARS-COV-2 COVID-19 ANTIBODY: CPT

## 2022-04-05 PROCEDURE — 83735 ASSAY OF MAGNESIUM: CPT

## 2022-04-05 PROCEDURE — 85730 THROMBOPLASTIN TIME PARTIAL: CPT

## 2022-04-05 PROCEDURE — 86901 BLOOD TYPING SEROLOGIC RH(D): CPT

## 2022-04-05 PROCEDURE — 85384 FIBRINOGEN ACTIVITY: CPT

## 2022-04-05 PROCEDURE — 82962 GLUCOSE BLOOD TEST: CPT

## 2022-04-05 PROCEDURE — 99232 SBSQ HOSP IP/OBS MODERATE 35: CPT

## 2022-04-05 PROCEDURE — 93306 TTE W/DOPPLER COMPLETE: CPT

## 2022-04-05 PROCEDURE — 84100 ASSAY OF PHOSPHORUS: CPT

## 2022-04-05 PROCEDURE — 99285 EMERGENCY DEPT VISIT HI MDM: CPT | Mod: 25

## 2022-04-05 PROCEDURE — 84550 ASSAY OF BLOOD/URIC ACID: CPT

## 2022-04-05 PROCEDURE — 80048 BASIC METABOLIC PNL TOTAL CA: CPT

## 2022-04-05 PROCEDURE — 80053 COMPREHEN METABOLIC PANEL: CPT

## 2022-04-05 PROCEDURE — 85027 COMPLETE CBC AUTOMATED: CPT

## 2022-04-05 RX ORDER — INSULIN GLARGINE 100 [IU]/ML
50 INJECTION, SOLUTION SUBCUTANEOUS
Qty: 0 | Refills: 0 | DISCHARGE
Start: 2022-04-05

## 2022-04-05 RX ORDER — INSULIN LISPRO 100/ML
25 VIAL (ML) SUBCUTANEOUS
Qty: 30 | Refills: 0
Start: 2022-04-05 | End: 2022-05-04

## 2022-04-05 RX ORDER — INSULIN GLARGINE 100 [IU]/ML
60 INJECTION, SOLUTION SUBCUTANEOUS
Qty: 18 | Refills: 0
Start: 2022-04-05 | End: 2022-05-04

## 2022-04-05 RX ORDER — ENOXAPARIN SODIUM 100 MG/ML
50 INJECTION SUBCUTANEOUS
Qty: 15 | Refills: 0
Start: 2022-04-05 | End: 2022-05-04

## 2022-04-05 RX ORDER — ASPIRIN/CALCIUM CARB/MAGNESIUM 324 MG
2 TABLET ORAL
Qty: 0 | Refills: 0 | DISCHARGE
Start: 2022-04-05

## 2022-04-05 RX ORDER — INSULIN GLARGINE 100 [IU]/ML
50 INJECTION, SOLUTION SUBCUTANEOUS
Qty: 15 | Refills: 0
Start: 2022-04-05 | End: 2022-05-04

## 2022-04-05 RX ORDER — INSULIN LISPRO 100/ML
25 VIAL (ML) SUBCUTANEOUS
Qty: 13 | Refills: 0
Start: 2022-04-05 | End: 2022-05-04

## 2022-04-05 RX ADMIN — Medication 162 MILLIGRAM(S): at 10:02

## 2022-04-05 RX ADMIN — Medication 1 TABLET(S): at 06:53

## 2022-04-05 RX ADMIN — Medication 650 MILLIGRAM(S): at 01:10

## 2022-04-05 RX ADMIN — Medication 650 MILLIGRAM(S): at 00:39

## 2022-04-05 RX ADMIN — Medication 25 UNIT(S): at 14:34

## 2022-04-05 RX ADMIN — Medication 25 UNIT(S): at 10:02

## 2022-04-05 NOTE — DISCHARGE NOTE ANTEPARTUM - MEDICATION SUMMARY - MEDICATIONS TO TAKE
I will START or STAY ON the medications listed below when I get home from the hospital:    Tylenol Extra Strength 500 mg oral tablet  -- 1 tab(s) by mouth every 6 hours MDD:3000mg  -- This product contains acetaminophen.  Do not use  with any other product containing acetaminophen to prevent possible liver damage.    -- Indication: For Pregnancy    aspirin 81 mg oral tablet, chewable  -- 2 tab(s) by mouth every 24 hours  -- Indication: For Pregnancy    insulin glargine 100 units/mL subcutaneous solution  -- 60 unit(s) subcutaneous once a day (at bedtime)  -- Indication: For Hyperglycemia    Admelog 100 units/mL injectable solution  -- 25 unit(s) injectable 3 times a day (before meals)   -- Indication: For Hyperglycemia    Prenatal Multivitamins with Folic Acid 1 mg oral tablet  -- 1 tab(s) by mouth once a day  -- Indication: For Pregnancy   I will START or STAY ON the medications listed below when I get home from the hospital:    aspirin 81 mg oral tablet, chewable  -- 2 tab(s) by mouth every 24 hours  -- Indication: For Pregnancy    Admelog 100 units/mL injectable solution  -- 25 unit(s) injectable 3 times a day (before meals)   -- Indication: For Hyperglycemia    insulin glargine 100 units/mL subcutaneous solution  -- 50 unit(s) subcutaneous once a day (at bedtime)  -- Indication: For Hyperglycemia    Prenatal Multivitamins with Folic Acid 1 mg oral tablet  -- 1 tab(s) by mouth once a day  -- Indication: For Pregnancy   I will START or STAY ON the medications listed below when I get home from the hospital:    pen needles  -- 1 application subcutaneous 3 to 4 times a day   -- Indication: For Hyperglycemia    aspirin 81 mg oral tablet, chewable  -- 2 tab(s) by mouth every 24 hours  -- Indication: For Pregnancy    Admelog 100 units/mL injectable solution  -- 25 unit(s) injectable 3 times a day (before meals)   -- Indication: For Hyperglycemia    insulin glargine 100 units/mL subcutaneous solution  -- 50 unit(s) subcutaneous once a day (at bedtime)  -- Indication: For Hyperglycemia    Lantus Solostar Pen 100 units/mL subcutaneous solution  -- 50 unit(s) subcutaneous once a day (at bedtime)   -- Do not drink alcoholic beverages when taking this medication.  It is very important that you take or use this exactly as directed.  Do not skip doses or discontinue unless directed by your doctor.  Keep in refrigerator.  Do not freeze.    -- Indication: For Hyperglycemia    Admelog SoloStar 100 units/mL injectable solution  -- 25 unit(s) injectable 3 times a day (before meals)   -- Indication: For Hyperglycemia    Prenatal Multivitamins with Folic Acid 1 mg oral tablet  -- 1 tab(s) by mouth once a day  -- Indication: For Pregnancy

## 2022-04-05 NOTE — DISCHARGE NOTE NURSING/CASE MANAGEMENT/SOCIAL WORK - NSDCPEFALRISK_GEN_ALL_CORE
For information on Fall & Injury Prevention, visit: [email or web address] OR  [email or web address] OR  https://www.cdc.gov/steadi/patient.html

## 2022-04-05 NOTE — DISCHARGE NOTE ANTEPARTUM - YES
Visit Information Date & Time Provider Department Dept. Phone Encounter #  
 7/7/2017  1:00 PM Nancy Eckert MD [facility] [phone] Upcoming Health Maintenance Date Due Hepatitis B Peds Age 0-18 (1 of 3 - Primary Series) 1999 Hepatitis A Peds Age 1-18 (1 of 2 - Standard Series) 3/11/2000 MMR Peds Age 1-18 (1 of 2) 3/11/2000 DTaP/Tdap/Td series (1 - Tdap) 3/11/2006 HPV AGE 9Y-26Y (1 of 3 - Male 3 Dose Series) 3/11/2010 Varicella Peds Age 1-18 (1 of 2 - 2 Dose Adolescent Series) 3/11/2012 MCV through Age 25 (1 of 1) 3/11/2015 INFLUENZA AGE 9 TO ADULT 8/1/2017 Allergies as of 7/7/2017  Review Complete On: 7/7/2017 By: Nancy Eckert MD  
 No Known Allergies Current Immunizations  Never Reviewed No immunizations on file. Not reviewed this visit You Were Diagnosed With   
  
 Codes Comments Left-sided Bell's palsy    -  Primary ICD-10-CM: G51.0 ICD-9-CM: 351.0 Neuropathic pain     ICD-10-CM: M79.2 ICD-9-CM: 729.2 Vitals BP Pulse Resp Height(growth percentile) Weight(growth percentile) BMI  
 123/83 (43 %/ 78 %)* 95 16 6' 5\" (1.956 m) (>99 %, Z= 2.74) 158 lb 9.6 oz (71.9 kg) (64 %, Z= 0.35) 18.81 kg/m2 (8 %, Z= -1.41) Smoking Status Never Smoker *BP percentiles are based on NHBPEP's 4th Report Growth percentiles are based on CDC 2-20 Years data. BMI and BSA Data Body Mass Index Body Surface Area  
 18.81 kg/m 2 1.98 m 2 Your Updated Medication List  
  
   
This list is accurate as of: 7/7/17  2:06 PM.  Always use your most recent med list.  
  
  
  
  
 pregabalin 100 mg capsule Commonly known as:  Wyatt Purdy Take 1 Cap by mouth three (3) times daily. Max Daily Amount: 300 mg. Prescriptions Printed Refills  
 pregabalin (LYRICA) 100 mg capsule 1 Sig: Take 1 Cap by mouth three (3) times daily. Max Daily Amount: 300 mg. Class: Print Route: Oral  
  
We Performed the Following LYME AB, IGG & IGM BY WB [10839 CPT(R)] Patient Instructions   
 
  protect your eye with drops during the day and ointment at night; tape your eye shut at night but do not have any tape or other material touching your eye. Bell's Palsy: Care Instructions Your Care Instructions Bell's palsy is paralysis or weakness of the muscles on one side of the face. Often people with Bell's palsy have a droop on one side of the mouth and have trouble completely shutting the eye on the same side. Bell's palsy can also interfere with the sense of taste. These things happen when a nerve in your face becomes inflamed. Bell's palsy is not caused by a stroke. The cause of the nerve inflammation is not known. But some experts think that a virus may cause it. Because of this, doctors sometimes prescribe antiviral medicine to treat it. You also may get medicine to reduce swelling. Bell's palsy usually gets better on its own in a few weeks or months. Follow-up care is a key part of your treatment and safety. Be sure to make and go to all appointments, and call your doctor if you are having problems. It's also a good idea to know your test results and keep a list of the medicines you take. How can you care for yourself at home? · Take your medicines exactly as prescribed. Call your doctor if you think you are having a problem with your medicine. You will get more details on the specific medicines your doctor prescribes. · Use artificial tears or ointment if your eyes are too dry. Bell's palsy can make your lower eyelid droop, causing a dry eye. · If you cannot completely close your eye, consider using an eye patch while you sleep. · Help yourself blink by using your finger to close and open your eyelid.  This may help keep your eye moist. 
 · Wear glasses or goggles to keep dust and dirt out of your eye. · As feeling comes back to your face, massage your forehead, cheeks, and lips. Massage may make the muscles in your face stronger. · Brush and floss your teeth often to help prevent tooth decay. Bell's palsy can dry up the spit on one side of your mouth. This increases the risk of tooth decay. When should you call for help? Call 911 anytime you think you may need emergency care. For example, call if: 
· You have symptoms of a stroke. These may include: 
¨ Sudden numbness, tingling, weakness, or loss of movement in your face, arm, or leg, especially on only one side of your body. ¨ Sudden vision changes. ¨ Sudden trouble speaking. ¨ Sudden confusion or trouble understanding simple statements. ¨ Sudden problems with walking or balance. ¨ A sudden, severe headache that is different from past headaches. Call your doctor now or seek immediate medical care if: 
· You have numbness or weakness that spreads beyond one side of your face. · You have a skin rash or eye pain or redness, or light bothers your eyes. · You have a new or worse headache. Watch closely for changes in your health, and be sure to contact your doctor if: 
· You do not get better as expected. Where can you learn more? Go to http://monica-norma.info/. Enter P168 in the search box to learn more about \"Bell's Palsy: Care Instructions. \" Current as of: October 14, 2016 Content Version: 11.3 © 7109-0674 [employer]. Care instructions adapted under license by [employer] (which disclaims liability or warranty for this information). If you have questions about a medical condition or this instruction, always ask your healthcare professional. [place] [address] any warranty or liability for your use of this information. Pregabalin (Lyrica) - (By mouth) Why this medicine is used: Treats nerve and muscle pain, including fibromyalgia. Also treats seizures. Contact a nurse or doctor right away if you have: · Thoughts of hurting yourself · Muscle pain, tenderness, weakness Common side effects: 
· Rapid weight gain; swelling in your hands, ankles, or feet · Confusion, trouble concentrating, tiredness · Constipation, dry mouth 
· Headache © 2017 [address] Information is for End User's use only and may not be sold, redistributed or otherwise used for commercial purposes. Pregabalin (By mouth) Pregabalin (pre-GA-ba-shalonda) Treats nerve and muscle pain, including fibromyalgia. Also treats seizures. Brand Name(s): Lyrica There may be other brand names for this medicine. When This Medicine Should Not Be Used: This medicine is not right for everyone. Do not use it if you had an allergic reaction to pregabalin. How to Use This Medicine:  
Capsule, Liquid · Take your medicine as directed. Your dose may need to be changed several times to find what works best for you. · Measure the oral liquid medicine with a marked measuring spoon, oral syringe, or medicine cup. · This medicine should come with a Medication Guide. Ask your pharmacist for a copy if you do not have one. · Missed dose: Take a dose as soon as you remember. If it is almost time for your next dose, wait until then and take a regular dose. Do not take extra medicine to make up for a missed dose. · Store the medicine in a closed container at room temperature, away from heat, moisture, and direct light. Drugs and Foods to Avoid: Ask your doctor or pharmacist before using any other medicine, including over-the-counter medicines, vitamins, and herbal products. · Some medicines can affect how pregabalin works. Tell your doctor if you are using any of the following: ¨ Diabetes medicine that you take by mouth (pioglitazone, rosiglitazone) ¨ An ACE inhibitor (benazepril, enalapril, lisinopril, quinapril, ramipril) · Tell your doctor if you use anything else that makes you sleepy. Some examples are allergy medicine, narcotic pain medicine, and alcohol. Warnings While Using This Medicine: · Tell your doctor if you are pregnant or breastfeeding, or if you have kidney disease, heart failure, heart rhythm problems, angioedema, a bleeding disorder, or a low blood platelet count. Tell your doctor if you have a history of alcohol or drug abuse, depression, or other mood problems. · This medicine may cause the following problems:  
¨ Serious allergic reactions ¨ Suicidal thoughts · This medicine may make you dizzy or drowsy. It may also cause blurry or double vision. Do not drive or do anything that could be dangerous until you know how this medicine affects you. · Do not stop using this medicine suddenly. Your doctor will need to slowly decrease your dose before you stop it completely. · Your doctor will check your progress and the effects of this medicine at regular visits. Keep all appointments. · Keep all medicine out of the reach of children. Never share your medicine with anyone. Possible Side Effects While Using This Medicine:  
Call your doctor right away if you notice any of these side effects: · Allergic reaction: Itching or hives, swelling in your face or hands, swelling or tingling in your mouth or throat, chest tightness, trouble breathing · Blistering, peeling, red skin rash · Blurry or double vision · Fever, chills, cough, sore throat, and body aches · Muscle pain, tenderness, or weakness, fever, or general ill feeling · Rapid weight gain, swelling in your hands, ankles, or feet · Severe dizziness or drowsiness · Sudden mood changes, unusual thoughts or behavior such as extreme happiness or depression · Suicidal thoughts · Swelling in your throat, head, or neck · Uneven heartbeat · Unusual bleeding, bruising, or weakness If you notice these less serious side effects, talk with your doctor: · Confusion, trouble concentrating · Constipation · Dry mouth If you notice other side effects that you think are caused by this medicine, tell your doctor. Call your doctor for medical advice about side effects. You may report side effects to FDA at 9-602-OSC-9316 © 2017 [facility] Information is for End User's use only and may not be sold, redistributed or otherwise used for commercial purposes. The above information is an  only. It is not intended as medical advice for individual conditions or treatments. Talk to your doctor, nurse or pharmacist before following any medical regimen to see if it is safe and effective for you. Introducing [facility] & HEALTH SERVICES! Suly Sumner introduces [employer] patient portal. Now you can access parts of your medical record, email your doctor's office, and request medication refills online. 1. In your internet browser, go to [email or web address] At [email or web address] "[email or web address] 2. Click on the First Time User? Click Here link in the Sign In box. You will see the New Member Sign Up page. 3. Enter your [employer] Access Code exactly as it appears below. You will not need to use this code after youve completed the sign-up process. If you do not sign up before the expiration date, you must request a new code. · [employer] Access Code: [email or web address] Expires: 10/5/2017 12:55 PM 
 
4. Enter the last four digits of your Social Security Number (xxxx) and Date of Birth (mm/dd/yyyy) as indicated and click Submit. You will be taken to the next sign-up page. 5. Create a [employer] ID. This will be your [employer] login ID and cannot be changed, so think of one that is secure and easy to remember. 6. Create a [employer] password. You can change your password at any time. 7. Enter your Password Reset Question and Answer. This can be used at a later time if you forget your password. 8. Enter your e-mail address. You will receive e-mail notification when new information is available in [address]. 9. Click Sign Up. You can now view and download portions of your medical record. 10. Click the Download Summary menu link to download a portable copy of your medical information. If you have questions, please visit the Frequently Asked Questions section of the [employer] website. Remember, [employer] is NOT to be used for urgent needs. For medical emergencies, dial 911. Now available from your iPhone and Android! Please provide this summary of care documentation to your next provider. Lyme Disease Testing Disclaimer:   
 § 71.7-9196.6. (Expires July 1, 2018) Lyme disease testing information disclosure. A. Every licensee or his in-office designee who orders a laboratory test for the presence of Lyme disease shall provide to the patient or his legal representative the following written information: \"ACCORDING TO THE CENTERS FOR DISEASE CONTROL AND PREVENTION, AS OF 2011 LYME DISEASE IS THE SIXTH FASTEST GROWING DISEASE IN THE [place]. YOUR HEALTH CARE PROVIDER HAS ORDERED A LABORATORY TEST FOR THE PRESENCE OF LYME DISEASE FOR YOU. CURRENT LABORATORY TESTING FOR LYME DISEASE CAN BE PROBLEMATIC AND STANDARD LABORATORY TESTS OFTEN RESULT IN FALSE NEGATIVE AND FALSE POSITIVE RESULTS, AND IF DONE TOO EARLY, YOU MAY NOT HAVE PRODUCED ENOUGH ANTIBODIES TO BE CONSIDERED POSITIVE BECAUSE YOUR IMMUNE RESPONSE REQUIRES TIME TO DEVELOP ANTIBODIES. IF YOU ARE TESTED FOR LYME DISEASE, AND THE RESULTS ARE NEGATIVE, THIS DOES NOT NECESSARILY MEAN YOU DO NOT HAVE LYME DISEASE. IF YOU CONTINUE TO EXPERIENCE SYMPTOMS, YOU SHOULD CONTACT YOUR HEALTH CARE PROVIDER AND INQUIRE ABOUT THE APPROPRIATENESS OF RETESTING OR ADDITIONAL TREATMENT. \"  
B.  Licensees shall be immune from civil liability for the provision of the written information required by this section absent gross negligence or willful misconduct. Your primary care clinician is listed as Allyssa Mackenzie. If you have any questions after today's visit, please call [phone]. Statement Selected

## 2022-04-05 NOTE — DISCHARGE NOTE NURSING/CASE MANAGEMENT/SOCIAL WORK - NSDCVIVACCINE_GEN_ALL_CORE_FT
Tdap; 30-Aug-2016 03:23; Johnathon Bruner); [employer]; [id number]; IntraMuscular; Deltoid Left.; 0.5 milliLiter(s); VIS (VIS Published: 09-May-2013, VIS Presented: 30-Aug-2016);

## 2022-04-05 NOTE — PROGRESS NOTE ADULT - ASSESSMENT
31 y.o.  @16w5d (VENKATESH 22) with known cHTN and T2DM admitted for glucose control.    T2DM  - FS: preprandial, 1hr postprandial, and BT   - Endo consult 3/31 - Increase insulin to Lantus 45 qHS, lispro 14u with meals     cHTN   - VSq4  - Not on medication - currently well-controlled    PNC  - Daily FH check  - SCDs in bed    Peace Wiggins MD PGY2    
31 y.o.  @17w0d (VENKATESH 22) with known cHTN and T2DM admitted for glucose control.    T2DM   [] FS: pre-prandial, 1hr postprandial, and BT    [x] Endo consulted - Increased insulin yesterday to Lantus 55 and lispro 16      cHTN   [] VSq4   [] TTE (ordered )    [] 24 urine protein collection - 100       PNC   [] PNV   [] AM labs today ordered 
31 y.o.  @16w6d (VENKATESH 22) with known cHTN and T2DM admitted for glucose control.    T2DM   [] FS: pre-prandial, 1hr postprandial, and BT ( - nurse did not take postprandials)   [x] Endo consulted - Increase insulin to Lantus 50 from 45 qHS, and decrease lispro to 12u with meals from 14u   [] 24 urine protein collection (-)     cHTN   [] VSq4   [] TTE (ordered )        PNC   [] Daily FH check 
31 y.o.  @17w1d (VENKATESH 22) with known cHTN and T2DM admitted for glucose control.    T2DM   [] FS: pre-prandial, 1hr postprandial, and BT    [x] Endo consulted - currently on lantus 55U QHS, Lispro 18U with meals, moderate ISS   [] Plan for consultation with Zhanna diabetes educator     cHTN   [] VSq4   [] TTE (ordered ), not yet performed   [] 24 urine protein collection - 100       PNC   [] PNV   [] Daily Fh checks  [] f/u FNA thyroid pathology from 3/31   [] Q3day labs, next 
31 y.o.  @17w2d (VENKATESH 22) with known cHTN and T2DM admitted for glucose control.    T2DM   [] FS: pre-prandial, 1hr postprandial, and BT    [x] Endo consulted - currently on lantus 55U QHS, Lispro 22U with meals, moderate ISS   [] Plan for consultation with Zhanna diabetes educator     cHTN   [] VSq4   [] TTE (ordered ), not yet performed   [] 24 urine protein collection - 100       PNC   [] PNV   [] Daily Fh checks  [] f/u FNA thyroid pathology from 3/31   [] Q3day labs, next   [] plan for d/c today with outpatient nephrology, endo, and ophtho follow up 
31y Female type 2 DM with increased insulin resistance in pregnancy, presenting with uncontrolled GDM  A1C: 6.8%- outpatient  Wt:83.9kg  Cr:0.47  GFR:130  Home regimen: 10u Basaglar AM, 20u Basaglar PM, 6u Admelog AM

## 2022-04-05 NOTE — DISCHARGE NOTE ANTEPARTUM - NS MD DC FALL RISK RISK
For information on Fall & Injury Prevention, visit: [email or web address] OR  [email or web address] OR  https://www.cdc.gov/steadi/patient.html

## 2022-04-05 NOTE — DISCHARGE NOTE ANTEPARTUM - PATIENT PORTAL LINK FT
You can access the FollowMyHealth Patient Portal offered by [facility] by registering at the following website: [email or web address]. By joining [employer]’s FollowMyHealth portal, you will also be able to view your health information using other applications (apps) compatible with our system.

## 2022-04-05 NOTE — PROGRESS NOTE ADULT - SUBJECTIVE AND OBJECTIVE BOX
SUBJECTIVE: Patient evaluated at bedside. She has no complaints. She denies any abdominal pain. Denies vaginal bleeding, leaking of fluid.       Vital Signs Last 24 Hrs  T(C): 36.9 (05 Apr 2022 09:15), Max: 37 (05 Apr 2022 05:00)  T(F): 98.5 (05 Apr 2022 09:15), Max: 98.6 (05 Apr 2022 05:00)  HR: 86 (05 Apr 2022 09:15) (76 - 94)  BP: 109/72 (05 Apr 2022 09:15) (107/69 - 131/69)  BP(mean): 82 (05 Apr 2022 05:00) (82 - 82)  RR: 18 (05 Apr 2022 09:15) (17 - 18)  SpO2: 97% (05 Apr 2022 09:15) (96% - 98%)    PHYSICAL EXAM   GA: NAD   Resp: normal respiratory effort  Abd: soft, non tender   Extrem: SCDs in place, no LE edema, no LE tenderness      MEDICATIONS  (STANDING):  aspirin  chewable 162 milliGRAM(s) Oral every 24 hours  dextrose 5%. 1000 milliLiter(s) (100 mL/Hr) IV Continuous <Continuous>  dextrose 5%. 1000 milliLiter(s) (50 mL/Hr) IV Continuous <Continuous>  dextrose 50% Injectable 25 Gram(s) IV Push once  dextrose 50% Injectable 12.5 Gram(s) IV Push once  dextrose 50% Injectable 25 Gram(s) IV Push once  glucagon  Injectable 1 milliGRAM(s) IntraMuscular once  insulin glargine Injectable (LANTUS) 60 Unit(s) SubCutaneous at bedtime  insulin lispro (ADMELOG) corrective regimen sliding scale   SubCutaneous Before meals and at bedtime  insulin lispro Injectable (ADMELOG) 25 Unit(s) SubCutaneous three times a day before meals  prenatal multivitamin 1 Tablet(s) Oral daily    MEDICATIONS  (PRN):  acetaminophen     Tablet .. 650 milliGRAM(s) Oral every 6 hours PRN Mild Pain (1 - 3)  dextrose Oral Gel 15 Gram(s) Oral once PRN Blood Glucose LESS THAN 70 milliGRAM(s)/deciliter  sodium chloride 0.65% Nasal 1 Spray(s) Both Nostrils four times a day PRN Nasal Congestion

## 2022-04-05 NOTE — PROGRESS NOTE ADULT - NS ATTEST RISK PROBLEM GEN_ALL_CORE FT
Sub-optimal glycemic control in a pregnant diabetic
Sub-optimal glycemic control in pregnant diabetic
Glucoses improved, but she remains high risk given her pregnancy

## 2022-04-05 NOTE — PROVIDER CONTACT NOTE (HYPOGLYCEMIA EVENT) - NS PROVIDER CONTACT BACKGROUND-HYPO
Age: 31y    Gender: Female    POCT Blood Glucose:  134 mg/dL (04-05-22 @ 14:27)  69 mg/dL (04-05-22 @ 13:56)  164 mg/dL (04-05-22 @ 11:12)  86 mg/dL (04-05-22 @ 09:22)  82 mg/dL (04-05-22 @ 06:42)  112 mg/dL (04-04-22 @ 21:26)  141 mg/dL (04-04-22 @ 18:41)  100 mg/dL (04-04-22 @ 17:17)      eMAR:  insulin glargine Injectable (LANTUS)   60 Unit(s) SubCutaneous (04-04-22 @ 21:35)    insulin lispro Injectable (ADMELOG)   25 Unit(s) SubCutaneous (04-05-22 @ 14:34)   25 Unit(s) SubCutaneous (04-05-22 @ 10:02)    insulin lispro Injectable (ADMELOG)   22 Unit(s) SubCutaneous (04-04-22 @ 17:44)

## 2022-04-05 NOTE — DISCHARGE NOTE NURSING/CASE MANAGEMENT/SOCIAL WORK - PATIENT PORTAL LINK FT
You can access the FollowMyHealth Patient Portal offered by [facility] by registering at the following website: [email or web address]. By joining [employer]’s FollowMyHealth portal, you will also be able to view your health information using other applications (apps) compatible with our system.

## 2022-04-05 NOTE — PROVIDER CONTACT NOTE (HYPOGLYCEMIA EVENT) - NS PROVIDER CONTACT ASSESS-HYPO
pt A&Ox4. pt alert and awake. pt states she is "feeling a little shaky" pt POCT 69 pt due for insulin medication. pt tolerating diet, no nausea or vomiting.

## 2022-04-05 NOTE — DISCHARGE NOTE ANTEPARTUM - MEDICATION SUMMARY - MEDICATIONS TO STOP TAKING
I will STOP taking the medications listed below when I get home from the hospital:    Flomax 0.4 mg oral capsule  -- 1 cap(s) by mouth once a day   -- It is very important that you take or use this exactly as directed.  Do not skip doses or discontinue unless directed by your doctor.  May cause drowsiness.  Alcohol may intensify this effect.  Use care when operating dangerous machinery.  Some non-prescription drugs may aggravate your condition.  Read all labels carefully.  If a warning appears, check with your doctor before taking.  Swallow whole.  Do not crush.   I will STOP taking the medications listed below when I get home from the hospital:    Bactrim  mg-160 mg oral tablet  -- 1 tab(s) by mouth 2 times a day  -- Avoid prolonged or excessive exposure to direct and/or artificial sunlight while taking this medication.  Finish all this medication unless otherwise directed by prescriber.  Medication should be taken with plenty of water.    Flomax 0.4 mg oral capsule  -- 1 cap(s) by mouth once a day   -- It is very important that you take or use this exactly as directed.  Do not skip doses or discontinue unless directed by your doctor.  May cause drowsiness.  Alcohol may intensify this effect.  Use care when operating dangerous machinery.  Some non-prescription drugs may aggravate your condition.  Read all labels carefully.  If a warning appears, check with your doctor before taking.  Swallow whole.  Do not crush.   I will STOP taking the medications listed below when I get home from the hospital:    Bactrim  mg-160 mg oral tablet  -- 1 tab(s) by mouth 2 times a day  -- Avoid prolonged or excessive exposure to direct and/or artificial sunlight while taking this medication.  Finish all this medication unless otherwise directed by prescriber.  Medication should be taken with plenty of water.    Flomax 0.4 mg oral capsule  -- 1 cap(s) by mouth once a day   -- It is very important that you take or use this exactly as directed.  Do not skip doses or discontinue unless directed by your doctor.  May cause drowsiness.  Alcohol may intensify this effect.  Use care when operating dangerous machinery.  Some non-prescription drugs may aggravate your condition.  Read all labels carefully.  If a warning appears, check with your doctor before taking.  Swallow whole.  Do not crush.    Flomax 0.4 mg oral capsule  -- 1 cap(s) by mouth once a day (at bedtime) MDD:1  -- It is very important that you take or use this exactly as directed.  Do not skip doses or discontinue unless directed by your doctor.  May cause drowsiness.  Alcohol may intensify this effect.  Use care when operating dangerous machinery.  Some non-prescription drugs may aggravate your condition.  Read all labels carefully.  If a warning appears, check with your doctor before taking.  Swallow whole.  Do not crush.    Colace 100 mg oral capsule  -- 1 cap(s) by mouth 2 times a day MDD:2  -- Medication should be taken with plenty of water.    oxycodone-acetaminophen 5 mg-325 mg oral tablet  -- 1 tab(s) by mouth every 6 hours, As Needed -for severe pain MDD:5  -- Caution federal law prohibits the transfer of this drug to any person other  than the person for whom it was prescribed.  May cause drowsiness.  Alcohol may intensify this effect.  Use care when operating dangerous machinery.  This prescription cannot be refilled.  This product contains acetaminophen.  Do not use  with any other product containing acetaminophen to prevent possible liver damage.  Using more of this medication than prescribed may cause serious breathing problems.    Valium 2 mg oral tablet  -- 1 tab(s) by mouth every 8 hours, As Needed -for bladder spasms MDD:3 tabs   -- Caution federal law prohibits the transfer of this drug to any person other  than the person for whom it was prescribed.  Do not take this drug if you are pregnant.  May cause drowsiness.  Alcohol may intensify this effect.  Use care when operating dangerous machinery.    Augmentin 875 mg-125 mg oral tablet  -- 1 tab(s) by mouth every 12 hours   -- Finish all this medication unless otherwise directed by prescriber.  Take with food or milk.    Flagyl 500 mg oral tablet  -- 1 tab(s) by mouth 2 times a day   -- Do not drink alcoholic beverages when taking this medication.  Finish all this medication unless otherwise directed by prescriber.  May discolor urine or feces.

## 2022-04-05 NOTE — PROGRESS NOTE ADULT - PROBLEM SELECTOR PLAN 1
Please decrease lantus to 50 units at night.    Please continue lispro 25  units before each meal.    Please continue lispro moderate dose sliding scale four times daily with meals and at bedtime.  Please get fingersticks before meals and 1 hour after meals      Pt's fingerstick glucose goal is Premeal: 95 or less , One hour postprandial <140, two hour postprandial <120    Will continue to monitor     For discharge, pt can regimen above. Goal CHO is 45gm/meal. No snack, as there is no insulin to cover.   Advised to download a carb counting gustabo and measure/weigh food. Consider dexcom as outpt.    Pt can follow up at discharge with AIDE Esquivel and AUSTIN Elias on 4/19 (will try to move NP appt to next week) and Dr. Amaya in May.  Will discuss case with Dr. Mattson and update primary team

## 2022-04-05 NOTE — DISCHARGE NOTE ANTEPARTUM - HOSPITAL COURSE
31 y.o.  @17w2d (VENKATESH 22) with known cHTN and T2DM admitted for glucose control.    T2DM   [] FS: pre-prandial, 1hr postprandial, and BT    [x] Endo consulted - currently on lantus 60U QHS, Lispro 25U with meals, moderate ISS   [] Pt will follow up with Dr. Amaya within the week, office is aware     cHTN   [] VSq4   [] TTE  WNL  [] 24 urine protein collection - 100       PNC   [] PNV   [] Daily Fh checks  [] f/u FNA thyroid pathology from 3/31   [] Outpatient nephrology appointment made with Dr. Robles  for  at 1400

## 2022-04-05 NOTE — DISCHARGE NOTE ANTEPARTUM - CARE PROVIDER_API CALL
Micki Thakkar)  Obstetrics and Gynecology  [address] [address]  Phone: [phone]  Fax: [phone]  Follow Up Time:

## 2022-04-05 NOTE — PROGRESS NOTE ADULT - SUBJECTIVE AND OBJECTIVE BOX
INTERVAL HPI/OVERNIGHT EVENTS:    Patient is a 31y old  Female who presents with a chief complaint of GDM in pregnancy (04 Apr 2022 08:38)  Patient eating well, no complaints.     FSG & insulin:  Yesterday:  5:15 Pain management. . Lispro 22. Ate quinoa stuffed swt potato, extra quinoa, salad  6:45PM .  Bedtime . Lantus 60. Ate sandwich around 11PM  Today:  Fasting FSG 82.  920AM FSG 86. Lispro 10. Ate juevos micki wrap and potatoes.  1120AM .   2:10AM FSG 69. Symptomatic Drank juice. FSG recheck 134. Given lispro 25 and ate lunch.    Pt reports the following symptoms:    CONSTITUTIONAL:  Negative fever or chills, feels well, good appetite  EYES:  Negative  blurry vision or double vision  CARDIOVASCULAR:  Negative for chest pain or palpitations  RESPIRATORY:  Negative for cough, wheezing, or SOB   GASTROINTESTINAL:  Negative for nausea, vomiting, diarrhea, constipation, or abdominal pain  GENITOURINARY:  Negative frequency, urgency or dysuria  NEUROLOGIC:  No headache, confusion, dizziness, lightheadedness    MEDICATIONS  (STANDING):  aspirin  chewable 162 milliGRAM(s) Oral every 24 hours  dextrose 5%. 1000 milliLiter(s) (100 mL/Hr) IV Continuous <Continuous>  dextrose 5%. 1000 milliLiter(s) (50 mL/Hr) IV Continuous <Continuous>  dextrose 50% Injectable 25 Gram(s) IV Push once  dextrose 50% Injectable 12.5 Gram(s) IV Push once  dextrose 50% Injectable 25 Gram(s) IV Push once  glucagon  Injectable 1 milliGRAM(s) IntraMuscular once  insulin glargine Injectable (LANTUS) 60 Unit(s) SubCutaneous at bedtime  insulin lispro (ADMELOG) corrective regimen sliding scale   SubCutaneous Before meals and at bedtime  insulin lispro Injectable (ADMELOG) 25 Unit(s) SubCutaneous three times a day before meals  prenatal multivitamin 1 Tablet(s) Oral daily    MEDICATIONS  (PRN):  acetaminophen     Tablet .. 650 milliGRAM(s) Oral every 6 hours PRN Mild Pain (1 - 3)  dextrose Oral Gel 15 Gram(s) Oral once PRN Blood Glucose LESS THAN 70 milliGRAM(s)/deciliter  sodium chloride 0.65% Nasal 1 Spray(s) Both Nostrils four times a day PRN Nasal Congestion      PHYSICAL EXAM  Vital Signs Last 24 Hrs  T(C): 36.8 (05 Apr 2022 16:07), Max: 37 (05 Apr 2022 05:00)  T(F): 98.2 (05 Apr 2022 16:07), Max: 98.6 (05 Apr 2022 05:00)  HR: 91 (05 Apr 2022 16:07) (79 - 91)  BP: 111/74 (05 Apr 2022 16:07) (107/69 - 126/74)  BP(mean): 82 (05 Apr 2022 05:00) (82 - 82)  RR: 17 (05 Apr 2022 16:07) (17 - 18)  SpO2: 95% (05 Apr 2022 16:07) (95% - 97%)      Constitutional:NAD.   HEENT: NCAT, MMM, OP clear, EOMI, , no proptosis or lid retraction  Neck: no thyromegaly or palpable thyroid nodules   Respiratory: lungs CTAB.  Cardiovascular: regular rhythm, normal S1 and S2, no audible murmurs, no peripheral edema  GI: soft, NT/ND, no masses/HSM appreciated.  Neurology: no tremors, DTR 2+  Skin: no visible rashes/lesions  Psychiatric: AAO x 3, normal affect/mood.    POCT Blood Glucose.: 82 mg/dL (05 Apr 2022 06:42)  POCT Blood Glucose.: 112 mg/dL (04 Apr 2022 21:26)  POCT Blood Glucose.: 141 mg/dL (04 Apr 2022 18:41)

## 2022-04-05 NOTE — DISCHARGE NOTE ANTEPARTUM - PLAN OF CARE
Please follow up outpatient as scheduled Please continue to take:  60 units of lantus before bed  25 units of lispro before meals    Take your blood sugar: before breakfast, 1 hour after eating each meal, and before bed     Please follow up with Dr. Amaya within the week at [address] [place] [address] - an appointment is being made for you ([phone])     A nephrology appointment has been made for you with Dr. Robles May 4th at 1400 Please continue to take:  50 units of lantus before bed  25 units of lispro before meals    Take your blood sugar: before breakfast, 1 hour after eating each meal, and before bed     Please follow up with Dr. Amaya within the week at [address] [place] [address] - an appointment is being made for you ([phone])     A nephrology appointment has been made for you with Dr. Robles May 4th at 1400

## 2022-04-05 NOTE — DISCHARGE NOTE ANTEPARTUM - CARE PLAN
1 Principal Discharge DX:	Hyperglycemia  Assessment and plan of treatment:	Please continue to take:  60 units of lantus before bed  25 units of lispro before meals    Take your blood sugar: before breakfast, 1 hour after eating each meal, and before bed     Please follow up with Dr. Amaya within the week at [address] - an appointment is being made for you ([phone])     A nephrology appointment has been made for you with Dr. Robles May 4th at 1400  Secondary Diagnosis:	Pregnancy  Assessment and plan of treatment:	Please follow up outpatient as scheduled   Principal Discharge DX:	Hyperglycemia  Assessment and plan of treatment:	Please continue to take:  50 units of lantus before bed  25 units of lispro before meals    Take your blood sugar: before breakfast, 1 hour after eating each meal, and before bed     Please follow up with Dr. Amaya within the week at [address] - an appointment is being made for you ([phone])     A nephrology appointment has been made for you with Dr. Robles May 4th at 1400  Secondary Diagnosis:	Pregnancy  Assessment and plan of treatment:	Please follow up outpatient as scheduled

## 2022-04-05 NOTE — PROGRESS NOTE ADULT - NS ATTEND AMEND GEN_ALL_CORE FT
Pt seen on rounds with Ms. Mariano this afternoon.  Glucoses have improved sufficiently for discharge, with AM fingerstick down to 82/86 this morning, and post-prandials only slightly above goal.  The AM fingerstick reflects a Lantus dose of 60 units, but also a sandwich eaten after the 10 PM fingerstick which was not "covered" with any lispro.  Her Lantus requirements are therefore lower than 60 units at this point, and will discharge on 50 units.  Will increase her premeal lispro to 25 units given the elevated post-prandials, but also spent a lot of time discussing her diet, since she has had very little formal nutritional training, rukhsana regarding carb counting.  Advised her to aim for 45 grams of carb at each meal, and discussed appropriate portion sizes of starch at each of her main meals.  Also emphasized that there is currently no insulin to handle between-meal snacks, and that she should avoid these (as well as bedtime snacks) for now.    Gave her a booklet with approximate portion sizes of the different starches and fruit, as well as two apps that she could use to help count the carbs

## 2022-04-11 ENCOUNTER — NON-APPOINTMENT (OUTPATIENT)
Age: 32
End: 2022-04-11

## 2022-04-12 DIAGNOSIS — Z83.3 FAMILY HISTORY OF DIABETES MELLITUS: ICD-10-CM

## 2022-04-12 DIAGNOSIS — E04.1 NONTOXIC SINGLE THYROID NODULE: ICD-10-CM

## 2022-04-12 DIAGNOSIS — J45.909 UNSPECIFIED ASTHMA, UNCOMPLICATED: ICD-10-CM

## 2022-04-12 DIAGNOSIS — Z79.51 LONG TERM (CURRENT) USE OF INHALED STEROIDS: ICD-10-CM

## 2022-04-12 DIAGNOSIS — O24.112 PRE-EXISTING TYPE 2 DIABETES MELLITUS, IN PREGNANCY, SECOND TRIMESTER: ICD-10-CM

## 2022-04-12 DIAGNOSIS — E11.65 TYPE 2 DIABETES MELLITUS WITH HYPERGLYCEMIA: ICD-10-CM

## 2022-04-12 DIAGNOSIS — Z3A.16 16 WEEKS GESTATION OF PREGNANCY: ICD-10-CM

## 2022-04-12 DIAGNOSIS — O10.912 UNSPECIFIED PRE-EXISTING HYPERTENSION COMPLICATING PREGNANCY, SECOND TRIMESTER: ICD-10-CM

## 2022-04-12 DIAGNOSIS — Z79.4 LONG TERM (CURRENT) USE OF INSULIN: ICD-10-CM

## 2022-04-12 DIAGNOSIS — E28.2 POLYCYSTIC OVARIAN SYNDROME: ICD-10-CM

## 2022-04-19 ENCOUNTER — APPOINTMENT (OUTPATIENT)
Dept: ENDOCRINOLOGY | Facility: CLINIC | Age: 32
End: 2022-04-19

## 2022-04-20 ENCOUNTER — APPOINTMENT (OUTPATIENT)
Dept: OBGYN | Facility: CLINIC | Age: 32
End: 2022-04-20
Payer: MEDICAID

## 2022-04-20 VITALS
DIASTOLIC BLOOD PRESSURE: 70 MMHG | BODY MASS INDEX: 30.82 KG/M2 | HEIGHT: 65 IN | SYSTOLIC BLOOD PRESSURE: 120 MMHG | OXYGEN SATURATION: 96 % | WEIGHT: 185 LBS

## 2022-04-20 PROCEDURE — 99213 OFFICE O/P EST LOW 20 MIN: CPT | Mod: TH

## 2022-04-21 ENCOUNTER — APPOINTMENT (OUTPATIENT)
Dept: ENDOCRINOLOGY | Facility: CLINIC | Age: 32
End: 2022-04-21
Payer: MEDICAID

## 2022-04-21 VITALS
DIASTOLIC BLOOD PRESSURE: 77 MMHG | BODY MASS INDEX: 30.95 KG/M2 | SYSTOLIC BLOOD PRESSURE: 109 MMHG | HEART RATE: 91 BPM | WEIGHT: 186 LBS

## 2022-04-21 DIAGNOSIS — Z3A.19 19 WEEKS GESTATION OF PREGNANCY: ICD-10-CM

## 2022-04-21 LAB
GLUCOSE BLDC GLUCOMTR-MCNC: 127
HBA1C MFR BLD HPLC: 6.2

## 2022-04-21 PROCEDURE — 83036 HEMOGLOBIN GLYCOSYLATED A1C: CPT | Mod: QW

## 2022-04-21 PROCEDURE — 82962 GLUCOSE BLOOD TEST: CPT

## 2022-04-21 PROCEDURE — 99214 OFFICE O/P EST MOD 30 MIN: CPT | Mod: 25

## 2022-04-21 RX ORDER — INSULIN LISPRO 100 U/ML
100 INJECTION, SOLUTION SUBCUTANEOUS
Qty: 5 | Refills: 1 | Status: ACTIVE | COMMUNITY
Start: 2022-04-21 | End: 1900-01-01

## 2022-04-21 RX ORDER — INSULIN GLARGINE 100 [IU]/ML
100 INJECTION, SOLUTION SUBCUTANEOUS DAILY
Qty: 20 | Refills: 1 | Status: ACTIVE | COMMUNITY
Start: 2022-04-21 | End: 1900-01-01

## 2022-04-21 NOTE — ASSESSMENT
[FreeTextEntry1] : 1. T2D, + 19 weeks gestation of pregnancy\par Dx in 2016\par A1C goal <6.5%\par A1C - 6.8% (3/16/21) from 7.8% (22) from 8.1% (2021)\par Current regimen: Lantus 55 units QHS, admelog 25 units TIDAC\par Glucometer readings at home reveal fasting BG above goal consistently, some postprandial variability dependent on meal size, with occasional BG <65 pre lunch/dinner if going many hours without eating. Most excursion following dinner, intermittent, typically only ~10-20 mg/dL above goal if above goal postprandially\par Glucometer reading performed in office today is 127 3H postprandial, fasting today was 150\par \par Current BG goals: fasting BG <95, 1H postprandial <140 and 2H postprandial <120 \par \par - Discussed importance of glycemic control for maternal and fetal health, as well as the risks of diabetes in pregnancy, with fetal complications, including, but not limited to, congential malformations, prematurity, macrosomia, growth restriction, medical complications, mortality, obstetric complications including, but not limited to, miscarriage, preeclampsia, gestational hypertension, polyhydramnios,  delivery,  section, maternal complications including, but not limited to, progression of microvascular or macrovascular disease, and DKA \par - Dietary counseling provided centered around healthy diet planning handout with focus on portion sizes\par \par -- increase lantus to 60 units daily\par -- continue admelog 25 units daily, if having larger meal (specifically habit of larger dinner, to increase to 30 units), may need titration with review of BG values next week\par -- patient to send me a [facility] message in 3-4 days with BG readings for review with modification today\par \par \par 2. thyroid nodules\par S/p FNA of right lower/isthmus(2.1cm)  and right lower (3.4cm) nodule in 2022, bethesda II, benign\par -- continue surveillance with thyroid US in 1 year\par \par Continue plan for FU with Dr Amaya in 3 weeks (22)\par \par Caitie Matute, MS, FNP-BC, [facility]\par 2022

## 2022-04-21 NOTE — PHYSICAL EXAM
[Alert] : alert [Well Nourished] : well nourished [Healthy Appearance] : healthy appearance [No Acute Distress] : no acute distress [Well Developed] : well developed [Normal Sclera/Conjunctiva] : normal sclera/conjunctiva [Normal Hearing] : hearing was normal [No Respiratory Distress] : no respiratory distress [Normal Rate and Effort] : normal respiratory rate and effort [Normal S1, S2] : normal S1 and S2 [Regular Rhythm] : with a regular rhythm [Normal Gait] : normal gait [Oriented x3] : oriented to person, place, and time [Normal Affect] : the affect was normal [Normal Insight/Judgement] : insight and judgment were intact [Normal Mood] : the mood was normal

## 2022-04-21 NOTE — DISCUSSION/SUMMARY
[FreeTextEntry1] : 32yo  at 19+3 with pregestational DM2, previously uncontrolled, presents for prenatal visit after admission to antepartum 3/31- for glucose control optimization\par \par -Will f/u more recent endocrinology regimen recommendations, unable to view recent notes on [employer] - has an appointment with Caitie Matute tomorrow, \par -Pt to return in 4 weeks for her next prenatal visit. Late anatomy scan is scheduled for  \par -Breast pump form and disability forms filled out and faxed to pt's place of work\par \par Pt seen and discussed with Dr. Thakkar

## 2022-04-21 NOTE — HISTORY OF PRESENT ILLNESS
[FreeTextEntry1] : 32yo  at 19+3 (VENKATESH 22) with pregestational DM2, previously uncontrolled, presents for prenatal visit after admission to antepartum 3/31- for glucose control optimization. Pt states that she has been seeing her Endocrinologist, Dr. Amaya. She cannot recall current regimen, however, pt is incredibly distressed given recent family crisis - pt's stepdaughter is currently missing for 1 week. Pt had been discharged on lantus 50U QHS and lispro 25U with meals. She was scheduled for weekly telehealth visits with Dr. Amaya. Pt reports +FM and states that she physically feels well despite feeling significant psychological stress. \par \par TAUS: +FM, grossly normal amniotic fluid,

## 2022-04-26 ENCOUNTER — NON-APPOINTMENT (OUTPATIENT)
Age: 32
End: 2022-04-26

## 2022-05-02 ENCOUNTER — APPOINTMENT (OUTPATIENT)
Dept: ANTEPARTUM | Facility: CLINIC | Age: 32
End: 2022-05-02
Payer: MEDICAID

## 2022-05-02 ENCOUNTER — ASOB RESULT (OUTPATIENT)
Age: 32
End: 2022-05-02

## 2022-05-02 PROCEDURE — 76816 OB US FOLLOW-UP PER FETUS: CPT

## 2022-05-03 ENCOUNTER — NON-APPOINTMENT (OUTPATIENT)
Age: 32
End: 2022-05-03

## 2022-05-04 ENCOUNTER — APPOINTMENT (OUTPATIENT)
Dept: NEPHROLOGY | Facility: CLINIC | Age: 32
End: 2022-05-04
Payer: MEDICAID

## 2022-05-04 VITALS
OXYGEN SATURATION: 97 % | HEART RATE: 87 BPM | RESPIRATION RATE: 16 BRPM | DIASTOLIC BLOOD PRESSURE: 65 MMHG | SYSTOLIC BLOOD PRESSURE: 99 MMHG

## 2022-05-04 DIAGNOSIS — Z87.09 PERSONAL HISTORY OF OTHER DISEASES OF THE RESPIRATORY SYSTEM: ICD-10-CM

## 2022-05-04 DIAGNOSIS — J45.909 UNSPECIFIED ASTHMA, UNCOMPLICATED: ICD-10-CM

## 2022-05-04 DIAGNOSIS — N20.1 CALCULUS OF URETER: ICD-10-CM

## 2022-05-04 DIAGNOSIS — I10 ESSENTIAL (PRIMARY) HYPERTENSION: ICD-10-CM

## 2022-05-04 DIAGNOSIS — O09.90 SUPERVISION OF HIGH RISK PREGNANCY, UNSPECIFIED, UNSPECIFIED TRIMESTER: ICD-10-CM

## 2022-05-04 DIAGNOSIS — R80.9 PROTEINURIA, UNSPECIFIED: ICD-10-CM

## 2022-05-04 PROCEDURE — 99204 OFFICE O/P NEW MOD 45 MIN: CPT

## 2022-05-06 LAB
APPEARANCE: CLEAR
APPEARANCE: CLEAR
BACTERIA: NEGATIVE
BILIRUBIN URINE: NEGATIVE
BILIRUBIN URINE: NEGATIVE
BLOOD URINE: NEGATIVE
BLOOD URINE: NEGATIVE
COLOR: NORMAL
COLOR: NORMAL
CREAT SPEC-SCNC: 73 MG/DL
CREAT SPEC-SCNC: 73 MG/DL
CREAT/PROT UR: 0.2 RATIO
GLUCOSE QUALITATIVE U: NEGATIVE
GLUCOSE QUALITATIVE U: NEGATIVE
HYALINE CASTS: 0 /LPF
KETONES URINE: NEGATIVE
KETONES URINE: NEGATIVE
LEUKOCYTE ESTERASE URINE: NEGATIVE
LEUKOCYTE ESTERASE URINE: NEGATIVE
MICROALBUMIN 24H UR DL<=1MG/L-MCNC: <1.2 MG/DL
MICROALBUMIN/CREAT 24H UR-RTO: NORMAL MG/G
MICROSCOPIC-UA: NORMAL
NITRITE URINE: NEGATIVE
NITRITE URINE: NEGATIVE
PH URINE: 7
PH URINE: 7
PROT UR-MCNC: 14 MG/DL
PROTEIN URINE: NEGATIVE
PROTEIN URINE: NEGATIVE
RED BLOOD CELLS URINE: 3 /HPF
SPECIFIC GRAVITY URINE: 1.02
SPECIFIC GRAVITY URINE: 1.02
SQUAMOUS EPITHELIAL CELLS: 1 /HPF
UROBILINOGEN URINE: NORMAL
UROBILINOGEN URINE: NORMAL
WHITE BLOOD CELLS URINE: 1 /HPF

## 2022-05-06 NOTE — PHYSICAL EXAM
[General Appearance - Alert] : alert [Jugular Venous Distention Increased] : there was no jugular-venous distention [Respiration, Rhythm And Depth] : normal respiratory rhythm and effort [Exaggerated Use Of Accessory Muscles For Inspiration] : no accessory muscle use [Heart Rate And Rhythm] : heart rate was normal and rhythm regular [Heart Sounds] : normal S1 and S2 [Edema] : there was no peripheral edema [] : no rash [Oriented To Time, Place, And Person] : oriented to person, place, and time [FreeTextEntry1] : gravid

## 2022-05-06 NOTE — HISTORY OF PRESENT ILLNESS
[FreeTextEntry1] : 30yo  21 weeks gestation with IDDMIII and HTN dx 24yo referred for BP management and supervision during high risk pregnancy:\par \par OB Dr. Mary Matt\par \par Feeling well. No nausea, no swelling, pregnancy going well. \par s/p first pfizer dose, does not want second dose during pregnancy, discussed benefits but she knows 5 people who had miscarriages post vaccination and will wait until after delivery.\par Acute flank pain with ureteral stone  requiring lithotripsy. No further flank pain or passage of kidney stones. Stopped drinking soda since then, drinks lots of water\par BP at home 116-120/80s consistently.\par No edema. \par Previously on 10mg Lisinopril daily, stopped when she got pregnant. \par DMII well controlled on Metformin prior to pregnancy, now insulin dependent. Currently well controlled. \par \par Well controlled asthma, rare albuterol. \par \par Forgot to start ASA\par \par OB Hx:\par First baby ended in early miscarriage (she's unsure of gestational week) at 20 years old. \par This pregnancy unplanned\par \par Fam Hx: mother with HTN, DMII, fatty liver, passed away 2021 from covid19. Father late onset asthma, DMII.

## 2022-05-06 NOTE — ASSESSMENT
[FreeTextEntry1] : 30yo  21 weeks gestation with IDDMIII and HTN dx 26yo referred for BP management and supervision during high risk pregnancy:\par \par Reviewed April labs/documentation from hospitalization for uncontrolled BG. Normal renal fx, plts. two 24hr urine collections <200mg proteinuria however noted ACR 0.4mg from a few months ago. \par Normal kidneys and adrenals on CT .\par -Discussed with patient today her increased risk of preeclampsia, ~30% in those with DM and HTN. \par -We reviewed the signs and symptoms of preeclampsia to be aware of given her increased risk, including rising blood pressure above baseline, headache, visual changes, scotoma, edema of feet hands or face, RUQ pain, generalized weakness. \par -ASA 162mg daily started started ideally at <16 weeks gestation (or less ideally up to 28 weeks gestation) in patients with 1 major risk factor or =/> 2 moderate risk factors has been shown to decrease the risk of developing preeclampsia as well as the morbidity of preeclampsia. (USPSTF, 2014). Advised to start this today as she'd forgotten previously.\par - she needs supervision of high risk pregnancy, alternating appointments between OB and Nephrology. We stressed the importance of checking BP daily. HTN is well controlled. She is to call if her BPs start varying from her baseline and/or approach 150/100 so we can screen for PEC and add nifedipine (has controlled asthma, would avoid labetalol if possible). \par \par Plan\par - recheck urine PCR today\par - start ppx ASA\par - f/u in 8 weeks\par \par Addendum : u/a wnl\par

## 2022-05-09 ENCOUNTER — APPOINTMENT (OUTPATIENT)
Dept: INTERNAL MEDICINE | Facility: CLINIC | Age: 32
End: 2022-05-09

## 2022-05-11 ENCOUNTER — NON-APPOINTMENT (OUTPATIENT)
Age: 32
End: 2022-05-11

## 2022-05-12 ENCOUNTER — APPOINTMENT (OUTPATIENT)
Dept: OBGYN | Facility: CLINIC | Age: 32
End: 2022-05-12
Payer: MEDICAID

## 2022-05-12 VITALS
DIASTOLIC BLOOD PRESSURE: 70 MMHG | BODY MASS INDEX: 31.62 KG/M2 | SYSTOLIC BLOOD PRESSURE: 110 MMHG | OXYGEN SATURATION: 97 % | WEIGHT: 190 LBS

## 2022-05-12 PROCEDURE — 99213 OFFICE O/P EST LOW 20 MIN: CPT | Mod: TH

## 2022-05-16 ENCOUNTER — APPOINTMENT (OUTPATIENT)
Dept: ENDOCRINOLOGY | Facility: CLINIC | Age: 32
End: 2022-05-16
Payer: MEDICAID

## 2022-05-16 VITALS
SYSTOLIC BLOOD PRESSURE: 115 MMHG | HEART RATE: 87 BPM | DIASTOLIC BLOOD PRESSURE: 72 MMHG | BODY MASS INDEX: 31.62 KG/M2 | WEIGHT: 190 LBS

## 2022-05-16 DIAGNOSIS — Z3A.23 23 WEEKS GESTATION OF PREGNANCY: ICD-10-CM

## 2022-05-16 DIAGNOSIS — E11.9 TYPE 2 DIABETES MELLITUS W/OUT COMPLICATIONS: ICD-10-CM

## 2022-05-16 DIAGNOSIS — E04.1 NONTOXIC SINGLE THYROID NODULE: ICD-10-CM

## 2022-05-16 PROCEDURE — 99215 OFFICE O/P EST HI 40 MIN: CPT | Mod: 25

## 2022-05-16 PROCEDURE — 82962 GLUCOSE BLOOD TEST: CPT

## 2022-05-16 PROCEDURE — 99214 OFFICE O/P EST MOD 30 MIN: CPT | Mod: 25

## 2022-05-17 PROBLEM — E11.9 DIABETES MELLITUS: Status: ACTIVE | Noted: 2021-02-16

## 2022-05-17 PROBLEM — Z3A.23 23 WEEKS GESTATION OF PREGNANCY: Status: ACTIVE | Noted: 2022-05-17

## 2022-05-17 PROBLEM — E04.1 THYROID NODULE: Status: ACTIVE | Noted: 2021-05-07

## 2022-05-17 NOTE — THERAPY
[Today's Date] : [unfilled] [Lantus] : Lantus [Admelog] : Admelog [FreeTextEntry9] : 50 u  [de-identified] : 15 u ac +ss \par BS <100: +0 u\par -150: + 4 u\par -200: + 6 u\par -250: + 8 u\par -300: + 10 u\par BS above 300: + 12 u\par

## 2022-05-17 NOTE — ADDENDUM
[FreeTextEntry1] : I Yolettesorin Barragan act soley as a scribe for Dr. Parker Amaya on this date. 05/16/2022

## 2022-05-17 NOTE — CONSULT LETTER
[Dear  ___] : Dear  [unfilled], [Consult Letter:] : I had the pleasure of evaluating your patient, [unfilled]. [Sincerely,] : Sincerely, [FreeTextEntry3] : Parker Amaya\par

## 2022-05-17 NOTE — ASSESSMENT
[FreeTextEntry1] : 31 year y/o F pt with:\par \par 1. T2DM diagnosed 6 yrs ago- 23 weeks pregnant:\par Pt is on Lantus 60 u and Admelog 15-20 u depending on her meal (she estimates carbs to adjust her insulin dose). \par She has proteinuria, for which she saw Dr. Robles. \par Her A1c improved from 7.8% to 6.2% on 04/2022. \par Recommend new MDI: Lantus 50 u and Admelog 15 u ac + ss (refer to 'DM Therapy Regimen' for details). \par Refer to and and RD and ophthalmologist for dilate pupil exam. \par Once again, we discussed fetal maternal complications for pt with uncontrolled DM. \par \par 2. History of Thyroid nodules discovered 2021:\par Asymptomatic\par Pt was explained that nodules tend to get larger during pregnancy. \par FNA biopsy on 04/01/22 reveals R lower pole, 2.1 cm isthmus, [place] II. \par Will reevaluate this after pregnancy. \par  \par Return in: 6 weeks. \par \par \par \par

## 2022-05-17 NOTE — REVIEW OF SYSTEMS
[Negative] : Heme/Lymph [Recent Weight Gain (___ Lbs)] : recent weight gain: [unfilled] lbs [As Noted in HPI] : as noted in HPI [FreeTextEntry2] : She gained 5 lbs in 1 month.  [de-identified] : Poor memory [de-identified] : Hypoglycemias.

## 2022-05-17 NOTE — HISTORY OF PRESENT ILLNESS
[Hypoglycemia] : Patient is hypoglycemic. [FreeTextEntry1] : 32 y/o F pt, with Hx of T2DM diagnosed 6 yrs ago (), Thyroid nodules (Thyroid US on 21) referred by Dr. Chantale Bagley on [address], presents today to establish endocrine care. \par * Patient with no information of DM related complications. When pt came to see Dr. Amaya, pt was on Basal 20 u and Prandial 6 u ac (started end of February).\par Home glucose test : Checks BS 3-4 times a day.  \par Last Funduscopic visit: March-May 2021- Pt was not informed of DM retinopathy. \par Other PMHx: Depression, Anxiety, Asthma, HTN, Bipolar disorder. No PMHx of: \par FHx: Grandfather  of stomach CA, Grandmother  of breast CA. Mother:Father: No siblings: No family Hx of: Thyroid CA. No exposure to radiation as a kid. \par Social Hx: Non smoker. No EtOH use. Smoked marijuana 14 wks ago. \par NKDA\par 1 pregnancy, currently 14-week pregnant (unplanned)\par \par 2022\par Review of pt's chart:\par - Pt has history of L side cholesteatoma, difficulty hearing\par - Dr. Davila note from 2022: A1c 7.8% (note A1c 8.8% 2021). \par - US May 2021\par \par Patient presents today, referred by Dr. Chantale Bagley, 1 month ago, to see endocrinologist for endocrine visit. \par Patient is 14-week pregnant, with POCT 117, /75 and BMI 29.79, with the chief complaint of elevated BS since her conception, 14 weeks ago. She checks her BS 3-4 times a day. As per pt, her BS were below 120 before her pregnancy. Now, it is at ~200, with  and PPBS ranging 117-120. Pt is unsure of the exact names of her medications, but started taking basal insulin 20 u qhs and prandial insulin 6 u ac 2 wks ago. Prior to her insulin, she was on Metformin 1000 mg BID.\par Otherwise, pt has no physical complaints. \par \par Pt also has thyroid nodules which were discovered last year. A biopsy was recommended then, but she hasn't completed it for unknown reasons. She doesn't remember why it wasn't completed because she "suffers from bad memory".  \par Pt also has a history of HTN, for which she was taking Lisinopril last year. She stopped taking it when she got pregnant.\par Finally, pt also suffers from asthma. She was never on steroids for her asthma because she never experienced asthma attacks. \par \par 2022\par Pt is 23 weeks pregnant, with POCT 68, /72 and BMI 31.62. She gained 5 lbs in 1 month. \par Today, pt is feeling well, with c/o hypoglycemias once or twice a week and memory loss. She brought her glucose meter- Preprandial: 96, 115, 155 (7:16 am), 103, 120, 144, 129, 134. PPBS: 215, 152, 151, 120, 131, 71, 94, 163. Pt takes Lantus 60 u and Admelog 25-30 u ac depending on her meals for DM (she estimates carbohydrates). \par \par Current Medications: Prenatal vitamin, Albuterol prn, Lantus 50 u qhs (decreased from 60 u this visit 22), Admelog 15 u ac +ss (modified this visit 2022)\par - Held: Metformin 1000 mg BID, Lisinopril (held start of pregnancy)\par - No high dose of steroids for asthma (no attacks at all)

## 2022-05-17 NOTE — END OF VISIT
[FreeTextEntry3] : All medical record entries made by the Scribe were at my, Dr. Parker Amaya, direction and personally dictated by me on 05/16/2022. I have reviewed the chart and agree that the record accurately reflects my personal performance of the history, physical exam, assessment and plan. I have also personally directed, reviewed and agreed with the chart.  [Time Spent: ___ minutes] : I have spent [unfilled] minutes of time on the encounter.

## 2022-05-17 NOTE — PHYSICAL EXAM
[Alert] : alert [Normal Sclera/Conjunctiva] : normal sclera/conjunctiva [Normal Outer Ear/Nose] : the ears and nose were normal in appearance [No Neck Mass] : no neck mass was observed [No Respiratory Distress] : no respiratory distress [Normal Rate] : heart rate was normal [Regular Rhythm] : with a regular rhythm [No Edema] : no peripheral edema [Spine Straight] : spine straight [No Stigmata of Cushings Syndrome] : no stigmata of Cushings Syndrome [Normal Gait] : normal gait [No Rash] : no rash [Normal Reflexes] : deep tendon reflexes were 2+ and symmetric [Oriented x3] : oriented to person, place, and time [No Tremors] : no tremors [Acanthosis Nigricans] : no acanthosis nigricans [de-identified] : 23 weeks pregnant.

## 2022-05-17 NOTE — DATA REVIEWED
[FreeTextEntry1] : Laboratory: \par - 04/21/22: POCT A1c 6.2% (H)\par - 03/16/22: POCT A1c 6.8% (H), s.creat 0.60, Glucose 122 (H), ALT 9 (L), LDL-c 79, TSH 0.90\par - 02/08/22: POCT A1c 7.8% (H), s.creat 0.57, Glucose 139 (H), ALT 8 (L), TSH 0.85, Ca 10.8, Albumin 4.9\par - 04/26/21: A1c 8.1% (H), s.creat 0.65, Glucose 228 (H), urine no protein, TSH 1.30\par - 02/25/21: A1c 8.8% (H), s.creat  TSH 0.99, LDL-c 88,  (H), urine 30 mg/dL protein. \par \par Imaging: \par - 04/01/22 FNA: Thyroid, R lower pole/isthmus, 2.1 cm, [place]. Thyroid R lower pole, 3.4 cm, [place]. \par - 05/06/21 Thyroid US: R lobe (measuring 5.6 x 2.1 x 2.7 cm, homogeneous with normal vascularity) contains 2 nodules. 1) R lower pole solid nodule measuring 2.6 x 1.9 x 2.4 cm. 2) R lower pole, solid nodule measuring 1.8 x 1.0 x 1.9 cm. No nodules in L lobe. No nodules in the isthmus. Impression: Two R thyroid nodules meet the criteria for FNA biopsy.

## 2022-05-18 ENCOUNTER — APPOINTMENT (OUTPATIENT)
Dept: OBGYN | Facility: CLINIC | Age: 32
End: 2022-05-18
Payer: MEDICAID

## 2022-05-18 VITALS
HEIGHT: 65 IN | WEIGHT: 190 LBS | DIASTOLIC BLOOD PRESSURE: 60 MMHG | OXYGEN SATURATION: 97 % | BODY MASS INDEX: 31.65 KG/M2 | SYSTOLIC BLOOD PRESSURE: 100 MMHG

## 2022-05-18 PROCEDURE — 99214 OFFICE O/P EST MOD 30 MIN: CPT | Mod: TH

## 2022-05-18 RX ORDER — BUPROPION HYDROCHLORIDE 150 MG/1
150 TABLET, EXTENDED RELEASE ORAL DAILY
Qty: 30 | Refills: 1 | Status: ACTIVE | COMMUNITY
Start: 2022-05-18 | End: 1900-01-01

## 2022-05-18 NOTE — DISCUSSION/SUMMARY
[FreeTextEntry1] : 32yo  at 23+3 with DM2 presents for routine prenatal visit. \par -Fingersticks well controlled on current regimen of lantus 50U with dinner and lispro 15U with meals\par -Discussed with patient that she is at high risk of postpartum depression and we should start optimizing her now as best as possible. -Pt agreed to starting Wellbutrin and to speaking with a therapist - she will contact Shirin Garibay (has contact info). Pt knows to reach out for help for any suicidal ideations \par -Pt to return in 4 weeks for a routine prenatal visit\par \par Patient seen and examined with Dr. Thakkar

## 2022-05-18 NOTE — HISTORY OF PRESENT ILLNESS
[FreeTextEntry1] : 32yo  at 23+3 with DM2 presents for routine prenatal visit. Pt has been following closely with Dr. Amaya with endocrinology, most recent regimen changed two days ago to Lantus 50U with dinner and lispro 15U with meals. Pt states that her fastings are  and postprandials are 120-130. +FM. Anatomy scan was  and WNL. Pt reports that she has been struggling with depression for over 10 years. 1 year ago was seeing a therapist and was on lexapro without improvement. She reports that more recently she has felt more sad, denies SI/HI, but expresses interest in starting again with a therapist and possibly starting medication.\par \par TAUS: cephalic presentation, grossly normal fluid, +

## 2022-05-19 LAB
GLUCOSE BLDC GLUCOMTR-MCNC: 64
GLUCOSE BLDC GLUCOMTR-MCNC: 81

## 2022-05-20 ENCOUNTER — APPOINTMENT (OUTPATIENT)
Dept: PEDIATRIC CARDIOLOGY | Facility: CLINIC | Age: 32
End: 2022-05-20

## 2022-05-27 ENCOUNTER — APPOINTMENT (OUTPATIENT)
Dept: PEDIATRIC CARDIOLOGY | Facility: CLINIC | Age: 32
End: 2022-05-27

## 2022-05-27 ENCOUNTER — APPOINTMENT (OUTPATIENT)
Dept: ENDOCRINOLOGY | Facility: CLINIC | Age: 32
End: 2022-05-27

## 2022-06-01 ENCOUNTER — ASOB RESULT (OUTPATIENT)
Age: 32
End: 2022-06-01

## 2022-06-01 ENCOUNTER — APPOINTMENT (OUTPATIENT)
Dept: ANTEPARTUM | Facility: CLINIC | Age: 32
End: 2022-06-01
Payer: MEDICAID

## 2022-06-01 PROCEDURE — 76819 FETAL BIOPHYS PROFIL W/O NST: CPT

## 2022-06-08 ENCOUNTER — NON-APPOINTMENT (OUTPATIENT)
Age: 32
End: 2022-06-08

## 2022-06-09 ENCOUNTER — APPOINTMENT (OUTPATIENT)
Dept: OBGYN | Facility: CLINIC | Age: 32
End: 2022-06-09
Payer: MEDICAID

## 2022-06-09 VITALS
OXYGEN SATURATION: 96 % | SYSTOLIC BLOOD PRESSURE: 110 MMHG | DIASTOLIC BLOOD PRESSURE: 70 MMHG | WEIGHT: 190 LBS | BODY MASS INDEX: 31.62 KG/M2

## 2022-06-09 PROCEDURE — 81002 URINALYSIS NONAUTO W/O SCOPE: CPT

## 2022-06-09 PROCEDURE — 99213 OFFICE O/P EST LOW 20 MIN: CPT | Mod: TH

## 2022-06-10 LAB
APPEARANCE: CLEAR
BASOPHILS # BLD AUTO: 0.07 K/UL
BASOPHILS NFR BLD AUTO: 0.5 %
BILIRUBIN URINE: NEGATIVE
BLOOD URINE: NEGATIVE
COLOR: YELLOW
EOSINOPHIL # BLD AUTO: 0.14 K/UL
EOSINOPHIL NFR BLD AUTO: 1 %
GLUCOSE QUALITATIVE U: ABNORMAL
HCT VFR BLD CALC: 38.7 %
HGB BLD-MCNC: 12.5 G/DL
IMM GRANULOCYTES NFR BLD AUTO: 1.1 %
KETONES URINE: ABNORMAL
LEUKOCYTE ESTERASE URINE: NEGATIVE
LYMPHOCYTES # BLD AUTO: 1.33 K/UL
LYMPHOCYTES NFR BLD AUTO: 9.2 %
MAN DIFF?: NORMAL
MCHC RBC-ENTMCNC: 31.3 PG
MCHC RBC-ENTMCNC: 32.3 GM/DL
MCV RBC AUTO: 96.8 FL
MONOCYTES # BLD AUTO: 0.51 K/UL
MONOCYTES NFR BLD AUTO: 3.5 %
NEUTROPHILS # BLD AUTO: 12.29 K/UL
NEUTROPHILS NFR BLD AUTO: 84.7 %
NITRITE URINE: NEGATIVE
PH URINE: 6
PLATELET # BLD AUTO: 319 K/UL
PROTEIN URINE: NORMAL
RBC # BLD: 4 M/UL
RBC # FLD: 13.8 %
SPECIFIC GRAVITY URINE: 1.02
T PALLIDUM AB SER QL IA: NEGATIVE
UROBILINOGEN URINE: NORMAL
WBC # FLD AUTO: 14.5 K/UL

## 2022-06-13 LAB — BACTERIA UR CULT: NORMAL

## 2022-06-17 ENCOUNTER — NON-APPOINTMENT (OUTPATIENT)
Age: 32
End: 2022-06-17

## 2022-06-20 ENCOUNTER — APPOINTMENT (OUTPATIENT)
Dept: OBGYN | Facility: CLINIC | Age: 32
End: 2022-06-20

## 2022-06-22 ENCOUNTER — APPOINTMENT (OUTPATIENT)
Dept: OBGYN | Facility: CLINIC | Age: 32
End: 2022-06-22
Payer: MEDICAID

## 2022-06-22 VITALS
OXYGEN SATURATION: 96 % | DIASTOLIC BLOOD PRESSURE: 70 MMHG | BODY MASS INDEX: 32.62 KG/M2 | SYSTOLIC BLOOD PRESSURE: 110 MMHG | WEIGHT: 196 LBS

## 2022-06-22 PROCEDURE — 99213 OFFICE O/P EST LOW 20 MIN: CPT | Mod: TH

## 2022-06-22 NOTE — HISTORY OF PRESENT ILLNESS
[FreeTextEntry1] : 32yo  at 28w3d (VENKATESH , LMP 21) presents for MFM follow up visit. Pregnancy c/b T2DM. Admitted to [facility] 3/31- for glucose control optimization (24hr urine protein collection performed during that visit resulted as  100 on ). She is currently on 60U lantus, admelog 15U with meals. Last A1c 6.2 on .  She is seeing Dr. Amaya of Endocrine, next visit in . Fasting fingersticks have been around 100-102, and 1 hour post-prandials around 120-130's.\par Pregnancy also c/b cHTN. BP's at home about 110's-120's/70's-80's. P:Cr 0.2 on , 24 hour urine protein of 100 on . Patient is seeing Dr. Robles for this issue, next visit is . Currently on ASA 81 qd.\par \par PMH: \par 1.  T2DM: as above\par 2. Thyroid nodule (known to her for a year. Per medicine note, US performed, visualized 2 thyroid nodules, most recent TSH wnl. Patient endocrine following, FNA on  was benign.\par 3. Bipolar disorder, anxiety, depression, PTSD:  no meds currently, previously on Lexapro, seroquel, hydroxycine.\par 4. Chronic HTN: diagnosed in , not currently on meds but was on lisinopril. BP's wnl as per hpi.\par 5. Asthma: albuterol use once a month. Hospitalized as a child, no intubation\par \par PSH:\par 1. History of right nephrolithiasis (s/p lithotripsy )\par 2. Ear surgery x2\par \par Social: hx of marijuana smoking (stopped when she got pregnant)\par \par abd US:  bpm, cephalic

## 2022-06-22 NOTE — DISCUSSION/SUMMARY
[FreeTextEntry1] : 30yo  at 28w3d with T2DM and cHTN presents for follow up [facility] prenatal visit.\par \par - T2DM improved after H admission to antepartum from 3/31-. 1 hr postprandial FS within goal, however, fasting has been elevated (100-102). Patient instructed to increase evening lantus from 60 to 65U. Will continue admelog 15U with meals. Next visit with Dr. Amaya scheduled for .\par - cHTN: BP's wnl, no toxic complaints. recent 24hr urine protein collection wnl. Patient will continue monitoring BP's. Patient will f/u with Dr. Robles.\par - TDAP given today. LOT [id number]. Expiration 3/18/2024.\par - Patient will RTC in 4 weeks. Next  sono scheduled for next week.\par - d/w Dr. Thakkar

## 2022-06-24 ENCOUNTER — APPOINTMENT (OUTPATIENT)
Dept: ENDOCRINOLOGY | Facility: CLINIC | Age: 32
End: 2022-06-24

## 2022-06-27 ENCOUNTER — NON-APPOINTMENT (OUTPATIENT)
Age: 32
End: 2022-06-27

## 2022-06-27 RX ORDER — BLOOD SUGAR DIAGNOSTIC
STRIP MISCELLANEOUS
Qty: 50 | Refills: 10 | Status: ACTIVE | COMMUNITY
Start: 2022-06-27 | End: 1900-01-01

## 2022-06-27 RX ORDER — LANCETS 30 GAUGE
EACH MISCELLANEOUS
Qty: 2 | Refills: 2 | Status: ACTIVE | COMMUNITY
Start: 2022-05-12 | End: 1900-01-01

## 2022-06-28 ENCOUNTER — RX RENEWAL (OUTPATIENT)
Age: 32
End: 2022-06-28

## 2022-06-28 RX ORDER — ALBUTEROL SULFATE 90 UG/1
108 (90 BASE) INHALANT RESPIRATORY (INHALATION) EVERY 4 HOURS
Qty: 1 | Refills: 1 | Status: ACTIVE | COMMUNITY
Start: 2022-02-08 | End: 1900-01-01

## 2022-06-29 ENCOUNTER — APPOINTMENT (OUTPATIENT)
Dept: NEPHROLOGY | Facility: CLINIC | Age: 32
End: 2022-06-29

## 2022-06-30 ENCOUNTER — NON-APPOINTMENT (OUTPATIENT)
Age: 32
End: 2022-06-30

## 2022-06-30 ENCOUNTER — APPOINTMENT (OUTPATIENT)
Dept: OBGYN | Facility: CLINIC | Age: 32
End: 2022-06-30

## 2022-06-30 ENCOUNTER — APPOINTMENT (OUTPATIENT)
Dept: ANTEPARTUM | Facility: CLINIC | Age: 32
End: 2022-06-30

## 2022-06-30 ENCOUNTER — ASOB RESULT (OUTPATIENT)
Age: 32
End: 2022-06-30

## 2022-06-30 PROCEDURE — 76816 OB US FOLLOW-UP PER FETUS: CPT

## 2022-06-30 PROCEDURE — 76818 FETAL BIOPHYS PROFILE W/NST: CPT

## 2022-07-01 ENCOUNTER — APPOINTMENT (OUTPATIENT)
Dept: PEDIATRIC CARDIOLOGY | Facility: CLINIC | Age: 32
End: 2022-07-01

## 2022-07-06 ENCOUNTER — APPOINTMENT (OUTPATIENT)
Dept: ENDOCRINOLOGY | Facility: CLINIC | Age: 32
End: 2022-07-06

## 2022-07-13 ENCOUNTER — APPOINTMENT (OUTPATIENT)
Dept: ANTEPARTUM | Facility: CLINIC | Age: 32
End: 2022-07-13

## 2022-07-18 ENCOUNTER — NON-APPOINTMENT (OUTPATIENT)
Age: 32
End: 2022-07-18

## 2022-07-18 ENCOUNTER — APPOINTMENT (OUTPATIENT)
Dept: OBGYN | Facility: CLINIC | Age: 32
End: 2022-07-18

## 2022-07-18 VITALS
OXYGEN SATURATION: 97 % | WEIGHT: 196 LBS | DIASTOLIC BLOOD PRESSURE: 70 MMHG | BODY MASS INDEX: 32.62 KG/M2 | SYSTOLIC BLOOD PRESSURE: 130 MMHG

## 2022-07-18 DIAGNOSIS — Z23 ENCOUNTER FOR IMMUNIZATION: ICD-10-CM

## 2022-07-18 PROCEDURE — 99213 OFFICE O/P EST LOW 20 MIN: CPT | Mod: TH,GC

## 2022-07-19 ENCOUNTER — APPOINTMENT (OUTPATIENT)
Dept: PEDIATRIC CARDIOLOGY | Facility: CLINIC | Age: 32
End: 2022-07-19

## 2022-07-22 ENCOUNTER — INPATIENT (INPATIENT)
Facility: HOSPITAL | Age: 32
LOS: 4 days | Discharge: HOME | DRG: 832 | End: 2022-07-27
Attending: OBSTETRICS & GYNECOLOGY | Admitting: OBSTETRICS & GYNECOLOGY
Payer: COMMERCIAL

## 2022-07-22 VITALS
RESPIRATION RATE: 18 BRPM | WEIGHT: 195.99 LBS | HEART RATE: 96 BPM | TEMPERATURE: 98 F | OXYGEN SATURATION: 95 % | HEIGHT: 63 IN | DIASTOLIC BLOOD PRESSURE: 79 MMHG | SYSTOLIC BLOOD PRESSURE: 139 MMHG

## 2022-07-22 DIAGNOSIS — H71.92 UNSPECIFIED CHOLESTEATOMA, LEFT EAR: Chronic | ICD-10-CM

## 2022-07-22 LAB
ALBUMIN SERPL ELPH-MCNC: 3.3 G/DL — SIGNIFICANT CHANGE UP (ref 3.3–5)
ALBUMIN SERPL ELPH-MCNC: 3.6 G/DL — SIGNIFICANT CHANGE UP (ref 3.3–5)
ALP SERPL-CCNC: 107 U/L — SIGNIFICANT CHANGE UP (ref 40–120)
ALP SERPL-CCNC: 98 U/L — SIGNIFICANT CHANGE UP (ref 40–120)
ALT FLD-CCNC: 6 U/L — LOW (ref 10–45)
ALT FLD-CCNC: SIGNIFICANT CHANGE UP (ref 10–45)
ANION GAP SERPL CALC-SCNC: 13 MMOL/L — SIGNIFICANT CHANGE UP (ref 5–17)
ANION GAP SERPL CALC-SCNC: 13 MMOL/L — SIGNIFICANT CHANGE UP (ref 5–17)
APPEARANCE UR: CLEAR — SIGNIFICANT CHANGE UP
AST SERPL-CCNC: 11 U/L — SIGNIFICANT CHANGE UP (ref 10–40)
AST SERPL-CCNC: SIGNIFICANT CHANGE UP (ref 10–40)
BASOPHILS # BLD AUTO: 0.04 K/UL — SIGNIFICANT CHANGE UP (ref 0–0.2)
BASOPHILS NFR BLD AUTO: 0.3 % — SIGNIFICANT CHANGE UP (ref 0–2)
BILIRUB SERPL-MCNC: 0.2 MG/DL — SIGNIFICANT CHANGE UP (ref 0.2–1.2)
BILIRUB SERPL-MCNC: 0.2 MG/DL — SIGNIFICANT CHANGE UP (ref 0.2–1.2)
BILIRUB UR-MCNC: NEGATIVE — SIGNIFICANT CHANGE UP
BUN SERPL-MCNC: 10 MG/DL — SIGNIFICANT CHANGE UP (ref 7–23)
BUN SERPL-MCNC: 9 MG/DL — SIGNIFICANT CHANGE UP (ref 7–23)
CALCIUM SERPL-MCNC: 9.6 MG/DL — SIGNIFICANT CHANGE UP (ref 8.4–10.5)
CALCIUM SERPL-MCNC: 9.7 MG/DL — SIGNIFICANT CHANGE UP (ref 8.4–10.5)
CHLORIDE SERPL-SCNC: 103 MMOL/L — SIGNIFICANT CHANGE UP (ref 96–108)
CHLORIDE SERPL-SCNC: 105 MMOL/L — SIGNIFICANT CHANGE UP (ref 96–108)
CO2 SERPL-SCNC: 17 MMOL/L — LOW (ref 22–31)
CO2 SERPL-SCNC: 17 MMOL/L — LOW (ref 22–31)
COLOR SPEC: YELLOW — SIGNIFICANT CHANGE UP
CREAT SERPL-MCNC: 0.52 MG/DL — SIGNIFICANT CHANGE UP (ref 0.5–1.3)
CREAT SERPL-MCNC: 0.6 MG/DL — SIGNIFICANT CHANGE UP (ref 0.5–1.3)
DIFF PNL FLD: NEGATIVE — SIGNIFICANT CHANGE UP
EGFR: 121 ML/MIN/1.73M2 — SIGNIFICANT CHANGE UP
EGFR: 126 ML/MIN/1.73M2 — SIGNIFICANT CHANGE UP
EOSINOPHIL # BLD AUTO: 0.32 K/UL — SIGNIFICANT CHANGE UP (ref 0–0.5)
EOSINOPHIL NFR BLD AUTO: 2.3 % — SIGNIFICANT CHANGE UP (ref 0–6)
GLUCOSE BLDC GLUCOMTR-MCNC: 126 MG/DL — HIGH (ref 70–99)
GLUCOSE BLDC GLUCOMTR-MCNC: 202 MG/DL — HIGH (ref 70–99)
GLUCOSE SERPL-MCNC: 142 MG/DL — HIGH (ref 70–99)
GLUCOSE SERPL-MCNC: 91 MG/DL — SIGNIFICANT CHANGE UP (ref 70–99)
GLUCOSE UR QL: 500
HCT VFR BLD CALC: 37.2 % — SIGNIFICANT CHANGE UP (ref 34.5–45)
HGB BLD-MCNC: 12.6 G/DL — SIGNIFICANT CHANGE UP (ref 11.5–15.5)
IMM GRANULOCYTES NFR BLD AUTO: 1 % — SIGNIFICANT CHANGE UP (ref 0–1.5)
KETONES UR-MCNC: ABNORMAL MG/DL
LEUKOCYTE ESTERASE UR-ACNC: NEGATIVE — SIGNIFICANT CHANGE UP
LYMPHOCYTES # BLD AUTO: 1.53 K/UL — SIGNIFICANT CHANGE UP (ref 1–3.3)
LYMPHOCYTES # BLD AUTO: 10.9 % — LOW (ref 13–44)
MCHC RBC-ENTMCNC: 30.8 PG — SIGNIFICANT CHANGE UP (ref 27–34)
MCHC RBC-ENTMCNC: 33.9 GM/DL — SIGNIFICANT CHANGE UP (ref 32–36)
MCV RBC AUTO: 91 FL — SIGNIFICANT CHANGE UP (ref 80–100)
MONOCYTES # BLD AUTO: 0.87 K/UL — SIGNIFICANT CHANGE UP (ref 0–0.9)
MONOCYTES NFR BLD AUTO: 6.2 % — SIGNIFICANT CHANGE UP (ref 2–14)
NEUTROPHILS # BLD AUTO: 11.11 K/UL — HIGH (ref 1.8–7.4)
NEUTROPHILS NFR BLD AUTO: 79.3 % — HIGH (ref 43–77)
NITRITE UR-MCNC: NEGATIVE — SIGNIFICANT CHANGE UP
NRBC # BLD: 0 /100 WBCS — SIGNIFICANT CHANGE UP (ref 0–0)
PH UR: 6 — SIGNIFICANT CHANGE UP (ref 5–8)
PLATELET # BLD AUTO: 307 K/UL — SIGNIFICANT CHANGE UP (ref 150–400)
POTASSIUM SERPL-MCNC: 4.3 MMOL/L — SIGNIFICANT CHANGE UP (ref 3.5–5.3)
POTASSIUM SERPL-MCNC: SIGNIFICANT CHANGE UP (ref 3.5–5.3)
POTASSIUM SERPL-SCNC: 4.3 MMOL/L — SIGNIFICANT CHANGE UP (ref 3.5–5.3)
POTASSIUM SERPL-SCNC: SIGNIFICANT CHANGE UP (ref 3.5–5.3)
PROT SERPL-MCNC: 6.8 G/DL — SIGNIFICANT CHANGE UP (ref 6–8.3)
PROT SERPL-MCNC: 7.5 G/DL — SIGNIFICANT CHANGE UP (ref 6–8.3)
PROT UR-MCNC: NEGATIVE MG/DL — SIGNIFICANT CHANGE UP
RBC # BLD: 4.09 M/UL — SIGNIFICANT CHANGE UP (ref 3.8–5.2)
RBC # FLD: 13.5 % — SIGNIFICANT CHANGE UP (ref 10.3–14.5)
SODIUM SERPL-SCNC: 133 MMOL/L — LOW (ref 135–145)
SODIUM SERPL-SCNC: 135 MMOL/L — SIGNIFICANT CHANGE UP (ref 135–145)
SP GR SPEC: 1.02 — SIGNIFICANT CHANGE UP (ref 1–1.03)
UROBILINOGEN FLD QL: 0.2 E.U./DL — SIGNIFICANT CHANGE UP
WBC # BLD: 14.01 K/UL — HIGH (ref 3.8–10.5)
WBC # FLD AUTO: 14.01 K/UL — HIGH (ref 3.8–10.5)

## 2022-07-22 PROCEDURE — 99285 EMERGENCY DEPT VISIT HI MDM: CPT

## 2022-07-22 RX ORDER — INSULIN LISPRO 100/ML
VIAL (ML) SUBCUTANEOUS ONCE
Refills: 0 | Status: DISCONTINUED | OUTPATIENT
Start: 2022-07-22 | End: 2022-07-23

## 2022-07-22 RX ORDER — SODIUM CHLORIDE 9 MG/ML
1000 INJECTION, SOLUTION INTRAVENOUS
Refills: 0 | Status: DISCONTINUED | OUTPATIENT
Start: 2022-07-22 | End: 2022-07-26

## 2022-07-22 RX ORDER — GLUCAGON INJECTION, SOLUTION 0.5 MG/.1ML
1 INJECTION, SOLUTION SUBCUTANEOUS ONCE
Refills: 0 | Status: DISCONTINUED | OUTPATIENT
Start: 2022-07-22 | End: 2022-07-26

## 2022-07-22 RX ORDER — DEXTROSE 50 % IN WATER 50 %
15 SYRINGE (ML) INTRAVENOUS ONCE
Refills: 0 | Status: DISCONTINUED | OUTPATIENT
Start: 2022-07-22 | End: 2022-07-26

## 2022-07-22 RX ORDER — DEXTROSE 50 % IN WATER 50 %
25 SYRINGE (ML) INTRAVENOUS ONCE
Refills: 0 | Status: DISCONTINUED | OUTPATIENT
Start: 2022-07-22 | End: 2022-07-26

## 2022-07-22 RX ORDER — SODIUM CHLORIDE 9 MG/ML
1000 INJECTION, SOLUTION INTRAVENOUS
Refills: 0 | Status: DISCONTINUED | OUTPATIENT
Start: 2022-07-22 | End: 2022-07-23

## 2022-07-22 RX ORDER — DEXTROSE 50 % IN WATER 50 %
12.5 SYRINGE (ML) INTRAVENOUS ONCE
Refills: 0 | Status: DISCONTINUED | OUTPATIENT
Start: 2022-07-22 | End: 2022-07-26

## 2022-07-22 RX ADMIN — SODIUM CHLORIDE 1000 MILLILITER(S): 9 INJECTION, SOLUTION INTRAVENOUS at 20:16

## 2022-07-22 NOTE — ED PROVIDER NOTE - PHYSICAL EXAMINATION
CONSTITUTIONAL: Non-toxic; in no apparent distress  HEAD: Normocephalic; atraumatic  EYES: PERRL; EOM intact   ENMT: External appears normal  NECK: Supple; non-tender  CARD: Normal S1, S2; no murmurs, rubs, or gallops  RESP: Normal chest excursion with respiration; breath sounds clear and equal bilaterally  ABD: Soft, +gravid, + mild ttp of suprapubic abd, no CVA ttp  EXT: Normal ROM in all four extremities; non-tender to palpation  SKIN: Warm, dry, no rash  NEURO:  No focal neurological deficiencies.

## 2022-07-22 NOTE — ED PROVIDER NOTE - CLINICAL SUMMARY MEDICAL DECISION MAKING FREE TEXT BOX
Abd pain in pregnancy  Pt is HDS, pain is mild  Will send UA and admit to L&D for further evaluation

## 2022-07-22 NOTE — ED PROVIDER NOTE - NS ED ROS FT
CONSTITUTIONAL: No fever, no chills, no fatigue  EYES: No eye redness, no visual changes  ENT: No ear pain, no sore throat  CARDIOVASCULAR: No chest pain, no palpitations  RESPIRATORY: No cough, no SOB  GI: +abdominal pain, no nausea, no vomiting, no constipation, no diarrhea  GENITOURINARY: No dysuria, no frequency, no hematuria  MUSCULOSKELETAL: No backpain, no joint pain, no myalgias  SKIN: No rash, no peripheral edema  NEURO: No headache, no confusion    ALL OTHER SYSTEMS NEGATIVE.

## 2022-07-22 NOTE — ED ADULT NURSE NOTE - NSIMPLEMENTINTERV_GEN_ALL_ED
Implemented All Universal Safety Interventions:  [place] to call system. Call bell, personal items and telephone within reach. Instruct patient to call for assistance. Room bathroom lighting operational. Non-slip footwear when patient is off stretcher. Physically safe environment: no spills, clutter or unnecessary equipment. Stretcher in lowest position, wheels locked, appropriate side rails in place.

## 2022-07-22 NOTE — ED ADULT TRIAGE NOTE - OTHER COMPLAINTS
patient  32 weeks pregnant c/o lower abdominal pain-- denies urinary symptoms/fevers/chills/vaginal bleeding

## 2022-07-22 NOTE — ED PROVIDER NOTE - OBJECTIVE STATEMENT
31 yo F  w PMH DM, prior spont ab, currently 32 wks pregnant p/w lower abd pain for 2 days. Pt states pain came on gradually, started on R side of abd, and now is lower abd/suprapubic. No urinary symptoms, no radiation, no flank pain, no fever, no vaginal dc or bleeding. No complications with this pregnancy.

## 2022-07-23 LAB
BLD GP AB SCN SERPL QL: NEGATIVE — SIGNIFICANT CHANGE UP
GLUCOSE BLDC GLUCOMTR-MCNC: 125 MG/DL — HIGH (ref 70–99)
GLUCOSE BLDC GLUCOMTR-MCNC: 127 MG/DL — HIGH (ref 70–99)
GLUCOSE BLDC GLUCOMTR-MCNC: 161 MG/DL — HIGH (ref 70–99)
GLUCOSE BLDC GLUCOMTR-MCNC: 162 MG/DL — HIGH (ref 70–99)
GLUCOSE BLDC GLUCOMTR-MCNC: 162 MG/DL — HIGH (ref 70–99)
GLUCOSE BLDC GLUCOMTR-MCNC: 182 MG/DL — HIGH (ref 70–99)
GLUCOSE BLDC GLUCOMTR-MCNC: 186 MG/DL — HIGH (ref 70–99)
GLUCOSE BLDC GLUCOMTR-MCNC: 65 MG/DL — LOW (ref 70–99)
RH IG SCN BLD-IMP: POSITIVE — SIGNIFICANT CHANGE UP
SARS-COV-2 RNA SPEC QL NAA+PROBE: NEGATIVE — SIGNIFICANT CHANGE UP

## 2022-07-23 RX ORDER — ASPIRIN/CALCIUM CARB/MAGNESIUM 324 MG
81 TABLET ORAL DAILY
Refills: 0 | Status: DISCONTINUED | OUTPATIENT
Start: 2022-07-23 | End: 2022-07-27

## 2022-07-23 RX ORDER — INSULIN LISPRO 100/ML
20 VIAL (ML) SUBCUTANEOUS
Refills: 0 | Status: DISCONTINUED | OUTPATIENT
Start: 2022-07-23 | End: 2022-07-24

## 2022-07-23 RX ORDER — INSULIN GLARGINE 100 [IU]/ML
65 INJECTION, SOLUTION SUBCUTANEOUS ONCE
Refills: 0 | Status: COMPLETED | OUTPATIENT
Start: 2022-07-23 | End: 2022-07-23

## 2022-07-23 RX ORDER — SODIUM CHLORIDE 9 MG/ML
1000 INJECTION, SOLUTION INTRAVENOUS
Refills: 0 | Status: DISCONTINUED | OUTPATIENT
Start: 2022-07-23 | End: 2022-07-23

## 2022-07-23 RX ORDER — INSULIN LISPRO 100/ML
18 VIAL (ML) SUBCUTANEOUS ONCE
Refills: 0 | Status: COMPLETED | OUTPATIENT
Start: 2022-07-23 | End: 2022-07-23

## 2022-07-23 RX ORDER — ALBUTEROL 90 UG/1
2 AEROSOL, METERED ORAL EVERY 6 HOURS
Refills: 0 | Status: DISCONTINUED | OUTPATIENT
Start: 2022-07-23 | End: 2022-07-27

## 2022-07-23 RX ORDER — ACETAMINOPHEN 500 MG
1000 TABLET ORAL ONCE
Refills: 0 | Status: COMPLETED | OUTPATIENT
Start: 2022-07-23 | End: 2022-07-23

## 2022-07-23 RX ORDER — INSULIN LISPRO 100/ML
18 VIAL (ML) SUBCUTANEOUS
Refills: 0 | Status: DISCONTINUED | OUTPATIENT
Start: 2022-07-23 | End: 2022-07-23

## 2022-07-23 RX ORDER — INSULIN GLARGINE 100 [IU]/ML
65 INJECTION, SOLUTION SUBCUTANEOUS AT BEDTIME
Refills: 0 | Status: DISCONTINUED | OUTPATIENT
Start: 2022-07-23 | End: 2022-07-25

## 2022-07-23 RX ADMIN — Medication 81 MILLIGRAM(S): at 14:25

## 2022-07-23 RX ADMIN — Medication 1 TABLET(S): at 14:25

## 2022-07-23 RX ADMIN — INSULIN GLARGINE 65 UNIT(S): 100 INJECTION, SOLUTION SUBCUTANEOUS at 02:31

## 2022-07-23 RX ADMIN — Medication 1000 MILLIGRAM(S): at 02:29

## 2022-07-23 RX ADMIN — Medication 18 UNIT(S): at 02:33

## 2022-07-23 RX ADMIN — Medication 1000 MILLIGRAM(S): at 03:15

## 2022-07-23 RX ADMIN — Medication 18 UNIT(S): at 14:24

## 2022-07-23 RX ADMIN — Medication 18 UNIT(S): at 07:21

## 2022-07-23 RX ADMIN — Medication 20 UNIT(S): at 18:04

## 2022-07-23 RX ADMIN — INSULIN GLARGINE 65 UNIT(S): 100 INJECTION, SOLUTION SUBCUTANEOUS at 23:32

## 2022-07-23 NOTE — PROVIDER CONTACT NOTE (OTHER) - SITUATION
MD notified that patient's BG before breakfast was at 0909 was 65, pt asymptomatic. 15g carbohydrate given - pt requested to have juice instead of glucose gel. RN will recheck BG in 15 minutes.

## 2022-07-23 NOTE — PROVIDER CONTACT NOTE (OTHER) - NAME OF MD/NP/PA/DO NOTIFIED:
12/28/21 2100   Respiratory Assessment   Assessment Type Assess only   General Appearance Alert; Awake   Respiratory Pattern Dyspnea with exertion   Chest Assessment Chest expansion symmetrical   Bilateral Breath Sounds Diminished;Clear   Resp Comments pt natacha well no changes made will cont to monitor on 60% 40 L    O2 Device opti   Non-Invasive Information   O2 Interface Device HFNC prongs  (opti)   Non-Invasive Ventilation Mode HFNC (High flow)   $ Pulse Oximetry Spot Check Charge Completed   Non-Invasive Settings   FiO2 (%) 60   Flow (lpm) 40   Temperature (Set) 31   Non-Invasive Readings   Heater Temperature (Obs) 31   Skin Intervention Skin intact   Maintenance   Water bag changed No Dr. Jerrell Keller

## 2022-07-23 NOTE — PROVIDER CONTACT NOTE (OTHER) - ACTION/TREATMENT ORDERED:
Repeat BG was 125. Dr. Keller informed. Pt ate breakfast with protein (eggs/sausage/toast). MD states also pt does not currently need IVF at this time. 1 hr postprandial BG was 186.

## 2022-07-24 LAB
CULTURE RESULTS: SIGNIFICANT CHANGE UP
GLUCOSE BLDC GLUCOMTR-MCNC: 109 MG/DL — HIGH (ref 70–99)
GLUCOSE BLDC GLUCOMTR-MCNC: 117 MG/DL — HIGH (ref 70–99)
GLUCOSE BLDC GLUCOMTR-MCNC: 131 MG/DL — HIGH (ref 70–99)
GLUCOSE BLDC GLUCOMTR-MCNC: 148 MG/DL — HIGH (ref 70–99)
GLUCOSE BLDC GLUCOMTR-MCNC: 152 MG/DL — HIGH (ref 70–99)
GLUCOSE BLDC GLUCOMTR-MCNC: 216 MG/DL — HIGH (ref 70–99)
GLUCOSE BLDC GLUCOMTR-MCNC: 82 MG/DL — SIGNIFICANT CHANGE UP (ref 70–99)
SPECIMEN SOURCE: SIGNIFICANT CHANGE UP

## 2022-07-24 RX ORDER — INSULIN LISPRO 100/ML
25 VIAL (ML) SUBCUTANEOUS
Refills: 0 | Status: DISCONTINUED | OUTPATIENT
Start: 2022-07-24 | End: 2022-07-26

## 2022-07-24 RX ORDER — ACETAMINOPHEN 500 MG
1000 TABLET ORAL ONCE
Refills: 0 | Status: COMPLETED | OUTPATIENT
Start: 2022-07-24 | End: 2022-07-24

## 2022-07-24 RX ORDER — INSULIN LISPRO 100/ML
VIAL (ML) SUBCUTANEOUS
Refills: 0 | Status: DISCONTINUED | OUTPATIENT
Start: 2022-07-24 | End: 2022-07-26

## 2022-07-24 RX ADMIN — Medication 81 MILLIGRAM(S): at 13:56

## 2022-07-24 RX ADMIN — Medication 1 TABLET(S): at 13:56

## 2022-07-24 RX ADMIN — INSULIN GLARGINE 65 UNIT(S): 100 INJECTION, SOLUTION SUBCUTANEOUS at 22:27

## 2022-07-24 RX ADMIN — Medication 20 UNIT(S): at 09:03

## 2022-07-24 RX ADMIN — Medication 2: at 16:44

## 2022-07-24 RX ADMIN — Medication 25 UNIT(S): at 18:46

## 2022-07-24 RX ADMIN — Medication 25 UNIT(S): at 15:15

## 2022-07-25 DIAGNOSIS — E11.9 TYPE 2 DIABETES MELLITUS WITHOUT COMPLICATIONS: ICD-10-CM

## 2022-07-25 LAB
A1C WITH ESTIMATED AVERAGE GLUCOSE RESULT: 6.3 % — HIGH (ref 4–5.6)
ESTIMATED AVERAGE GLUCOSE: 134 MG/DL — HIGH (ref 68–114)
FRUCTOSAMINE SERPL-MCNC: 226 UMOL/L — SIGNIFICANT CHANGE UP (ref 205–285)
GLUCOSE BLDC GLUCOMTR-MCNC: 117 MG/DL — HIGH (ref 70–99)
GLUCOSE BLDC GLUCOMTR-MCNC: 118 MG/DL — HIGH (ref 70–99)
GLUCOSE BLDC GLUCOMTR-MCNC: 121 MG/DL — HIGH (ref 70–99)
GLUCOSE BLDC GLUCOMTR-MCNC: 140 MG/DL — HIGH (ref 70–99)
GLUCOSE BLDC GLUCOMTR-MCNC: 148 MG/DL — HIGH (ref 70–99)
GLUCOSE BLDC GLUCOMTR-MCNC: 150 MG/DL — HIGH (ref 70–99)
GLUCOSE BLDC GLUCOMTR-MCNC: 168 MG/DL — HIGH (ref 70–99)

## 2022-07-25 PROCEDURE — 99222 1ST HOSP IP/OBS MODERATE 55: CPT | Mod: GC

## 2022-07-25 RX ORDER — INSULIN GLARGINE 100 [IU]/ML
75 INJECTION, SOLUTION SUBCUTANEOUS AT BEDTIME
Refills: 0 | Status: DISCONTINUED | OUTPATIENT
Start: 2022-07-25 | End: 2022-07-26

## 2022-07-25 RX ADMIN — Medication 25 UNIT(S): at 18:51

## 2022-07-25 RX ADMIN — Medication 25 UNIT(S): at 08:17

## 2022-07-25 RX ADMIN — Medication 25 UNIT(S): at 14:26

## 2022-07-25 RX ADMIN — Medication 2: at 15:41

## 2022-07-25 RX ADMIN — Medication 1 TABLET(S): at 13:03

## 2022-07-25 RX ADMIN — Medication 81 MILLIGRAM(S): at 13:03

## 2022-07-25 RX ADMIN — INSULIN GLARGINE 75 UNIT(S): 100 INJECTION, SOLUTION SUBCUTANEOUS at 22:25

## 2022-07-25 NOTE — CONSULT NOTE ADULT - PROBLEM SELECTOR RECOMMENDATION 9
Currently on 65 units Lantus at bedtime  Lispro 25units pre-meal  Advised patient if blood sugars remain >95 in the morning can increase Lantus to 70units at bedtime  If afterwards she continues to remain above goal, can get in touch with Endocrinologist for suggestions on starting a second dose of Lantus in the AM.     Patient can continue pre-meal insulin adjustment as per home regimen. Can follow up with Dr. Amaya.

## 2022-07-25 NOTE — CONSULT NOTE ADULT - ASSESSMENT
32y Female type 2 DM with increased insulin resistance in pregnancy, presenting with uncontrolled GDM  A1c: 6.3%   Wt:88.9kg   Cr:0.6   GFR:121   Home regimen:  Basal Insulin: Lantus 50 u.    Bolus Insulin: Admelog     15 u ac +ss    BS <100: +0 u   -150: + 4 u   -200: + 6 u   -250: + 8 u   -300: + 10 u   BS above 300: + 12 u             32y Female type 2 DM with increased insulin resistance in pregnancy, presenting with uncontrolled GDM  A1c: 6.3%   Wt:88.9kg   Cr:0.6   GFR:121   Home regimen:  Basal Insulin: Lantus 60 u.    Bolus Insulin: Admelog     15 u ac +ss    BS <100: +0 u   -150: + 4 u   -200: + 6 u   -250: + 8 u   -300: + 10 u   BS above 300: + 12 u

## 2022-07-25 NOTE — DIETITIAN INITIAL EVALUATION ADULT - ORAL INTAKE PTA/DIET HISTORY
per patient she has been trying to comply with DM restrictions and prior to pregnancy was on metformin with good BS control.Patient stated she avoids increased CHO and seldom has simple CHO sources.Brown rice,quinoa generally.

## 2022-07-25 NOTE — DIETITIAN INITIAL EVALUATION ADULT - EDUCATION DIETARY MODIFICATIONS
(2) meets goals/outcomes Elliptical Excision Additional Text (Leave Blank If You Do Not Want): The margin was drawn around the clinically apparent lesion.  An elliptical shape was then drawn on the skin incorporating the lesion and margins.  Incisions were then made along these lines to the appropriate tissue plane and the lesion was extirpated.

## 2022-07-25 NOTE — DIETITIAN INITIAL EVALUATION ADULT - OTHER INFO
32 year old female  w/ PMH DM, prior spontaneous  ab, currently 32 weeks pregnant p/w lower abd pain for 2 days.  This afternoon, patient seen walking in room with reported good po and no N/V/D/C or pain.BM .Pre-pregnancy weight:178lbs, above 120% of IBW. Per diet recall, patient appears to follow C-CHO restrictions in her diet.RD provided brief review.

## 2022-07-25 NOTE — CONSULT NOTE ADULT - ATTENDING COMMENTS
Pt seen on rounds this afternoon.  Is known to us from her last admission, and is followed as an outpatient by Dr. Amaya in our division.  She has a history of type 2 DM, treated only with metformin prior to her current pregnancy, and on insulin during the pregnancy.  Has been taking 60 units of Lantus qhs, pre-meal lispro 15-30 units at home, mostly depending on what she intended to eat--(though she does not do any strict carb counting).  Her AM fingersticks at home have been mildly above target at  mg%.  Most of her post-prandials are < 140, but at 2 hours.  Her A1c level is nonetheless excellent at 6.3%.    Since admission, her AM fingersticks have been even a bit higher than at home, despite an increase in the Lantus to 65 units.  Her 2-hr post-prandials were elevated yesterday (216/152), but better today (140 after breakfast).  --Would continue the 65 units Lantus for tonight, and would discharge on this dose, but we have told her that if her AM fingersticks are still above 95 during the first few days after discharge that she should increase to 70 units.  If still above target, she should speak to Dr. Amaya about adding an AM Lantus dose  --She was also in the habit of taking her premeal lispro directly before the meals at home (and in the hospital).  Have suggested that she increase the interval to closer to 20 minutes.  (Her hypoglycemia this morning after breakfast was delayed did not occur until over an hour after she had received her premeal lispro

## 2022-07-25 NOTE — DIETITIAN INITIAL EVALUATION ADULT - PERTINENT MEDS FT
MEDICATIONS  (STANDING):  aspirin  chewable 81 milliGRAM(s) Oral daily  dextrose 5%. 1000 milliLiter(s) (50 mL/Hr) IV Continuous <Continuous>  dextrose 5%. 1000 milliLiter(s) (100 mL/Hr) IV Continuous <Continuous>  dextrose 50% Injectable 25 Gram(s) IV Push once  dextrose 50% Injectable 12.5 Gram(s) IV Push once  dextrose 50% Injectable 25 Gram(s) IV Push once  glucagon  Injectable 1 milliGRAM(s) IntraMuscular once  insulin glargine Injectable (LANTUS) 65 Unit(s) SubCutaneous at bedtime  insulin lispro (ADMELOG) corrective regimen sliding scale   SubCutaneous Before meals and at bedtime  insulin lispro Injectable (ADMELOG) 25 Unit(s) SubCutaneous before breakfast  insulin lispro Injectable (ADMELOG) 25 Unit(s) SubCutaneous before lunch  insulin lispro Injectable (ADMELOG) 25 Unit(s) SubCutaneous before dinner  prenatal multivitamin 1 Tablet(s) Oral daily    MEDICATIONS  (PRN):  ALBUTerol    90 MICROgram(s) HFA Inhaler 2 Puff(s) Inhalation every 6 hours PRN Bronchospasm  dextrose Oral Gel 15 Gram(s) Oral once PRN Blood Glucose LESS THAN 70 milliGRAM(s)/deciliter

## 2022-07-25 NOTE — DIETITIAN INITIAL EVALUATION ADULT - PERTINENT LABORATORY DATA
POCT Blood Glucose.: 118 mg/dL (07-25-22 @ 14:21)  A1C with Estimated Average Glucose Result: 6.3 % (07-25-22 @ 09:13)

## 2022-07-25 NOTE — CONSULT NOTE ADULT - SUBJECTIVE AND OBJECTIVE BOX
HPI: 32yFemale  Endocrinology consulted for uncontrolled gestational diabetes.   Patient was diagnosed with diabetes type 2 at the age of 25, was started on metformin. She has only been on insulin since becoming pregnant.   Currently therapy:   Endocrinologist: Dr. Amaya   A1C: 6.8% March, This admission: 6.3%  Diet: she reports eating low carb diet, only carbs are brown rice or quinoa.   FSG:  Fasting   Postprandial    Despite diet she has been unable too blood sugars at range for GDM.     During last admission in 2022:  Discharged on 50units Lantus and 25units Lispro pre-meal as 60 units Lantus was dropping her overnight at that time despite evening snacks.     Follow up with Endocrinology May 16, 2022:  Basal Insulin: Lantus 50 u.    Bolus Insulin: Admelog     15 u ac +ss    BS <100: +0 u   -150: + 4 u   -200: + 6 u   -250: + 8 u   -300: + 10 u   BS above 300: + 12 u     PMH & Surgical Hx: as per HPI  FH: mom() and dad  SH: smokes marijuana, no alcohol       Current Meds:  ALBUTerol    90 MICROgram(s) HFA Inhaler 2 Puff(s) Inhalation every 6 hours PRN  aspirin  chewable 81 milliGRAM(s) Oral daily  dextrose 5%. 1000 milliLiter(s) IV Continuous <Continuous>  dextrose 5%. 1000 milliLiter(s) IV Continuous <Continuous>  dextrose 50% Injectable 25 Gram(s) IV Push once  dextrose 50% Injectable 12.5 Gram(s) IV Push once  dextrose 50% Injectable 25 Gram(s) IV Push once  dextrose Oral Gel 15 Gram(s) Oral once PRN  glucagon  Injectable 1 milliGRAM(s) IntraMuscular once  insulin glargine Injectable (LANTUS) 65 Unit(s) SubCutaneous at bedtime  insulin lispro (ADMELOG) corrective regimen sliding scale   SubCutaneous Before meals and at bedtime  insulin lispro Injectable (ADMELOG) 25 Unit(s) SubCutaneous before breakfast  insulin lispro Injectable (ADMELOG) 25 Unit(s) SubCutaneous before lunch  insulin lispro Injectable (ADMELOG) 25 Unit(s) SubCutaneous before dinner  prenatal multivitamin 1 Tablet(s) Oral daily      Allergies:  No Known Allergies      ROS:  Denies the following except as indicated.    General: weight loss/weight gain, decreased appetite, fatigue  Eyes: Blurry vision, double vision, visual changes  ENT: Throat pain, changes in voice,   CV: palpitations, SOB, CP, cough  GI: NVD, difficulty swallowing, abdominal pain  : polyuria, dysuria  Endo: abnormal menses, temperature intolerance, decreased libido  MSK: weakness, joint pain  Skin: rash, dryness, diaphoresis  Heme: Easy bruising,bleeding  Neuro: HA, dizziness, lightheadedness, numbness tingling  Psych: Anxiety, Depression    Vital Signs Last 24 Hrs  T(C): 36.8 (2022 06:00), Max: 36.8 (2022 14:18)  T(F): 98.3 (2022 06:00), Max: 98.3 (2022 14:18)  HR: 78 (2022 06:00) (75 - 94)  BP: 99/62 (2022 06:00) (99/62 - 122/70)  BP(mean): --  RR: 18 (2022 06:00) (17 - 18)  SpO2: 97% (2022 06:00) (97% - 99%)    Parameters below as of 2022 06:00  Patient On (Oxygen Delivery Method): room air      Height (cm): 153.2 ( @ 01:33)  Weight (kg): 88.904 ( @ 01:33)  BMI (kg/m2): 37.9 ( @ 01:33)      Constitutional: NAD.   HEENT: NCAT, MMM, OP clear, EOMI, , no proptosis or lid retraction  Neck: no thyromegaly or palpable thyroid nodules   Respiratory: lungs CTAB.  Cardiovascular: regular rhythm, normal S1 and S2, no audible murmurs, no peripheral edema  GI: soft, NT/ND, no masses/HSM appreciated.  Neurology: no tremors, DTR 2+  Skin: no visible rashes/lesions  Psychiatric: AAO x 3, normal affect/mood.  Ext: radial pulses intact, DP pulses intact, extremities warm, no cyanosis, clubbing or edema.       LABS:                    RADIOLOGY & ADDITIONAL STUDIES:  CAPILLARY BLOOD GLUCOSE      POCT Blood Glucose.: 140 mg/dL (2022 09:17)  POCT Blood Glucose.: 121 mg/dL (2022 08:17)  POCT Blood Glucose.: 131 mg/dL (2022 22:21)  POCT Blood Glucose.: 148 mg/dL (2022 20:16)  POCT Blood Glucose.: 117 mg/dL (2022 18:38)  POCT Blood Glucose.: 152 mg/dL (2022 16:24)  POCT Blood Glucose.: 82 mg/dL (2022 15:13)        A/P:32y Female    1.  DM  Please continue lantus       units at night / morning.  Please continue lispro      units before each meal.  Please continue lispro moderate / low dose sliding scale four times daily with meals and at bedtime    Pt's fingerstick glucose goal is     Will continue to monitor     For discharge, pt can continue    Pt can follow up at discharge with [facility] by calling  to make an appointment.   Will discuss case with     and update primary team HPI: 32yFemale 32 weeks pregnant   Endocrinology consulted for uncontrolled gestational diabetes with history of type 2 DM.   Patient was diagnosed with diabetes type 2 at the age of 25, was started on metformin. She has only been on insulin since becoming pregnant.   Currently therapy:   Endocrinologist: Dr. Amaya   A1C: 6.8% March, This admission: 6.3%  Diet: she reports eating low carb diet, only carbs are brown rice or quinoa.   FSG:  Fasting   Postprandial    Despite diet she has been unable too blood sugars at range for GDM.     During last admission in 2022:  Discharged on 50units Lantus and 25units Lispro pre-meal as 60 units Lantus was dropping her overnight at that time despite evening snacks.     Follow up with Endocrinology May 16, 2022:  Basal Insulin: Lantus 60 u.    Bolus Insulin: Admelog     15 u ac +ss    BS <100: +0 u   -150: + 4 u   -200: + 6 u   -250: + 8 u   -300: + 10 u   BS above 300: + 12 u     PMH & Surgical Hx: as per HPI  FH: mom() and dad  SH: smokes marijuana, no alcohol       Current Meds:  ALBUTerol    90 MICROgram(s) HFA Inhaler 2 Puff(s) Inhalation every 6 hours PRN  aspirin  chewable 81 milliGRAM(s) Oral daily  dextrose 5%. 1000 milliLiter(s) IV Continuous <Continuous>  dextrose 5%. 1000 milliLiter(s) IV Continuous <Continuous>  dextrose 50% Injectable 25 Gram(s) IV Push once  dextrose 50% Injectable 12.5 Gram(s) IV Push once  dextrose 50% Injectable 25 Gram(s) IV Push once  dextrose Oral Gel 15 Gram(s) Oral once PRN  glucagon  Injectable 1 milliGRAM(s) IntraMuscular once  insulin glargine Injectable (LANTUS) 65 Unit(s) SubCutaneous at bedtime  insulin lispro (ADMELOG) corrective regimen sliding scale   SubCutaneous Before meals and at bedtime  insulin lispro Injectable (ADMELOG) 25 Unit(s) SubCutaneous before breakfast  insulin lispro Injectable (ADMELOG) 25 Unit(s) SubCutaneous before lunch  insulin lispro Injectable (ADMELOG) 25 Unit(s) SubCutaneous before dinner  prenatal multivitamin 1 Tablet(s) Oral daily      Allergies:  No Known Allergies      ROS:  Denies the following except as indicated.    General: weight loss/weight gain, decreased appetite, fatigue  Eyes: Blurry vision, double vision, visual changes  ENT: Throat pain, changes in voice,   CV: palpitations, SOB, CP, cough  GI: NVD, difficulty swallowing, abdominal pain  : polyuria, dysuria  Endo: abnormal menses, temperature intolerance, decreased libido  MSK: weakness, joint pain  Skin: rash, dryness, diaphoresis  Heme: Easy bruising,bleeding  Neuro: HA, dizziness, lightheadedness, numbness tingling  Psych: Anxiety, Depression    Vital Signs Last 24 Hrs  T(C): 36.8 (2022 06:00), Max: 36.8 (2022 14:18)  T(F): 98.3 (2022 06:00), Max: 98.3 (2022 14:18)  HR: 78 (2022 06:00) (75 - 94)  BP: 99/62 (2022 06:00) (99/62 - 122/70)  BP(mean): --  RR: 18 (2022 06:00) (17 - 18)  SpO2: 97% (2022 06:00) (97% - 99%)    Parameters below as of 2022 06:00  Patient On (Oxygen Delivery Method): room air      Height (cm): 153.2 ( @ 01:33)  Weight (kg): 88.904 ( @ 01:33)  BMI (kg/m2): 37.9 ( @ 01:33)      Constitutional: NAD.   HEENT: NCAT, MMM, OP clear, EOMI, , no proptosis or lid retraction  Neck: no thyromegaly or palpable thyroid nodules   Respiratory: lungs CTAB.  Cardiovascular: regular rhythm, normal S1 and S2, no audible murmurs, no peripheral edema  GI: GRAVID , NT/ND, no masses/HSM appreciated.  Neurology: no tremors, DTR 2+  Skin: no visible rashes/lesions  Psychiatric: AAO x 3, normal affect/mood.  Ext: radial pulses intact, DP pulses intact, extremities warm, no cyanosis, clubbing or edema.       LABS:                    RADIOLOGY & ADDITIONAL STUDIES:  CAPILLARY BLOOD GLUCOSE      POCT Blood Glucose.: 140 mg/dL (2022 09:17)  POCT Blood Glucose.: 121 mg/dL (2022 08:17)  POCT Blood Glucose.: 131 mg/dL (2022 22:21)  POCT Blood Glucose.: 148 mg/dL (2022 20:16)  POCT Blood Glucose.: 117 mg/dL (2022 18:38)  POCT Blood Glucose.: 152 mg/dL (2022 16:24)  POCT Blood Glucose.: 82 mg/dL (2022 15:13)

## 2022-07-26 ENCOUNTER — TRANSCRIPTION ENCOUNTER (OUTPATIENT)
Age: 32
End: 2022-07-26

## 2022-07-26 LAB
GLUCOSE BLDC GLUCOMTR-MCNC: 102 MG/DL — HIGH (ref 70–99)
GLUCOSE BLDC GLUCOMTR-MCNC: 122 MG/DL — HIGH (ref 70–99)
GLUCOSE BLDC GLUCOMTR-MCNC: 133 MG/DL — HIGH (ref 70–99)
GLUCOSE BLDC GLUCOMTR-MCNC: 137 MG/DL — HIGH (ref 70–99)
GLUCOSE BLDC GLUCOMTR-MCNC: 158 MG/DL — HIGH (ref 70–99)
GLUCOSE BLDC GLUCOMTR-MCNC: 182 MG/DL — HIGH (ref 70–99)
GLUCOSE BLDC GLUCOMTR-MCNC: 80 MG/DL — SIGNIFICANT CHANGE UP (ref 70–99)

## 2022-07-26 PROCEDURE — 99231 SBSQ HOSP IP/OBS SF/LOW 25: CPT

## 2022-07-26 RX ORDER — SODIUM CHLORIDE 9 MG/ML
1000 INJECTION, SOLUTION INTRAVENOUS
Refills: 0 | Status: DISCONTINUED | OUTPATIENT
Start: 2022-07-26 | End: 2022-07-27

## 2022-07-26 RX ORDER — INSULIN LISPRO 100/ML
30 VIAL (ML) SUBCUTANEOUS
Refills: 0 | Status: DISCONTINUED | OUTPATIENT
Start: 2022-07-27 | End: 2022-07-27

## 2022-07-26 RX ORDER — DEXTROSE 50 % IN WATER 50 %
12.5 SYRINGE (ML) INTRAVENOUS ONCE
Refills: 0 | Status: DISCONTINUED | OUTPATIENT
Start: 2022-07-26 | End: 2022-07-27

## 2022-07-26 RX ORDER — GLUCAGON INJECTION, SOLUTION 0.5 MG/.1ML
1 INJECTION, SOLUTION SUBCUTANEOUS ONCE
Refills: 0 | Status: DISCONTINUED | OUTPATIENT
Start: 2022-07-26 | End: 2022-07-27

## 2022-07-26 RX ORDER — INSULIN GLARGINE 100 [IU]/ML
70 INJECTION, SOLUTION SUBCUTANEOUS AT BEDTIME
Refills: 0 | Status: DISCONTINUED | OUTPATIENT
Start: 2022-07-26 | End: 2022-07-27

## 2022-07-26 RX ORDER — DEXTROSE 50 % IN WATER 50 %
15 SYRINGE (ML) INTRAVENOUS ONCE
Refills: 0 | Status: DISCONTINUED | OUTPATIENT
Start: 2022-07-26 | End: 2022-07-27

## 2022-07-26 RX ORDER — DEXTROSE 50 % IN WATER 50 %
25 SYRINGE (ML) INTRAVENOUS ONCE
Refills: 0 | Status: DISCONTINUED | OUTPATIENT
Start: 2022-07-26 | End: 2022-07-27

## 2022-07-26 RX ADMIN — Medication 2: at 09:58

## 2022-07-26 RX ADMIN — Medication 30 UNIT(S): at 18:34

## 2022-07-26 RX ADMIN — Medication 25 UNIT(S): at 08:41

## 2022-07-26 RX ADMIN — Medication 25 UNIT(S): at 14:45

## 2022-07-26 RX ADMIN — Medication 81 MILLIGRAM(S): at 16:57

## 2022-07-26 RX ADMIN — INSULIN GLARGINE 70 UNIT(S): 100 INJECTION, SOLUTION SUBCUTANEOUS at 22:34

## 2022-07-26 RX ADMIN — Medication 1 TABLET(S): at 15:00

## 2022-07-26 NOTE — DISCHARGE NOTE ANTEPARTUM - HOSPITAL COURSE
Patient admitted for  contractions at 32+6. She was ruled out for  labor and her cervix remained closed on cervical exams. Her Type 2 DM was also managed with [facility] and endocrinology inpatient, with titration of medications to lispo 25 U with meals and lantus 75 U qhs. Patient was discharged clinically and hemodynamically stable with twice weekly follow up at [facility] for ultrasounds and with Dr. Thakkar. Patient admitted for  contractions at 32+6. She was ruled out for  labor and her cervix remained closed on cervical exams. Her Type 2 DM was also managed with [facility] and endocrinology inpatient, with titration of medications to lispo 30 U with meals and lantus 70 U qhs. Patient was discharged clinically and hemodynamically stable with twice weekly follow up at [facility] for ultrasounds and with Dr. Thakkar.

## 2022-07-26 NOTE — PROGRESS NOTE ADULT - NS ATTEND AMEND GEN_ALL_CORE FT
Pt seen on rounds this afternoon in anticipation of planned discharge.  Glucoses have remained above target despite an increase in the Lantus to 75 units last night.  She has also tried to have the insulin given at least 20 min before the meal.  Although her readings at home were better than those during the current hospitalization, they were still mildly above goal--despite her excellent A1c level.  --Would discharge on a Lantus dose of 70 units.  --Have told her that if her AM fingersticks remain > 95 on this dose, she is to contact Dr. Amaya about adding an AM dose of Lantus to her regimen  -Emphasized that her 2-hr post-prandials are often mildly above target at home, and that these need to be closer to 120.  She will increase the premeal to 30-35 units, but this dose may also need to be increased as outpatient

## 2022-07-26 NOTE — PROGRESS NOTE ADULT - SUBJECTIVE AND OBJECTIVE BOX
INTERVAL HPI/OVERNIGHT EVENTS:    No new complaints today. Glucose remains)        FSG & insulin:    Dinner FSG    Lispro    Ate  Bedtime FSG    Lantus    lispro       Breakfast FSG    Lispro   Ate      Lunch FSG     Lispro   Ate    Pt reports the following symptoms:    CONSTITUTIONAL:  Negative fever or chills, feels well, good appetite  EYES:  Negative  blurry vision or double vision  CARDIOVASCULAR:  Negative for chest pain or palpitations  RESPIRATORY:  Negative for cough, wheezing, or SOB   GASTROINTESTINAL:  Negative for nausea, vomiting, diarrhea, constipation, or abdominal pain  GENITOURINARY:  Negative frequency, urgency or dysuria  NEUROLOGIC:  No headache, confusion, dizziness, lightheadedness    MEDICATIONS  (STANDING):  aspirin  chewable 81 milliGRAM(s) Oral daily  dextrose 5%. 1000 milliLiter(s) (50 mL/Hr) IV Continuous <Continuous>  dextrose 5%. 1000 milliLiter(s) (100 mL/Hr) IV Continuous <Continuous>  dextrose 50% Injectable 25 Gram(s) IV Push once  dextrose 50% Injectable 12.5 Gram(s) IV Push once  dextrose 50% Injectable 25 Gram(s) IV Push once  glucagon  Injectable 1 milliGRAM(s) IntraMuscular once  insulin glargine Injectable (LANTUS) 75 Unit(s) SubCutaneous at bedtime  insulin lispro (ADMELOG) corrective regimen sliding scale   SubCutaneous Before meals and at bedtime  insulin lispro Injectable (ADMELOG) 25 Unit(s) SubCutaneous before breakfast  insulin lispro Injectable (ADMELOG) 25 Unit(s) SubCutaneous before lunch  insulin lispro Injectable (ADMELOG) 25 Unit(s) SubCutaneous before dinner  prenatal multivitamin 1 Tablet(s) Oral daily    MEDICATIONS  (PRN):  ALBUTerol    90 MICROgram(s) HFA Inhaler 2 Puff(s) Inhalation every 6 hours PRN Bronchospasm  dextrose Oral Gel 15 Gram(s) Oral once PRN Blood Glucose LESS THAN 70 milliGRAM(s)/deciliter      PHYSICAL EXAM  Vital Signs Last 24 Hrs  T(C): 36.7 (26 Jul 2022 14:00), Max: 36.8 (25 Jul 2022 22:00)  T(F): 98.1 (26 Jul 2022 14:00), Max: 98.3 (26 Jul 2022 02:00)  HR: 101 (26 Jul 2022 14:00) (90 - 101)  BP: 120/61 (26 Jul 2022 14:00) (104/62 - 126/87)  BP(mean): --  RR: 18 (26 Jul 2022 14:00) (18 - 18)  SpO2: 96% (26 Jul 2022 14:00) (96% - 98%)    Parameters below as of 26 Jul 2022 14:00  Patient On (Oxygen Delivery Method): room air        Constitutional: wn/wd in NAD.   HEENT: NCAT, MMM, OP clear, EOMI, no proptosis or lid retraction  Neck: no thyromegaly or palpable thyroid nodules   Respiratory: lungs CTAB.  Cardiovascular: regular rhythm, normal S1 and S2, no audible murmurs, no peripheral edema  GI: soft, NT/ND, no masses/HSM appreciated.  Neurology: no tremors, DTR 2+  Skin: no visible rashes/lesions. no acanthosis nigricans. no lipohypertrophy. no cushing's stigmata.  Psychiatric: AAO x 3, normal affect/mood.    CAPILLARY BLOOD GLUCOSE      POCT Blood Glucose.: 158 mg/dL (26 Jul 2022 16:05)  POCT Blood Glucose.: 80 mg/dL (26 Jul 2022 14:41)  POCT Blood Glucose.: 182 mg/dL (26 Jul 2022 09:51)  POCT Blood Glucose.: 122 mg/dL (26 Jul 2022 08:34)  POCT Blood Glucose.: 150 mg/dL (25 Jul 2022 22:22)  POCT Blood Glucose.: 148 mg/dL (25 Jul 2022 20:00)  POCT Blood Glucose.: 117 mg/dL (25 Jul 2022 18:22)   INTERVAL HPI/OVERNIGHT EVENTS:    No new complaints today. Glucose remains above goal. For discharge today.    FSG & insulin:  Yesterday:  6P: . Lispro 25. ate quinoa, chicken, string beans.  2HR PP   10PM . Lantus 75  Today:  8:20AM . Lispro 25. juevos rancheros, skim milk  1.5hr PP   2PM FSG 80. Lispro 25.  2hr PP       Pt reports the following symptoms:    CONSTITUTIONAL:  Negative fever or chills, feels well, good appetite  EYES:  Negative  blurry vision or double vision  CARDIOVASCULAR:  Negative for chest pain or palpitations  RESPIRATORY:  Negative for cough, wheezing, or SOB   GASTROINTESTINAL:  Negative for nausea, vomiting, diarrhea, constipation, or abdominal pain  GENITOURINARY:  Negative frequency, urgency or dysuria  NEUROLOGIC:  No headache, confusion, dizziness, lightheadedness    MEDICATIONS  (STANDING):  aspirin  chewable 81 milliGRAM(s) Oral daily  dextrose 5%. 1000 milliLiter(s) (50 mL/Hr) IV Continuous <Continuous>  dextrose 5%. 1000 milliLiter(s) (100 mL/Hr) IV Continuous <Continuous>  dextrose 50% Injectable 25 Gram(s) IV Push once  dextrose 50% Injectable 12.5 Gram(s) IV Push once  dextrose 50% Injectable 25 Gram(s) IV Push once  glucagon  Injectable 1 milliGRAM(s) IntraMuscular once  insulin glargine Injectable (LANTUS) 75 Unit(s) SubCutaneous at bedtime  insulin lispro (ADMELOG) corrective regimen sliding scale   SubCutaneous Before meals and at bedtime  insulin lispro Injectable (ADMELOG) 25 Unit(s) SubCutaneous before breakfast  insulin lispro Injectable (ADMELOG) 25 Unit(s) SubCutaneous before lunch  insulin lispro Injectable (ADMELOG) 25 Unit(s) SubCutaneous before dinner  prenatal multivitamin 1 Tablet(s) Oral daily    MEDICATIONS  (PRN):  ALBUTerol    90 MICROgram(s) HFA Inhaler 2 Puff(s) Inhalation every 6 hours PRN Bronchospasm  dextrose Oral Gel 15 Gram(s) Oral once PRN Blood Glucose LESS THAN 70 milliGRAM(s)/deciliter      PHYSICAL EXAM    Constitutional:  NAD.   HEENT: NCAT, MMM, OP clear, EOMI, no proptosis or lid retraction  Neck: no thyromegaly or palpable thyroid nodules   Respiratory: lungs CTAB.  Cardiovascular: regular rhythm, normal S1 and S2, no audible murmurs, no peripheral edema  GI: soft, NT/ND, no masses/HSM appreciated.  Neurology: no tremors, DTR 2+  Skin: no visible rashes/lesions.   Psychiatric: AAO x 3, normal affect/mood.    Vital Signs Last 24 Hrs  T(C): 36.7 (26 Jul 2022 14:00), Max: 36.8 (25 Jul 2022 22:00)  T(F): 98.1 (26 Jul 2022 14:00), Max: 98.3 (26 Jul 2022 02:00)  HR: 101 (26 Jul 2022 14:00) (90 - 101)  BP: 120/61 (26 Jul 2022 14:00) (104/62 - 126/87)  BP(mean): --  RR: 18 (26 Jul 2022 14:00) (18 - 18)  SpO2: 96% (26 Jul 2022 14:00) (96% - 98%)    Parameters below as of 26 Jul 2022 14:00  Patient On (Oxygen Delivery Method): room air    CAPILLARY BLOOD GLUCOSE      POCT Blood Glucose.: 158 mg/dL (26 Jul 2022 16:05)  POCT Blood Glucose.: 80 mg/dL (26 Jul 2022 14:41)  POCT Blood Glucose.: 182 mg/dL (26 Jul 2022 09:51)  POCT Blood Glucose.: 122 mg/dL (26 Jul 2022 08:34)  POCT Blood Glucose.: 150 mg/dL (25 Jul 2022 22:22)  POCT Blood Glucose.: 148 mg/dL (25 Jul 2022 20:00)  POCT Blood Glucose.: 117 mg/dL (25 Jul 2022 18:22)

## 2022-07-26 NOTE — PROGRESS NOTE ADULT - PROBLEM SELECTOR PLAN 1
Discharge on Lantus 70 units at bedtime and novolog 30-35 units before meals.  Advised patient if blood sugars remain >95 in the morning, contact Dr Amaya to add additional morning Lantus dose.  Reviewed goals <95, and 2 hr PP < 120    Patient can continue pre-meal insulin adjustment as per home regimen. Can follow up with Dr. Amaya.

## 2022-07-26 NOTE — DISCHARGE NOTE ANTEPARTUM - PLAN OF CARE
Return to the hospital if you have regular, painful, frequent  contractions lasting every 3-4 minutes for more than one hour, heavy vaginal bleeding like a menstrual period, continuous leakage of fluid, or decreased fetal movement. Follow outpatient with Dr. Amaya. Continue lispro 25 U with meals and lantus 75 U at night. Follow up with Dr. Thakkar on. Continue to monitor BPs daily. You will need to collect a 24 hour urine at a later prenatal appointment. Follow outpatient with Dr. Amaya. Continue lispro 30 U with meals and lantus 70 U at night. Continue to check blood sugars fasting and 2 hours after meals. Follow up with Dr. Thakkar on Wednesday 8/3 at 1:45 PM. Follow up at the ultrasound unit: 7/28 at 2:00 PM, 8/2 at 7:30 AM, and 8/5 at 9:00 AM.

## 2022-07-26 NOTE — DISCHARGE NOTE ANTEPARTUM - CARE PLAN
1 Principal Discharge DX:	Type 2 diabetes mellitus  Assessment and plan of treatment:	Follow outpatient with Dr. Amaya. Continue lispro 25 U with meals and lantus 75 U at night. Follow up with Dr. Thakkar on.  Secondary Diagnosis:	 contractions  Assessment and plan of treatment:	Return to the hospital if you have regular, painful, frequent  contractions lasting every 3-4 minutes for more than one hour, heavy vaginal bleeding like a menstrual period, continuous leakage of fluid, or decreased fetal movement.  Secondary Diagnosis:	Chronic hypertension  Assessment and plan of treatment:	Continue to monitor BPs daily. You will need to collect a 24 hour urine at a later prenatal appointment.   Principal Discharge DX:	Type 2 diabetes mellitus  Assessment and plan of treatment:	Follow outpatient with Dr. Amaya. Continue lispro 30 U with meals and lantus 70 U at night. Continue to check blood sugars fasting and 2 hours after meals. Follow up with Dr. Thakkar on Wednesday 8/3 at 1:45 PM. Follow up at the ultrasound unit:  at 2:00 PM,  at 7:30 AM, and  at 9:00 AM.  Secondary Diagnosis:	 contractions  Assessment and plan of treatment:	Return to the hospital if you have regular, painful, frequent  contractions lasting every 3-4 minutes for more than one hour, heavy vaginal bleeding like a menstrual period, continuous leakage of fluid, or decreased fetal movement.  Secondary Diagnosis:	Chronic hypertension  Assessment and plan of treatment:	Continue to monitor BPs daily. You will need to collect a 24 hour urine at a later prenatal appointment.

## 2022-07-26 NOTE — DISCHARGE NOTE ANTEPARTUM - CARE PROVIDER_API CALL
Micki Thakkar)  Obstetrics and Gynecology  [address] [address]  Phone: [phone]  Fax: [phone]  Follow Up Time:

## 2022-07-26 NOTE — DISCHARGE NOTE ANTEPARTUM - PATIENT PORTAL LINK FT
You can access the FollowMyHealth Patient Portal offered by [facility] by registering at the following website: [email or web address]. By joining [facility]’s FollowMyHealth portal, you will also be able to view your health information using other applications (apps) compatible with our system.

## 2022-07-26 NOTE — PROGRESS NOTE ADULT - ASSESSMENT
32y Female type 2 DM with increased insulin resistance in pregnancy, presenting with uncontrolled GDM  A1c: 6.3%   Wt:88.9kg   Cr:0.6   GFR:121   Home regimen:  Basal Insulin: Lantus 60 u.    Bolus Insulin: Admelog     15 u ac +ss    BS <100: +0 u   -150: + 4 u   -200: + 6 u   -250: + 8 u   -300: + 10 u   BS above 300: + 12 u

## 2022-07-26 NOTE — DISCHARGE NOTE ANTEPARTUM - MEDICATION SUMMARY - MEDICATIONS TO TAKE
I will START or STAY ON the medications listed below when I get home from the hospital:    aspirin 81 mg oral tablet, chewable  -- 2 tab(s) by mouth every 24 hours  -- Indication: For Chronic hypertension    insulin glargine 100 units/mL subcutaneous solution  -- 70 unit(s) subcutaneous once a day (at bedtime)   -- Do not drink alcoholic beverages when taking this medication.  It is very important that you take or use this exactly as directed.  Do not skip doses or discontinue unless directed by your doctor.  Keep in refrigerator.  Do not freeze.    -- Indication: For Type 2 diabetes mellitus    insulin lispro 100 units/mL injectable solution  -- 30 unit(s) injectable 3 times a day (with meals)   -- Indication: For Type 2 diabetes mellitus    Prenatal Multivitamins with Folic Acid 1 mg oral tablet  -- 1 tab(s) by mouth once a day  -- Indication: For Pregnancy

## 2022-07-26 NOTE — DISCHARGE NOTE ANTEPARTUM - VAGINAL BLEEDING
Athletic Training Progress Note    Name: Kristina Benavides  Age: 24 y o  Assessment/Plan:     Visit Diagnosis: B/L MTSS    Treatment Plan: Progress to incorporate more eccentrics  Incorporate more SL proprioception    []  Follow-up PRN  []  Follow-up prior to next practice/game for re-evaluation  [x]  Daily treatment/rehab  Progress note expected weekly  Subjective: Ath states her pain level is a 3/10 on the right and a 2/10 on the left today  Objective:   See treatment log below  Treatment Log:     Date: 4/8 4/7 4/5   Playing Status:            Exercise/Treatment      DF Self Mobilization B/L 2x10 2x10 2x10   Sidelying hip AB 5# 2x10 2x10 2x10   Step ups 1x10 due to pain  1x15 No pain!   1x15   Hip hikes (try to focus on moving the foot less and keeping it still, focus more on hip movement) - 2x10 2x10   SL Balance  3x30s on jaswinder disc 3x30 s    Ice cup 5min B/L  5 min   Effleurage B/L      CUS  4 min B/L
No Vaccines Administered.
Statement Selected

## 2022-07-26 NOTE — DISCHARGE NOTE ANTEPARTUM - MEDICATION SUMMARY - MEDICATIONS TO CHANGE
I will SWITCH the dose or number of times a day I take the medications listed below when I get home from the hospital:    Admelog 100 units/mL injectable solution  -- 25 unit(s) injectable 3 times a day (before meals)    insulin glargine 100 units/mL subcutaneous solution  -- 50 unit(s) subcutaneous once a day (at bedtime)    Lantus Solostar Pen 100 units/mL subcutaneous solution  -- 50 unit(s) subcutaneous once a day (at bedtime)   -- Do not drink alcoholic beverages when taking this medication.  It is very important that you take or use this exactly as directed.  Do not skip doses or discontinue unless directed by your doctor.  Keep in refrigerator.  Do not freeze.    Admelog SoloStar 100 units/mL injectable solution  -- 25 unit(s) injectable 3 times a day (before meals)

## 2022-07-26 NOTE — DISCHARGE NOTE ANTEPARTUM - NS MD DC FALL RISK RISK
For information on Fall & Injury Prevention, visit: [email or web address] OR  [email or web address] OR  https://www.cdc.gov/steadi/patient.html

## 2022-07-27 VITALS
SYSTOLIC BLOOD PRESSURE: 109 MMHG | HEART RATE: 84 BPM | TEMPERATURE: 98 F | DIASTOLIC BLOOD PRESSURE: 70 MMHG | RESPIRATION RATE: 16 BRPM | OXYGEN SATURATION: 98 %

## 2022-07-27 LAB
GLUCOSE BLDC GLUCOMTR-MCNC: 177 MG/DL — HIGH (ref 70–99)
GLUCOSE BLDC GLUCOMTR-MCNC: 98 MG/DL — SIGNIFICANT CHANGE UP (ref 70–99)

## 2022-07-27 RX ORDER — INSULIN LISPRO 100/ML
30 VIAL (ML) SUBCUTANEOUS
Qty: 2700 | Refills: 2
Start: 2022-07-27 | End: 2022-10-24

## 2022-07-27 RX ORDER — INSULIN LISPRO 100/ML
30 VIAL (ML) SUBCUTANEOUS
Qty: 2700 | Refills: 0
Start: 2022-07-27 | End: 2022-08-25

## 2022-07-27 RX ORDER — INSULIN GLARGINE 100 [IU]/ML
70 INJECTION, SOLUTION SUBCUTANEOUS
Qty: 2100 | Refills: 0
Start: 2022-07-27 | End: 2022-08-25

## 2022-07-27 RX ORDER — ENOXAPARIN SODIUM 100 MG/ML
70 INJECTION SUBCUTANEOUS
Qty: 2100 | Refills: 2
Start: 2022-07-27 | End: 2022-10-24

## 2022-07-27 RX ORDER — INSULIN LISPRO 100/ML
VIAL (ML) SUBCUTANEOUS
Refills: 0 | Status: DISCONTINUED | OUTPATIENT
Start: 2022-07-27 | End: 2022-07-27

## 2022-07-27 RX ADMIN — Medication 600 MILLIGRAM(S): at 16:12

## 2022-07-27 RX ADMIN — Medication 1 TABLET(S): at 14:50

## 2022-07-27 RX ADMIN — Medication 30 UNIT(S): at 14:19

## 2022-07-27 RX ADMIN — Medication 81 MILLIGRAM(S): at 14:50

## 2022-07-27 RX ADMIN — Medication 2: at 16:52

## 2022-07-28 ENCOUNTER — APPOINTMENT (OUTPATIENT)
Dept: ANTEPARTUM | Facility: CLINIC | Age: 32
End: 2022-07-28

## 2022-08-01 ENCOUNTER — APPOINTMENT (OUTPATIENT)
Dept: PEDIATRIC CARDIOLOGY | Facility: CLINIC | Age: 32
End: 2022-08-01

## 2022-08-01 NOTE — ED PROVIDER NOTE - CARDIAC, MLM
Normal rate, regular rhythm.  No murmurs, rubs or gallops. High Dose Vitamin A Pregnancy And Lactation Text: High dose vitamin A therapy is contraindicated during pregnancy and breast feeding.

## 2022-08-02 ENCOUNTER — APPOINTMENT (OUTPATIENT)
Dept: ANTEPARTUM | Facility: CLINIC | Age: 32
End: 2022-08-02

## 2022-08-02 ENCOUNTER — APPOINTMENT (OUTPATIENT)
Dept: PSYCHIATRY | Facility: CLINIC | Age: 32
End: 2022-08-02

## 2022-08-02 DIAGNOSIS — F31.9 BIPOLAR DISORDER, UNSPECIFIED: ICD-10-CM

## 2022-08-02 DIAGNOSIS — F41.9 ANXIETY DISORDER, UNSPECIFIED: ICD-10-CM

## 2022-08-02 DIAGNOSIS — Z3A.32 32 WEEKS GESTATION OF PREGNANCY: ICD-10-CM

## 2022-08-02 DIAGNOSIS — O10.913 UNSPECIFIED PRE-EXISTING HYPERTENSION COMPLICATING PREGNANCY, THIRD TRIMESTER: ICD-10-CM

## 2022-08-02 DIAGNOSIS — O99.891 OTHER SPECIFIED DISEASES AND CONDITIONS COMPLICATING PREGNANCY: ICD-10-CM

## 2022-08-02 DIAGNOSIS — R10.9 UNSPECIFIED ABDOMINAL PAIN: ICD-10-CM

## 2022-08-02 DIAGNOSIS — F32.A DEPRESSION, UNSPECIFIED: ICD-10-CM

## 2022-08-02 DIAGNOSIS — O99.343 OTHER MENTAL DISORDERS COMPLICATING PREGNANCY, THIRD TRIMESTER: ICD-10-CM

## 2022-08-02 DIAGNOSIS — F12.99 CANNABIS USE, UNSPECIFIED WITH UNSPECIFIED CANNABIS-INDUCED DISORDER: ICD-10-CM

## 2022-08-02 DIAGNOSIS — Z79.4 LONG TERM (CURRENT) USE OF INSULIN: ICD-10-CM

## 2022-08-02 DIAGNOSIS — O24.113 PRE-EXISTING TYPE 2 DIABETES MELLITUS, IN PREGNANCY, THIRD TRIMESTER: ICD-10-CM

## 2022-08-02 DIAGNOSIS — F43.10 POST-TRAUMATIC STRESS DISORDER, UNSPECIFIED: ICD-10-CM

## 2022-08-02 DIAGNOSIS — J45.909 UNSPECIFIED ASTHMA, UNCOMPLICATED: ICD-10-CM

## 2022-08-02 DIAGNOSIS — O99.323 DRUG USE COMPLICATING PREGNANCY, THIRD TRIMESTER: ICD-10-CM

## 2022-08-03 ENCOUNTER — NON-APPOINTMENT (OUTPATIENT)
Age: 32
End: 2022-08-03

## 2022-08-03 ENCOUNTER — APPOINTMENT (OUTPATIENT)
Dept: OBGYN | Facility: CLINIC | Age: 32
End: 2022-08-03

## 2022-08-03 VITALS
WEIGHT: 198 LBS | DIASTOLIC BLOOD PRESSURE: 60 MMHG | BODY MASS INDEX: 32.99 KG/M2 | OXYGEN SATURATION: 91 % | HEIGHT: 65 IN | SYSTOLIC BLOOD PRESSURE: 120 MMHG

## 2022-08-03 PROCEDURE — 99213 OFFICE O/P EST LOW 20 MIN: CPT | Mod: TH

## 2022-08-04 NOTE — DISCUSSION/SUMMARY
[FreeTextEntry1] : 32 yo  with T2DM at 34+2 presenting for follow up MFM visit.\par \par #T2DM\par -increased bedtime lantus to 80 U; continue lispo 30 U with meals\par -patient to see Dr. Thakkar at the ultrasound unit on \par -patient to schedule with Dr. Amaya to follow up within the next 1-2 weeks\par \par #PNC\par -RTO in 2 weeks for GBS\par -IOL scheduled for uncontrolled diabetes and cHTN on 22 at 17:00 (37+1)- approved by Dr. Thakkar\par \par D/W Dr. Thakkar \par Note by Janice Riggs MD PGY2

## 2022-08-04 NOTE — HISTORY OF PRESENT ILLNESS
[FreeTextEntry1] : 30 yo  with T2DM at 34+2 presenting for follow up [facility] visit.\par \par Patient was admitted to the antepartum service from - for r/o PTL for  contractions. She was ruled out overnight on - but was kept for additional monitoring for glucose control. She was seen by Dr. Amaya's team in house and her regimen was increased to lispro 30 U premeal and lantus 70 U qhs. Her fingersticks remained elevated and she was discharged with twice weekly outpatient follow up. Patient has not made it to her last 2 sono appointments, canceled her psychiatry appointment, and has not made a follow-up with Dr. Amaya. She reports she cannot do early morning appointments and that's all that were available. She is scheduled for sono on  at 13:30, which she will be able to make. She will also reschedule with Dr. Amaya next week, and rescheduled her psychiatry appointment to .\par \par Patient has been monitoring fasting, pre-meal, and 1-hour-postprandial fingersticks at home. Her fasting fingersticks range from , and postprandials range from 120s-140s. Patient reports monitoring her diet at home- she east oatmeal or theodore and egg whites for breakfast, then will eat meat and brown rice for dinner.\par \par She reports she has a good support system with her partner and friends that she considers her family. \par \par

## 2022-08-05 ENCOUNTER — ASOB RESULT (OUTPATIENT)
Age: 32
End: 2022-08-05

## 2022-08-05 ENCOUNTER — APPOINTMENT (OUTPATIENT)
Dept: ANTEPARTUM | Facility: CLINIC | Age: 32
End: 2022-08-05

## 2022-08-05 ENCOUNTER — EMERGENCY (EMERGENCY)
Facility: HOSPITAL | Age: 32
LOS: 1 days | Discharge: HOME | End: 2022-08-05
Attending: EMERGENCY MEDICINE | Admitting: EMERGENCY MEDICINE
Payer: COMMERCIAL

## 2022-08-05 ENCOUNTER — OUTPATIENT (OUTPATIENT)
Dept: OUTPATIENT SERVICES | Facility: HOSPITAL | Age: 32
LOS: 1 days | End: 2022-08-05
Payer: COMMERCIAL

## 2022-08-05 ENCOUNTER — NON-APPOINTMENT (OUTPATIENT)
Age: 32
End: 2022-08-05

## 2022-08-05 ENCOUNTER — APPOINTMENT (OUTPATIENT)
Dept: PEDIATRIC CARDIOLOGY | Facility: CLINIC | Age: 32
End: 2022-08-05

## 2022-08-05 VITALS
OXYGEN SATURATION: 96 % | TEMPERATURE: 100 F | SYSTOLIC BLOOD PRESSURE: 132 MMHG | HEART RATE: 122 BPM | RESPIRATION RATE: 16 BRPM | HEIGHT: 60.3 IN | DIASTOLIC BLOOD PRESSURE: 83 MMHG

## 2022-08-05 DIAGNOSIS — O26.899 OTHER SPECIFIED PREGNANCY RELATED CONDITIONS, UNSPECIFIED TRIMESTER: ICD-10-CM

## 2022-08-05 DIAGNOSIS — H71.92 UNSPECIFIED CHOLESTEATOMA, LEFT EAR: Chronic | ICD-10-CM

## 2022-08-05 DIAGNOSIS — Z3A.00 WEEKS OF GESTATION OF PREGNANCY NOT SPECIFIED: ICD-10-CM

## 2022-08-05 PROCEDURE — 76820 UMBILICAL ARTERY ECHO: CPT

## 2022-08-05 PROCEDURE — 76819 FETAL BIOPHYS PROFIL W/O NST: CPT

## 2022-08-05 PROCEDURE — 76816 OB US FOLLOW-UP PER FETUS: CPT

## 2022-08-05 PROCEDURE — 76825 ECHO EXAM OF FETAL HEART: CPT

## 2022-08-05 PROCEDURE — 99203 OFFICE O/P NEW LOW 30 MIN: CPT | Mod: 25

## 2022-08-05 PROCEDURE — 93010 ELECTROCARDIOGRAM REPORT: CPT

## 2022-08-05 PROCEDURE — 76827 ECHO EXAM OF FETAL HEART: CPT

## 2022-08-05 PROCEDURE — 99285 EMERGENCY DEPT VISIT HI MDM: CPT

## 2022-08-05 PROCEDURE — 93325 DOPPLER ECHO COLOR FLOW MAPG: CPT | Mod: 59

## 2022-08-05 PROCEDURE — 76821 MIDDLE CEREBRAL ARTERY ECHO: CPT

## 2022-08-05 NOTE — ED PROVIDER NOTE - CLINICAL SUMMARY MEDICAL DECISION MAKING FREE TEXT BOX
34wks pregnant, lower abd discomfort, no bleeding, palpations, tachycardia, no chest pain, no SOB  -ekg  spoke with OB resident - will transfer pt upstairs to L&D for further observation and mgmt

## 2022-08-05 NOTE — ED PROVIDER NOTE - OBJECTIVE STATEMENT
32F hx htn, dm, anxiety, asthma, , 34 wks pregnant c/o lower abd discomfort. states started tonight, pain across lower abd. no vomiting, no fevers. no bleeding, no fluid gush. states feels fetal movement.

## 2022-08-05 NOTE — ED ADULT TRIAGE NOTE - OTHER COMPLAINTS
hx miscarriage. 34 weeks reports lower abd pressure, generalized weakness, dehydration and headache since yesterday.

## 2022-08-06 LAB
ALBUMIN SERPL ELPH-MCNC: 3.6 G/DL — SIGNIFICANT CHANGE UP (ref 3.3–5)
ALP SERPL-CCNC: 128 U/L — HIGH (ref 40–120)
ALT FLD-CCNC: 13 U/L — SIGNIFICANT CHANGE UP (ref 10–45)
ANION GAP SERPL CALC-SCNC: 14 MMOL/L — SIGNIFICANT CHANGE UP (ref 5–17)
APPEARANCE UR: CLEAR — SIGNIFICANT CHANGE UP
AST SERPL-CCNC: 22 U/L — SIGNIFICANT CHANGE UP (ref 10–40)
BASOPHILS # BLD AUTO: 0 K/UL — SIGNIFICANT CHANGE UP (ref 0–0.2)
BASOPHILS NFR BLD AUTO: 0 % — SIGNIFICANT CHANGE UP (ref 0–2)
BILIRUB SERPL-MCNC: 0.2 MG/DL — SIGNIFICANT CHANGE UP (ref 0.2–1.2)
BILIRUB UR-MCNC: NEGATIVE — SIGNIFICANT CHANGE UP
BUN SERPL-MCNC: 10 MG/DL — SIGNIFICANT CHANGE UP (ref 7–23)
CALCIUM SERPL-MCNC: 9.3 MG/DL — SIGNIFICANT CHANGE UP (ref 8.4–10.5)
CHLORIDE SERPL-SCNC: 103 MMOL/L — SIGNIFICANT CHANGE UP (ref 96–108)
CO2 SERPL-SCNC: 17 MMOL/L — LOW (ref 22–31)
COLOR SPEC: YELLOW — SIGNIFICANT CHANGE UP
CREAT ?TM UR-MCNC: 76 MG/DL — SIGNIFICANT CHANGE UP
CREAT SERPL-MCNC: 0.6 MG/DL — SIGNIFICANT CHANGE UP (ref 0.5–1.3)
DIFF PNL FLD: NEGATIVE — SIGNIFICANT CHANGE UP
EGFR: 121 ML/MIN/1.73M2 — SIGNIFICANT CHANGE UP
EOSINOPHIL # BLD AUTO: 0.09 K/UL — SIGNIFICANT CHANGE UP (ref 0–0.5)
EOSINOPHIL NFR BLD AUTO: 0.9 % — SIGNIFICANT CHANGE UP (ref 0–6)
GLUCOSE BLDC GLUCOMTR-MCNC: 159 MG/DL — HIGH (ref 70–99)
GLUCOSE SERPL-MCNC: 144 MG/DL — HIGH (ref 70–99)
GLUCOSE UR QL: 500
HCT VFR BLD CALC: 32.7 % — LOW (ref 34.5–45)
HGB BLD-MCNC: 11 G/DL — LOW (ref 11.5–15.5)
KETONES UR-MCNC: NEGATIVE — SIGNIFICANT CHANGE UP
LDH SERPL L TO P-CCNC: 138 U/L — SIGNIFICANT CHANGE UP (ref 50–242)
LEUKOCYTE ESTERASE UR-ACNC: NEGATIVE — SIGNIFICANT CHANGE UP
LYMPHOCYTES # BLD AUTO: 0.17 K/UL — LOW (ref 1–3.3)
LYMPHOCYTES # BLD AUTO: 1.7 % — LOW (ref 13–44)
MANUAL SMEAR VERIFICATION: SIGNIFICANT CHANGE UP
MCHC RBC-ENTMCNC: 30.7 PG — SIGNIFICANT CHANGE UP (ref 27–34)
MCHC RBC-ENTMCNC: 33.6 GM/DL — SIGNIFICANT CHANGE UP (ref 32–36)
MCV RBC AUTO: 91.3 FL — SIGNIFICANT CHANGE UP (ref 80–100)
MONOCYTES # BLD AUTO: 0.51 K/UL — SIGNIFICANT CHANGE UP (ref 0–0.9)
MONOCYTES NFR BLD AUTO: 5.2 % — SIGNIFICANT CHANGE UP (ref 2–14)
MYELOCYTES NFR BLD: 0.9 % — HIGH (ref 0–0)
NEUTROPHILS # BLD AUTO: 8.97 K/UL — HIGH (ref 1.8–7.4)
NEUTROPHILS NFR BLD AUTO: 91.3 % — HIGH (ref 43–77)
NITRITE UR-MCNC: NEGATIVE — SIGNIFICANT CHANGE UP
PH UR: 6 — SIGNIFICANT CHANGE UP (ref 5–8)
PLAT MORPH BLD: NORMAL — SIGNIFICANT CHANGE UP
PLATELET # BLD AUTO: 229 K/UL — SIGNIFICANT CHANGE UP (ref 150–400)
POTASSIUM SERPL-MCNC: 3.9 MMOL/L — SIGNIFICANT CHANGE UP (ref 3.5–5.3)
POTASSIUM SERPL-SCNC: 3.9 MMOL/L — SIGNIFICANT CHANGE UP (ref 3.5–5.3)
PROT ?TM UR-MCNC: 8 MG/DL — SIGNIFICANT CHANGE UP (ref 0–12)
PROT SERPL-MCNC: 7.1 G/DL — SIGNIFICANT CHANGE UP (ref 6–8.3)
PROT UR-MCNC: NEGATIVE MG/DL — SIGNIFICANT CHANGE UP
PROT/CREAT UR-RTO: 0.1 RATIO — SIGNIFICANT CHANGE UP (ref 0–0.2)
RBC # BLD: 3.58 M/UL — LOW (ref 3.8–5.2)
RBC # FLD: 13.9 % — SIGNIFICANT CHANGE UP (ref 10.3–14.5)
RBC BLD AUTO: NORMAL — SIGNIFICANT CHANGE UP
SODIUM SERPL-SCNC: 134 MMOL/L — LOW (ref 135–145)
SP GR SPEC: 1.02 — SIGNIFICANT CHANGE UP (ref 1–1.03)
URATE SERPL-MCNC: 5 MG/DL — SIGNIFICANT CHANGE UP (ref 2.5–7)
UROBILINOGEN FLD QL: 0.2 E.U./DL — SIGNIFICANT CHANGE UP
WBC # BLD: 9.83 K/UL — SIGNIFICANT CHANGE UP (ref 3.8–10.5)
WBC # FLD AUTO: 9.83 K/UL — SIGNIFICANT CHANGE UP (ref 3.8–10.5)

## 2022-08-06 PROCEDURE — 36415 COLL VENOUS BLD VENIPUNCTURE: CPT

## 2022-08-06 PROCEDURE — 82962 GLUCOSE BLOOD TEST: CPT

## 2022-08-06 PROCEDURE — 84550 ASSAY OF BLOOD/URIC ACID: CPT

## 2022-08-06 PROCEDURE — 99214 OFFICE O/P EST MOD 30 MIN: CPT

## 2022-08-06 PROCEDURE — 96375 TX/PRO/DX INJ NEW DRUG ADDON: CPT

## 2022-08-06 PROCEDURE — 80053 COMPREHEN METABOLIC PANEL: CPT

## 2022-08-06 PROCEDURE — 84156 ASSAY OF PROTEIN URINE: CPT

## 2022-08-06 PROCEDURE — 99285 EMERGENCY DEPT VISIT HI MDM: CPT | Mod: 25

## 2022-08-06 PROCEDURE — 93005 ELECTROCARDIOGRAM TRACING: CPT

## 2022-08-06 PROCEDURE — 96374 THER/PROPH/DIAG INJ IV PUSH: CPT

## 2022-08-06 PROCEDURE — 83615 LACTATE (LD) (LDH) ENZYME: CPT

## 2022-08-06 PROCEDURE — 81003 URINALYSIS AUTO W/O SCOPE: CPT

## 2022-08-06 PROCEDURE — 85025 COMPLETE CBC W/AUTO DIFF WBC: CPT

## 2022-08-06 PROCEDURE — 82570 ASSAY OF URINE CREATININE: CPT

## 2022-08-06 RX ORDER — SODIUM CHLORIDE 9 MG/ML
1000 INJECTION, SOLUTION INTRAVENOUS ONCE
Refills: 0 | Status: COMPLETED | OUTPATIENT
Start: 2022-08-06 | End: 2022-08-06

## 2022-08-06 RX ORDER — METOCLOPRAMIDE HCL 10 MG
10 TABLET ORAL ONCE
Refills: 0 | Status: COMPLETED | OUTPATIENT
Start: 2022-08-06 | End: 2022-08-06

## 2022-08-06 RX ORDER — ACETAMINOPHEN 500 MG
1000 TABLET ORAL ONCE
Refills: 0 | Status: COMPLETED | OUTPATIENT
Start: 2022-08-06 | End: 2022-08-06

## 2022-08-06 RX ORDER — ACETAMINOPHEN 500 MG
1000 TABLET ORAL ONCE
Refills: 0 | Status: DISCONTINUED | OUTPATIENT
Start: 2022-08-06 | End: 2022-08-19

## 2022-08-06 RX ORDER — METOCLOPRAMIDE HCL 10 MG
10 TABLET ORAL ONCE
Refills: 0 | Status: DISCONTINUED | OUTPATIENT
Start: 2022-08-06 | End: 2022-08-06

## 2022-08-06 RX ORDER — METOCLOPRAMIDE HCL 10 MG
10 TABLET ORAL ONCE
Refills: 0 | Status: DISCONTINUED | OUTPATIENT
Start: 2022-08-06 | End: 2022-08-19

## 2022-08-06 RX ADMIN — SODIUM CHLORIDE 1000 MILLILITER(S): 9 INJECTION, SOLUTION INTRAVENOUS at 02:04

## 2022-08-06 RX ADMIN — SODIUM CHLORIDE 1000 MILLILITER(S): 9 INJECTION, SOLUTION INTRAVENOUS at 00:22

## 2022-08-06 RX ADMIN — Medication 10 MILLIGRAM(S): at 01:40

## 2022-08-06 RX ADMIN — Medication 400 MILLIGRAM(S): at 01:40

## 2022-08-08 ENCOUNTER — NON-APPOINTMENT (OUTPATIENT)
Age: 32
End: 2022-08-08

## 2022-08-08 ENCOUNTER — APPOINTMENT (OUTPATIENT)
Dept: OBGYN | Facility: CLINIC | Age: 32
End: 2022-08-08

## 2022-08-08 ENCOUNTER — APPOINTMENT (OUTPATIENT)
Dept: ENDOCRINOLOGY | Facility: CLINIC | Age: 32
End: 2022-08-08

## 2022-08-09 ENCOUNTER — APPOINTMENT (OUTPATIENT)
Dept: ANTEPARTUM | Facility: CLINIC | Age: 32
End: 2022-08-09

## 2022-08-09 DIAGNOSIS — Z79.82 LONG TERM (CURRENT) USE OF ASPIRIN: ICD-10-CM

## 2022-08-09 DIAGNOSIS — Z79.4 LONG TERM (CURRENT) USE OF INSULIN: ICD-10-CM

## 2022-08-09 DIAGNOSIS — E28.2 POLYCYSTIC OVARIAN SYNDROME: ICD-10-CM

## 2022-08-09 DIAGNOSIS — R00.0 TACHYCARDIA, UNSPECIFIED: ICD-10-CM

## 2022-08-09 DIAGNOSIS — J45.909 UNSPECIFIED ASTHMA, UNCOMPLICATED: ICD-10-CM

## 2022-08-09 DIAGNOSIS — F41.9 ANXIETY DISORDER, UNSPECIFIED: ICD-10-CM

## 2022-08-09 DIAGNOSIS — O99.891 OTHER SPECIFIED DISEASES AND CONDITIONS COMPLICATING PREGNANCY: ICD-10-CM

## 2022-08-09 DIAGNOSIS — E11.9 TYPE 2 DIABETES MELLITUS WITHOUT COMPLICATIONS: ICD-10-CM

## 2022-08-09 DIAGNOSIS — Z20.822 CONTACT WITH AND (SUSPECTED) EXPOSURE TO COVID-19: ICD-10-CM

## 2022-08-09 DIAGNOSIS — Z3A.34 34 WEEKS GESTATION OF PREGNANCY: ICD-10-CM

## 2022-08-09 DIAGNOSIS — I10 ESSENTIAL (PRIMARY) HYPERTENSION: ICD-10-CM

## 2022-08-09 DIAGNOSIS — R10.30 LOWER ABDOMINAL PAIN, UNSPECIFIED: ICD-10-CM

## 2022-08-11 PROCEDURE — 87635 SARS-COV-2 COVID-19 AMP PRB: CPT

## 2022-08-11 PROCEDURE — 80053 COMPREHEN METABOLIC PANEL: CPT

## 2022-08-11 PROCEDURE — 85025 COMPLETE CBC W/AUTO DIFF WBC: CPT

## 2022-08-11 PROCEDURE — 81003 URINALYSIS AUTO W/O SCOPE: CPT

## 2022-08-11 PROCEDURE — 99285 EMERGENCY DEPT VISIT HI MDM: CPT | Mod: 25

## 2022-08-11 PROCEDURE — 82985 ASSAY OF GLYCATED PROTEIN: CPT

## 2022-08-11 PROCEDURE — 86850 RBC ANTIBODY SCREEN: CPT

## 2022-08-11 PROCEDURE — 87081 CULTURE SCREEN ONLY: CPT

## 2022-08-11 PROCEDURE — 36415 COLL VENOUS BLD VENIPUNCTURE: CPT

## 2022-08-11 PROCEDURE — 82962 GLUCOSE BLOOD TEST: CPT

## 2022-08-11 PROCEDURE — 83036 HEMOGLOBIN GLYCOSYLATED A1C: CPT

## 2022-08-11 PROCEDURE — 86900 BLOOD TYPING SEROLOGIC ABO: CPT

## 2022-08-11 PROCEDURE — 86901 BLOOD TYPING SEROLOGIC RH(D): CPT

## 2022-08-12 ENCOUNTER — APPOINTMENT (OUTPATIENT)
Dept: ANTEPARTUM | Facility: CLINIC | Age: 32
End: 2022-08-12

## 2022-08-15 ENCOUNTER — APPOINTMENT (OUTPATIENT)
Dept: OBGYN | Facility: CLINIC | Age: 32
End: 2022-08-15

## 2022-08-15 VITALS
SYSTOLIC BLOOD PRESSURE: 110 MMHG | DIASTOLIC BLOOD PRESSURE: 80 MMHG | OXYGEN SATURATION: 97 % | WEIGHT: 202 LBS | BODY MASS INDEX: 33.61 KG/M2

## 2022-08-15 PROCEDURE — 99213 OFFICE O/P EST LOW 20 MIN: CPT | Mod: TH

## 2022-08-16 ENCOUNTER — ASOB RESULT (OUTPATIENT)
Age: 32
End: 2022-08-16

## 2022-08-16 ENCOUNTER — NON-APPOINTMENT (OUTPATIENT)
Age: 32
End: 2022-08-16

## 2022-08-16 ENCOUNTER — APPOINTMENT (OUTPATIENT)
Dept: ANTEPARTUM | Facility: CLINIC | Age: 32
End: 2022-08-16

## 2022-08-16 LAB
HCV AB SER QL: NONREACTIVE
HCV S/CO RATIO: 0.16 S/CO
HIV1+2 AB SPEC QL IA.RAPID: NONREACTIVE

## 2022-08-16 PROCEDURE — 76816 OB US FOLLOW-UP PER FETUS: CPT

## 2022-08-16 PROCEDURE — 76818 FETAL BIOPHYS PROFILE W/NST: CPT

## 2022-08-17 ENCOUNTER — NON-APPOINTMENT (OUTPATIENT)
Age: 32
End: 2022-08-17

## 2022-08-17 ENCOUNTER — APPOINTMENT (OUTPATIENT)
Dept: OBGYN | Facility: CLINIC | Age: 32
End: 2022-08-17

## 2022-08-17 VITALS
SYSTOLIC BLOOD PRESSURE: 110 MMHG | BODY MASS INDEX: 33.61 KG/M2 | WEIGHT: 202 LBS | OXYGEN SATURATION: 97 % | DIASTOLIC BLOOD PRESSURE: 70 MMHG

## 2022-08-17 PROCEDURE — 99214 OFFICE O/P EST MOD 30 MIN: CPT | Mod: TH

## 2022-08-19 ENCOUNTER — ASOB RESULT (OUTPATIENT)
Age: 32
End: 2022-08-19

## 2022-08-19 ENCOUNTER — APPOINTMENT (OUTPATIENT)
Dept: ANTEPARTUM | Facility: CLINIC | Age: 32
End: 2022-08-19

## 2022-08-19 ENCOUNTER — OUTPATIENT (OUTPATIENT)
Dept: OUTPATIENT SERVICES | Facility: HOSPITAL | Age: 32
LOS: 1 days | End: 2022-08-19
Payer: COMMERCIAL

## 2022-08-19 DIAGNOSIS — Z01.818 ENCOUNTER FOR OTHER PREPROCEDURAL EXAMINATION: ICD-10-CM

## 2022-08-19 DIAGNOSIS — H71.92 UNSPECIFIED CHOLESTEATOMA, LEFT EAR: Chronic | ICD-10-CM

## 2022-08-19 LAB
BASOPHILS # BLD AUTO: 0.02 K/UL — SIGNIFICANT CHANGE UP (ref 0–0.2)
BASOPHILS NFR BLD AUTO: 0.2 % — SIGNIFICANT CHANGE UP (ref 0–2)
BLD GP AB SCN SERPL QL: NEGATIVE — SIGNIFICANT CHANGE UP
EOSINOPHIL # BLD AUTO: 0.13 K/UL — SIGNIFICANT CHANGE UP (ref 0–0.5)
EOSINOPHIL NFR BLD AUTO: 1.2 % — SIGNIFICANT CHANGE UP (ref 0–6)
HCT VFR BLD CALC: 35.9 % — SIGNIFICANT CHANGE UP (ref 34.5–45)
HGB BLD-MCNC: 12 G/DL — SIGNIFICANT CHANGE UP (ref 11.5–15.5)
IMM GRANULOCYTES NFR BLD AUTO: 1 % — SIGNIFICANT CHANGE UP (ref 0–1.5)
LYMPHOCYTES # BLD AUTO: 1.16 K/UL — SIGNIFICANT CHANGE UP (ref 1–3.3)
LYMPHOCYTES # BLD AUTO: 10.8 % — LOW (ref 13–44)
MCHC RBC-ENTMCNC: 30.5 PG — SIGNIFICANT CHANGE UP (ref 27–34)
MCHC RBC-ENTMCNC: 33.4 GM/DL — SIGNIFICANT CHANGE UP (ref 32–36)
MCV RBC AUTO: 91.3 FL — SIGNIFICANT CHANGE UP (ref 80–100)
MONOCYTES # BLD AUTO: 0.72 K/UL — SIGNIFICANT CHANGE UP (ref 0–0.9)
MONOCYTES NFR BLD AUTO: 6.7 % — SIGNIFICANT CHANGE UP (ref 2–14)
NEUTROPHILS # BLD AUTO: 8.58 K/UL — HIGH (ref 1.8–7.4)
NEUTROPHILS NFR BLD AUTO: 80.1 % — HIGH (ref 43–77)
NRBC # BLD: 0 /100 WBCS — SIGNIFICANT CHANGE UP (ref 0–0)
PLATELET # BLD AUTO: 273 K/UL — SIGNIFICANT CHANGE UP (ref 150–400)
RBC # BLD: 3.93 M/UL — SIGNIFICANT CHANGE UP (ref 3.8–5.2)
RBC # FLD: 14 % — SIGNIFICANT CHANGE UP (ref 10.3–14.5)
RH IG SCN BLD-IMP: POSITIVE — SIGNIFICANT CHANGE UP
WBC # BLD: 10.72 K/UL — HIGH (ref 3.8–10.5)
WBC # FLD AUTO: 10.72 K/UL — HIGH (ref 3.8–10.5)

## 2022-08-19 PROCEDURE — 76816 OB US FOLLOW-UP PER FETUS: CPT

## 2022-08-19 PROCEDURE — 86780 TREPONEMA PALLIDUM: CPT

## 2022-08-19 PROCEDURE — 85025 COMPLETE CBC W/AUTO DIFF WBC: CPT

## 2022-08-19 PROCEDURE — 76819 FETAL BIOPHYS PROFIL W/O NST: CPT

## 2022-08-19 NOTE — HISTORY OF PRESENT ILLNESS
[FreeTextEntry1] : 31 y/o  with T2DM at 36+3 presenting for f/u MFM visit.\par \par Patient was diagnosed with COVID two weeks ago and has been experiencing exacerbation of her asthma symptoms. Used nebulizer treatment at home yesterday which significantly improved her breathing. She is otherwise feeling well, denies s/s PEC.\par \par Patient has been monitoring fasting, pre-meal and 1-hr postprandial FS at home. Log reviewed on patient's phone. She was seen by Dr. Real yesterday. Instructed to continue lispro 30U premeal and lantus increased to 80U QHS. Pt is feeling well. Reports slight cramping today but denies painful contractions. No VB/LOF. Good FM.\par \par PMH: \par 1. T2DM: as above\par 2. Thyroid nodule (known to her for a year. Per medicine note, US performed, visualized 2 thyroid nodules, most recent TSH wnl. Patient endocrine following, FNA on  was benign.\par 3. Bipolar disorder, anxiety, depression, PTSD: no meds currently, previously on Lexapro, seroquel, hydroxycine.\par 4. Chronic HTN: diagnosed in , not currently on meds but was on lisinopril. BP's wnl as per hpi.\par 5. Asthma: albuterol use once a month. Hospitalized as a child, no intubation\par \par PSH:\par 1. History of right nephrolithiasis (s/p lithotripsy )\par 2. Ear surgery x2\par \par Social: hx of marijuana smoking (stopped when she got pregnant)

## 2022-08-19 NOTE — DISCUSSION/SUMMARY
[FreeTextEntry1] : 32 yo  with T2DM at 36+3 presenting for follow up M visit.\par \par #T2DM\par -Continue bedtime lantus 80 U; lispo 30 U with meals\par -patient to see Dr. Thakkar at the ultrasound unit on  for fetal growth scan\par \par #PNC\par -GBS collected , f/u results\par -Reviewed options for IOL and primary CS in the setting of current EFW, DM, and the risk of shoulder dystocia. Risk/benefits of each method of delivery were reviewed. All questions/concerns addressed. Pt expressed understanding.\par -IOL scheduled for uncontrolled diabetes and cHTN on 22 at 17:00 (37+1)- approved by Dr. Thakkar.\par -Membrane sweep performed (SVE /-3)\par \par D/W Dr. Thakkar \par Note by Alfredo Hayes MD PGY2 \par

## 2022-08-20 ENCOUNTER — NON-APPOINTMENT (OUTPATIENT)
Age: 32
End: 2022-08-20

## 2022-08-20 ENCOUNTER — TRANSCRIPTION ENCOUNTER (OUTPATIENT)
Age: 32
End: 2022-08-20

## 2022-08-20 ENCOUNTER — INPATIENT (INPATIENT)
Facility: HOSPITAL | Age: 32
LOS: 4 days | Discharge: HOME | End: 2022-08-25
Attending: OBSTETRICS & GYNECOLOGY | Admitting: OBSTETRICS & GYNECOLOGY
Payer: COMMERCIAL

## 2022-08-20 DIAGNOSIS — H71.92 UNSPECIFIED CHOLESTEATOMA, LEFT EAR: Chronic | ICD-10-CM

## 2022-08-20 DIAGNOSIS — O26.899 OTHER SPECIFIED PREGNANCY RELATED CONDITIONS, UNSPECIFIED TRIMESTER: ICD-10-CM

## 2022-08-20 DIAGNOSIS — Z3A.00 WEEKS OF GESTATION OF PREGNANCY NOT SPECIFIED: ICD-10-CM

## 2022-08-20 LAB — T PALLIDUM AB TITR SER: NEGATIVE — SIGNIFICANT CHANGE UP

## 2022-08-21 ENCOUNTER — RESULT REVIEW (OUTPATIENT)
Age: 32
End: 2022-08-21

## 2022-08-21 VITALS — HEIGHT: 63 IN | WEIGHT: 201.94 LBS

## 2022-08-21 LAB
ALBUMIN SERPL ELPH-MCNC: 3.6 G/DL — SIGNIFICANT CHANGE UP (ref 3.3–5)
ALP SERPL-CCNC: 153 U/L — HIGH (ref 40–120)
ALT FLD-CCNC: 7 U/L — LOW (ref 10–45)
ANION GAP SERPL CALC-SCNC: 12 MMOL/L — SIGNIFICANT CHANGE UP (ref 5–17)
APTT BLD: 26.6 SEC — LOW (ref 27.5–35.5)
AST SERPL-CCNC: 12 U/L — SIGNIFICANT CHANGE UP (ref 10–40)
BASOPHILS # BLD AUTO: 0.04 K/UL — SIGNIFICANT CHANGE UP (ref 0–0.2)
BASOPHILS NFR BLD AUTO: 0.3 % — SIGNIFICANT CHANGE UP (ref 0–2)
BILIRUB SERPL-MCNC: 0.2 MG/DL — SIGNIFICANT CHANGE UP (ref 0.2–1.2)
BLD GP AB SCN SERPL QL: NEGATIVE — SIGNIFICANT CHANGE UP
BUN SERPL-MCNC: 14 MG/DL — SIGNIFICANT CHANGE UP (ref 7–23)
CALCIUM SERPL-MCNC: 9.2 MG/DL — SIGNIFICANT CHANGE UP (ref 8.4–10.5)
CHLORIDE SERPL-SCNC: 107 MMOL/L — SIGNIFICANT CHANGE UP (ref 96–108)
CO2 SERPL-SCNC: 18 MMOL/L — LOW (ref 22–31)
COVID-19 SPIKE DOMAIN AB INTERP: POSITIVE
COVID-19 SPIKE DOMAIN ANTIBODY RESULT: >250 U/ML — HIGH
CREAT ?TM UR-MCNC: 88 MG/DL — SIGNIFICANT CHANGE UP
CREAT SERPL-MCNC: 0.55 MG/DL — SIGNIFICANT CHANGE UP (ref 0.5–1.3)
EGFR: 124 ML/MIN/1.73M2 — SIGNIFICANT CHANGE UP
EOSINOPHIL # BLD AUTO: 0.26 K/UL — SIGNIFICANT CHANGE UP (ref 0–0.5)
EOSINOPHIL NFR BLD AUTO: 2 % — SIGNIFICANT CHANGE UP (ref 0–6)
FIBRINOGEN PPP-MCNC: 580 MG/DL — HIGH (ref 258–438)
GLUCOSE BLDC GLUCOMTR-MCNC: 108 MG/DL — HIGH (ref 70–99)
GLUCOSE BLDC GLUCOMTR-MCNC: 113 MG/DL — HIGH (ref 70–99)
GLUCOSE BLDC GLUCOMTR-MCNC: 129 MG/DL — HIGH (ref 70–99)
GLUCOSE BLDC GLUCOMTR-MCNC: 131 MG/DL — HIGH (ref 70–99)
GLUCOSE BLDC GLUCOMTR-MCNC: 134 MG/DL — HIGH (ref 70–99)
GLUCOSE BLDC GLUCOMTR-MCNC: 77 MG/DL — SIGNIFICANT CHANGE UP (ref 70–99)
GLUCOSE BLDC GLUCOMTR-MCNC: 82 MG/DL — SIGNIFICANT CHANGE UP (ref 70–99)
GLUCOSE SERPL-MCNC: 83 MG/DL — SIGNIFICANT CHANGE UP (ref 70–99)
HCT VFR BLD CALC: 37.2 % — SIGNIFICANT CHANGE UP (ref 34.5–45)
HGB BLD-MCNC: 12.7 G/DL — SIGNIFICANT CHANGE UP (ref 11.5–15.5)
IMM GRANULOCYTES NFR BLD AUTO: 1 % — SIGNIFICANT CHANGE UP (ref 0–1.5)
INR BLD: 0.83 — LOW (ref 0.88–1.16)
LDH SERPL L TO P-CCNC: 166 U/L — SIGNIFICANT CHANGE UP (ref 50–242)
LYMPHOCYTES # BLD AUTO: 1.69 K/UL — SIGNIFICANT CHANGE UP (ref 1–3.3)
LYMPHOCYTES # BLD AUTO: 12.9 % — LOW (ref 13–44)
MCHC RBC-ENTMCNC: 30.7 PG — SIGNIFICANT CHANGE UP (ref 27–34)
MCHC RBC-ENTMCNC: 34.1 GM/DL — SIGNIFICANT CHANGE UP (ref 32–36)
MCV RBC AUTO: 89.9 FL — SIGNIFICANT CHANGE UP (ref 80–100)
MONOCYTES # BLD AUTO: 1 K/UL — HIGH (ref 0–0.9)
MONOCYTES NFR BLD AUTO: 7.6 % — SIGNIFICANT CHANGE UP (ref 2–14)
NEUTROPHILS # BLD AUTO: 10.03 K/UL — HIGH (ref 1.8–7.4)
NEUTROPHILS NFR BLD AUTO: 76.2 % — SIGNIFICANT CHANGE UP (ref 43–77)
NRBC # BLD: 0 /100 WBCS — SIGNIFICANT CHANGE UP (ref 0–0)
PLATELET # BLD AUTO: 298 K/UL — SIGNIFICANT CHANGE UP (ref 150–400)
POTASSIUM SERPL-MCNC: 4.2 MMOL/L — SIGNIFICANT CHANGE UP (ref 3.5–5.3)
POTASSIUM SERPL-SCNC: 4.2 MMOL/L — SIGNIFICANT CHANGE UP (ref 3.5–5.3)
PROT ?TM UR-MCNC: 16 MG/DL — HIGH (ref 0–12)
PROT SERPL-MCNC: 7.1 G/DL — SIGNIFICANT CHANGE UP (ref 6–8.3)
PROT/CREAT UR-RTO: 0.2 RATIO — SIGNIFICANT CHANGE UP (ref 0–0.2)
PROTHROM AB SERPL-ACNC: 9.8 SEC — LOW (ref 10.5–13.4)
RBC # BLD: 4.14 M/UL — SIGNIFICANT CHANGE UP (ref 3.8–5.2)
RBC # FLD: 13.9 % — SIGNIFICANT CHANGE UP (ref 10.3–14.5)
RH IG SCN BLD-IMP: POSITIVE — SIGNIFICANT CHANGE UP
SARS-COV-2 IGG+IGM SERPL QL IA: >250 U/ML — HIGH
SARS-COV-2 IGG+IGM SERPL QL IA: POSITIVE
SODIUM SERPL-SCNC: 137 MMOL/L — SIGNIFICANT CHANGE UP (ref 135–145)
URATE SERPL-MCNC: 5.1 MG/DL — SIGNIFICANT CHANGE UP (ref 2.5–7)
WBC # BLD: 13.15 K/UL — HIGH (ref 3.8–10.5)
WBC # FLD AUTO: 13.15 K/UL — HIGH (ref 3.8–10.5)

## 2022-08-21 PROCEDURE — 59514 CESAREAN DELIVERY ONLY: CPT | Mod: U7

## 2022-08-21 PROCEDURE — 88307 TISSUE EXAM BY PATHOLOGIST: CPT | Mod: 26

## 2022-08-21 PROCEDURE — 99223 1ST HOSP IP/OBS HIGH 75: CPT

## 2022-08-21 RX ORDER — CITRIC ACID/SODIUM CITRATE 300-500 MG
30 SOLUTION, ORAL ORAL ONCE
Refills: 0 | Status: COMPLETED | OUTPATIENT
Start: 2022-08-21 | End: 2022-08-21

## 2022-08-21 RX ORDER — ACETAMINOPHEN 500 MG
975 TABLET ORAL
Refills: 0 | Status: DISCONTINUED | OUTPATIENT
Start: 2022-08-21 | End: 2022-08-25

## 2022-08-21 RX ORDER — SIMETHICONE 80 MG/1
80 TABLET, CHEWABLE ORAL EVERY 4 HOURS
Refills: 0 | Status: DISCONTINUED | OUTPATIENT
Start: 2022-08-21 | End: 2022-08-25

## 2022-08-21 RX ORDER — DEXTROSE 50 % IN WATER 50 %
25 SYRINGE (ML) INTRAVENOUS ONCE
Refills: 0 | Status: DISCONTINUED | OUTPATIENT
Start: 2022-08-21 | End: 2022-08-25

## 2022-08-21 RX ORDER — OXYCODONE HYDROCHLORIDE 5 MG/1
5 TABLET ORAL ONCE
Refills: 0 | Status: DISCONTINUED | OUTPATIENT
Start: 2022-08-21 | End: 2022-08-25

## 2022-08-21 RX ORDER — DEXTROSE 50 % IN WATER 50 %
15 SYRINGE (ML) INTRAVENOUS ONCE
Refills: 0 | Status: DISCONTINUED | OUTPATIENT
Start: 2022-08-21 | End: 2022-08-25

## 2022-08-21 RX ORDER — OXYTOCIN 10 UNIT/ML
333.33 VIAL (ML) INJECTION
Qty: 20 | Refills: 0 | Status: DISCONTINUED | OUTPATIENT
Start: 2022-08-21 | End: 2022-08-21

## 2022-08-21 RX ORDER — CEFAZOLIN SODIUM 1 G
2000 VIAL (EA) INJECTION ONCE
Refills: 0 | Status: COMPLETED | OUTPATIENT
Start: 2022-08-21 | End: 2022-08-21

## 2022-08-21 RX ORDER — CITRIC ACID/SODIUM CITRATE 300-500 MG
15 SOLUTION, ORAL ORAL EVERY 6 HOURS
Refills: 0 | Status: DISCONTINUED | OUTPATIENT
Start: 2022-08-21 | End: 2022-08-21

## 2022-08-21 RX ORDER — INSULIN LISPRO 100/ML
5 VIAL (ML) SUBCUTANEOUS
Refills: 0 | Status: DISCONTINUED | OUTPATIENT
Start: 2022-08-21 | End: 2022-08-25

## 2022-08-21 RX ORDER — DIPHENHYDRAMINE HCL 50 MG
25 CAPSULE ORAL EVERY 6 HOURS
Refills: 0 | Status: DISCONTINUED | OUTPATIENT
Start: 2022-08-21 | End: 2022-08-25

## 2022-08-21 RX ORDER — KETOROLAC TROMETHAMINE 30 MG/ML
30 SYRINGE (ML) INJECTION EVERY 6 HOURS
Refills: 0 | Status: DISCONTINUED | OUTPATIENT
Start: 2022-08-21 | End: 2022-08-22

## 2022-08-21 RX ORDER — INSULIN LISPRO 100/ML
15 VIAL (ML) SUBCUTANEOUS
Refills: 0 | Status: DISCONTINUED | OUTPATIENT
Start: 2022-08-21 | End: 2022-08-21

## 2022-08-21 RX ORDER — SODIUM CHLORIDE 9 MG/ML
1000 INJECTION, SOLUTION INTRAVENOUS
Refills: 0 | Status: DISCONTINUED | OUTPATIENT
Start: 2022-08-21 | End: 2022-08-25

## 2022-08-21 RX ORDER — ENOXAPARIN SODIUM 100 MG/ML
40 INJECTION SUBCUTANEOUS EVERY 24 HOURS
Refills: 0 | Status: DISCONTINUED | OUTPATIENT
Start: 2022-08-21 | End: 2022-08-25

## 2022-08-21 RX ORDER — MAGNESIUM HYDROXIDE 400 MG/1
30 TABLET, CHEWABLE ORAL
Refills: 0 | Status: DISCONTINUED | OUTPATIENT
Start: 2022-08-21 | End: 2022-08-25

## 2022-08-21 RX ORDER — LANOLIN
1 OINTMENT (GRAM) TOPICAL EVERY 6 HOURS
Refills: 0 | Status: DISCONTINUED | OUTPATIENT
Start: 2022-08-21 | End: 2022-08-25

## 2022-08-21 RX ORDER — SODIUM CHLORIDE 9 MG/ML
1000 INJECTION, SOLUTION INTRAVENOUS
Refills: 0 | Status: DISCONTINUED | OUTPATIENT
Start: 2022-08-21 | End: 2022-08-21

## 2022-08-21 RX ORDER — DEXTROSE 50 % IN WATER 50 %
12.5 SYRINGE (ML) INTRAVENOUS ONCE
Refills: 0 | Status: DISCONTINUED | OUTPATIENT
Start: 2022-08-21 | End: 2022-08-25

## 2022-08-21 RX ORDER — SODIUM CHLORIDE 9 MG/ML
1000 INJECTION, SOLUTION INTRAVENOUS ONCE
Refills: 0 | Status: DISCONTINUED | OUTPATIENT
Start: 2022-08-21 | End: 2022-08-21

## 2022-08-21 RX ORDER — FAMOTIDINE 10 MG/ML
20 INJECTION INTRAVENOUS ONCE
Refills: 0 | Status: DISCONTINUED | OUTPATIENT
Start: 2022-08-21 | End: 2022-08-21

## 2022-08-21 RX ORDER — CHLORHEXIDINE GLUCONATE 213 G/1000ML
1 SOLUTION TOPICAL ONCE
Refills: 0 | Status: DISCONTINUED | OUTPATIENT
Start: 2022-08-21 | End: 2022-08-21

## 2022-08-21 RX ORDER — GLUCAGON INJECTION, SOLUTION 0.5 MG/.1ML
1 INJECTION, SOLUTION SUBCUTANEOUS ONCE
Refills: 0 | Status: DISCONTINUED | OUTPATIENT
Start: 2022-08-21 | End: 2022-08-25

## 2022-08-21 RX ORDER — OXYTOCIN 10 UNIT/ML
333.33 VIAL (ML) INJECTION
Qty: 20 | Refills: 0 | Status: DISCONTINUED | OUTPATIENT
Start: 2022-08-21 | End: 2022-08-25

## 2022-08-21 RX ORDER — TETANUS TOXOID, REDUCED DIPHTHERIA TOXOID AND ACELLULAR PERTUSSIS VACCINE, ADSORBED 5; 2.5; 8; 8; 2.5 [IU]/.5ML; [IU]/.5ML; UG/.5ML; UG/.5ML; UG/.5ML
0.5 SUSPENSION INTRAMUSCULAR ONCE
Refills: 0 | Status: DISCONTINUED | OUTPATIENT
Start: 2022-08-21 | End: 2022-08-25

## 2022-08-21 RX ORDER — INSULIN LISPRO 100/ML
VIAL (ML) SUBCUTANEOUS
Refills: 0 | Status: DISCONTINUED | OUTPATIENT
Start: 2022-08-21 | End: 2022-08-25

## 2022-08-21 RX ORDER — IBUPROFEN 200 MG
600 TABLET ORAL EVERY 6 HOURS
Refills: 0 | Status: COMPLETED | OUTPATIENT
Start: 2022-08-21 | End: 2023-07-20

## 2022-08-21 RX ORDER — OXYCODONE HYDROCHLORIDE 5 MG/1
5 TABLET ORAL
Refills: 0 | Status: COMPLETED | OUTPATIENT
Start: 2022-08-21 | End: 2022-08-28

## 2022-08-21 RX ORDER — INSULIN GLARGINE 100 [IU]/ML
40 INJECTION, SOLUTION SUBCUTANEOUS AT BEDTIME
Refills: 0 | Status: DISCONTINUED | OUTPATIENT
Start: 2022-08-21 | End: 2022-08-21

## 2022-08-21 RX ADMIN — Medication 30 MILLIGRAM(S): at 17:57

## 2022-08-21 RX ADMIN — Medication 30 MILLIGRAM(S): at 06:05

## 2022-08-21 RX ADMIN — Medication 975 MILLIGRAM(S): at 15:03

## 2022-08-21 RX ADMIN — Medication 975 MILLIGRAM(S): at 21:43

## 2022-08-21 RX ADMIN — Medication 30 MILLIGRAM(S): at 13:00

## 2022-08-21 RX ADMIN — Medication 15 UNIT(S): at 12:06

## 2022-08-21 RX ADMIN — Medication 975 MILLIGRAM(S): at 21:13

## 2022-08-21 RX ADMIN — Medication 975 MILLIGRAM(S): at 16:00

## 2022-08-21 RX ADMIN — Medication 30 MILLILITER(S): at 03:02

## 2022-08-21 RX ADMIN — Medication 5 UNIT(S): at 17:36

## 2022-08-21 RX ADMIN — Medication 30 MILLIGRAM(S): at 18:50

## 2022-08-21 RX ADMIN — Medication 100 MILLIGRAM(S): at 03:09

## 2022-08-21 RX ADMIN — ENOXAPARIN SODIUM 40 MILLIGRAM(S): 100 INJECTION SUBCUTANEOUS at 18:23

## 2022-08-21 RX ADMIN — Medication 30 MILLIGRAM(S): at 12:10

## 2022-08-21 NOTE — CONSULT NOTE ADULT - SUBJECTIVE AND OBJECTIVE BOX
HISTORY OF PRESENT ILLNESS  Demi Nance is a 32-years-old female  with a past medical history of poorly controlled type 2 diabetes mellitus and hypertension who was admitted for scheduled  due to macrosomia, currently on post-operative day #1. Endocrinology consulted for recommendations regarding diabetes management.     DIABETES HISTORY  She is followed outpatient by Dr. Amaya in our division. Patient has a history of type 2 diabetes mellitus, diagnosed ~7 years ago, which was treated only with metformin prior to her current pregnancy, and on insulin during pregnancy. Her last hemoglobin A1C is 6.3% (22). She was recently evaluated by inpatient endocrinology during a recent admission for abdominal pain on 22 - 22. At that time, she was taking Lantus 60 units at bedtime and Admelog 25 units three times daily before meals. She reported eating a low carbohydrate diet. However, despite diet she had been unable to achieve a good glycemic control during pregnancy.     During last admission, her insulin regimen was modified and she was discharged on Lantus 70 units at bedtime and Novolog 30-35 units before meals. Patient was instructed to contact Dr. Amaya about adding a morning dose of Lantus to her regimen if her morning fingersticks remained >95 mg/dL on that dose. Impression was that she was likely going to need more premeal insulin as well.     Patient says that her FSG after discharge have been ***.     PAST MEDICAL & SURGICAL HISTORY: as per HPI.    FAMILY HISTORY  DM:  Thyroid:  Autoimmune:  Other:    SOCIAL HISTORY  Work:  Smoking:  Etoh:  Recreational Drugs:    ALLERGIES  No Known Allergies    CURRENT MEDICATIONS  acetaminophen     Tablet .. 975 milliGRAM(s) Oral <User Schedule>  dextrose 5%. 1000 milliLiter(s) IV Continuous <Continuous>  dextrose 5%. 1000 milliLiter(s) IV Continuous <Continuous>  dextrose 50% Injectable 25 Gram(s) IV Push once  dextrose 50% Injectable 12.5 Gram(s) IV Push once  dextrose 50% Injectable 25 Gram(s) IV Push once  dextrose Oral Gel 15 Gram(s) Oral once PRN  diphenhydrAMINE 25 milliGRAM(s) Oral every 6 hours PRN  diphtheria/tetanus/pertussis (acellular) Vaccine (ADAcel) 0.5 milliLiter(s) IntraMuscular once  enoxaparin Injectable 40 milliGRAM(s) SubCutaneous every 24 hours  glucagon  Injectable 1 milliGRAM(s) IntraMuscular once  ibuprofen  Tablet. 600 milliGRAM(s) Oral every 6 hours  insulin glargine Injectable (LANTUS) 40 Unit(s) SubCutaneous at bedtime  insulin lispro (ADMELOG) corrective regimen sliding scale   SubCutaneous three times a day before meals  insulin lispro Injectable (ADMELOG) 15 Unit(s) SubCutaneous three times a day before meals  ketorolac   Injectable 30 milliGRAM(s) IV Push every 6 hours  lactated ringers. 1000 milliLiter(s) IV Continuous <Continuous>  lanolin Ointment 1 Application(s) Topical every 6 hours PRN  magnesium hydroxide Suspension 30 milliLiter(s) Oral two times a day PRN  oxyCODONE    IR 5 milliGRAM(s) Oral every 3 hours PRN  oxyCODONE    IR 5 milliGRAM(s) Oral once PRN  oxytocin Infusion 333.333 milliUNIT(s)/Min IV Continuous <Continuous>  simethicone 80 milliGRAM(s) Chew every 4 hours PRN    REVIEW OF SYSTEMS  Constitutional:  Negative fever, chills or loss of appetite.  Eyes:  Negative blurry vision or double vision.  Cardiovascular:  Negative for chest pain or palpitations.  Respiratory:  Negative for cough, wheezing, or SOB.   Gastrointestinal:  Negative for nausea, vomiting, diarrhea, constipation, or abdominal pain.  Genitourinary:  Negative frequency, urgency or dysuria.  Neurologic:  No headache, confusion, dizziness, lightheadedness.    PHYSICAL EXAM  Vital Signs Last 24 Hrs  T(C): 36.8 (21 Aug 2022 06:30), Max: 36.8 (21 Aug 2022 06:30)  T(F): 98.2 (21 Aug 2022 06:30), Max: 98.2 (21 Aug 2022 06:30)  HR: 82 (21 Aug 2022 08:15) (66 - 84)  BP: 108/60 (21 Aug 2022 08:15) (100/54 - 125/61)  BP(mean): --  RR: 17 (21 Aug 2022 08:15) (16 - 19)  SpO2: 100% (21 Aug 2022 08:15) (100% - 100%)    Height (cm): 160 ( @ 02:36)  Weight (kg): 91.6 ( @ 02:36)  BMI (kg/m2): 35.8 ( @ 02:36)    Constitutional: Awake, alert, in no acute distress.   HEENT: Normocephalic, atraumatic, KARLA, no proptosis or lid retraction.   Neck: supple, no acanthosis, no thyromegaly or palpable thyroid nodules.  Respiratory: Lungs clear to ausculation bilaterally.   Cardiovascular: regular rhythm, normal S1 and S2, no audible murmurs.   GI: soft, non-tender, non-distended, bowel sounds present, no masses appreciated.  Extremities: No lower extremity edema, peripheral pulses present.   Skin: no rashes.   Psychiatric: AAO x 3. Normal affect/mood.     LABS                        12.7   13.15 )-----------( 298      ( 21 Aug 2022 02:06 )             37.2     137  |  107  |  14  ----------------------------<  83  4.2   |  18<L>  |  0.55    Ca    9.2      21 Aug 2022 03:18  TPro  7.1  /  Alb  3.6  /  TBili  0.2  /  DBili  x   /  AST  12  /  ALT  7<L>  /  AlkPhos  153<H>    Alb: 3.6 g/dL / Pro: 7.1 g/dL / ALK PHOS: 153 U/L / ALT: 7 U/L / AST: 12 U/L / GGT: x         PT/INR - ( 21 Aug 2022 03:18 )   PT: 9.8 sec;   INR: 0.83     PTT - ( 21 Aug 2022 03:18 )  PTT:26.6 sec    CAPILLARY BLOOD GLUCOSE   POCT Blood Glucose.: 113 mg/dL (21 Aug 2022 08:26)  POCT Blood Glucose.: 129 mg/dL (21 Aug 2022 05:48)  POCT Blood Glucose.: 77 mg/dL (21 Aug 2022 01:45)    ASSESSMENT / RECOMMENDATIONS      A1C: 6.3%.   Weight: 91.6 kg.   BMI: 35.8.   GFR: 124.   Ejection Fraction: 65%.     # Type 2 diabetes mellitus      Thank you for allowing us to participate in the care of Ms. Nance.    Will continue to monitor.       Case discussed with Dr. Hawthorne. Primary team updated.       Pramod Nuñez    Endocrinology Fellow    Service Pager: [phone]  HISTORY OF PRESENT ILLNESS  Demi Nance is a 32-years-old female with a past medical history of poorly controlled type 2 diabetes mellitus and hypertension who was admitted for emergent  overnight, currently on post-operative day #1. Endocrinology consulted for recommendations regarding diabetes management.     DIABETES HISTORY  She is followed outpatient by Dr. Amaya in our division. Patient has a history of type 2 diabetes mellitus, diagnosed ~6 years ago, which was treated only with metformin 1,000 mg twice daily prior to her current pregnancy, and on insulin during pregnancy. Prior to pregnancy, her last hemoglobin A1C on file is 8.8% (2021). Her last hemoglobin A1C during current pregnancy is 6.3% (22). She was previously evaluated by inpatient endocrinology during a recent admission for abdominal pain on 22 - 22. At that time, she was taking Lantus 60 units at bedtime and Admelog 25-30 units three times daily before meals. She reported eating a low carbohydrate diet. However, despite diet she had been unable to achieve a good glycemic control during pregnancy. During last admission, her insulin regimen was modified and she was discharged on Lantus 70 units at bedtime and Novolog 30-35 units before meals. Patient was instructed to contact Dr. Amaya about adding a morning dose of Lantus to her regimen if her morning fingersticks remained >95 mg/dL on that dose. Impression was that she was likely going to need more pre-meal insulin as well.     Upon evaluation, patient says that she was recently instructed by a doctor last Monday (she thinks was a gynecologist) to increase her bedtime Lantus to 80 units at bedtime. However, despite this change, her fingerstick glucose remained out of goal. She had a blood glucose log from 22 - 22 which showed an fasting fingerstick glucose around ~110's in average and a 1-hour postprandial fingerstick glucose of 130-150s.  She mentioned that this numbers were similar to the ones she was seen prior to increasing her long-acting insulin.     Patient says she had an appointment with Dr. Amaya for 22; however, she got infected with COVID-19 on 22 and had to cancel the appointment. She missed rescheduling it. Patient says that she was not feeling her baby moving as much as he usually does, and had a sonogram last friday (22) which showed that her baby was not moving that much. She was told that maybe the baby was sleeping at the time. The next day, she only felt one kick during the morning. Therefore, she called her gynecologist in the evening who instructed her to come to the hospital for further evaluation, and later underwent emergent . Her last dose of insulin was prior to having dinner (22 ~9 PM, 35 units of Lispro after eating an Kevin pasta at [employer]). Her last dose of long-acting insulin was the night before yesterday (40 units of Lantus on the evening of 22). Patient was evaluated in the morning after her procedure, reports having decreased appetite; denies having nausea or vomiting.     PAST MEDICAL & SURGICAL HISTORY: as per HPI.    ALLERGIES  No Known Allergies    CURRENT MEDICATIONS  acetaminophen     Tablet .. 975 milliGRAM(s) Oral <User Schedule>  dextrose 5%. 1000 milliLiter(s) IV Continuous <Continuous>  dextrose 5%. 1000 milliLiter(s) IV Continuous <Continuous>  dextrose 50% Injectable 25 Gram(s) IV Push once  dextrose 50% Injectable 12.5 Gram(s) IV Push once  dextrose 50% Injectable 25 Gram(s) IV Push once  dextrose Oral Gel 15 Gram(s) Oral once PRN  diphenhydrAMINE 25 milliGRAM(s) Oral every 6 hours PRN  diphtheria/tetanus/pertussis (acellular) Vaccine (ADAcel) 0.5 milliLiter(s) IntraMuscular once  enoxaparin Injectable 40 milliGRAM(s) SubCutaneous every 24 hours  glucagon  Injectable 1 milliGRAM(s) IntraMuscular once  ibuprofen  Tablet. 600 milliGRAM(s) Oral every 6 hours  insulin glargine Injectable (LANTUS) 40 Unit(s) SubCutaneous at bedtime  insulin lispro (ADMELOG) corrective regimen sliding scale   SubCutaneous three times a day before meals  insulin lispro Injectable (ADMELOG) 15 Unit(s) SubCutaneous three times a day before meals  ketorolac   Injectable 30 milliGRAM(s) IV Push every 6 hours  lactated ringers. 1000 milliLiter(s) IV Continuous <Continuous>  lanolin Ointment 1 Application(s) Topical every 6 hours PRN  magnesium hydroxide Suspension 30 milliLiter(s) Oral two times a day PRN  oxyCODONE    IR 5 milliGRAM(s) Oral every 3 hours PRN  oxyCODONE    IR 5 milliGRAM(s) Oral once PRN  oxytocin Infusion 333.333 milliUNIT(s)/Min IV Continuous <Continuous>  simethicone 80 milliGRAM(s) Chew every 4 hours PRN    REVIEW OF SYSTEMS  Constitutional:  Negative fever, chills or loss of appetite.  Eyes:  Negative blurry vision or double vision.  Cardiovascular:  Negative for chest pain or palpitations.  Respiratory:  Negative for cough, wheezing, or SOB.   Gastrointestinal:  Negative for nausea, vomiting, diarrhea, constipation, or abdominal pain.  Genitourinary:  Negative frequency, urgency or dysuria.  Neurologic:  No headache, confusion, dizziness, lightheadedness.    PHYSICAL EXAM  Vital Signs Last 24 Hrs  T(C): 36.8 (21 Aug 2022 06:30), Max: 36.8 (21 Aug 2022 06:30)  T(F): 98.2 (21 Aug 2022 06:30), Max: 98.2 (21 Aug 2022 06:30)  HR: 82 (21 Aug 2022 08:15) (66 - 84)  BP: 108/60 (21 Aug 2022 08:15) (100/54 - 125/61)  BP(mean): --  RR: 17 (21 Aug 2022 08:15) (16 - 19)  SpO2: 100% (21 Aug 2022 08:15) (100% - 100%)    Height (cm): 160 ( @ 02:36)  Weight (kg): 91.6 ( @ 02:36)  BMI (kg/m2): 35.8 ( @ 02:36)    Constitutional: Awake, alert, young female in no acute distress.   HEENT: Normocephalic, atraumatic, KARLA.  Respiratory: Lungs clear to ausculation bilaterally.   Cardiovascular: regular rhythm, normal S1 and S2, no audible murmurs.   GI: soft, non-tender, bowel sounds present.  Extremities: No lower extremity edema.  Psychiatric: AAO x 3. Normal affect/mood.     LABS                        12.7   13.15 )-----------( 298      ( 21 Aug 2022 02:06 )             37.2     137  |  107  |  14  ----------------------------<  83  4.2   |  18<L>  |  0.55    Ca    9.2      21 Aug 2022 03:18  TPro  7.1  /  Alb  3.6  /  TBili  0.2  /  DBili  x   /  AST  12  /  ALT  7<L>  /  AlkPhos  153<H>  -  Alb: 3.6 g/dL / Pro: 7.1 g/dL / ALK PHOS: 153 U/L / ALT: 7 U/L / AST: 12 U/L / GGT: x         PT/INR - ( 21 Aug 2022 03:18 )   PT: 9.8 sec;   INR: 0.83     PTT - ( 21 Aug 2022 03:18 )  PTT:26.6 sec    CAPILLARY BLOOD GLUCOSE   POCT Blood Glucose.: 113 mg/dL (21 Aug 2022 08:26)  POCT Blood Glucose.: 129 mg/dL (21 Aug 2022 05:48)  POCT Blood Glucose.: 77 mg/dL (21 Aug 2022 01:45)    ASSESSMENT / RECOMMENDATIONS  Goyo is a 32-years-old female with a past medical history of poorly controlled type 2 diabetes mellitus and hypertension who was admitted for emergent  overnight, currently on post-operative day #1. Endocrinology consulted for recommendations regarding diabetes management.     Patient had been previously only on metformin prior to current pregnancy with a hemoglobin A1C around 8% as of last year. Despite her elevated insulin requirements during pregnancy, many patients who were not on insulin before pregnancy or who started insulin in the setting of planning pregnancy don't need insulin once the baby is delivered. Given her last dose of insulin was more than 24 hours ago and her fingerstick glucose remained between 100-130 mg/dL, would not recommend to continue with the long-actin insulin for now. She will likely require some pre-meal insulin for now, but its not expected for her to require insulin upon discharge. She will likely be able to go home on an oral antidiabetic regimen.     A1C: 6.3%.   Weight: 91.6 kg.   BMI: 35.8.   GFR: 124.   Ejection Fraction: 65%.     # Type 2 diabetes mellitus  - Hold Lantus for now.   - Decrease Lispro insulin to 5 units three times daily before meals.  - Continue with Lispro insulin moderate dose sliding scale three times daily and at bedtime.   - Goal fingerstick glucose goal of 100-180 mg/dL.     Thank you for allowing us to participate in the care of Ms. Nance.    Will continue to monitor.       Case discussed with Dr. Hawthorne. Primary team updated.       Pramod Nuñez    Endocrinology Fellow    Service Pager: [phone]

## 2022-08-21 NOTE — CONSULT NOTE ADULT - ATTENDING COMMENTS
32yoF h/o T2DM (outpatient endocrinologist Dr. Parker Amaya) who underwent  in the early morning of , on whom we are consulted for diabetes management. Prior to this pregnancy she was on metformin 1000mg bid only, with reported A1c 7-8%. Was started on insulin this pregnancy and A1c improved to 6.8% then 6.3%. Was taking Lantus 80 units daily and bolus insulin 30-35 units with each meal this past week. Pregnancy was c/b macrosomia.    At home the patient felt decreased fetal movement and was instructed to come to the hospital where she underwent C section in the early morning of . Her last dose of long-acting insulin was on 22 at 9pm. She also took 35u Lispro after eating a pasta Kevin dinner on the evening of . FSG on arrival was 77. FSGs 129-134 the morning of  without additional insulin.     She ate lunch today and was given lispro 15u for lunch. Is not planning to breastfeed.    Insulin requirements have drastically decreased post-delivery. Would recommend Lispro 5u prior to each meal and holding basal insulin to see how she does overnight.     Deana Hawthorne MD  Endocrinology Attending

## 2022-08-21 NOTE — PATIENT PROFILE OB - FALL HARM RISK - UNIVERSAL INTERVENTIONS
Bed in lowest position, wheels locked, appropriate side rails in place/Call bell, personal items and telephone in reach/Instruct patient to call for assistance before getting out of bed or chair/Non-slip footwear when patient is out of bed/[place] to call system/Physically safe environment - no spills, clutter or unnecessary equipment/Purposeful Proactive Rounding/Room/bathroom lighting operational, light cord in reach

## 2022-08-21 NOTE — PATIENT PROFILE OB - MEDICAL/SURG HX
ID bands checked. Infant's ID band and Mother's matching ID bands removed and taped to discharge instruction sheet, the mother verified as correct and witnessed by RN. Umbilical clamp and HUGS tag removed. Mom and  Infant discharged via wheelchair to private car. Infant placed in car seat per parents. Mom and baby accompanied by family and in stable condition. For Medical Surgical Hx obtained at Admission, Please see Provider H&P

## 2022-08-22 ENCOUNTER — APPOINTMENT (OUTPATIENT)
Dept: OBGYN | Facility: CLINIC | Age: 32
End: 2022-08-22

## 2022-08-22 LAB
B-HEM STREP SPEC QL CULT: NORMAL
BASOPHILS # BLD AUTO: 0.02 K/UL — SIGNIFICANT CHANGE UP (ref 0–0.2)
BASOPHILS NFR BLD AUTO: 0.2 % — SIGNIFICANT CHANGE UP (ref 0–2)
EOSINOPHIL # BLD AUTO: 0.36 K/UL — SIGNIFICANT CHANGE UP (ref 0–0.5)
EOSINOPHIL NFR BLD AUTO: 3.5 % — SIGNIFICANT CHANGE UP (ref 0–6)
GLUCOSE BLDC GLUCOMTR-MCNC: 105 MG/DL — HIGH (ref 70–99)
GLUCOSE BLDC GLUCOMTR-MCNC: 105 MG/DL — HIGH (ref 70–99)
GLUCOSE BLDC GLUCOMTR-MCNC: 122 MG/DL — HIGH (ref 70–99)
GLUCOSE BLDC GLUCOMTR-MCNC: 90 MG/DL — SIGNIFICANT CHANGE UP (ref 70–99)
HCT VFR BLD CALC: 28.1 % — LOW (ref 34.5–45)
HGB BLD-MCNC: 9.1 G/DL — LOW (ref 11.5–15.5)
IMM GRANULOCYTES NFR BLD AUTO: 0.8 % — SIGNIFICANT CHANGE UP (ref 0–1.5)
LYMPHOCYTES # BLD AUTO: 1.23 K/UL — SIGNIFICANT CHANGE UP (ref 1–3.3)
LYMPHOCYTES # BLD AUTO: 12 % — LOW (ref 13–44)
MCHC RBC-ENTMCNC: 30.1 PG — SIGNIFICANT CHANGE UP (ref 27–34)
MCHC RBC-ENTMCNC: 32.4 GM/DL — SIGNIFICANT CHANGE UP (ref 32–36)
MCV RBC AUTO: 93 FL — SIGNIFICANT CHANGE UP (ref 80–100)
MONOCYTES # BLD AUTO: 0.66 K/UL — SIGNIFICANT CHANGE UP (ref 0–0.9)
MONOCYTES NFR BLD AUTO: 6.5 % — SIGNIFICANT CHANGE UP (ref 2–14)
NEUTROPHILS # BLD AUTO: 7.88 K/UL — HIGH (ref 1.8–7.4)
NEUTROPHILS NFR BLD AUTO: 77 % — SIGNIFICANT CHANGE UP (ref 43–77)
NRBC # BLD: 0 /100 WBCS — SIGNIFICANT CHANGE UP (ref 0–0)
PLATELET # BLD AUTO: 213 K/UL — SIGNIFICANT CHANGE UP (ref 150–400)
RBC # BLD: 3.02 M/UL — LOW (ref 3.8–5.2)
RBC # FLD: 14.1 % — SIGNIFICANT CHANGE UP (ref 10.3–14.5)
SARS-COV-2 N GENE NPH QL NAA+PROBE: NOT DETECTED
T PALLIDUM AB TITR SER: NEGATIVE — SIGNIFICANT CHANGE UP
WBC # BLD: 10.23 K/UL — SIGNIFICANT CHANGE UP (ref 3.8–10.5)
WBC # FLD AUTO: 10.23 K/UL — SIGNIFICANT CHANGE UP (ref 3.8–10.5)

## 2022-08-22 PROCEDURE — 99232 SBSQ HOSP IP/OBS MODERATE 35: CPT | Mod: GC

## 2022-08-22 RX ORDER — IBUPROFEN 200 MG
600 TABLET ORAL EVERY 6 HOURS
Refills: 0 | Status: DISCONTINUED | OUTPATIENT
Start: 2022-08-22 | End: 2022-08-25

## 2022-08-22 RX ORDER — OXYCODONE HYDROCHLORIDE 5 MG/1
5 TABLET ORAL
Refills: 0 | Status: DISCONTINUED | OUTPATIENT
Start: 2022-08-22 | End: 2022-08-25

## 2022-08-22 RX ADMIN — Medication 600 MILLIGRAM(S): at 19:00

## 2022-08-22 RX ADMIN — Medication 975 MILLIGRAM(S): at 20:59

## 2022-08-22 RX ADMIN — SIMETHICONE 80 MILLIGRAM(S): 80 TABLET, CHEWABLE ORAL at 14:38

## 2022-08-22 RX ADMIN — Medication 975 MILLIGRAM(S): at 03:14

## 2022-08-22 RX ADMIN — Medication 5 UNIT(S): at 09:08

## 2022-08-22 RX ADMIN — Medication 975 MILLIGRAM(S): at 09:08

## 2022-08-22 RX ADMIN — OXYCODONE HYDROCHLORIDE 5 MILLIGRAM(S): 5 TABLET ORAL at 20:59

## 2022-08-22 RX ADMIN — Medication 5 UNIT(S): at 19:16

## 2022-08-22 RX ADMIN — ENOXAPARIN SODIUM 40 MILLIGRAM(S): 100 INJECTION SUBCUTANEOUS at 18:19

## 2022-08-22 RX ADMIN — Medication 975 MILLIGRAM(S): at 21:30

## 2022-08-22 RX ADMIN — Medication 975 MILLIGRAM(S): at 14:38

## 2022-08-22 RX ADMIN — Medication 30 MILLIGRAM(S): at 00:13

## 2022-08-22 RX ADMIN — Medication 600 MILLIGRAM(S): at 05:41

## 2022-08-22 RX ADMIN — Medication 600 MILLIGRAM(S): at 12:30

## 2022-08-22 RX ADMIN — Medication 975 MILLIGRAM(S): at 02:44

## 2022-08-22 RX ADMIN — Medication 600 MILLIGRAM(S): at 18:19

## 2022-08-22 RX ADMIN — Medication 975 MILLIGRAM(S): at 15:30

## 2022-08-22 RX ADMIN — OXYCODONE HYDROCHLORIDE 5 MILLIGRAM(S): 5 TABLET ORAL at 21:30

## 2022-08-22 RX ADMIN — Medication 600 MILLIGRAM(S): at 11:35

## 2022-08-22 RX ADMIN — Medication 975 MILLIGRAM(S): at 10:00

## 2022-08-22 NOTE — PROGRESS NOTE ADULT - ASSESSMENT
32y Female POD#1 s/p pC/S @37+0 for NRFHT and suspected macrosomia, in the setting of DMII and cHTN                                      - Neuro/Pain:  toradol atc, tylenol atc, oxy prn  - CV:  VS per routine, AM CBC pending.   - Pulm: Encourage ISS & Ambulation  - GI: Diabetic diet  - : Holloway in place, to be removed this morning, TOV this afternoon  - DVT ppx: SCDs, Lovenox 40mg QD  - cHTN: normotensive overnight  - T2DM: Endocrine consulted, on Lispro 5 with meals, better control s/p pCS. Mod ISS.   - Dispo: POD #2/3 32y Female POD#1 s/p pC/S @37+0 for NRFHT and suspected macrosomia, in the setting of DMII and cHTN                                      - Neuro/Pain:  toradol atc, tylenol atc, oxy prn  - CV:  VS per routine, AM CBC resulted.   - Pulm: Encourage ISS & Ambulation  - GI: Diabetic diet  - : Holloway in place, to be removed this morning, TOV this afternoon  - DVT ppx: SCDs, Lovenox 40mg QD  - cHTN: normotensive overnight  - T2DM: Endocrine consulted, on Lispro 5 with meals, better control s/p pCS. Mod ISS.   - Dispo: POD #2/3

## 2022-08-22 NOTE — PROGRESS NOTE ADULT - ATTENDING COMMENTS
Pt seen on rounds this afternoon and events of the weekend reviewed.  She is well known to me from consultations during previous admissions during the pregnancy  Delivered by  on .  Baby was macrosomic, with cardiac/hepatic issues  Strongly suspect that the pt's A1c levels during the pregnancy were falsely low  Insulin has been decreased to 5 units lispro pre-meal, no Lantus  Fingersticks have been mostly .  She continues to have moderate incisional pain.  Appetite has been fairly good  --To continue the current insulin regimen for now, though requirements may start to increase over the next few days  --Will consider starting back on metformin tomorrow  --Pt would like to breast-feed, was concerned about safety given her DM--told her this would not be a problem, and she should speak to the lactation consultant about starting to pump

## 2022-08-22 NOTE — PROGRESS NOTE ADULT - SUBJECTIVE AND OBJECTIVE BOX
Patient evaluated at bedside.   She reports pain is well controlled with OPM.  She has been ambulating without assistance, voiding spontaneously, passing gas, tolerating regular diet.  She denies HA, dizziness, CP, palpitations, SOB, n/v, or heavy vaginal bleeding.    Physical Exam:  Vital Signs Last 24 Hrs  T(C): 36.6 (22 Aug 2022 05:41), Max: 36.8 (21 Aug 2022 22:16)  T(F): 97.8 (22 Aug 2022 05:41), Max: 98.3 (21 Aug 2022 22:16)  HR: 77 (22 Aug 2022 05:41) (69 - 88)  BP: 108/70 (22 Aug 2022 05:41) (101/64 - 116/76)  BP(mean): --  RR: 18 (22 Aug 2022 05:41) (17 - 18)  SpO2: 95% (22 Aug 2022 05:41) (95% - 100%)        Gen: NAD  Abd: + BS, soft, nontender, nondistended, no rebound or guarding  Incision clean, dry and intact  uterus firm at midline  : lochia WNL  Extremities: no swelling or calf tenderness                          9.1    10.23 )-----------( 213      ( 22 Aug 2022 05:09 )             28.1     08-21    137  |  107  |  14  ----------------------------<  83  4.2   |  18<L>  |  0.55    Ca    9.2      21 Aug 2022 03:18    TPro  7.1  /  Alb  3.6  /  TBili  0.2  /  DBili  x   /  AST  12  /  ALT  7<L>  /  AlkPhos  153<H>  08-21      PT/INR - ( 21 Aug 2022 03:18 )   PT: 9.8 sec;   INR: 0.83          PTT - ( 21 Aug 2022 03:18 )  PTT:26.6 sec

## 2022-08-22 NOTE — PROGRESS NOTE ADULT - SUBJECTIVE AND OBJECTIVE BOX
SUBJECTIVE / OVERNIGHT EVENTS  Patient was seen and examined this morning. Patient resting in bed, expresses concern for baby.   She is eating well and blood sugars remain well controlled.  Dinner: Burger, quinoa , sweet potato  Breakfast: Priti lucasniurka     REVIEW OF SYSTEMS  Constitutional:  Negative fever, chills or loss of appetite.  Eyes:  Negative blurry vision or double vision.  Cardiovascular:  Negative for chest pain or palpitations.  Respiratory:  Negative for cough, wheezing, or SOB.   Gastrointestinal:  Negative for nausea, vomiting, diarrhea, constipation, or abdominal pain.  Genitourinary:  Negative frequency, urgency or dysuria.  Neurologic:  No headache, confusion, dizziness, lightheadedness.    PHYSICAL EXAM  Vital Signs Last 24 Hrs  T(C): 36.8 (22 Aug 2022 10:00), Max: 36.8 (21 Aug 2022 22:16)  T(F): 98.2 (22 Aug 2022 10:00), Max: 98.3 (21 Aug 2022 22:16)  HR: 95 (22 Aug 2022 10:00) (77 - 95)  BP: 118/77 (22 Aug 2022 10:00) (107/71 - 118/77)  BP(mean): --  RR: 16 (22 Aug 2022 10:00) (16 - 18)  SpO2: 95% (22 Aug 2022 10:00) (95% - 98%)        Constitutional: Awake, alert, in no acute distress.   HEENT: Normocephalic, atraumatic, KARLA, no proptosis or lid retraction.   Neck: supple, no acanthosis, no thyromegaly or palpable thyroid nodules.  Respiratory: Lungs clear to ausculation bilaterally.   Cardiovascular: regular rhythm, normal S1 and S2, no audible murmurs.   GI: soft, non-tender, non-distended, bowel sounds present, no masses appreciated.  Extremities: No lower extremity edema, peripheral pulses present.   Skin: no rashes.   Psychiatric: AAO x 3. Normal affect/mood.     LABS                        9.1    10.23 )-----------( 213      ( 22 Aug 2022 05:09 )             28.1     08-21    137  |  107  |  14  ----------------------------<  83  4.2   |  18<L>  |  0.55    Ca    9.2      21 Aug 2022 03:18    TPro  7.1  /  Alb  3.6  /  TBili  0.2  /  DBili  x   /  AST  12  /  ALT  7<L>  /  AlkPhos  153<H>  08-      PT/INR - ( 21 Aug 2022 03:18 )   PT: 9.8 sec;   INR: 0.83          PTT - ( 21 Aug 2022 03:18 )  PTT:26.6 sec        MEDICATIONS  MEDICATIONS  (STANDING):  acetaminophen     Tablet .. 975 milliGRAM(s) Oral <User Schedule>  dextrose 5%. 1000 milliLiter(s) (50 mL/Hr) IV Continuous <Continuous>  dextrose 5%. 1000 milliLiter(s) (100 mL/Hr) IV Continuous <Continuous>  dextrose 50% Injectable 25 Gram(s) IV Push once  dextrose 50% Injectable 12.5 Gram(s) IV Push once  dextrose 50% Injectable 25 Gram(s) IV Push once  diphtheria/tetanus/pertussis (acellular) Vaccine (ADAcel) 0.5 milliLiter(s) IntraMuscular once  enoxaparin Injectable 40 milliGRAM(s) SubCutaneous every 24 hours  glucagon  Injectable 1 milliGRAM(s) IntraMuscular once  ibuprofen  Tablet. 600 milliGRAM(s) Oral every 6 hours  insulin lispro (ADMELOG) corrective regimen sliding scale   SubCutaneous three times a day before meals  insulin lispro Injectable (ADMELOG) 5 Unit(s) SubCutaneous three times a day before meals  lactated ringers. 1000 milliLiter(s) (125 mL/Hr) IV Continuous <Continuous>  oxytocin Infusion 333.333 milliUNIT(s)/Min (1000 mL/Hr) IV Continuous <Continuous>    MEDICATIONS  (PRN):  dextrose Oral Gel 15 Gram(s) Oral once PRN Blood Glucose LESS THAN 70 milliGRAM(s)/deciliter  diphenhydrAMINE 25 milliGRAM(s) Oral every 6 hours PRN Pruritus  lanolin Ointment 1 Application(s) Topical every 6 hours PRN Sore Nipples  magnesium hydroxide Suspension 30 milliLiter(s) Oral two times a day PRN Constipation  oxyCODONE    IR 5 milliGRAM(s) Oral every 3 hours PRN Moderate to Severe Pain (4-10)  oxyCODONE    IR 5 milliGRAM(s) Oral once PRN Moderate to Severe Pain (4-10)  simethicone 80 milliGRAM(s) Chew every 4 hours PRN Gas      CAPILLARY BLOOD GLUCOSE & INSULIN RECEIVED  Yesterday  Dinner FS mg/dL = 5 units of premeal Lispro + 0 units of Lispro sliding scale.   Bedtime FS mg/dL = 0 units of Lantus + 0 units Lispro sliding scale.     Today  Breakfast FS mg/dL = 5 units of premeal Lispro + 0 units of Lispro sliding scale.   Lunch FSG: BBB mg/dL = BBB units of premeal Lispro + BBB units of Lispro sliding scale.     CAPILLARY BLOOD GLUCOSE      POCT Blood Glucose.: 105 mg/dL (22 Aug 2022 09:05)  POCT Blood Glucose.: 131 mg/dL (21 Aug 2022 21:22)  POCT Blood Glucose.: 82 mg/dL (21 Aug 2022 17:24)      ASSESSMENT / RECOMMENDATIONS  Goyo is a 32-years-old female with a past medical history of poorly controlled type 2 diabetes mellitus and hypertension who was admitted for emergent  overnight, currently on post-operative day #2. Endocrinology consulted for recommendations regarding diabetes management.     Patient had been previously only on metformin prior to current pregnancy with a hemoglobin A1C around 8% as of last year. Despite her elevated insulin requirements during pregnancy, many patients who were not on insulin before pregnancy or who started insulin in the setting of planning pregnancy don't need insulin once the baby is delivered. Given her last dose of insulin was more than 24 hours ago and her fingerstick glucose remained between 100-130 mg/dL, would not recommend to continue with the long-actin insulin for now. She will likely require some pre-meal insulin for now, but its not expected for her to require insulin upon discharge. She will likely be able to go home on an oral antidiabetic regimen.       A1C: 6.3%.    Wt: 91.6 kg.    BMI: 35.8.    GFR: 124.    EF: 65%.   Home regimen: During Pregnancy (Lantus 80 QHS ,Lispro 30 TID) Prior to Pregnancy: Metformin      # Type 2 diabetes mellitus  - Hold Lantus for now.   - Decrease Lispro insulin to 5 units three times daily before meals.  - Continue with Lispro insulin moderate dose sliding scale three times daily and at bedtime.   - Goal fingerstick glucose goal of 100-180 mg/dL.     Thank you for allowing us to participate in the care of Ms. Nance.    Will continue to monitor.     - For discharge, patient can TBD.  - Patient can follow up at discharge with [facility] by calling  to make an appointment with Dr. Amaya.       Case discussed with Dr. Mattson. Primary team updated.       Juan Arriaga    Endocrinology Fellow    Service Pager: [phone]    SUBJECTIVE / OVERNIGHT EVENTS  Patient was seen and examined this morning. Patient resting in bed, expresses concern for baby.   She is eating well and blood sugars remain well controlled.  Dinner: Burger, quinoa , sweet potato  Breakfast: Priti lucasniurka     REVIEW OF SYSTEMS  Constitutional:  Negative fever, chills or loss of appetite.  Eyes:  Negative blurry vision or double vision.  Cardiovascular:  Negative for chest pain or palpitations.  Respiratory:  Negative for cough, wheezing, or SOB.   Gastrointestinal:  Negative for nausea, vomiting, diarrhea, constipation, or abdominal pain.  Genitourinary:  Negative frequency, urgency or dysuria.  Neurologic:  No headache, confusion, dizziness, lightheadedness.    PHYSICAL EXAM  Vital Signs Last 24 Hrs  T(C): 36.8 (22 Aug 2022 10:00), Max: 36.8 (21 Aug 2022 22:16)  T(F): 98.2 (22 Aug 2022 10:00), Max: 98.3 (21 Aug 2022 22:16)  HR: 95 (22 Aug 2022 10:00) (77 - 95)  BP: 118/77 (22 Aug 2022 10:00) (107/71 - 118/77)  BP(mean): --  RR: 16 (22 Aug 2022 10:00) (16 - 18)  SpO2: 95% (22 Aug 2022 10:00) (95% - 98%)        Constitutional: Awake, alert, in no acute distress.   HEENT: Normocephalic, atraumatic, KARLA, no proptosis or lid retraction.   Neck: supple, no acanthosis, no thyromegaly or palpable thyroid nodules.  Respiratory: Lungs clear to ausculation bilaterally.   Cardiovascular: regular rhythm, normal S1 and S2, no audible murmurs.   GI: soft, non-tender, non-distended, bowel sounds present, no masses appreciated.  Extremities: No lower extremity edema, peripheral pulses present.   Skin: no rashes.   Psychiatric: AAO x 3. Normal affect/mood.     LABS                        9.1    10.23 )-----------( 213      ( 22 Aug 2022 05:09 )             28.1     08-21    137  |  107  |  14  ----------------------------<  83  4.2   |  18<L>  |  0.55    Ca    9.2      21 Aug 2022 03:18    TPro  7.1  /  Alb  3.6  /  TBili  0.2  /  DBili  x   /  AST  12  /  ALT  7<L>  /  AlkPhos  153<H>  08-      PT/INR - ( 21 Aug 2022 03:18 )   PT: 9.8 sec;   INR: 0.83          PTT - ( 21 Aug 2022 03:18 )  PTT:26.6 sec        MEDICATIONS  MEDICATIONS  (STANDING):  acetaminophen     Tablet .. 975 milliGRAM(s) Oral <User Schedule>  dextrose 5%. 1000 milliLiter(s) (50 mL/Hr) IV Continuous <Continuous>  dextrose 5%. 1000 milliLiter(s) (100 mL/Hr) IV Continuous <Continuous>  dextrose 50% Injectable 25 Gram(s) IV Push once  dextrose 50% Injectable 12.5 Gram(s) IV Push once  dextrose 50% Injectable 25 Gram(s) IV Push once  diphtheria/tetanus/pertussis (acellular) Vaccine (ADAcel) 0.5 milliLiter(s) IntraMuscular once  enoxaparin Injectable 40 milliGRAM(s) SubCutaneous every 24 hours  glucagon  Injectable 1 milliGRAM(s) IntraMuscular once  ibuprofen  Tablet. 600 milliGRAM(s) Oral every 6 hours  insulin lispro (ADMELOG) corrective regimen sliding scale   SubCutaneous three times a day before meals  insulin lispro Injectable (ADMELOG) 5 Unit(s) SubCutaneous three times a day before meals  lactated ringers. 1000 milliLiter(s) (125 mL/Hr) IV Continuous <Continuous>  oxytocin Infusion 333.333 milliUNIT(s)/Min (1000 mL/Hr) IV Continuous <Continuous>    MEDICATIONS  (PRN):  dextrose Oral Gel 15 Gram(s) Oral once PRN Blood Glucose LESS THAN 70 milliGRAM(s)/deciliter  diphenhydrAMINE 25 milliGRAM(s) Oral every 6 hours PRN Pruritus  lanolin Ointment 1 Application(s) Topical every 6 hours PRN Sore Nipples  magnesium hydroxide Suspension 30 milliLiter(s) Oral two times a day PRN Constipation  oxyCODONE    IR 5 milliGRAM(s) Oral every 3 hours PRN Moderate to Severe Pain (4-10)  oxyCODONE    IR 5 milliGRAM(s) Oral once PRN Moderate to Severe Pain (4-10)  simethicone 80 milliGRAM(s) Chew every 4 hours PRN Gas      CAPILLARY BLOOD GLUCOSE & INSULIN RECEIVED  Yesterday  Dinner FS mg/dL = 5 units of premeal Lispro + 0 units of Lispro sliding scale.   Bedtime FS mg/dL = 0 units of Lantus + 0 units Lispro sliding scale.     Today  Breakfast FS mg/dL = 5 units of premeal Lispro + 0 units of Lispro sliding scale.   Lunch FSG: BBB mg/dL = BBB units of premeal Lispro + BBB units of Lispro sliding scale.     CAPILLARY BLOOD GLUCOSE      POCT Blood Glucose.: 105 mg/dL (22 Aug 2022 09:05)  POCT Blood Glucose.: 131 mg/dL (21 Aug 2022 21:22)  POCT Blood Glucose.: 82 mg/dL (21 Aug 2022 17:24)      ASSESSMENT / RECOMMENDATIONS  Goyo is a 32-years-old female with a past medical history of poorly controlled type 2 diabetes mellitus and hypertension who was admitted for emergent  overnight, currently on post-operative day #2. Endocrinology consulted for recommendations regarding diabetes management.     Patient had been previously only on metformin prior to current pregnancy with a hemoglobin A1C around 8% as of last year. Despite her elevated insulin requirements during pregnancy, many patients who were not on insulin before pregnancy or who started insulin in the setting of planning pregnancy don't need insulin once the baby is delivered. Given her last dose of insulin was more than 24 hours ago and her fingerstick glucose remained between 100-130 mg/dL, would not recommend to continue with the long-actin insulin for now. She will likely require some pre-meal insulin for now, but its not expected for her to require insulin upon discharge. She will likely be able to go home on an oral antidiabetic regimen.       A1C: 6.3%.    Wt: 91.6 kg.    BMI: 35.8.    GFR: 124.    EF: 65%.   Home regimen: During Pregnancy (Lantus 80 QHS ,Lispro 30 TID) Prior to Pregnancy: Metformin      # Type 2 diabetes mellitus  - Hold Lantus for now.   - Lispro insulin  units three times daily before meals.  - Continue with Lispro insulin moderate dose sliding scale three times daily and at bedtime.   - Goal fingerstick glucose goal of 100-180 mg/dL.     Thank you for allowing us to participate in the care of Ms. Nance.    Will continue to monitor.     - For discharge, patient can TBD.  - Patient can follow up at discharge with [facility] by calling  to make an appointment with Dr. Amaya.       Case discussed with Dr. Mattson. Primary team updated.       Juan Arriaga    Endocrinology Fellow    Service Pager: [phone]    SUBJECTIVE / OVERNIGHT EVENTS  Patient was seen and examined this morning. Patient resting in bed, expresses concern for baby.   She is eating well and blood sugars remain well controlled.  Dinner: Burger, quinoa , sweet potato  Breakfast: Priti lucasniurka     REVIEW OF SYSTEMS  Constitutional:  Negative fever, chills or loss of appetite.  Eyes:  Negative blurry vision or double vision.  Cardiovascular:  Negative for chest pain or palpitations.  Respiratory:  Negative for cough, wheezing, or SOB.   Gastrointestinal:  Negative for nausea, vomiting, diarrhea, constipation, or abdominal pain.  Genitourinary:  Negative frequency, urgency or dysuria.  Neurologic:  No headache, confusion, dizziness, lightheadedness.    PHYSICAL EXAM  Vital Signs Last 24 Hrs  T(C): 36.8 (22 Aug 2022 10:00), Max: 36.8 (21 Aug 2022 22:16)  T(F): 98.2 (22 Aug 2022 10:00), Max: 98.3 (21 Aug 2022 22:16)  HR: 95 (22 Aug 2022 10:00) (77 - 95)  BP: 118/77 (22 Aug 2022 10:00) (107/71 - 118/77)  BP(mean): --  RR: 16 (22 Aug 2022 10:00) (16 - 18)  SpO2: 95% (22 Aug 2022 10:00) (95% - 98%)        Constitutional: Awake, alert, in no acute distress.   HEENT: Normocephalic, atraumatic, KARLA, no proptosis or lid retraction.   Neck: supple, no acanthosis, no thyromegaly or palpable thyroid nodules.  Respiratory: Lungs clear to ausculation bilaterally.   Cardiovascular: regular rhythm, normal S1 and S2, no audible murmurs.   GI: soft, non-tender, non-distended, bowel sounds present, no masses appreciated.  Extremities: No lower extremity edema, peripheral pulses present.   Skin: no rashes.   Psychiatric: AAO x 3. Normal affect/mood.     LABS                        9.1    10.23 )-----------( 213      ( 22 Aug 2022 05:09 )             28.1     08-21    137  |  107  |  14  ----------------------------<  83  4.2   |  18<L>  |  0.55    Ca    9.2      21 Aug 2022 03:18    TPro  7.1  /  Alb  3.6  /  TBili  0.2  /  DBili  x   /  AST  12  /  ALT  7<L>  /  AlkPhos  153<H>  08-      PT/INR - ( 21 Aug 2022 03:18 )   PT: 9.8 sec;   INR: 0.83          PTT - ( 21 Aug 2022 03:18 )  PTT:26.6 sec        MEDICATIONS  MEDICATIONS  (STANDING):  acetaminophen     Tablet .. 975 milliGRAM(s) Oral <User Schedule>  dextrose 5%. 1000 milliLiter(s) (50 mL/Hr) IV Continuous <Continuous>  dextrose 5%. 1000 milliLiter(s) (100 mL/Hr) IV Continuous <Continuous>  dextrose 50% Injectable 25 Gram(s) IV Push once  dextrose 50% Injectable 12.5 Gram(s) IV Push once  dextrose 50% Injectable 25 Gram(s) IV Push once  diphtheria/tetanus/pertussis (acellular) Vaccine (ADAcel) 0.5 milliLiter(s) IntraMuscular once  enoxaparin Injectable 40 milliGRAM(s) SubCutaneous every 24 hours  glucagon  Injectable 1 milliGRAM(s) IntraMuscular once  ibuprofen  Tablet. 600 milliGRAM(s) Oral every 6 hours  insulin lispro (ADMELOG) corrective regimen sliding scale   SubCutaneous three times a day before meals  insulin lispro Injectable (ADMELOG) 5 Unit(s) SubCutaneous three times a day before meals  lactated ringers. 1000 milliLiter(s) (125 mL/Hr) IV Continuous <Continuous>  oxytocin Infusion 333.333 milliUNIT(s)/Min (1000 mL/Hr) IV Continuous <Continuous>    MEDICATIONS  (PRN):  dextrose Oral Gel 15 Gram(s) Oral once PRN Blood Glucose LESS THAN 70 milliGRAM(s)/deciliter  diphenhydrAMINE 25 milliGRAM(s) Oral every 6 hours PRN Pruritus  lanolin Ointment 1 Application(s) Topical every 6 hours PRN Sore Nipples  magnesium hydroxide Suspension 30 milliLiter(s) Oral two times a day PRN Constipation  oxyCODONE    IR 5 milliGRAM(s) Oral every 3 hours PRN Moderate to Severe Pain (4-10)  oxyCODONE    IR 5 milliGRAM(s) Oral once PRN Moderate to Severe Pain (4-10)  simethicone 80 milliGRAM(s) Chew every 4 hours PRN Gas      CAPILLARY BLOOD GLUCOSE & INSULIN RECEIVED  Yesterday  Dinner FS mg/dL = 5 units of premeal Lispro + 0 units of Lispro sliding scale.   Bedtime FS mg/dL = 0 units of Lantus + 0 units Lispro sliding scale.     Today  Breakfast FS mg/dL = 5 units of premeal Lispro + 0 units of Lispro sliding scale.   Lunch FS mg/dL = 5 units of premeal Lispro + 0 units of Lispro sliding scale.     CAPILLARY BLOOD GLUCOSE      POCT Blood Glucose.: 105 mg/dL (22 Aug 2022 09:05)  POCT Blood Glucose.: 131 mg/dL (21 Aug 2022 21:22)  POCT Blood Glucose.: 82 mg/dL (21 Aug 2022 17:24)      ASSESSMENT / RECOMMENDATIONS  Goyo is a 32-years-old female with a past medical history of poorly controlled type 2 diabetes mellitus and hypertension who was admitted for emergent  overnight, currently on post-operative day #2. Endocrinology consulted for recommendations regarding diabetes management.     Patient had been previously only on metformin prior to current pregnancy with a hemoglobin A1C around 8% as of last year. Despite her elevated insulin requirements during pregnancy, many patients who were not on insulin before pregnancy or who started insulin in the setting of planning pregnancy don't need insulin once the baby is delivered. Given her last dose of insulin was more than 24 hours ago and her fingerstick glucose remained between 100-130 mg/dL, would not recommend to continue with the long-actin insulin for now. She will likely require some pre-meal insulin for now, but its not expected for her to require insulin upon discharge. She will likely be able to go home on metformin.        A1C: 6.3%.    Wt: 91.6 kg.    BMI: 35.8.    GFR: 124.    EF: 65%.   Home regimen: During Pregnancy (Lantus 80 QHS ,Lispro 30 TID) Prior to Pregnancy: Metformin      # Type 2 diabetes mellitus  - Hold Lantus for now.   - Lispro insulin 5 units three times daily before meals.  - Continue with Lispro insulin moderate dose sliding scale three times daily and at bedtime.   - Goal fingerstick glucose goal of 100-180 mg/dL.     Thank you for allowing us to participate in the care of Ms. Nance.    Will continue to monitor.     - For discharge, patient can restart Metformin 500mg BID.    - Patient can follow up at discharge with [facility] by calling  to make an appointment with Dr. Amaya.       Case discussed with Dr. Mattson. Primary team updated.       Juan Arriaga    Endocrinology Fellow    Service Pager: [phone]

## 2022-08-23 ENCOUNTER — APPOINTMENT (OUTPATIENT)
Dept: PSYCHIATRY | Facility: CLINIC | Age: 32
End: 2022-08-23

## 2022-08-23 DIAGNOSIS — E11.9 TYPE 2 DIABETES MELLITUS WITHOUT COMPLICATIONS: ICD-10-CM

## 2022-08-23 LAB
GLUCOSE BLDC GLUCOMTR-MCNC: 115 MG/DL — HIGH (ref 70–99)
GLUCOSE BLDC GLUCOMTR-MCNC: 125 MG/DL — HIGH (ref 70–99)
GLUCOSE BLDC GLUCOMTR-MCNC: 129 MG/DL — HIGH (ref 70–99)
GLUCOSE BLDC GLUCOMTR-MCNC: 144 MG/DL — HIGH (ref 70–99)
GLUCOSE BLDC GLUCOMTR-MCNC: 181 MG/DL — HIGH (ref 70–99)

## 2022-08-23 PROCEDURE — 99231 SBSQ HOSP IP/OBS SF/LOW 25: CPT

## 2022-08-23 RX ADMIN — Medication 600 MILLIGRAM(S): at 18:30

## 2022-08-23 RX ADMIN — Medication 2: at 14:52

## 2022-08-23 RX ADMIN — Medication 5 UNIT(S): at 14:50

## 2022-08-23 RX ADMIN — Medication 975 MILLIGRAM(S): at 10:30

## 2022-08-23 RX ADMIN — Medication 600 MILLIGRAM(S): at 07:06

## 2022-08-23 RX ADMIN — Medication 600 MILLIGRAM(S): at 13:22

## 2022-08-23 RX ADMIN — Medication 600 MILLIGRAM(S): at 17:45

## 2022-08-23 RX ADMIN — Medication 600 MILLIGRAM(S): at 01:34

## 2022-08-23 RX ADMIN — Medication 5 UNIT(S): at 18:12

## 2022-08-23 RX ADMIN — OXYCODONE HYDROCHLORIDE 5 MILLIGRAM(S): 5 TABLET ORAL at 03:01

## 2022-08-23 RX ADMIN — Medication 975 MILLIGRAM(S): at 03:32

## 2022-08-23 RX ADMIN — Medication 600 MILLIGRAM(S): at 01:04

## 2022-08-23 RX ADMIN — Medication 975 MILLIGRAM(S): at 16:03

## 2022-08-23 RX ADMIN — ENOXAPARIN SODIUM 40 MILLIGRAM(S): 100 INJECTION SUBCUTANEOUS at 17:46

## 2022-08-23 RX ADMIN — Medication 5 UNIT(S): at 08:36

## 2022-08-23 RX ADMIN — Medication 975 MILLIGRAM(S): at 15:17

## 2022-08-23 RX ADMIN — Medication 975 MILLIGRAM(S): at 22:47

## 2022-08-23 RX ADMIN — Medication 600 MILLIGRAM(S): at 06:27

## 2022-08-23 RX ADMIN — Medication 975 MILLIGRAM(S): at 22:17

## 2022-08-23 RX ADMIN — Medication 975 MILLIGRAM(S): at 03:01

## 2022-08-23 RX ADMIN — Medication 975 MILLIGRAM(S): at 09:38

## 2022-08-23 RX ADMIN — Medication 600 MILLIGRAM(S): at 12:49

## 2022-08-23 NOTE — PROGRESS NOTE ADULT - ASSESSMENT
Goyo is a 32-years-old female with a past medical history of poorly controlled type 2 diabetes mellitus and hypertension who was admitted for emergent  overnight, currently on post-operative day #2. Endocrinology consulted for recommendations regarding diabetes management.     Patient had been previously only on metformin prior to current pregnancy with a hemoglobin A1C around 8% as of last year. Despite her elevated insulin requirements during pregnancy, many patients who were not on insulin before pregnancy or who started insulin in the setting of planning pregnancy don't need insulin once the baby is delivered. Given her last dose of insulin was more than 24 hours ago and her fingerstick glucose remained between 100-130 mg/dL, would not recommend to continue with the long-actin insulin for now. She will likely require some pre-meal insulin for now, but its not expected for her to require insulin upon discharge. She will likely be able to go home on metformin.        A1C: 6.3%.    Wt: 91.6 kg.    BMI: 35.8.    GFR: 124.    EF: 65%.   Home regimen: During Pregnancy (Lantus 80 QHS ,Lispro 30 TID) Prior to Pregnancy: Metformin

## 2022-08-23 NOTE — PROGRESS NOTE ADULT - PROBLEM SELECTOR PLAN 1
- Hold Lantus for now.   - Lispro insulin 5 units three times daily before meals.  - Continue with Lispro insulin moderate dose sliding scale three times daily and at bedtime.   - Goal fingerstick glucose goal of 100-180 mg/dL.     Thank you for allowing us to participate in the care of Ms. Nance.    Will continue to monitor.     - For discharge, patient can restart Metformin 500mg BID.    - Patient can follow up at discharge with [facility] by calling  to make an appointment with Dr. Amaya.       Case discussed with Dr. Mattson. Primary team updated. - Please start metformin ER 500mg twice daily with breakfast and dinner.  - Continue Lispro insulin 5 units three times daily before meals.  - Continue with Lispro insulin moderate dose sliding scale three times daily and at bedtime.   - Goal fingerstick glucose goal of 100-180 mg/dL.     Thank you for allowing us to participate in the care of Ms. Nance.    Will continue to monitor.     - For discharge, patient can restart Metformin 500mg BID.    - Patient can follow up at discharge with [facility] by calling  to make an appointment with Dr. Amaya.       Case discussed with Dr. Mattson. Primary team updated.

## 2022-08-23 NOTE — PROGRESS NOTE ADULT - NS ATTEND AMEND GEN_ALL_CORE FT
Pt seen on rounds this afternoon.  Glucoses remain well controlled on 5 units of premeal lispro, no Lantus  PO intake has been good.  She still intends to breast-feed, and has started pumping.  Will continue the premeal lispro, but also start the pt back on metformin

## 2022-08-23 NOTE — PROGRESS NOTE ADULT - NS ATTEST RISK PROBLEM GEN_ALL_CORE FT
Glucoses under control post-partum on minimal insulin
Marked change in insulin requirements post-partum

## 2022-08-23 NOTE — PROGRESS NOTE ADULT - SUBJECTIVE AND OBJECTIVE BOX
INTERVAL HPI/OVERNIGHT EVENTS:  Patient was seen and examined this morning. Patient resting in bed. Pain is currently controlled. Per her, baby is doing better; no longer on vent. Patient is eating well, but not heavy.    FSG & insulin:  Yesterday  Dinner FSG  122  Lispro 5   Ate chicken, veggies.  Bedtime FSG 90   Lantus  10  Today  Breakfast FSG  144  Lispro 5  Ate  1 bread, omelet.    Lunch FSG        Pt reports the following symptoms:    Constitutional:  Negative fever, chills or loss of appetite.  Eyes:  Negative blurry vision or double vision.  Cardiovascular:  Negative for chest pain or palpitations.  Respiratory:  Negative for cough, wheezing, or SOB.   Gastrointestinal:  Negative for nausea, vomiting, diarrhea, constipation, or abdominal pain.  Genitourinary:  Negative frequency, urgency or dysuria.  Neurologic:  No headache, confusion, dizziness, lightheadedness.    MEDICATIONS  (STANDING):  acetaminophen     Tablet .. 975 milliGRAM(s) Oral <User Schedule>  dextrose 5%. 1000 milliLiter(s) (50 mL/Hr) IV Continuous <Continuous>  dextrose 5%. 1000 milliLiter(s) (100 mL/Hr) IV Continuous <Continuous>  dextrose 50% Injectable 25 Gram(s) IV Push once  dextrose 50% Injectable 12.5 Gram(s) IV Push once  dextrose 50% Injectable 25 Gram(s) IV Push once  diphtheria/tetanus/pertussis (acellular) Vaccine (ADAcel) 0.5 milliLiter(s) IntraMuscular once  enoxaparin Injectable 40 milliGRAM(s) SubCutaneous every 24 hours  glucagon  Injectable 1 milliGRAM(s) IntraMuscular once  ibuprofen  Tablet. 600 milliGRAM(s) Oral every 6 hours  insulin lispro (ADMELOG) corrective regimen sliding scale   SubCutaneous Before meals and at bedtime  insulin lispro Injectable (ADMELOG) 5 Unit(s) SubCutaneous three times a day before meals  lactated ringers. 1000 milliLiter(s) (125 mL/Hr) IV Continuous <Continuous>  oxytocin Infusion 333.333 milliUNIT(s)/Min (1000 mL/Hr) IV Continuous <Continuous>    MEDICATIONS  (PRN):  dextrose Oral Gel 15 Gram(s) Oral once PRN Blood Glucose LESS THAN 70 milliGRAM(s)/deciliter  diphenhydrAMINE 25 milliGRAM(s) Oral every 6 hours PRN Pruritus  lanolin Ointment 1 Application(s) Topical every 6 hours PRN Sore Nipples  magnesium hydroxide Suspension 30 milliLiter(s) Oral two times a day PRN Constipation  oxyCODONE    IR 5 milliGRAM(s) Oral once PRN Moderate to Severe Pain (4-10)  oxyCODONE    IR 5 milliGRAM(s) Oral every 3 hours PRN Moderate to Severe Pain (4-10)  simethicone 80 milliGRAM(s) Chew every 4 hours PRN Gas      PHYSICAL EXAM  Vital Signs Last 24 Hrs  T(C): 36.7 (23 Aug 2022 10:00), Max: 37.2 (22 Aug 2022 18:15)  T(F): 98.1 (23 Aug 2022 10:00), Max: 99 (22 Aug 2022 18:15)  HR: 82 (23 Aug 2022 10:00) (82 - 91)  BP: 124/83 (23 Aug 2022 10:00) (117/70 - 133/87)  BP(mean): --  RR: 18 (23 Aug 2022 10:00) (16 - 19)  SpO2: 82% (23 Aug 2022 10:00) (82% - 97%)    Parameters below as of 23 Aug 2022 10:00  Patient On (Oxygen Delivery Method): room air      Constitutional: Awake, alert, in no acute distress.   HEENT: Normocephalic, atraumatic, KARLA, no proptosis or lid retraction.   Neck: supple, no acanthosis, no thyromegaly or palpable thyroid nodules.  Respiratory: Lungs clear to ausculation bilaterally.   Cardiovascular: regular rhythm, normal S1 and S2, no audible murmurs.   GI: soft, non-tender, non-distended, bowel sounds present, no masses appreciated.  Extremities: No lower extremity edema, peripheral pulses present.   Skin: no rashes.   Psychiatric: AAO x 3. Normal affect/mood.     LABS:                        9.1    10.23 )-----------( 213      ( 22 Aug 2022 05:09 )             28.1     HbA1C:   CAPILLARY BLOOD GLUCOSE      POCT Blood Glucose.: 144 mg/dL (23 Aug 2022 08:34)  POCT Blood Glucose.: 125 mg/dL (23 Aug 2022 06:29)  POCT Blood Glucose.: 90 mg/dL (22 Aug 2022 21:31)  POCT Blood Glucose.: 122 mg/dL (22 Aug 2022 19:09)  POCT Blood Glucose.: 105 mg/dL (22 Aug 2022 15:10)     INTERVAL HPI/OVERNIGHT EVENTS:  Patient was seen and examined this morning. Patient resting in bed. Pain is currently controlled. Per her, baby is doing better; no longer on vent. Patient is eating well, but not heavy.    FSG & insulin:  Yesterday  Dinner FSG  122  Lispro 5   Ate chicken, veggies.  Bedtime FSG 90   Lantus  10  Today  Breakfast FSG  144  Lispro 5  Ate  1 bread, omelet.    Lunch FSG   181     Pt reports the following symptoms:    Constitutional:  Negative fever, chills or loss of appetite.  Eyes:  Negative blurry vision or double vision.  Cardiovascular:  Negative for chest pain or palpitations.  Respiratory:  Negative for cough, wheezing, or SOB.   Gastrointestinal:  Negative for nausea, vomiting, diarrhea, constipation, or abdominal pain.  Genitourinary:  Negative frequency, urgency or dysuria.  Neurologic:  No headache, confusion, dizziness, lightheadedness.    MEDICATIONS  (STANDING):  acetaminophen     Tablet .. 975 milliGRAM(s) Oral <User Schedule>  dextrose 5%. 1000 milliLiter(s) (50 mL/Hr) IV Continuous <Continuous>  dextrose 5%. 1000 milliLiter(s) (100 mL/Hr) IV Continuous <Continuous>  dextrose 50% Injectable 25 Gram(s) IV Push once  dextrose 50% Injectable 12.5 Gram(s) IV Push once  dextrose 50% Injectable 25 Gram(s) IV Push once  diphtheria/tetanus/pertussis (acellular) Vaccine (ADAcel) 0.5 milliLiter(s) IntraMuscular once  enoxaparin Injectable 40 milliGRAM(s) SubCutaneous every 24 hours  glucagon  Injectable 1 milliGRAM(s) IntraMuscular once  ibuprofen  Tablet. 600 milliGRAM(s) Oral every 6 hours  insulin lispro (ADMELOG) corrective regimen sliding scale   SubCutaneous Before meals and at bedtime  insulin lispro Injectable (ADMELOG) 5 Unit(s) SubCutaneous three times a day before meals  lactated ringers. 1000 milliLiter(s) (125 mL/Hr) IV Continuous <Continuous>  oxytocin Infusion 333.333 milliUNIT(s)/Min (1000 mL/Hr) IV Continuous <Continuous>    MEDICATIONS  (PRN):  dextrose Oral Gel 15 Gram(s) Oral once PRN Blood Glucose LESS THAN 70 milliGRAM(s)/deciliter  diphenhydrAMINE 25 milliGRAM(s) Oral every 6 hours PRN Pruritus  lanolin Ointment 1 Application(s) Topical every 6 hours PRN Sore Nipples  magnesium hydroxide Suspension 30 milliLiter(s) Oral two times a day PRN Constipation  oxyCODONE    IR 5 milliGRAM(s) Oral once PRN Moderate to Severe Pain (4-10)  oxyCODONE    IR 5 milliGRAM(s) Oral every 3 hours PRN Moderate to Severe Pain (4-10)  simethicone 80 milliGRAM(s) Chew every 4 hours PRN Gas      PHYSICAL EXAM  Vital Signs Last 24 Hrs  T(C): 36.7 (23 Aug 2022 10:00), Max: 37.2 (22 Aug 2022 18:15)  T(F): 98.1 (23 Aug 2022 10:00), Max: 99 (22 Aug 2022 18:15)  HR: 82 (23 Aug 2022 10:00) (82 - 91)  BP: 124/83 (23 Aug 2022 10:00) (117/70 - 133/87)  BP(mean): --  RR: 18 (23 Aug 2022 10:00) (16 - 19)  SpO2: 82% (23 Aug 2022 10:00) (82% - 97%)    Parameters below as of 23 Aug 2022 10:00  Patient On (Oxygen Delivery Method): room air      Constitutional: Awake, alert, in no acute distress.   HEENT: Normocephalic, atraumatic, KARLA, no proptosis or lid retraction.   Neck: supple, no acanthosis, no thyromegaly or palpable thyroid nodules.  Respiratory: Lungs clear to ausculation bilaterally.   Cardiovascular: regular rhythm, normal S1 and S2, no audible murmurs.   GI: soft, non-tender, non-distended, bowel sounds present, no masses appreciated.  Extremities: No lower extremity edema, peripheral pulses present.   Skin: no rashes.   Psychiatric: AAO x 3. Normal affect/mood.     LABS:                        9.1    10.23 )-----------( 213      ( 22 Aug 2022 05:09 )             28.1     HbA1C:   CAPILLARY BLOOD GLUCOSE      POCT Blood Glucose.: 144 mg/dL (23 Aug 2022 08:34)  POCT Blood Glucose.: 125 mg/dL (23 Aug 2022 06:29)  POCT Blood Glucose.: 90 mg/dL (22 Aug 2022 21:31)  POCT Blood Glucose.: 122 mg/dL (22 Aug 2022 19:09)  POCT Blood Glucose.: 105 mg/dL (22 Aug 2022 15:10)

## 2022-08-24 ENCOUNTER — TRANSCRIPTION ENCOUNTER (OUTPATIENT)
Age: 32
End: 2022-08-24

## 2022-08-24 DIAGNOSIS — I10 ESSENTIAL (PRIMARY) HYPERTENSION: ICD-10-CM

## 2022-08-24 LAB
GLUCOSE BLDC GLUCOMTR-MCNC: 106 MG/DL — HIGH (ref 70–99)
GLUCOSE BLDC GLUCOMTR-MCNC: 109 MG/DL — HIGH (ref 70–99)
GLUCOSE BLDC GLUCOMTR-MCNC: 118 MG/DL — HIGH (ref 70–99)
GLUCOSE BLDC GLUCOMTR-MCNC: 126 MG/DL — HIGH (ref 70–99)

## 2022-08-24 RX ORDER — METFORMIN HYDROCHLORIDE 850 MG/1
1 TABLET ORAL
Qty: 60 | Refills: 0
Start: 2022-08-24 | End: 2022-09-22

## 2022-08-24 RX ORDER — IBUPROFEN 200 MG
1 TABLET ORAL
Qty: 0 | Refills: 0 | DISCHARGE
Start: 2022-08-24

## 2022-08-24 RX ORDER — METFORMIN HYDROCHLORIDE 850 MG/1
500 TABLET ORAL
Refills: 0 | Status: DISCONTINUED | OUTPATIENT
Start: 2022-08-24 | End: 2022-08-25

## 2022-08-24 RX ORDER — ACETAMINOPHEN 500 MG
3 TABLET ORAL
Qty: 0 | Refills: 0 | DISCHARGE
Start: 2022-08-24

## 2022-08-24 RX ADMIN — Medication 600 MILLIGRAM(S): at 00:40

## 2022-08-24 RX ADMIN — Medication 975 MILLIGRAM(S): at 16:05

## 2022-08-24 RX ADMIN — Medication 5 UNIT(S): at 20:44

## 2022-08-24 RX ADMIN — ENOXAPARIN SODIUM 40 MILLIGRAM(S): 100 INJECTION SUBCUTANEOUS at 19:43

## 2022-08-24 RX ADMIN — Medication 600 MILLIGRAM(S): at 06:29

## 2022-08-24 RX ADMIN — Medication 600 MILLIGRAM(S): at 05:58

## 2022-08-24 RX ADMIN — Medication 5 UNIT(S): at 16:04

## 2022-08-24 RX ADMIN — Medication 600 MILLIGRAM(S): at 00:02

## 2022-08-24 RX ADMIN — Medication 5 UNIT(S): at 08:35

## 2022-08-24 RX ADMIN — Medication 600 MILLIGRAM(S): at 18:10

## 2022-08-24 RX ADMIN — Medication 975 MILLIGRAM(S): at 09:19

## 2022-08-24 RX ADMIN — METFORMIN HYDROCHLORIDE 500 MILLIGRAM(S): 850 TABLET ORAL at 21:31

## 2022-08-24 RX ADMIN — Medication 600 MILLIGRAM(S): at 18:40

## 2022-08-24 RX ADMIN — OXYCODONE HYDROCHLORIDE 5 MILLIGRAM(S): 5 TABLET ORAL at 00:40

## 2022-08-24 RX ADMIN — Medication 975 MILLIGRAM(S): at 09:49

## 2022-08-24 RX ADMIN — OXYCODONE HYDROCHLORIDE 5 MILLIGRAM(S): 5 TABLET ORAL at 00:01

## 2022-08-24 RX ADMIN — Medication 975 MILLIGRAM(S): at 03:20

## 2022-08-24 RX ADMIN — Medication 975 MILLIGRAM(S): at 02:46

## 2022-08-24 NOTE — DISCHARGE NOTE OB - NS MD DC FALL RISK RISK
For information on Fall & Injury Prevention, visit: [email or web address] OR  [email or web address] OR  https://www.cdc.gov/steadi/patient.html

## 2022-08-24 NOTE — DISCHARGE NOTE OB - PATIENT PORTAL LINK FT
You can access the FollowMyHealth Patient Portal offered by [facility] by registering at the following website: [email or web address]. By joining [employer]’s FollowMyHealth portal, you will also be able to view your health information using other applications (apps) compatible with our system.

## 2022-08-24 NOTE — DISCHARGE NOTE OB - CAREGIVER PHONE NUMBER
"Dx: NSCLC SCC, LLL/JAMAAL    Met with Stephania Gould & her spouse after her [facility] visits with Dr. Dunia Chou, Dr. Jonah Bence Dr. Amey Crutch today. The treatment recommendations were summarized by Dr. Marisela Piedra signed the chemo consent form, she was given a copy of this. She understands that she will receive phone calls from the radiation oncology dept & Dr. Ricarda José office to schedule her appointments. Provided her with the summary of her dx, her imaging, & the recommended tx. Writer briefly reviewed this with her. Also gave her a teaching folder with information r/t our program & the ""Be A Survivor r/t Lung Cancer\"" book. Enclosed was Sebastian Whiting card, reinforced that if she has any questions to contact [phone] Writer then walked her to the RT/[facility] elevators and explained where RT would take place. Support and encouragement given to Stephania Gould.   "
[phone]

## 2022-08-24 NOTE — PROGRESS NOTE ADULT - PROBLEM SELECTOR PLAN 1
- Please start metformin ER 500mg twice daily with breakfast and dinner.  - Continue Lispro insulin 5 units three times daily before meals.  - Continue with Lispro insulin moderate dose sliding scale three times daily and at bedtime.   - Goal fingerstick glucose goal of 100-180 mg/dL.     Thank you for allowing us to participate in the care of Ms. Nance.    Will continue to monitor.     - For discharge, patient can continue Metformin 500mg bid, and increase to 1000mg BID in 3 days if tolerating.  - Patient can follow up at discharge with [facility] by calling  to make an appointment with Dr. Amaya.       Case discussed with Dr. Mattson. Primary team updated. - Please start metformin ER 500mg twice daily with breakfast and dinner.  - Continue Lispro insulin 5 units three times daily before meals.  - Continue with Lispro insulin moderate dose sliding scale three times daily and at bedtime.   - Goal fingerstick glucose goal of 100-180 mg/dL.     Thank you for allowing us to participate in the care of Ms. Nance.    Will continue to monitor.     - For discharge, patient can continue Metformin 500mg bid, and increase to 1000mg BID in 3 days if tolerating.  - Patient can follow up at discharge with [facility] by calling  to make an appointment with Dr. Amaya.       Case discussed with Dr. Mattson. Primary team updated.    Attending:  Above discussed as noted.  Glucoses well controlled at present.  Target 100-180 while she is in the hospital, 100-150 post discharge  KENDRICK Mattson MD

## 2022-08-24 NOTE — DISCHARGE NOTE OB - CARE PLAN
1 Principal Discharge DX:	Postpartum state  Assessment and plan of treatment:	Take Motrin 600mg every 6 hours and/or tylenol 650mg every 6 hours as needed for pain. Call your OBGYN to schedule a follow up appointment in 1-2weeks. Keep incision site clean and dry. Call your OBGYN if you experiences severe abdominal pain not improved by oral pain medications, heavy bright red vaginal bleeding saturating more than 1 pad per hour, red /warmth at incision site, drainage from incision site, or fever greater than 100.4F.  Secondary Diagnosis:	Type II diabetes mellitus  Assessment and plan of treatment:	Finger sticks were well controlled during stay. Please follow up postpartum with Dr Amaya (endocrinology) within 1-2 weeks of discharge. We are sending Metformin 500 to the pharmacy, please take this twice daily.  Secondary Diagnosis:	Chronic hypertension  Assessment and plan of treatment:	We monitored your blood pressure while you were in the hospital. It was well controlled without medicines. Please continue to take your BPs daily and call us if you experience any of the following: BP >160/>110, headache that does not resolve, vision changes, shortness of breath, or right upper abdominal pain.  Secondary Diagnosis:	Preeclampsia  Assessment and plan of treatment:	Please follow up with your OB within 1-2 weeks for a blood pressure check. Check blood pressures at home 3x a day. If your blood pressure is ever greater than 160/110, you develop a headache not relieved by tylenol, visual disturbances, or right upper abdominal pain, call your doctor or the hospital, or go to your nearest emergency room right away.   Principal Discharge DX:	Postpartum state  Assessment and plan of treatment:	Take Motrin 600mg every 6 hours and/or tylenol 650mg every 6 hours as needed for pain. Call your OBGYN to schedule a follow up appointment in 1-2weeks. Keep incision site clean and dry. Call your OBGYN if you experiences severe abdominal pain not improved by oral pain medications, heavy bright red vaginal bleeding saturating more than 1 pad per hour, red /warmth at incision site, drainage from incision site, or fever greater than 100.4F.  Secondary Diagnosis:	Type II diabetes mellitus  Assessment and plan of treatment:	Finger sticks were well controlled during stay. Please follow up postpartum with Dr Amaya (endocrinology) on Sept 19th at 2:20pm. Appt was scheduled for you for this day and time. at [address]. We are sending Metformin 500 to the pharmacy, please take this twice daily. Please take Metformin 500 twice a day for three days, then 1000 Metformin twice a day.  Secondary Diagnosis:	Chronic hypertension  Assessment and plan of treatment:	We monitored your blood pressure while you were in the hospital. It was well controlled without medicines. Please continue to take your BPs daily and call us if you experience any of the following: BP >160/>110, headache that does not resolve, vision changes, shortness of breath, or right upper abdominal pain.  Secondary Diagnosis:	Preeclampsia  Assessment and plan of treatment:	Please follow up with your OB within 1-2 weeks for a blood pressure check. Check blood pressures at home 3x a day. If your blood pressure is ever greater than 160/110, you develop a headache not relieved by tylenol, visual disturbances, or right upper abdominal pain, call your doctor or the hospital, or go to your nearest emergency room right away.

## 2022-08-24 NOTE — DISCHARGE NOTE OB - PROVIDER TOKENS
FREE:[LAST:[Shaka],FIRST:[Mary],PHONE:[phone]],FAX:[phone]],ADDRESS:[[facility]  [address] [place] [address]]]

## 2022-08-24 NOTE — PROGRESS NOTE ADULT - SUBJECTIVE AND OBJECTIVE BOX
INTERVAL HPI/OVERNIGHT EVENTS:  Patient was seen and examined this morning. Patient resting in bed. Pain is currently controlled. Per her, baby is doing better. Patient is eating well, but not heavy. Planned for discharge tomorrow.    FSG & insulin:  Yesterday  Dinner FSG  115  Lispro 5     Bedtime   Today  Breakfast FSG  109  Lispro 5  Lunch FSG  118    Pt reports the following symptoms:    Constitutional:  Negative fever, chills or loss of appetite.  Eyes:  Negative blurry vision or double vision.  Cardiovascular:  Negative for chest pain or palpitations.  Respiratory:  Negative for cough, wheezing, or SOB.   Gastrointestinal:  Negative for nausea, vomiting, diarrhea, constipation, or abdominal pain.  Genitourinary:  Negative frequency, urgency or dysuria.  Neurologic:  No headache, confusion, dizziness, lightheadedness.    MEDICATIONS  (STANDING):  acetaminophen     Tablet .. 975 milliGRAM(s) Oral <User Schedule>  dextrose 5%. 1000 milliLiter(s) (50 mL/Hr) IV Continuous <Continuous>  dextrose 5%. 1000 milliLiter(s) (100 mL/Hr) IV Continuous <Continuous>  dextrose 50% Injectable 25 Gram(s) IV Push once  dextrose 50% Injectable 12.5 Gram(s) IV Push once  dextrose 50% Injectable 25 Gram(s) IV Push once  diphtheria/tetanus/pertussis (acellular) Vaccine (ADAcel) 0.5 milliLiter(s) IntraMuscular once  enoxaparin Injectable 40 milliGRAM(s) SubCutaneous every 24 hours  glucagon  Injectable 1 milliGRAM(s) IntraMuscular once  ibuprofen  Tablet. 600 milliGRAM(s) Oral every 6 hours  insulin lispro (ADMELOG) corrective regimen sliding scale   SubCutaneous Before meals and at bedtime  insulin lispro Injectable (ADMELOG) 5 Unit(s) SubCutaneous three times a day before meals  lactated ringers. 1000 milliLiter(s) (125 mL/Hr) IV Continuous <Continuous>  oxytocin Infusion 333.333 milliUNIT(s)/Min (1000 mL/Hr) IV Continuous <Continuous>    MEDICATIONS  (PRN):  dextrose Oral Gel 15 Gram(s) Oral once PRN Blood Glucose LESS THAN 70 milliGRAM(s)/deciliter  diphenhydrAMINE 25 milliGRAM(s) Oral every 6 hours PRN Pruritus  lanolin Ointment 1 Application(s) Topical every 6 hours PRN Sore Nipples  magnesium hydroxide Suspension 30 milliLiter(s) Oral two times a day PRN Constipation  oxyCODONE    IR 5 milliGRAM(s) Oral once PRN Moderate to Severe Pain (4-10)  oxyCODONE    IR 5 milliGRAM(s) Oral every 3 hours PRN Moderate to Severe Pain (4-10)  simethicone 80 milliGRAM(s) Chew every 4 hours PRN Gas      PHYSICAL EXAM  Vital Signs Last 24 Hrs  T(C): 36.7 (23 Aug 2022 10:00), Max: 37.2 (22 Aug 2022 18:15)  T(F): 98.1 (23 Aug 2022 10:00), Max: 99 (22 Aug 2022 18:15)  HR: 82 (23 Aug 2022 10:00) (82 - 91)  BP: 124/83 (23 Aug 2022 10:00) (117/70 - 133/87)  BP(mean): --  RR: 18 (23 Aug 2022 10:00) (16 - 19)  SpO2: 82% (23 Aug 2022 10:00) (82% - 97%)    Parameters below as of 23 Aug 2022 10:00  Patient On (Oxygen Delivery Method): room air      Constitutional: Awake, alert, in no acute distress.   HEENT: Normocephalic, atraumatic, KARLA, no proptosis or lid retraction.   Neck: supple, no acanthosis, no thyromegaly or palpable thyroid nodules.  Respiratory: Lungs clear to ausculation bilaterally.   Cardiovascular: regular rhythm, normal S1 and S2, no audible murmurs.   GI: soft, non-tender, non-distended, bowel sounds present, no masses appreciated.  Extremities: No lower extremity edema, peripheral pulses present.   Skin: no rashes.   Psychiatric: AAO x 3. Normal affect/mood.     LABS:                        9.1    10.23 )-----------( 213      ( 22 Aug 2022 05:09 )             28.1     HbA1C:   CAPILLARY BLOOD GLUCOSE      POCT Blood Glucose.: 144 mg/dL (23 Aug 2022 08:34)  POCT Blood Glucose.: 125 mg/dL (23 Aug 2022 06:29)  POCT Blood Glucose.: 90 mg/dL (22 Aug 2022 21:31)  POCT Blood Glucose.: 122 mg/dL (22 Aug 2022 19:09)  POCT Blood Glucose.: 105 mg/dL (22 Aug 2022 15:10)

## 2022-08-24 NOTE — DISCHARGE NOTE OB - PLAN OF CARE
We monitored your blood pressure while you were in the hospital. It was well controlled without medicines. Please continue to take your BPs daily and call us if you experience any of the following: BP >160/>110, headache that does not resolve, vision changes, shortness of breath, or right upper abdominal pain. Please follow up with your OB within 1-2 weeks for a blood pressure check. Check blood pressures at home 3x a day. If your blood pressure is ever greater than 160/110, you develop a headache not relieved by tylenol, visual disturbances, or right upper abdominal pain, call your doctor or the hospital, or go to your nearest emergency room right away. Take Motrin 600mg every 6 hours and/or tylenol 650mg every 6 hours as needed for pain. Call your OBGYN to schedule a follow up appointment in 1-2weeks. Keep incision site clean and dry. Call your OBGYN if you experiences severe abdominal pain not improved by oral pain medications, heavy bright red vaginal bleeding saturating more than 1 pad per hour, red /warmth at incision site, drainage from incision site, or fever greater than 100.4F. Finger sticks were well controlled during stay. Please follow up postpartum with Dr Amaya (endocrinology) within 1-2 weeks of discharge. We are sending Metformin 500 to the pharmacy, please take this twice daily. Finger sticks were well controlled during stay. Please follow up postpartum with Dr Amaya (endocrinology) on Sept 19th at 2:20pm. Appt was scheduled for you for this day and time. at [address] We are sending Metformin 500 to the pharmacy, please take this twice daily. Please take Metformin 500 twice a day for three days, then 1000 Metformin twice a day.

## 2022-08-24 NOTE — DISCHARGE NOTE OB - AVOID DOUCHING OR TAMPONS UNTIL YOUR POSTPARTUM VISIT
Chemo double check    Ht 64 in  Wt 117#  BSA 1.55    Oxaliplatin  85 mg/m2  = 132 mg    Camptosar 180 mg/m2  = 279 mg    Leucovorin 400 mg/m2  = 620 mg    5FU 400 mg/m2  = 620 mg    5FU pump = 3720 mg over 46 hours
Statement Selected

## 2022-08-24 NOTE — DISCHARGE NOTE OB - CARE PROVIDER_API CALL
Mary Matt  [facility]  [address] [place] [address]  Phone: [phone]  Fax: [phone]  Follow Up Time:

## 2022-08-24 NOTE — DISCHARGE NOTE OB - MEDICATION SUMMARY - MEDICATIONS TO STOP TAKING
I will STOP taking the medications listed below when I get home from the hospital:  None I will STOP taking the medications listed below when I get home from the hospital:    insulin glargine 100 units/mL subcutaneous solution  -- 70 unit(s) subcutaneous once a day (at bedtime)   -- Do not drink alcoholic beverages when taking this medication.  It is very important that you take or use this exactly as directed.  Do not skip doses or discontinue unless directed by your doctor.  Keep in refrigerator.  Do not freeze.    insulin lispro 100 units/mL injectable solution  -- 30 unit(s) injectable 3 times a day (with meals)

## 2022-08-25 VITALS
RESPIRATION RATE: 18 BRPM | HEART RATE: 85 BPM | TEMPERATURE: 98 F | DIASTOLIC BLOOD PRESSURE: 65 MMHG | SYSTOLIC BLOOD PRESSURE: 99 MMHG | OXYGEN SATURATION: 97 %

## 2022-08-25 LAB
GLUCOSE BLDC GLUCOMTR-MCNC: 108 MG/DL — HIGH (ref 70–99)
GLUCOSE BLDC GLUCOMTR-MCNC: 123 MG/DL — HIGH (ref 70–99)
GLUCOSE BLDC GLUCOMTR-MCNC: 124 MG/DL — HIGH (ref 70–99)
SURGICAL PATHOLOGY STUDY: SIGNIFICANT CHANGE UP

## 2022-08-25 PROCEDURE — 85730 THROMBOPLASTIN TIME PARTIAL: CPT

## 2022-08-25 PROCEDURE — 86901 BLOOD TYPING SEROLOGIC RH(D): CPT

## 2022-08-25 PROCEDURE — 88307 TISSUE EXAM BY PATHOLOGIST: CPT

## 2022-08-25 PROCEDURE — 82962 GLUCOSE BLOOD TEST: CPT

## 2022-08-25 PROCEDURE — 86769 SARS-COV-2 COVID-19 ANTIBODY: CPT

## 2022-08-25 PROCEDURE — 86780 TREPONEMA PALLIDUM: CPT

## 2022-08-25 PROCEDURE — 80053 COMPREHEN METABOLIC PANEL: CPT

## 2022-08-25 PROCEDURE — 85025 COMPLETE CBC W/AUTO DIFF WBC: CPT

## 2022-08-25 PROCEDURE — 99214 OFFICE O/P EST MOD 30 MIN: CPT

## 2022-08-25 PROCEDURE — 84156 ASSAY OF PROTEIN URINE: CPT

## 2022-08-25 PROCEDURE — 85610 PROTHROMBIN TIME: CPT

## 2022-08-25 PROCEDURE — 86900 BLOOD TYPING SEROLOGIC ABO: CPT

## 2022-08-25 PROCEDURE — 86850 RBC ANTIBODY SCREEN: CPT

## 2022-08-25 PROCEDURE — 83615 LACTATE (LD) (LDH) ENZYME: CPT

## 2022-08-25 PROCEDURE — 82570 ASSAY OF URINE CREATININE: CPT

## 2022-08-25 PROCEDURE — 59050 FETAL MONITOR W/REPORT: CPT

## 2022-08-25 PROCEDURE — 84550 ASSAY OF BLOOD/URIC ACID: CPT

## 2022-08-25 PROCEDURE — 36415 COLL VENOUS BLD VENIPUNCTURE: CPT

## 2022-08-25 PROCEDURE — 85384 FIBRINOGEN ACTIVITY: CPT

## 2022-08-25 RX ORDER — METFORMIN HYDROCHLORIDE 850 MG/1
1 TABLET ORAL
Qty: 4 | Refills: 0
Start: 2022-08-25 | End: 2022-08-26

## 2022-08-25 RX ORDER — METFORMIN HYDROCHLORIDE 850 MG/1
1000 TABLET ORAL
Qty: 60000 | Refills: 0
Start: 2022-08-25 | End: 2022-09-23

## 2022-08-25 RX ORDER — METFORMIN HYDROCHLORIDE 850 MG/1
1 TABLET ORAL
Qty: 60 | Refills: 0
Start: 2022-08-25 | End: 2022-09-23

## 2022-08-25 RX ORDER — METFORMIN HYDROCHLORIDE 850 MG/1
500 TABLET ORAL
Qty: 3000 | Refills: 0
Start: 2022-08-25 | End: 2022-08-27

## 2022-08-25 RX ADMIN — Medication 600 MILLIGRAM(S): at 06:02

## 2022-08-25 RX ADMIN — Medication 975 MILLIGRAM(S): at 08:52

## 2022-08-25 RX ADMIN — Medication 600 MILLIGRAM(S): at 06:32

## 2022-08-25 RX ADMIN — Medication 600 MILLIGRAM(S): at 16:00

## 2022-08-25 RX ADMIN — Medication 600 MILLIGRAM(S): at 15:17

## 2022-08-25 RX ADMIN — Medication 975 MILLIGRAM(S): at 03:20

## 2022-08-25 RX ADMIN — Medication 5 UNIT(S): at 15:11

## 2022-08-25 RX ADMIN — METFORMIN HYDROCHLORIDE 500 MILLIGRAM(S): 850 TABLET ORAL at 09:57

## 2022-08-25 RX ADMIN — Medication 975 MILLIGRAM(S): at 02:48

## 2022-08-25 RX ADMIN — Medication 5 UNIT(S): at 08:38

## 2022-08-25 RX ADMIN — Medication 975 MILLIGRAM(S): at 09:30

## 2022-08-25 NOTE — PROGRESS NOTE ADULT - SUBJECTIVE AND OBJECTIVE BOX
Patient evaluated at bedside.   She reports pain is well controlled with OPM.  She has been ambulating without assistance, voiding spontaneously, passing gas, tolerating regular diet.  She denies HA, dizziness, CP, palpitations, SOB, n/v, or heavy vaginal bleeding.  Pt complains of chronic groin pain intermittently.     Physical Exam:  Vital Signs Last 24 Hrs  T(C): 37.2 (24 Aug 2022 22:03), Max: 37.2 (24 Aug 2022 22:03)  T(F): 98.9 (24 Aug 2022 22:03), Max: 98.9 (24 Aug 2022 22:03)  HR: 70 (24 Aug 2022 22:03) (70 - 86)  BP: 125/82 (24 Aug 2022 22:03) (115/68 - 136/91)  BP(mean): --  RR: 18 (24 Aug 2022 22:03) (18 - 20)  SpO2: 97% (24 Aug 2022 22:03) (97% - 99%)    Parameters below as of 24 Aug 2022 14:13  Patient On (Oxygen Delivery Method): room air        Gen: NAD  Abd: + BS, soft, nontender, nondistended, no rebound or guarding  Incision clean, dry and intact  uterus firm at midline  : lochia WNL  Extremities: no swelling or calf tenderness                 Patient evaluated at bedside.   She reports pain is well controlled with OPM.  She has been ambulating without assistance, voiding spontaneously, passing gas, tolerating regular diet.  She denies HA, dizziness, CP, palpitations, SOB, n/v, or heavy vaginal bleeding.  Pt complains of chronic groin pain intermittently.     Physical Exam:  Vital Signs Last 24 Hrs  T(C): 37.2 (24 Aug 2022 22:03), Max: 37.2 (24 Aug 2022 22:03)  T(F): 98.9 (24 Aug 2022 22:03), Max: 98.9 (24 Aug 2022 22:03)  HR: 70 (24 Aug 2022 22:03) (70 - 86)  BP: 125/82 (24 Aug 2022 22:03) (115/68 - 136/91)  BP(mean): --  RR: 18 (24 Aug 2022 22:03) (18 - 20)  SpO2: 97% (24 Aug 2022 22:03) (97% - 99%)    Parameters below as of 24 Aug 2022 14:13  Patient On (Oxygen Delivery Method): room air        Gen: NAD  Abd: soft, nontender, nondistended, no rebound or guarding,   Incision clean, dry and intact with provena dressing  uterus firm at midline  : lochia WNL  Extremities: no swelling or calf tenderness

## 2022-08-25 NOTE — PROGRESS NOTE ADULT - ASSESSMENT
32y Female POD#2 s/p pC/S, c/b T2DM and cHTN                                    - Neuro/Pain: toradol atc, tylenol atc, oxy prn  - CV: VS per routine, normotensive  - Pulm: Encourage ISS & Ambulation  - GI: Advance as tolerated  - Endocrine: Metformin 500 BID started 8/24, lispro 5 with meals, sugars well controlled today  - : Voiding spontaneously  - DVT ppx: SCDs, Lovenox 40mg QD  - Groin pain - patient encouraged to see PCP and ask for PT  - Dispo: POD #3

## 2022-08-25 NOTE — PROGRESS NOTE ADULT - SUBJECTIVE AND OBJECTIVE BOX
Patient evaluated at bedside this morning, resting comfortable in bed.   She reports pain is well controlled.  She denies headache, dizziness, chest pain, palpitations, shortness of breathe, nausea, vomiting or heavy vaginal bleeding.  She has been ambulating without assistance, voiding spontaneously, passing gas, tolerating regular diet and is breastfeeding.    Physical Exam:  Vital Signs Last 24 Hrs  T(C): 36.7 (25 Aug 2022 05:38), Max: 37.2 (24 Aug 2022 22:03)  T(F): 98.1 (25 Aug 2022 05:38), Max: 98.9 (24 Aug 2022 22:03)  HR: 80 (25 Aug 2022 05:38) (70 - 90)  BP: 134/81 (25 Aug 2022 05:38) (115/68 - 134/81)  BP(mean): --  RR: 18 (25 Aug 2022 05:38) (18 - 20)  SpO2: 97% (25 Aug 2022 05:38) (97% - 99%)    Parameters below as of 24 Aug 2022 14:13  Patient On (Oxygen Delivery Method): room air        GA: NAD, comfortable, conversational  Abd: soft, nontender, nondistended, no rebound or guarding, incision clean, dry and intact, uterus firm at midline and below umbilicus  : lochia WNL  Extremities: no swelling or calf tenderness

## 2022-08-25 NOTE — PROGRESS NOTE ADULT - ASSESSMENT
32y Female POD#3 s/p pC/S, c/b T2DM and cHTN                                    - Neuro/Pain: toradol atc, tylenol atc, oxy prn  - CV: VS per routine, normotensive  - Pulm: Encourage ISS & Ambulation  - GI: regular diet  - Endocrine: Metformin 500 BID started 8/24, lispro 5 with meals, sugars well controlled today  - : Voiding spontaneously  - DVT ppx: SCDs, Lovenox 40mg QD  - Groin pain - patient encouraged to see PCP and ask for PT  - Dispo: POD #3

## 2022-08-29 ENCOUNTER — APPOINTMENT (OUTPATIENT)
Dept: OBGYN | Facility: CLINIC | Age: 32
End: 2022-08-29

## 2022-08-30 DIAGNOSIS — E11.65 TYPE 2 DIABETES MELLITUS WITH HYPERGLYCEMIA: ICD-10-CM

## 2022-08-30 DIAGNOSIS — Z3A.37 37 WEEKS GESTATION OF PREGNANCY: ICD-10-CM

## 2022-08-30 DIAGNOSIS — O14.93 UNSPECIFIED PRE-ECLAMPSIA, THIRD TRIMESTER: ICD-10-CM

## 2022-08-30 DIAGNOSIS — Z34.83 ENCOUNTER FOR SUPERVISION OF OTHER NORMAL PREGNANCY, THIRD TRIMESTER: ICD-10-CM

## 2022-08-30 DIAGNOSIS — O36.63X0 MATERNAL CARE FOR EXCESSIVE FETAL GROWTH, THIRD TRIMESTER, NOT APPLICABLE OR UNSPECIFIED: ICD-10-CM

## 2022-08-30 DIAGNOSIS — Z86.16 PERSONAL HISTORY OF COVID-19: ICD-10-CM

## 2022-08-30 DIAGNOSIS — O36.8131 DECREASED FETAL MOVEMENTS, THIRD TRIMESTER, FETUS 1: ICD-10-CM

## 2022-09-06 ENCOUNTER — APPOINTMENT (OUTPATIENT)
Dept: OBGYN | Facility: CLINIC | Age: 32
End: 2022-09-06

## 2022-09-12 ENCOUNTER — APPOINTMENT (OUTPATIENT)
Dept: OBGYN | Facility: CLINIC | Age: 32
End: 2022-09-12

## 2022-09-12 VITALS — WEIGHT: 177 LBS | DIASTOLIC BLOOD PRESSURE: 80 MMHG | SYSTOLIC BLOOD PRESSURE: 110 MMHG | BODY MASS INDEX: 29.45 KG/M2

## 2022-09-13 ENCOUNTER — APPOINTMENT (OUTPATIENT)
Dept: PSYCHIATRY | Facility: CLINIC | Age: 32
End: 2022-09-13

## 2022-09-13 NOTE — ATTENDING NOTE
[FreeTextEntry2] : I saw and examined pt and discussed care [FreeTextEntry3] : doing well post csection

## 2022-09-13 NOTE — HISTORY OF PRESENT ILLNESS
[Postpartum Follow Up] : postpartum follow up [Delivery Date: ___] : on [unfilled] [Male] : Delivery History: baby boy [Primary C/S] : delivered by  section [None] : No associated symptoms are reported [Clean/Dry/Intact] : clean, dry and intact [Healed] : healed [Doing Well] : is doing well [No Sign of Infection] : is showing no signs of infection [Breastfeeding] : not currently nursing [Abdominal Pain] : no abdominal pain [Incisional Drainage] : no incisional drainage [Shortness of Breath] : no shortness of breath [Suicidal Ideation] : no suicidal ideation [Erythema] : not erythematous [Swelling] : not swollen [FreeTextEntry8] : incision check [de-identified] : 32 year old POD#22 s/p pLTCS for NRFHT complicated by DM-2. Patient stable, doing well. Interested in Nexplanon for contraception. Currently taking metformin for DM-2, following up with Endocrine next week.  [de-identified] : Nexplanon rx sent to pharmacy; patient to return in 3 weeks for placement. WIC forms filled out.

## 2022-09-19 ENCOUNTER — APPOINTMENT (OUTPATIENT)
Dept: ENDOCRINOLOGY | Facility: CLINIC | Age: 32
End: 2022-09-19

## 2022-09-30 RX ORDER — ETONOGESTREL 68 MG/1
68 IMPLANT SUBCUTANEOUS
Qty: 1 | Refills: 0 | Status: ACTIVE | COMMUNITY
Start: 2022-09-12 | End: 1900-01-01

## 2022-10-04 ENCOUNTER — APPOINTMENT (OUTPATIENT)
Dept: OBGYN | Facility: CLINIC | Age: 32
End: 2022-10-04

## 2022-10-24 NOTE — ED PROVIDER NOTE - CARE PLAN
I assumed care for this patient at 7am this morning. In brief, this is a 79 year old patient with PMH of HTN, HLD, COPD, Chronic Respiratory Failure on Home O2 3L NC PRN, GERD, BPH, Adenocarcinoma of Right Lung S/P Chemotherapy & Radiation therapy, Prostate Cancer came with SOB since Wednesday. He was subsequently  Found to have an elevated BNP to 4,077, increased from 835 7 months ago.    Patient is not sure if he has lost or gained weight in the last several months. He states that he has been using his \"as needed\" prescribed after he had COVID more frequently and wearing the oxygen often over the past week. Prior to this, he would go \"weeks and weeks\" without wearing it. He sleeps on his side, but states that he has not needed to add more pillows lately.     Patient has made excellent urine output so far today. He will continue on 40 mg IV BID. Echo completed and results pending at this time. Oxygen weaned down to 4L, patient states he feels his breathing is much more comfortable. Please see H&P for further problem based plan.     Visit Vitals  BP (!) 140/72 (BP Location: RUE - Right upper extremity, Patient Position: Sitting)   Pulse 67   Temp 98 °F (36.7 °C) (Oral)   Resp 18   Ht 5' 9\" (1.753 m)   Wt 88 kg (194 lb 1.6 oz)   SpO2 93%   BMI 28.66 kg/m²   General: No acute distress, sitting up in bed. ON 4L  HEENT: Atraumatic Normocephalic ANGY EOMI No Pallor No Icterus Moist Oral mucosa  Neck: Soft Supple  Lungs: CTAB  Heart: S1 S2 + Regular  Abdomen: Soft Non-tender Non-distended BS +  Extremities: B/L LE edema Pulses +  Neuro: Alert and Oriented X 3   Principal Discharge DX:	Otitis media

## 2022-10-25 RX ORDER — ETONOGESTREL 68 MG/1
68 IMPLANT SUBCUTANEOUS
Qty: 1 | Refills: 0 | Status: ACTIVE | COMMUNITY
Start: 2022-10-24 | End: 1900-01-01

## 2022-11-08 ENCOUNTER — APPOINTMENT (OUTPATIENT)
Dept: ENDOCRINOLOGY | Facility: CLINIC | Age: 32
End: 2022-11-08

## 2022-11-08 NOTE — HISTORY OF PRESENT ILLNESS
[Hypoglycemia] : Patient is hypoglycemic. [FreeTextEntry1] : 30 y/o F pt, with Hx of T2DM diagnosed 6 yrs ago (), Thyroid nodules (Thyroid US on 21) referred by Dr. Chantale Bagley on [address], presents today to establish endocrine care. \par * Patient with no information of DM related complications. When pt came to see Dr. Amaya, pt was on Basal 20 u and Prandial 6 u ac (started end of February).\par Home glucose test : Checks BS 3-4 times a day.  \par Last Funduscopic visit: March-May 2021- Pt was not informed of DM retinopathy. \par Other PMHx: Depression, Anxiety, Asthma, HTN, Bipolar disorder. No PMHx of: \par FHx: Grandfather  of stomach CA, Grandmother  of breast CA. Mother:Father: No siblings: No family Hx of: Thyroid CA. No exposure to radiation as a kid. \par Social Hx: Non smoker. No EtOH use. Smoked marijuana 14 wks ago. \par NKDA\par 1 pregnancy, currently 14-week pregnant (unplanned)\par \par 2022\par Review of pt's chart:\par - Pt has history of L side cholesteatoma, difficulty hearing\par - Dr. Davila note from 2022: A1c 7.8% (note A1c 8.8% 2021). \par - US May 2021\par \par Patient presents today, referred by Dr. Chantale Bagley, 1 month ago, to see endocrinologist for endocrine visit. \par Patient is 14-week pregnant, with POCT 117, /75 and BMI 29.79, with the chief complaint of elevated BS since her conception, 14 weeks ago. She checks her BS 3-4 times a day. As per pt, her BS were below 120 before her pregnancy. Now, it is at ~200, with  and PPBS ranging 117-120. Pt is unsure of the exact names of her medications, but started taking basal insulin 20 u qhs and prandial insulin 6 u ac 2 wks ago. Prior to her insulin, she was on Metformin 1000 mg BID.\par Otherwise, pt has no physical complaints. \par \par Pt also has thyroid nodules which were discovered last year. A biopsy was recommended then, but she hasn't completed it for unknown reasons. She doesn't remember why it wasn't completed because she "suffers from bad memory".  \par Pt also has a history of HTN, for which she was taking Lisinopril last year. She stopped taking it when she got pregnant.\par Finally, pt also suffers from asthma. She was never on steroids for her asthma because she never experienced asthma attacks. \par \par 2022\par Pt is 23 weeks pregnant, with POCT 68, /72 and BMI 31.62. She gained 5 lbs in 1 month. \par Today, pt is feeling well, with c/o hypoglycemias once or twice a week and memory loss. She brought her glucose meter- Preprandial: 96, 115, 155 (7:16 am), 103, 120, 144, 129, 134. PPBS: 215, 152, 151, 120, 131, 71, 94, 163. Pt takes Lantus 60 u and Admelog 25-30 u ac depending on her meals for DM (she estimates carbohydrates).  \par \par 2022\par Pt presents today for DM f/u with POCT _, BP _ and BMI _. She gained _ lbs in _ months. \par Today, pt is feeling , with c/o \par \par Current Medications: Prenatal vitamin, Albuterol prn, Lantus 50 u qhs (decreased from 60 u this visit 22), Admelog 15 u ac +ss (modified this visit 2022)\par - Held: Metformin 1000 mg BID, Lisinopril (held start of pregnancy)\par - No high dose of steroids for asthma (no attacks at all)

## 2022-11-08 NOTE — DATA REVIEWED
[FreeTextEntry1] : Laboratory: \par - 04/21/22: POCT A1c 6.2% (H)\par - 03/16/22: POCT A1c 6.8% (H), s.creat 0.60, Glucose 122 (H), ALT 9 (L), LDL-c 79, TSH 0.90\par - 02/08/22: POCT A1c 7.8% (H), s.creat 0.57, Glucose 139 (H), ALT 8 (L), TSH 0.85, Ca 10.8, Albumin 4.9\par - 04/26/21: A1c 8.1% (H), s.creat 0.65, Glucose 228 (H), urine no protein, TSH 1.30\par - 02/25/21: A1c 8.8% (H), s.creat  TSH 0.99, LDL-c 88,  (H), urine 30 mg/dL protein. \par \par Imaging: \par - 04/01/22 FNA: Thyroid, R lower pole/isthmus, 2.1 cm, [place]. Thyroid R lower pole, 3.4 cm, [place] II. \par - 05/06/21 Thyroid US: R lobe (measuring 5.6 x 2.1 x 2.7 cm, homogeneous with normal vascularity) contains 2 nodules. 1) R lower pole solid nodule measuring 2.6 x 1.9 x 2.4 cm. 2) R lower pole, solid nodule measuring 1.8 x 1.0 x 1.9 cm. No nodules in L lobe. No nodules in the isthmus. Impression: Two R thyroid nodules meet the criteria for FNA biopsy.

## 2022-11-08 NOTE — PHYSICAL EXAM
[Alert] : alert [Normal Sclera/Conjunctiva] : normal sclera/conjunctiva [Normal Outer Ear/Nose] : the ears and nose were normal in appearance [No Neck Mass] : no neck mass was observed [No Respiratory Distress] : no respiratory distress [Normal Rate] : heart rate was normal [Regular Rhythm] : with a regular rhythm [No Edema] : no peripheral edema [Spine Straight] : spine straight [No Stigmata of Cushings Syndrome] : no stigmata of Cushings Syndrome [Normal Gait] : normal gait [No Rash] : no rash [Acanthosis Nigricans] : no acanthosis nigricans [Normal Reflexes] : deep tendon reflexes were 2+ and symmetric [No Tremors] : no tremors [Oriented x3] : oriented to person, place, and time [de-identified] : 23 weeks pregnant.

## 2022-11-08 NOTE — ASSESSMENT
[FreeTextEntry1] : 31 year y/o F pt with:\par \par 1. T2DM diagnosed 6 yrs ago- 23 weeks pregnant:\par Pt is on Lantus 60 u and Admelog 15-20 u depending on her meal (she estimates carbs to adjust her insulin dose). \par She has proteinuria, for which she saw Dr. Robles. \par Her A1c improved from 7.8% to 6.2% on 04/2022. \par Recommend new MDI: Lantus 50 u and Admelog 15 u ac + ss (refer to 'DM Therapy Regimen' for details). \par Refer to and and RD and ophthalmologist for dilate pupil exam. \par Once again, we discussed fetal maternal complications for pt with uncontrolled DM. \par \par 2. History of Thyroid nodules discovered 2021:\par Asymptomatic\par Pt was explained that nodules tend to get larger during pregnancy. \par FNA biopsy on 04/01/22 reveals R lower pole, 2.1 cm isthmus, [place] II. \par Will reevaluate this after pregnancy. \par  \par Return in: 6 weeks. \par \par \par \par

## 2022-11-08 NOTE — REVIEW OF SYSTEMS
[Recent Weight Gain (___ Lbs)] : recent weight gain: [unfilled] lbs [As Noted in HPI] : as noted in HPI [Negative] : Heme/Lymph [FreeTextEntry2] : She gained 5 lbs in 1 month.  [de-identified] : Poor memory [de-identified] : Hypoglycemias.

## 2022-11-08 NOTE — REVIEW OF SYSTEMS
[Recent Weight Gain (___ Lbs)] : recent weight gain: [unfilled] lbs [As Noted in HPI] : as noted in HPI [Negative] : Heme/Lymph [FreeTextEntry2] : She gained 5 lbs in 1 month.  [de-identified] : Poor memory [de-identified] : Hypoglycemias.

## 2022-11-08 NOTE — THERAPY
[Today's Date] : [unfilled] [Lantus] : Lantus [Admelog] : Admelog [FreeTextEntry9] : 50 u  [de-identified] : 15 u ac +ss \par BS <100: +0 u\par -150: + 4 u\par -200: + 6 u\par -250: + 8 u\par -300: + 10 u\par BS above 300: + 12 u\par

## 2022-11-08 NOTE — DATA REVIEWED
[FreeTextEntry1] : Labs: \par - 04/21/22: POCT A1c 6.2% (H)\par - 03/16/22: POCT A1c 6.8% (H), s.creat 0.60, Glucose 122 (H), ALT 9 (L), LDL-c 79, TSH 0.90\par - 02/08/22: POCT A1c 7.8% (H), s.creat 0.57, Glucose 139 (H), ALT 8 (L), TSH 0.85, Ca 10.8, Albumin 4.9\par - 04/26/21: A1c 8.1% (H), s.creat 0.65, Glucose 228 (H), urine no protein, TSH 1.30\par - 02/25/21: A1c 8.8% (H), s.creat  TSH 0.99, LDL-c 88,  (H), urine 30 mg/dL protein. \par \par Imaging: \par - 04/01/22 FNA: Thyroid, R lower pole/isthmus, 2.1 cm, [place] II. Thyroid R lower pole, 3.4 cm, [place] II. \par - 05/06/21 Thyroid US: R lobe (measuring 5.6 x 2.1 x 2.7 cm, homogeneous with normal vascularity) contains 2 nodules. 1) R lower pole solid nodule measuring 2.6 x 1.9 x 2.4 cm. 2) R lower pole, solid nodule measuring 1.8 x 1.0 x 1.9 cm. No nodules in L lobe. No nodules in the isthmus. Impression: Two R thyroid nodules meet the criteria for FNA biopsy.

## 2022-11-08 NOTE — DATA REVIEWED
[FreeTextEntry1] : Labs: \par - 04/21/22: POCT A1c 6.2% (H)\par - 03/16/22: POCT A1c 6.8% (H), s.creat 0.60, Glucose 122 (H), ALT 9 (L), LDL-c 79, TSH 0.90\par - 02/08/22: POCT A1c 7.8% (H), s.creat 0.57, Glucose 139 (H), ALT 8 (L), TSH 0.85, Ca 10.8, Albumin 4.9\par - 04/26/21: A1c 8.1% (H), s.creat 0.65, Glucose 228 (H), urine no protein, TSH 1.30\par - 02/25/21: A1c 8.8% (H), s.creat  TSH 0.99, LDL-c 88,  (H), urine 30 mg/dL protein. \par \par Imaging: \par - 04/01/22 FNA: Thyroid, R lower pole/isthmus, 2.1 cm, [place] II. Thyroid R lower pole, 3.4 cm, [place] II. \par - 05/06/21 Thyroid US: R lobe (measuring 5.6 x 2.1 x 2.7 cm, homogeneous with normal vascularity) contains 2 nodules. 1) R lower pole solid nodule measuring 2.6 x 1.9 x 2.4 cm. 2) R lower pole, solid nodule measuring 1.8 x 1.0 x 1.9 cm. No nodules in L lobe. No nodules in the isthmus. Impression: Two R thyroid nodules meet the criteria for FNA biopsy.

## 2022-11-08 NOTE — ASSESSMENT
[FreeTextEntry1] : 31 year y/o F pt with:\par \par 1. T2DM diagnosed 6 yrs ago- 23 weeks pregnant:\par Pt is on Lantus 60 u and Admelog 15-20 u depending on her meal (she estimates carbs to adjust her insulin dose). \par She has proteinuria, for which she saw Dr. Robles. \par Her A1c improved from 7.8% to 6.2% on 04/2022. \par Recommend new MDI: Lantus 50 u and Admelog 15 u ac + ss (refer to 'DM Therapy Regimen' for details). \par Refer to and and RD and ophthalmologist for dilate pupil exam. \par Once again, we discussed fetal maternal complications for pt with uncontrolled DM. \par \par 2. History of Thyroid nodules discovered 2021:\par Asymptomatic\par Pt was explained that nodules tend to get larger during pregnancy. \par FNA biopsy on 04/01/22 reveals R lower pole, 2.1 cm isthmus, [place] II. \par Will reevaluate this after pregnancy. \par  \par Return in: 6 weeks. \par \par \par \par

## 2022-11-08 NOTE — HISTORY OF PRESENT ILLNESS
[Hypoglycemia] : Patient is hypoglycemic. [FreeTextEntry1] : 32 y/o F pt, with Hx of T2DM diagnosed 6 yrs ago (), Thyroid nodules (Thyroid US on 21) referred by Dr. Chantale Bagley on [address], presents today to establish endocrine care. \par * Patient with no information of DM related complications. When pt came to see Dr. Amaya, pt was on Basal 20 u and Prandial 6 u ac (started end of February).\par Home glucose test : Checks BS 3-4 times a day.  \par Last Funduscopic visit: March-May 2021- Pt was not informed of DM retinopathy. \par Other PMHx: Depression, Anxiety, Asthma, HTN, Bipolar disorder. No PMHx of: \par FHx: Grandfather  of stomach CA, Grandmother  of breast CA. Mother:Father: No siblings: No family Hx of: Thyroid CA. No exposure to radiation as a kid. \par Social Hx: Non smoker. No EtOH use. Smoked marijuana 14 wks ago. \par NKDA\par 1 pregnancy, currently 14-week pregnant (unplanned)\par \par 2022\par Review of pt's chart:\par - Pt has history of L side cholesteatoma, difficulty hearing\par - Dr. Davila note from 2022: A1c 7.8% (note A1c 8.8% 2021). \par - US May 2021\par \par Patient presents today, referred by Dr. Chantale Bagley, 1 month ago, to see endocrinologist for endocrine visit. \par Patient is 14-week pregnant, with POCT 117, /75 and BMI 29.79, with the chief complaint of elevated BS since her conception, 14 weeks ago. She checks her BS 3-4 times a day. As per pt, her BS were below 120 before her pregnancy. Now, it is at ~200, with  and PPBS ranging 117-120. Pt is unsure of the exact names of her medications, but started taking basal insulin 20 u qhs and prandial insulin 6 u ac 2 wks ago. Prior to her insulin, she was on Metformin 1000 mg BID.\par Otherwise, pt has no physical complaints. \par \par Pt also has thyroid nodules which were discovered last year. A biopsy was recommended then, but she hasn't completed it for unknown reasons. She doesn't remember why it wasn't completed because she "suffers from bad memory".  \par Pt also has a history of HTN, for which she was taking Lisinopril last year. She stopped taking it when she got pregnant.\par Finally, pt also suffers from asthma. She was never on steroids for her asthma because she never experienced asthma attacks. \par \par 2022\par Pt is 23 weeks pregnant, with POCT 68, /72 and BMI 31.62. She gained 5 lbs in 1 month. \par Today, pt is feeling well, with c/o hypoglycemias once or twice a week and memory loss. She brought her glucose meter- Preprandial: 96, 115, 155 (7:16 am), 103, 120, 144, 129, 134. PPBS: 215, 152, 151, 120, 131, 71, 94, 163. Pt takes Lantus 60 u and Admelog 25-30 u ac depending on her meals for DM (she estimates carbohydrates).  \par \par 2022\par Pt presents today for DM f/u with POCT _, BP _ and BMI _. She gained _ lbs in _ months. \par Today, pt is feeling , with c/o \par \par Current Medications: Prenatal vitamin, Albuterol prn, Lantus 50 u qhs (decreased from 60 u this visit 22), Admelog 15 u ac +ss (modified this visit 2022)\par - Held: Metformin 1000 mg BID, Lisinopril (held start of pregnancy)\par - No high dose of steroids for asthma (no attacks at all)

## 2022-11-08 NOTE — END OF VISIT
[FreeTextEntry3] : All medical record entries made by the Scribe were at my, Dr. Parker Amaya, direction and personally dictated by me on 11/08/2022. I have reviewed the chart and agree that the record accurately reflects my personal performance of the history, physical exam, assessment and plan. I have also personally, directed, reviewed and agreed with the chart

## 2022-11-08 NOTE — THERAPY
[Today's Date] : [unfilled] [Lantus] : Lantus [Admelog] : Admelog [FreeTextEntry9] : 50 u  [de-identified] : 15 u ac +ss \par BS <100: +0 u\par -150: + 4 u\par -200: + 6 u\par -250: + 8 u\par -300: + 10 u\par BS above 300: + 12 u\par

## 2022-11-08 NOTE — ADDENDUM
[FreeTextEntry1] : I, Skyla Almazan, acted solely as a scribe for Dr. Parker Amaya on this date 11/08/2022

## 2022-11-08 NOTE — HISTORY OF PRESENT ILLNESS
[FreeTextEntry1] : 30 y/o F pt, with Hx of T2DM diagnosed 6 yrs ago (), Thyroid nodules (Thyroid US on 21) referred by Dr. Chantale Bagley on [address], presents today to establish endocrine care. \par * Patient with no information of DM related complications. When pt came to see Dr. Amaya, pt was on Basal 20 u and Prandial 6 u ac (started end of February).\par Home glucose test : Checks BS 3-4 times a day.  \par Last Funduscopic visit: March-May 2021- Pt was not informed of DM retinopathy. \par Other PMHx: Depression, Anxiety, Asthma, HTN, Bipolar disorder. No PMHx of: \par FHx: Grandfather  of stomach CA, Grandmother  of breast CA. Mother:Father: No siblings: No family Hx of: Thyroid CA. No exposure to radiation as a kid. \par Social Hx: Non smoker. No EtOH use. Smoked marijuana 14 wks ago. \par NKDA\par 1 pregnancy, currently 14-week pregnant (unplanned)\par \par 2022\par Review of pt's chart:\par - Pt has history of L side cholesteatoma, difficulty hearing\par - Dr. Davila note from 2022: A1c 7.8% (note A1c 8.8% 2021). \par - US May 2021\par \par Patient presents today, referred by Dr. Chantale Bagley, 1 month ago, to see endocrinologist for endocrine visit. \par Patient is 14-week pregnant, with POCT 117, /75 and BMI 29.79, with the chief complaint of elevated BS since her conception, 14 weeks ago. She checks her BS 3-4 times a day. As per pt, her BS were below 120 before her pregnancy. Now, it is at ~200, with  and PPBS ranging 117-120. Pt is unsure of the exact names of her medications, but started taking basal insulin 20 u qhs and prandial insulin 6 u ac 2 wks ago. Prior to her insulin, she was on Metformin 1000 mg BID.\par Otherwise, pt has no physical complaints. \par \par Pt also has thyroid nodules which were discovered last year. A biopsy was recommended then, but she hasn't completed it for unknown reasons. She doesn't remember why it wasn't completed because she "suffers from bad memory".  \par Pt also has a history of HTN, for which she was taking Lisinopril last year. She stopped taking it when she got pregnant.\par Finally, pt also suffers from asthma. She was never on steroids for her asthma because she never experienced asthma attacks. \par \par 2022\par Pt is 23 weeks pregnant, with POCT 68, /72 and BMI 31.62. She gained 5 lbs in 1 month. \par Today, pt is feeling well, with c/o hypoglycemias once or twice a week and memory loss. She brought her glucose meter- Preprandial: 96, 115, 155 (7:16 am), 103, 120, 144, 129, 134. PPBS: 215, 152, 151, 120, 131, 71, 94, 163. Pt takes Lantus 60 u and Admelog 25-30 u ac depending on her meals for DM (she estimates carbohydrates). \par \par 2022 \par Pt presents today for endocrine f/u, feeling , with c/o\par Review of pt's chart:\par -  Pt delivered baby boy c section on 22.  \par - In , her initiation visit with me and her DM was dx in 2016 thryoid nodules in may 2021, Last exam was in . She was 14 weeks pregnant when she was here for her first visit \par - In 22, A1c 7.8% She was on insulin 20 u basal and prandial 6 u ac meal that started last week of  or  \par \par Current Medications: Prenatal vitamin, Albuterol prn, Lantus 50 u qhs (decreased from 60 u this visit 22), Admelog 15 u ac +ss (modified this visit 2022)\par - Held: Metformin 1000 mg BID, Lisinopril (held start of pregnancy)\par - No high dose of steroids for asthma (no attacks at all) [Hypoglycemia] : Patient is hypoglycemic.

## 2022-11-15 ENCOUNTER — APPOINTMENT (OUTPATIENT)
Dept: OBGYN | Facility: CLINIC | Age: 32
End: 2022-11-15

## 2022-11-15 ENCOUNTER — NON-APPOINTMENT (OUTPATIENT)
Age: 32
End: 2022-11-15

## 2022-11-15 VITALS — SYSTOLIC BLOOD PRESSURE: 120 MMHG | DIASTOLIC BLOOD PRESSURE: 80 MMHG

## 2022-11-15 PROCEDURE — 11981 INSERTION DRUG DLVR IMPLANT: CPT

## 2022-11-15 NOTE — PLAN
[FreeTextEntry1] : Nexplanon placed on L arm, approximately 10cm medial to medial epicondyle, 4cm inferior to sulcus of the biceps/triceps. Area prepped with alcohol swab. 1% lidocaine injected for analgesia. Insertion site re-prepped with alcohol swab. Nexplanon placed with applicator without difficulty. Pressure applied to insertion site, good hemostasis noted. Pressure dressing applied afterwards. Nexplanon palpated by provider and the patient.\par - Jerrell Keller PGY3. Nexplanon placed with attending physician Dr. Grimm

## 2022-11-15 NOTE — DISCUSSION/SUMMARY
[FreeTextEntry1] : 32 year olds/p pLTCS for NRFHT on 8/21/2022 complicated by DM-2 presents for Nexplanon placement. \par - Nexplanon placed today on L arm. Lot number [id number]\par - [place] Depression Screen performed, score of 0, reassuring.\par - RTC at next annual exam \par - Jerrell Keller PGY3, patient seen and examined with Dr. Grimm

## 2022-11-15 NOTE — HISTORY OF PRESENT ILLNESS
[FreeTextEntry1] : 32 year olds/p pLTCS for NRFHT on 8/21/2022 complicated by DM-2 presents for Nexplanon placement. She continues to do well postpartum. Lochia has stopped, tolerating PO, having normal BM and voiding. She is not current breast feeding. She has resumed sexual intercourse, without issues. Her mood has been normal.\par \par PE:\par abd: soft, nontender to palpation, nondistended. CS scar healed.\par

## 2022-11-15 NOTE — DISCUSSION/SUMMARY
[FreeTextEntry1] : 32 year olds/p pLTCS for NRFHT on 8/21/2022 complicated by DM-2 presents for Nexplanon placement. \par - Nexplanon placed today on L arm. Lot number [id number]\par - [place] Depression Screen performed, score of 0, reassuring.\par - RTC at next annual exam \par - Jerrell Keller PGY3, patient seen and examined with Dr. Grimm

## 2022-11-21 NOTE — ED ADULT TRIAGE NOTE - CHIEF COMPLAINT QUOTE
"I got pcos and Im pretty sure I ruptured a cyst" Device at [facility] [place]

## 2023-03-06 NOTE — ED PROVIDER NOTE - DATE/TIME 1
[FreeTextEntry1] : SUKHDEV JESUS  36 year M presents with recurrent genital wart. Consent obtained. Area prepped with betadine. Time out called. 1% lidocaine plain. Lesion above left pubic area excised and fulgurated. Tolerated well. \par Shower 24 hrs. pat dry.\par Specimen sent to path \par \par Use barrier precautions.  Will call with pathology results. \par follow up as needed. \par \par \par  14-Feb-2021 07:55

## 2023-04-06 NOTE — ED ADULT NURSE NOTE - TEMPLATE LIST FOR HEAD TO TOE ASSESSMENT
Patient calling and is scheduled for a Welcome to Medicare visit with Lala Hernandez on 5/12/2023.    She was on Sertraline 100 mg, taking 2 tabs (200 mg) daily for VELASQUEZ from psychiatry.  However, her psychiatrist retired and before he left the practice, he provided patient with refills.  She used up the refills either end of 2022 or beginning of 2023 and has been off of the med since due to no more refills.  Has not established care with a new psychiatrist.  Patient felt better on the Sertraline but felt that the 200 mg dose was starting to lose effect.  Patient asking if Lala Hernandez can prescribe the Sertraline, preferably at an increased dose or at least refill the previous dose of 200 mg daily.  She feels that waiting til appointment on 5/12/2023 is too long to wait to get back on the Sertraline.    Pharmacy- [facility]    Detailed message left on patient's VM with note as written below.  Advised to call clinic back with any questions    Cierra Reed RN  [facility]     EENMT

## 2023-04-11 ENCOUNTER — EMERGENCY (EMERGENCY)
Facility: HOSPITAL | Age: 33
LOS: 1 days | Discharge: HOME | End: 2023-04-11
Attending: EMERGENCY MEDICINE | Admitting: EMERGENCY MEDICINE
Payer: COMMERCIAL

## 2023-04-11 VITALS
RESPIRATION RATE: 18 BRPM | OXYGEN SATURATION: 100 % | DIASTOLIC BLOOD PRESSURE: 84 MMHG | TEMPERATURE: 98 F | SYSTOLIC BLOOD PRESSURE: 143 MMHG | HEART RATE: 75 BPM

## 2023-04-11 VITALS
WEIGHT: 188.72 LBS | DIASTOLIC BLOOD PRESSURE: 91 MMHG | OXYGEN SATURATION: 98 % | RESPIRATION RATE: 18 BRPM | HEIGHT: 63 IN | SYSTOLIC BLOOD PRESSURE: 141 MMHG | TEMPERATURE: 98 F | HEART RATE: 105 BPM

## 2023-04-11 DIAGNOSIS — I10 ESSENTIAL (PRIMARY) HYPERTENSION: ICD-10-CM

## 2023-04-11 DIAGNOSIS — Z20.822 CONTACT WITH AND (SUSPECTED) EXPOSURE TO COVID-19: ICD-10-CM

## 2023-04-11 DIAGNOSIS — R11.10 VOMITING, UNSPECIFIED: ICD-10-CM

## 2023-04-11 DIAGNOSIS — G89.29 OTHER CHRONIC PAIN: ICD-10-CM

## 2023-04-11 DIAGNOSIS — M54.9 DORSALGIA, UNSPECIFIED: ICD-10-CM

## 2023-04-11 DIAGNOSIS — E11.65 TYPE 2 DIABETES MELLITUS WITH HYPERGLYCEMIA: ICD-10-CM

## 2023-04-11 DIAGNOSIS — Z79.84 LONG TERM (CURRENT) USE OF ORAL HYPOGLYCEMIC DRUGS: ICD-10-CM

## 2023-04-11 DIAGNOSIS — R00.0 TACHYCARDIA, UNSPECIFIED: ICD-10-CM

## 2023-04-11 DIAGNOSIS — E28.2 POLYCYSTIC OVARIAN SYNDROME: ICD-10-CM

## 2023-04-11 DIAGNOSIS — Z86.018 PERSONAL HISTORY OF OTHER BENIGN NEOPLASM: ICD-10-CM

## 2023-04-11 DIAGNOSIS — E72.89 OTHER SPECIFIED DISORDERS OF AMINO-ACID METABOLISM: ICD-10-CM

## 2023-04-11 DIAGNOSIS — R05.2 SUBACUTE COUGH: ICD-10-CM

## 2023-04-11 DIAGNOSIS — H71.92 UNSPECIFIED CHOLESTEATOMA, LEFT EAR: Chronic | ICD-10-CM

## 2023-04-11 DIAGNOSIS — J45.901 UNSPECIFIED ASTHMA WITH (ACUTE) EXACERBATION: ICD-10-CM

## 2023-04-11 LAB
ALBUMIN SERPL ELPH-MCNC: 3.7 G/DL — SIGNIFICANT CHANGE UP (ref 3.3–5)
ALP SERPL-CCNC: 96 U/L — SIGNIFICANT CHANGE UP (ref 40–120)
ALT FLD-CCNC: 21 U/L — SIGNIFICANT CHANGE UP (ref 10–45)
ANION GAP SERPL CALC-SCNC: 12 MMOL/L — SIGNIFICANT CHANGE UP (ref 5–17)
APPEARANCE UR: CLEAR — SIGNIFICANT CHANGE UP
AST SERPL-CCNC: 21 U/L — SIGNIFICANT CHANGE UP (ref 10–40)
B-OH-BUTYR SERPL-SCNC: 0.5 MMOL/L — HIGH
BACTERIA # UR AUTO: PRESENT /HPF
BASE EXCESS BLDV CALC-SCNC: -1.4 MMOL/L — SIGNIFICANT CHANGE UP (ref -2–3)
BASOPHILS # BLD AUTO: 0.04 K/UL — SIGNIFICANT CHANGE UP (ref 0–0.2)
BASOPHILS NFR BLD AUTO: 0.3 % — SIGNIFICANT CHANGE UP (ref 0–2)
BILIRUB SERPL-MCNC: 0.4 MG/DL — SIGNIFICANT CHANGE UP (ref 0.2–1.2)
BILIRUB UR-MCNC: NEGATIVE — SIGNIFICANT CHANGE UP
BUN SERPL-MCNC: 12 MG/DL — SIGNIFICANT CHANGE UP (ref 7–23)
CALCIUM SERPL-MCNC: 9.1 MG/DL — SIGNIFICANT CHANGE UP (ref 8.4–10.5)
CHLORIDE SERPL-SCNC: 97 MMOL/L — SIGNIFICANT CHANGE UP (ref 96–108)
CO2 BLDV-SCNC: 23.9 MMOL/L — SIGNIFICANT CHANGE UP (ref 22–26)
CO2 SERPL-SCNC: 23 MMOL/L — SIGNIFICANT CHANGE UP (ref 22–31)
COLOR SPEC: YELLOW — SIGNIFICANT CHANGE UP
CREAT SERPL-MCNC: 0.7 MG/DL — SIGNIFICANT CHANGE UP (ref 0.5–1.3)
DIFF PNL FLD: ABNORMAL
EGFR: 117 ML/MIN/1.73M2 — SIGNIFICANT CHANGE UP
EOSINOPHIL # BLD AUTO: 0.21 K/UL — SIGNIFICANT CHANGE UP (ref 0–0.5)
EOSINOPHIL NFR BLD AUTO: 1.8 % — SIGNIFICANT CHANGE UP (ref 0–6)
EPI CELLS # UR: SIGNIFICANT CHANGE UP /HPF (ref 0–5)
FLUAV AG NPH QL: SIGNIFICANT CHANGE UP
FLUBV AG NPH QL: SIGNIFICANT CHANGE UP
GAS PNL BLDV: SIGNIFICANT CHANGE UP
GLUCOSE SERPL-MCNC: 507 MG/DL — CRITICAL HIGH (ref 70–99)
GLUCOSE UR QL: >=1000
HCO3 BLDV-SCNC: 23 MMOL/L — SIGNIFICANT CHANGE UP (ref 22–29)
HCT VFR BLD CALC: 44.9 % — SIGNIFICANT CHANGE UP (ref 34.5–45)
HGB BLD-MCNC: 15 G/DL — SIGNIFICANT CHANGE UP (ref 11.5–15.5)
IMM GRANULOCYTES NFR BLD AUTO: 0.8 % — SIGNIFICANT CHANGE UP (ref 0–0.9)
KETONES UR-MCNC: 15 MG/DL
LACTATE SERPL-SCNC: 1.9 MMOL/L — SIGNIFICANT CHANGE UP (ref 0.5–2)
LEUKOCYTE ESTERASE UR-ACNC: NEGATIVE — SIGNIFICANT CHANGE UP
LYMPHOCYTES # BLD AUTO: 1.56 K/UL — SIGNIFICANT CHANGE UP (ref 1–3.3)
LYMPHOCYTES # BLD AUTO: 13.1 % — SIGNIFICANT CHANGE UP (ref 13–44)
MAGNESIUM SERPL-MCNC: 1.7 MG/DL — SIGNIFICANT CHANGE UP (ref 1.6–2.6)
MCHC RBC-ENTMCNC: 30.4 PG — SIGNIFICANT CHANGE UP (ref 27–34)
MCHC RBC-ENTMCNC: 33.4 GM/DL — SIGNIFICANT CHANGE UP (ref 32–36)
MCV RBC AUTO: 91.1 FL — SIGNIFICANT CHANGE UP (ref 80–100)
MONOCYTES # BLD AUTO: 0.59 K/UL — SIGNIFICANT CHANGE UP (ref 0–0.9)
MONOCYTES NFR BLD AUTO: 5 % — SIGNIFICANT CHANGE UP (ref 2–14)
NEUTROPHILS # BLD AUTO: 9.39 K/UL — HIGH (ref 1.8–7.4)
NEUTROPHILS NFR BLD AUTO: 79 % — HIGH (ref 43–77)
NITRITE UR-MCNC: NEGATIVE — SIGNIFICANT CHANGE UP
NRBC # BLD: 0 /100 WBCS — SIGNIFICANT CHANGE UP (ref 0–0)
PCO2 BLDV: 36 MMHG — LOW (ref 39–42)
PH BLDV: 7.41 — SIGNIFICANT CHANGE UP (ref 7.32–7.43)
PH UR: 6 — SIGNIFICANT CHANGE UP (ref 5–8)
PLATELET # BLD AUTO: 301 K/UL — SIGNIFICANT CHANGE UP (ref 150–400)
PO2 BLDV: 53 MMHG — HIGH (ref 25–45)
POTASSIUM SERPL-MCNC: 4.3 MMOL/L — SIGNIFICANT CHANGE UP (ref 3.5–5.3)
POTASSIUM SERPL-SCNC: 4.3 MMOL/L — SIGNIFICANT CHANGE UP (ref 3.5–5.3)
PROT SERPL-MCNC: 7.4 G/DL — SIGNIFICANT CHANGE UP (ref 6–8.3)
PROT UR-MCNC: ABNORMAL MG/DL
RBC # BLD: 4.93 M/UL — SIGNIFICANT CHANGE UP (ref 3.8–5.2)
RBC # FLD: 12.6 % — SIGNIFICANT CHANGE UP (ref 10.3–14.5)
RBC CASTS # UR COMP ASSIST: < 5 /HPF — SIGNIFICANT CHANGE UP
RSV RNA NPH QL NAA+NON-PROBE: SIGNIFICANT CHANGE UP
SAO2 % BLDV: 87.8 % — SIGNIFICANT CHANGE UP (ref 67–88)
SARS-COV-2 RNA SPEC QL NAA+PROBE: SIGNIFICANT CHANGE UP
SODIUM SERPL-SCNC: 132 MMOL/L — LOW (ref 135–145)
SP GR SPEC: 1.02 — SIGNIFICANT CHANGE UP (ref 1–1.03)
UROBILINOGEN FLD QL: 0.2 E.U./DL — SIGNIFICANT CHANGE UP
WBC # BLD: 11.88 K/UL — HIGH (ref 3.8–10.5)
WBC # FLD AUTO: 11.88 K/UL — HIGH (ref 3.8–10.5)
WBC UR QL: ABNORMAL /HPF

## 2023-04-11 PROCEDURE — 83605 ASSAY OF LACTIC ACID: CPT

## 2023-04-11 PROCEDURE — 82803 BLOOD GASES ANY COMBINATION: CPT

## 2023-04-11 PROCEDURE — 85025 COMPLETE CBC W/AUTO DIFF WBC: CPT

## 2023-04-11 PROCEDURE — 82010 KETONE BODYS QUAN: CPT

## 2023-04-11 PROCEDURE — 83735 ASSAY OF MAGNESIUM: CPT

## 2023-04-11 PROCEDURE — 71045 X-RAY EXAM CHEST 1 VIEW: CPT | Mod: 26

## 2023-04-11 PROCEDURE — 96360 HYDRATION IV INFUSION INIT: CPT

## 2023-04-11 PROCEDURE — 36415 COLL VENOUS BLD VENIPUNCTURE: CPT

## 2023-04-11 PROCEDURE — 99285 EMERGENCY DEPT VISIT HI MDM: CPT

## 2023-04-11 PROCEDURE — 87637 SARSCOV2&INF A&B&RSV AMP PRB: CPT

## 2023-04-11 PROCEDURE — 82962 GLUCOSE BLOOD TEST: CPT

## 2023-04-11 PROCEDURE — 71045 X-RAY EXAM CHEST 1 VIEW: CPT

## 2023-04-11 PROCEDURE — 80053 COMPREHEN METABOLIC PANEL: CPT

## 2023-04-11 PROCEDURE — 99285 EMERGENCY DEPT VISIT HI MDM: CPT | Mod: 25

## 2023-04-11 PROCEDURE — 81001 URINALYSIS AUTO W/SCOPE: CPT

## 2023-04-11 PROCEDURE — 94640 AIRWAY INHALATION TREATMENT: CPT

## 2023-04-11 RX ORDER — ALBUTEROL 90 UG/1
3 AEROSOL, METERED ORAL
Qty: 30 | Refills: 0
Start: 2023-04-11

## 2023-04-11 RX ORDER — SODIUM CHLORIDE 9 MG/ML
1000 INJECTION INTRAMUSCULAR; INTRAVENOUS; SUBCUTANEOUS ONCE
Refills: 0 | Status: COMPLETED | OUTPATIENT
Start: 2023-04-11 | End: 2023-04-11

## 2023-04-11 RX ORDER — ALBUTEROL 90 UG/1
2 AEROSOL, METERED ORAL
Qty: 1 | Refills: 0
Start: 2023-04-11

## 2023-04-11 RX ORDER — IPRATROPIUM/ALBUTEROL SULFATE 18-103MCG
3 AEROSOL WITH ADAPTER (GRAM) INHALATION
Refills: 0 | Status: COMPLETED | OUTPATIENT
Start: 2023-04-11 | End: 2023-04-11

## 2023-04-11 RX ADMIN — Medication 3 MILLILITER(S): at 21:21

## 2023-04-11 RX ADMIN — SODIUM CHLORIDE 1000 MILLILITER(S): 9 INJECTION INTRAMUSCULAR; INTRAVENOUS; SUBCUTANEOUS at 20:58

## 2023-04-11 RX ADMIN — Medication 3 MILLILITER(S): at 21:11

## 2023-04-11 RX ADMIN — Medication 3 MILLILITER(S): at 20:56

## 2023-04-11 RX ADMIN — SODIUM CHLORIDE 1000 MILLILITER(S): 9 INJECTION INTRAMUSCULAR; INTRAVENOUS; SUBCUTANEOUS at 20:19

## 2023-04-11 RX ADMIN — SODIUM CHLORIDE 1000 MILLILITER(S): 9 INJECTION INTRAMUSCULAR; INTRAVENOUS; SUBCUTANEOUS at 21:11

## 2023-04-11 NOTE — ED PROVIDER NOTE - PHYSICAL EXAMINATION
Constitutional : Well appearing, non-toxic, no acute distress. awake, alert, oriented to person, place, time/situation.  Head : head normocephalic, atraumatic  EENMT : eyes clear bilaterally, PERRL, EOMI. airway patent. moist mucous membranes. oropharynx noninjected  neck supple.  Cardiac : Normal rate, regular rhythm. No murmur appreciated, no LE edema.  Resp : Expiratory wheezing, coughing fits throughout. no rales / rhonchi. Respirations even and unlabored.   Gastro : abdomen soft, nontender, nondistended. no rebound or guarding. no CVAT.  MSK :  range of motion is not limited, no muscle or joint tenderness  Back : No evidence of trauma. No spinal or CVA tenderness.  Vasc : Extremities warm and well perfused. 2+ radial and DP pulses. cap refill <2 seconds  Neuro : Alert and oriented, CNII-XII grossly intact, no focal deficits, no motor or sensory deficits.  Skin : Skin normal color for race, warm, dry and intact. No evidence of rash.  Psych : Alert and oriented to person, place, time/situation. normal mood and affect. no apparent risk to self or others. Constitutional : Well appearing, non-toxic, no acute distress. awake, alert, oriented to person, place, time/situation.  Head : head normocephalic, atraumatic  EENMT : eyes clear bilaterally, PERRL, EOMI. airway patent. moist mucous membranes. oropharynx noninjected, uvula midline w/o edema. no tonsillar hypertrophy, no exudates.  neck supple.  Cardiac : Normal rate, regular rhythm. No murmur appreciated, no LE edema.  Resp : Expiratory wheezing, coughing fits throughout. no rales / rhonchi. Respirations even and unlabored.   Gastro : abdomen soft, nontender, nondistended. no rebound or guarding. no CVAT.  MSK :  range of motion is not limited, no muscle or joint tenderness  Back : No evidence of trauma. No spinal or CVA tenderness.  Vasc : Extremities warm and well perfused. 2+ radial and DP pulses. cap refill <2 seconds  Neuro : Alert and oriented, CNII-XII grossly intact, no focal deficits, no motor or sensory deficits.  Skin : Skin normal color for race, warm, dry and intact. No evidence of rash.  Psych : Alert and oriented to person, place, time/situation. normal mood and affect. no apparent risk to self or others.

## 2023-04-11 NOTE — ED PROVIDER NOTE - CLINICAL SUMMARY MEDICAL DECISION MAKING FREE TEXT BOX
history of asthma, htn, dm, here w intermittent uri symptoms x 1 month, worse in last week. mostly cough, nonproductive. triggering asthma x few days. also w two weeks uncontrolled BG >300s.   pt nontoxic appearing, tachycardic 105, vitals otherwise ok - not hypoxic or tachypneic. Expiratory wheezing, coughing fits throughout. oropharyngeal exam unremarkable  - uri symptoms / cough  suspect viral illness triggering asthma. r/o pna, possible bronchitis.   will get swab, cxr, trial duo nebs.   hold steroids given uncontrolled BG   - hyperglycemia  possibly elevated from recent illness, possible stress (admits stress w family issues).  r/o dka  will give iv fluids and reassess

## 2023-04-11 NOTE — ED ADULT TRIAGE NOTE - CHIEF COMPLAINT QUOTE
Pt presents to ED C/O flu like symptoms x 1 month with cough, chest tightness and high BG today. Pt states, " I took my sugar this AM and it was 490, I took two metformin pills at home at 1pm". Hx DM, asthma.  in triage. EKG in progress.

## 2023-04-11 NOTE — ED ADULT TRIAGE NOTE - HISTORY OF COVID-19 VACCINATION
Date of visit: 8/13/2018      Chief Complaint   Patient presents with   • Back Problem     pt presents with sciatic pain that runs down the left leg. Pt asking for medication to help with pain.        MA note appreciated and accepted.      HISTORY OF PRESENT ILLNESS:  Mr Antonio Reyes who is a pt of Jim Dixon MD presents with c/o continuing aching pain from L lower buttock, down L leg to thigh & calf   Denies trauma  Like a toothache 7-8 of 10, worst when sitting and at night- keeps him up.  Not so bad when active during the day  Usually sleeps 10 pm to 05 am   Has been using naproxen as prescribed, but it isn't helping - would like something stronger  Self care includes: warm, cold,       Past Medical History:   Diagnosis Date   • Calculus of kidney     Kidney Stone, calcium oxalate   • Diverticulosis of colon (without mention of hemorrhage) 12/14/06   • Hypersomnia with sleep apnea, unspecified 6/04    using CPAP,7.5 cm H20, heated humidity, small comfort classic mask   • Hypertrophy (benign) of prostate    • BLISS (nonalcoholic steatohepatitis)    • Other and unspecified hyperlipidemia    • Reflux esophagitis    • Senile cataract, unspecified 6/6/2014   • Tear film insufficiency 6/6/2014   • Tear film insufficiency, unspecified 2/25/2011   • Unspecified essential hypertension          REVIEW OF SYSTEMS:  Constitutional: Denies fever, chills   Cardiovascular: Denies  Respiratory: Denies   Feeling fine otherwise          EXAM:    Visit Vitals  /64 (BP Location: [facility], Patient Position: Sitting, Cuff Size: Regular)   Pulse 65   Resp 16   Ht 5' 10\" (1.778 m)   Wt 84.8 kg   SpO2 98%   BMI 26.83 kg/m²     General: pleasant 73 year old male who looks uncomfortable   Cardiovascular: RRR, no murmur  Respiratory: CTA bilateral, normal resp effort  + L sided supine straight leg raise test, also some discomfort with passive abduction, internal & external rotation on L hip   Negative R sided supine straight leg raise  test, no discomfort with passive abduction, internal & external rotation on R hip   Skin: no rash       ASSESSMENT:   1. Left sided sciatica        PLAN:   Orders Placed This Encounter   • cyclobenzaprine (FLEXERIL) 10 MG tablet   Patient instructed on uses and possible side effects of medication - discussed safety.  Continue with naproxen  Explained dermatomes & why this is most likely a pinching of the nerve  Instructed on daily exercises for the core  Explained that most people improve over 2 weeks      FU: prn     Written information given  Patient states understanding and agreement with plan  Collaborating physician: Dr. Dino Block     Yes

## 2023-04-11 NOTE — ED ADULT NURSE NOTE - OBJECTIVE STATEMENT
Pt a+ox4 with multiple complaints. States chest tightness, frequent thirst, general malaise, and elevated sugars. Took 2000mg metformin this morning

## 2023-04-11 NOTE — ED PROVIDER NOTE - PROGRESS NOTE DETAILS
wbc count 11.88  glucose 507 - beta hydroxy slightly elevated. no gap, not acidotic.   cxr clear. flu / covid negative  UA 5-10 wbcs but leuk / nitrite negative and no urinary symptoms.     after fluids BG downtrended 351 -> 317 -> 279  pt feeling better after fluids and nebs. requesting albuterol neb treatments and pump.   will f/u w endo this week.     All results reviewed with the patient verbally. Discharge plan and return precautions d/w pt who verbalized understanding and agrees with plan. All questions answered. Vitals WNL. Ready for d/c.

## 2023-04-11 NOTE — ED PROVIDER NOTE - OBJECTIVE STATEMENT
32 yr old female, history of asthma, htn, dm, presents to the Emergency Department with multiple complaints. pt reports cough, congestion x 1 month intermittently. worse again x1 week. this week cough has been worse, more frequent. coughing fits, one episode of post-tussive emesis today. using albuterol inhaler w temporary relief. used albuterol nebs today w some relief. no chest pain.  also notes in last two weeks her BG has been poorly controlled. has been >300 over this time. no n/v/d, urinary symptoms. normally BG well controlled. was on insulin last year during pregnancy but currently only takes metformin and follows regularly w a endocrinologist.

## 2023-04-11 NOTE — ED PROVIDER NOTE - NSFOLLOWUPINSTRUCTIONS_ED_ALL_ED_FT
Continue all medications as previously instructed.   Monitor your blood sugar closely.   Albuterol inhaler and nebulizers were sent to your pharmacy.     Follow up with your Endocrinologist this week for continued evaluation.     Return to the Emergency Department for persistent, worsening or new symptoms including difficulty breathing, severe cough, high fever, chest pain, or any other serious concerns.

## 2023-04-11 NOTE — ED PROVIDER NOTE - NS ED ATTENDING STATEMENT MOD
This was a shared visit with the DELON. I reviewed and verified the documentation and independently performed the documented:

## 2023-04-11 NOTE — ED PROVIDER NOTE - PATIENT PORTAL LINK FT
You can access the FollowMyHealth Patient Portal offered by [facility] by registering at the following website: [email or web address]. By joining [employer]’s FollowMyHealth portal, you will also be able to view your health information using other applications (apps) compatible with our system.

## 2023-04-12 RX ORDER — ALBUTEROL 90 UG/1
3 AEROSOL, METERED ORAL
Qty: 30 | Refills: 0
Start: 2023-04-12

## 2023-04-12 RX ORDER — ALBUTEROL 90 UG/1
2 AEROSOL, METERED ORAL
Qty: 1 | Refills: 0
Start: 2023-04-12

## 2023-04-12 NOTE — ED POST DISCHARGE NOTE - REASON FOR FOLLOW-UP
Other Patient called and spoke to  requesting that the medication that she was prescribed on a visit prior be resent to different pharmacy.  The  placed the correct pharmacy and I have resent the 3 medications albuterol solution, albuterol aerosol, and Tessalon Perles to patient's [facility] pharmacy on [address]

## 2023-04-15 ENCOUNTER — EMERGENCY (EMERGENCY)
Facility: HOSPITAL | Age: 33
LOS: 1 days | Discharge: HOME | End: 2023-04-15
Attending: EMERGENCY MEDICINE | Admitting: EMERGENCY MEDICINE
Payer: COMMERCIAL

## 2023-04-15 VITALS
DIASTOLIC BLOOD PRESSURE: 72 MMHG | OXYGEN SATURATION: 97 % | HEART RATE: 75 BPM | TEMPERATURE: 98 F | SYSTOLIC BLOOD PRESSURE: 109 MMHG | RESPIRATION RATE: 18 BRPM

## 2023-04-15 VITALS
HEART RATE: 87 BPM | DIASTOLIC BLOOD PRESSURE: 86 MMHG | HEIGHT: 63 IN | TEMPERATURE: 98 F | SYSTOLIC BLOOD PRESSURE: 119 MMHG | WEIGHT: 188.05 LBS | OXYGEN SATURATION: 97 % | RESPIRATION RATE: 18 BRPM

## 2023-04-15 DIAGNOSIS — I10 ESSENTIAL (PRIMARY) HYPERTENSION: ICD-10-CM

## 2023-04-15 DIAGNOSIS — E28.2 POLYCYSTIC OVARIAN SYNDROME: ICD-10-CM

## 2023-04-15 DIAGNOSIS — Z98.890 OTHER SPECIFIED POSTPROCEDURAL STATES: ICD-10-CM

## 2023-04-15 DIAGNOSIS — E11.9 TYPE 2 DIABETES MELLITUS WITHOUT COMPLICATIONS: ICD-10-CM

## 2023-04-15 DIAGNOSIS — J45.909 UNSPECIFIED ASTHMA, UNCOMPLICATED: ICD-10-CM

## 2023-04-15 DIAGNOSIS — H71.92 UNSPECIFIED CHOLESTEATOMA, LEFT EAR: Chronic | ICD-10-CM

## 2023-04-15 DIAGNOSIS — Z20.822 CONTACT WITH AND (SUSPECTED) EXPOSURE TO COVID-19: ICD-10-CM

## 2023-04-15 DIAGNOSIS — Z79.84 LONG TERM (CURRENT) USE OF ORAL HYPOGLYCEMIC DRUGS: ICD-10-CM

## 2023-04-15 DIAGNOSIS — N20.0 CALCULUS OF KIDNEY: ICD-10-CM

## 2023-04-15 DIAGNOSIS — F41.9 ANXIETY DISORDER, UNSPECIFIED: ICD-10-CM

## 2023-04-15 DIAGNOSIS — M54.9 DORSALGIA, UNSPECIFIED: ICD-10-CM

## 2023-04-15 LAB
ANION GAP SERPL CALC-SCNC: 11 MMOL/L — SIGNIFICANT CHANGE UP (ref 5–17)
APPEARANCE UR: CLEAR — SIGNIFICANT CHANGE UP
BACTERIA # UR AUTO: PRESENT /HPF
BILIRUB UR-MCNC: NEGATIVE — SIGNIFICANT CHANGE UP
BUN SERPL-MCNC: 19 MG/DL — SIGNIFICANT CHANGE UP (ref 7–23)
CALCIUM SERPL-MCNC: 9.4 MG/DL — SIGNIFICANT CHANGE UP (ref 8.4–10.5)
CHLORIDE SERPL-SCNC: 99 MMOL/L — SIGNIFICANT CHANGE UP (ref 96–108)
CO2 SERPL-SCNC: 24 MMOL/L — SIGNIFICANT CHANGE UP (ref 22–31)
COLOR SPEC: YELLOW — SIGNIFICANT CHANGE UP
CREAT SERPL-MCNC: 1.01 MG/DL — SIGNIFICANT CHANGE UP (ref 0.5–1.3)
DIFF PNL FLD: NEGATIVE — SIGNIFICANT CHANGE UP
EGFR: 75 ML/MIN/1.73M2 — SIGNIFICANT CHANGE UP
EPI CELLS # UR: SIGNIFICANT CHANGE UP /HPF (ref 0–5)
GLUCOSE SERPL-MCNC: 336 MG/DL — HIGH (ref 70–99)
GLUCOSE UR QL: 500
HCT VFR BLD CALC: 43.3 % — SIGNIFICANT CHANGE UP (ref 34.5–45)
HGB BLD-MCNC: 14.5 G/DL — SIGNIFICANT CHANGE UP (ref 11.5–15.5)
KETONES UR-MCNC: 15 MG/DL
LEUKOCYTE ESTERASE UR-ACNC: NEGATIVE — SIGNIFICANT CHANGE UP
MCHC RBC-ENTMCNC: 30 PG — SIGNIFICANT CHANGE UP (ref 27–34)
MCHC RBC-ENTMCNC: 33.5 GM/DL — SIGNIFICANT CHANGE UP (ref 32–36)
MCV RBC AUTO: 89.5 FL — SIGNIFICANT CHANGE UP (ref 80–100)
NITRITE UR-MCNC: NEGATIVE — SIGNIFICANT CHANGE UP
NRBC # BLD: 0 /100 WBCS — SIGNIFICANT CHANGE UP (ref 0–0)
PH UR: 6.5 — SIGNIFICANT CHANGE UP (ref 5–8)
PLATELET # BLD AUTO: 314 K/UL — SIGNIFICANT CHANGE UP (ref 150–400)
POTASSIUM SERPL-MCNC: 3.8 MMOL/L — SIGNIFICANT CHANGE UP (ref 3.5–5.3)
POTASSIUM SERPL-SCNC: 3.8 MMOL/L — SIGNIFICANT CHANGE UP (ref 3.5–5.3)
PROT UR-MCNC: 30 MG/DL
RBC # BLD: 4.84 M/UL — SIGNIFICANT CHANGE UP (ref 3.8–5.2)
RBC # FLD: 12.3 % — SIGNIFICANT CHANGE UP (ref 10.3–14.5)
RBC CASTS # UR COMP ASSIST: < 5 /HPF — SIGNIFICANT CHANGE UP
SARS-COV-2 RNA SPEC QL NAA+PROBE: NEGATIVE — SIGNIFICANT CHANGE UP
SODIUM SERPL-SCNC: 134 MMOL/L — LOW (ref 135–145)
SP GR SPEC: 1.02 — SIGNIFICANT CHANGE UP (ref 1–1.03)
UROBILINOGEN FLD QL: 0.2 E.U./DL — SIGNIFICANT CHANGE UP
WBC # BLD: 10.67 K/UL — HIGH (ref 3.8–10.5)
WBC # FLD AUTO: 10.67 K/UL — HIGH (ref 3.8–10.5)
WBC UR QL: < 5 /HPF — SIGNIFICANT CHANGE UP

## 2023-04-15 PROCEDURE — 74176 CT ABD & PELVIS W/O CONTRAST: CPT | Mod: 26,MA

## 2023-04-15 PROCEDURE — 96375 TX/PRO/DX INJ NEW DRUG ADDON: CPT

## 2023-04-15 PROCEDURE — 99285 EMERGENCY DEPT VISIT HI MDM: CPT

## 2023-04-15 PROCEDURE — 85027 COMPLETE CBC AUTOMATED: CPT

## 2023-04-15 PROCEDURE — 96361 HYDRATE IV INFUSION ADD-ON: CPT

## 2023-04-15 PROCEDURE — 87635 SARS-COV-2 COVID-19 AMP PRB: CPT

## 2023-04-15 PROCEDURE — 74176 CT ABD & PELVIS W/O CONTRAST: CPT | Mod: MA

## 2023-04-15 PROCEDURE — 36415 COLL VENOUS BLD VENIPUNCTURE: CPT

## 2023-04-15 PROCEDURE — 80048 BASIC METABOLIC PNL TOTAL CA: CPT

## 2023-04-15 PROCEDURE — 87086 URINE CULTURE/COLONY COUNT: CPT

## 2023-04-15 PROCEDURE — 96374 THER/PROPH/DIAG INJ IV PUSH: CPT

## 2023-04-15 PROCEDURE — 81001 URINALYSIS AUTO W/SCOPE: CPT

## 2023-04-15 PROCEDURE — 99284 EMERGENCY DEPT VISIT MOD MDM: CPT | Mod: 25

## 2023-04-15 PROCEDURE — 96376 TX/PRO/DX INJ SAME DRUG ADON: CPT

## 2023-04-15 RX ORDER — ONDANSETRON 8 MG/1
1 TABLET, FILM COATED ORAL
Qty: 10 | Refills: 0
Start: 2023-04-15

## 2023-04-15 RX ORDER — TAMSULOSIN HYDROCHLORIDE 0.4 MG/1
0.4 CAPSULE ORAL ONCE
Refills: 0 | Status: COMPLETED | OUTPATIENT
Start: 2023-04-15 | End: 2023-04-15

## 2023-04-15 RX ORDER — SODIUM CHLORIDE 9 MG/ML
1000 INJECTION INTRAMUSCULAR; INTRAVENOUS; SUBCUTANEOUS ONCE
Refills: 0 | Status: COMPLETED | OUTPATIENT
Start: 2023-04-15 | End: 2023-04-15

## 2023-04-15 RX ORDER — KETOROLAC TROMETHAMINE 30 MG/ML
1 SYRINGE (ML) INJECTION
Qty: 15 | Refills: 0
Start: 2023-04-15

## 2023-04-15 RX ORDER — KETOROLAC TROMETHAMINE 30 MG/ML
15 SYRINGE (ML) INJECTION ONCE
Refills: 0 | Status: DISCONTINUED | OUTPATIENT
Start: 2023-04-15 | End: 2023-04-15

## 2023-04-15 RX ORDER — ONDANSETRON 8 MG/1
4 TABLET, FILM COATED ORAL ONCE
Refills: 0 | Status: COMPLETED | OUTPATIENT
Start: 2023-04-15 | End: 2023-04-15

## 2023-04-15 RX ADMIN — Medication 15 MILLIGRAM(S): at 09:43

## 2023-04-15 RX ADMIN — Medication 15 MILLIGRAM(S): at 09:13

## 2023-04-15 RX ADMIN — SODIUM CHLORIDE 1000 MILLILITER(S): 9 INJECTION INTRAMUSCULAR; INTRAVENOUS; SUBCUTANEOUS at 07:02

## 2023-04-15 RX ADMIN — Medication 15 MILLIGRAM(S): at 05:20

## 2023-04-15 RX ADMIN — SODIUM CHLORIDE 1000 MILLILITER(S): 9 INJECTION INTRAMUSCULAR; INTRAVENOUS; SUBCUTANEOUS at 08:02

## 2023-04-15 RX ADMIN — SODIUM CHLORIDE 1000 MILLILITER(S): 9 INJECTION INTRAMUSCULAR; INTRAVENOUS; SUBCUTANEOUS at 05:21

## 2023-04-15 RX ADMIN — SODIUM CHLORIDE 1000 MILLILITER(S): 9 INJECTION INTRAMUSCULAR; INTRAVENOUS; SUBCUTANEOUS at 06:30

## 2023-04-15 RX ADMIN — TAMSULOSIN HYDROCHLORIDE 0.4 MILLIGRAM(S): 0.4 CAPSULE ORAL at 07:02

## 2023-04-15 RX ADMIN — ONDANSETRON 4 MILLIGRAM(S): 8 TABLET, FILM COATED ORAL at 07:33

## 2023-04-15 RX ADMIN — Medication 15 MILLIGRAM(S): at 05:50

## 2023-04-15 RX ADMIN — ONDANSETRON 4 MILLIGRAM(S): 8 TABLET, FILM COATED ORAL at 05:20

## 2023-04-15 NOTE — ED PROVIDER NOTE - PROGRESS NOTE DETAILS
Pt felt improved after toradol.  Labs wnl, ct + 4 mm prox uvj stone w hydro and obstruction.   consulted ~ 6a, pending their eval.  Pt signed out to Dr Guerra and WALDO Mack. uro eval, wants urine cx sent- will be sent with downtime form as system cancels order.  pain controlled w/ toradol.  dc w/ toradol/zofran and f/u outpt w/ Dr. Maddox.  discussed strict return parameters

## 2023-04-15 NOTE — ED PROVIDER NOTE - OBJECTIVE STATEMENT
29-year-old female history of diabetes, hypertension, asthma, back pain, PCOS, anxiety, kidney stone complaining of 2 to 3 days severe right sided flank pain similar to prior kidney stone.  Patient also reports intermittent nausea vomiting over the past 2 to 3 days that she attributes to the pain.  The patient occasionally felt pain radiating towards her right abdomen but does not currently feel abdominal pain.  No fever.  No dysuria, hematuria.  Patient has tried Tylenol, ibuprofen at home without significant relief of symptoms.  Pain is severe, constant but increases in intensity at times.  No radiation into buttock or lower extremity, or associated numbness, weakness, incontinence.

## 2023-04-15 NOTE — ED PROVIDER NOTE - CARE PROVIDER_API CALL
Robb Maddox)  Urology  [address] [place] [address]  Phone: [phone]  Fax: [phone]  Follow Up Time:

## 2023-04-15 NOTE — ED PROVIDER NOTE - NSICDXPASTMEDICALHX_GEN_ALL_CORE_FT
PAST MEDICAL HISTORY:  Asthma     Chronic back pain     Diabetes     HTN (hypertension)     Kidney stone     PCOS (polycystic ovarian syndrome)

## 2023-04-15 NOTE — ED PROVIDER NOTE - NSFOLLOWUPINSTRUCTIONS_ED_ALL_ED_FT
Take toradol as prescribed for pain.  You can also take tylenol 650mg every 4-6 hours for pain.  Please rest and remain well hydrated with plenty of fluids.      Follow up with your urologist within one week    Kidney Stones    WHAT YOU NEED TO KNOW:    What is a kidney stone? Kidney stones form in the urinary system when the water and waste in your urine are out of balance. When this happens, certain types of waste crystals separate from the urine. The crystals build up and form kidney stones. Kidney stones can be made of uric acid, calcium, phosphate, or oxalate crystals. You may have more than one kidney stone.  Kidney Stones     What increases my risk for kidney stones?    Not drinking enough liquids (especially water) each day    Having urinary tract infections often    Too much of certain foods, such as meat, salt, nuts, and chocolate    Obesity    Certain medicines, such as diuretics, steroids, and antacids    A family history of kidney stones    Being born with a kidney or bowel disorder  What are the signs and symptoms of kidney stones?    Pain in the middle of your back that moves across to your side or that may spread to your groin    Nausea and vomiting    Urge to urinate often, burning feeling when you urinate, or pink or red urine    Tenderness in your lower back, side, or stomach  How are kidney stones diagnosed? Your healthcare provider will ask about your health and usual foods. He or she may refer you to a urologist. You may need tests to find out what type of kidney stones you have. Tests can show the size of your kidney stones and where they are in your urinary system. You may need more than one of the following:    Urine tests may show if you have blood in your urine. They may also show high amounts of the substances that form kidney stones, such as uric acid.    Blood tests show how well your kidneys are working. They may also be used to check the levels of calcium or uric acid in your blood.    X-ray or ultrasound pictures may be taken of your kidneys, bladder, and ureters. You may be given contrast liquid before an x-ray to help these show up better in the pictures. You may need to have more than one x-ray. Tell the healthcare provider if you have ever had an allergic reaction to contrast liquid.  How are kidney stones treated?    NSAIDs, such as ibuprofen, help decrease swelling, pain, and fever. This medicine is available with or without a doctor's order. NSAIDs can cause stomach bleeding or kidney problems in certain people. If you take blood thinner medicine, always ask your healthcare provider if NSAIDs are safe for you. Always read the medicine label and follow directions.    Acetaminophen decreases pain and fever. It is available without a doctor's order. Ask how much to take and how often to take it. Follow directions. Read the labels of all other medicines you are using to see if they also contain acetaminophen, or ask your doctor or pharmacist. Acetaminophen can cause liver damage if not taken correctly.    Prescription pain medicine may be given. Ask your healthcare provider how to take this medicine safely. Some prescription pain medicines contain acetaminophen. Do not take other medicines that contain acetaminophen without talking to your healthcare provider. Too much acetaminophen may cause liver damage. Prescription pain medicine may cause constipation. Ask your healthcare provider how to prevent or treat constipation.    Medicines to balance your electrolytes may be needed.    A procedure or surgery to remove the kidney stones may be needed if they do not pass on their own. Your treatment will depend on the size and location of your kidney stones.  What can I do to manage kidney stones?    Drink more liquids. Your healthcare provider may tell you to drink at least 8 to 12 (eight-ounce) cups of liquids each day. This helps flush out the kidney stones when you urinate. Water is the best liquid to drink.    Strain your urine every time you go to the bathroom. Urinate through a strainer or a piece of thin cloth to catch the stones. Take the stones to your healthcare provider so they can be sent to the lab for tests. This will help your healthcare providers plan the best treatment for you.  Look for Stones in the Filter      Ask if you should avoid any foods. You may need to limit oxalate. Oxalate is a chemical found in some plant foods. The most common type of kidney stone is made up of crystals that contain calcium and oxalate. Your healthcare provider or dietitian may recommend that you limit oxalate if you get this type of kidney stone often. You may need to limit how much sodium (salt) or protein you eat. Ask for information about the best foods for you.  High Oxalate Foods      Be physically active as directed. Your stones may pass more easily if you stay active. Physical activity can also help you manage your weight. Ask about the best activities for you.   Family Walking for Exercise  When should I seek immediate care?    You are vomiting and it is not relieved with medicine.    When should I call my doctor?    You have a fever.    You have trouble urinating.    You see blood in your urine.    You have severe pain.    You have any questions or concerns about your condition or care.  CARE AGREEMENT:    You have the right to help plan your care. Learn about your health condition and how it may be treated. Discuss treatment options with your healthcare providers to decide what care you want to receive. You always have the right to refuse treatment

## 2023-04-15 NOTE — ED PROVIDER NOTE - PHYSICAL EXAMINATION
VITAL SIGNS: I have reviewed nursing notes and confirm.  CONSTITUTIONAL:  in painful distress.   SKIN:  warm and dry, no acute rash.   HEAD:  normocephalic, atraumatic.  EYES: PERRL, EOM intact; conjunctiva and sclera clear.  ENT: No nasal discharge; airway clear.   NECK: Supple; non tender.  CARD: S1, S2 normal; no murmurs, gallops, or rubs. Regular rate and rhythm.   RESP:  Clear to auscultation b/l, no wheezes, rales or rhonchi.  ABD: Normal bowel sounds; soft; non-distended; non-tender; no guarding/ rebound. no cvat  MSK: Normal ROM. No clubbing, cyanosis or edema. no ttp bilat le  NEURO: Alert, oriented, grossly unremarkable  PSYCH: Cooperative, mood and affect appropriate.

## 2023-04-15 NOTE — CONSULT NOTE ADULT - ASSESSMENT
Patient is a 32 Female.  32F with PMH of HTN, DM (uncontrolled), asthma, nephrolithiasis x1 (2021 req lithotripsy with Dr. Maddox), presented to ED for obstructing prox 6 mm right ureteral stone with hydro, pain controlled in ER. No signs of infection, will opt for conservative management. Discussed return precautions including fever and worsening uncontrolled pain, patient indicated she understood.    Recs:  - urine strainer  - adequate hydration  - flomax daily  - pain control - PO toradol  - follow-up with Dr. Maddox outpatient

## 2023-04-15 NOTE — ED PROVIDER NOTE - CLINICAL SUMMARY MEDICAL DECISION MAKING FREE TEXT BOX
Pt c/o R flank pain occ radiating to abd similar to prior stone; sx suspicious for stone.  No rlq ttp to suggest appy or ovarian cyst.  No reproducible ttp on exam but ? msk.  Plan labs, pain meds, ct for stone; reassess.  See progress notes for further mdm related documentation.

## 2023-04-15 NOTE — ED PROVIDER NOTE - IV ALTEPLASE DOOR HIDDEN
show
I, William Bhatt, performed the initial face to face bedside interview with this patient regarding history of present illness, review of symptoms and relevant past medical, social and family history.  I completed an independent physical examination.  I was the provider who initially evaluated this patient.  The history, relevant review of systems, past medical and surgical history, medical decision making, and physical examination was documented by the scribe in my presence and I attest to the accuracy of the documentation. Follow-up on ordered tests (ie labs, radiologic studies) and re-evaluation of the patient's status has been communicated to the ACP.  Disposition of the patient will be based on test outcome and response to ED interventions.

## 2023-04-15 NOTE — CONSULT NOTE ADULT - SUBJECTIVE AND OBJECTIVE BOX
Patient is a 32y old  Female who presents with a chief complaint of right flank pain      HPI:    Patient is a 32F w/ PMH of PCOS, HTN, DM II, kidney stones (2021) reporting to ED for right flank pain w/ n/v x 4 days.  Patient started to feel right flank pain approx 4 days ago and started to have n/v 3 days ago.  At first patient thought it was gas pain, and tried to hydrate well.  Patient states pain is similar to her first kidney stone from 2021 but this time its less severe.  Patient was taking Tylenol with little efficacy, and yesterday started Motrin with better efficacy. Patient had multiple episodes of n/v x 3 days non bloody non-bilious.                            Vital Signs Last 24 Hrs  T(C): 36.6 (15 Apr 2023 04:19), Max: 36.6 (15 Apr 2023 04:19)  T(F): 97.8 (15 Apr 2023 04:19), Max: 97.8 (15 Apr 2023 04:19)  HR: 87 (15 Apr 2023 04:19) (87 - 87)  BP: 119/86 (15 Apr 2023 04:19) (119/86 - 119/86)  BP(mean): --  RR: 18 (15 Apr 2023 04:19) (18 - 18)  SpO2: 97% (15 Apr 2023 04:19) (97% - 97%)    Parameters below as of 15 Apr 2023 04:19  Patient On (Oxygen Delivery Method): room air      I&O's Summary      PE:  Gen: NAD, alert and oriented x 3  Abd: soft nt/nd, Negative CVAT B/L  : Negative SP tenderness   MARI:    LABS:                        14.5   10.67 )-----------( 314      ( 15 Apr 2023 05:05 )             43.3     04-15    134<L>  |  99  |  19  ----------------------------<  336<H>  3.8   |  24  |  1.01    Ca    9.4      15 Apr 2023 05:05         Patient is a 32y old  Female who presents with a chief complaint of right flank pain      HPI:    Patient is a 32F w/ PMH of PCOS, HTN, DM II, kidney stones (2021) reporting to ED for right flank pain w/ n/v x 4 days.  Patient started to feel right flank pain approx 4 days ago and started to have n/v 3 days ago.  At first patient thought it was gas pain, and tried to hydrate well.  Patient states pain is similar to her first kidney stone from 2021 but this time its less severe.  Patient was taking Tylenol with little efficacy, and yesterday started Motrin with better efficacy. Patient had multiple episodes of n/v x 3 days non bloody non-bilious.                            Vital Signs Last 24 Hrs  T(C): 36.6 (15 Apr 2023 04:19), Max: 36.6 (15 Apr 2023 04:19)  T(F): 97.8 (15 Apr 2023 04:19), Max: 97.8 (15 Apr 2023 04:19)  HR: 87 (15 Apr 2023 04:19) (87 - 87)  BP: 119/86 (15 Apr 2023 04:19) (119/86 - 119/86)  BP(mean): --  RR: 18 (15 Apr 2023 04:19) (18 - 18)  SpO2: 97% (15 Apr 2023 04:19) (97% - 97%)    Parameters below as of 15 Apr 2023 04:19  Patient On (Oxygen Delivery Method): room air      I&O's Summary      PE:  Gen: NAD, alert and oriented x 3  Abd: soft nt/nd, Negative CVAT B/L  : Negative SP tenderness       LABS:                        14.5   10.67 )-----------( 314      ( 15 Apr 2023 05:05 )             43.3     04-15    134<L>  |  99  |  19  ----------------------------<  336<H>  3.8   |  24  |  1.01    Ca    9.4      15 Apr 2023 05:05

## 2023-04-15 NOTE — ED PROVIDER NOTE - PATIENT PORTAL LINK FT
You can access the FollowMyHealth Patient Portal offered by [facility] by registering at the following website: [email or web address]. By joining [employer]’s FollowMyHealth portal, you will also be able to view your health information using other applications (apps) compatible with our system.

## 2023-04-17 ENCOUNTER — TRANSCRIPTION ENCOUNTER (OUTPATIENT)
Age: 33
End: 2023-04-17

## 2023-04-18 ENCOUNTER — RESULT REVIEW (OUTPATIENT)
Age: 33
End: 2023-04-18

## 2023-04-18 ENCOUNTER — INPATIENT (INPATIENT)
Facility: HOSPITAL | Age: 33
LOS: 1 days | Discharge: HOME | DRG: 694 | End: 2023-04-20
Attending: UROLOGY | Admitting: UROLOGY
Payer: COMMERCIAL

## 2023-04-18 ENCOUNTER — TRANSCRIPTION ENCOUNTER (OUTPATIENT)
Age: 33
End: 2023-04-18

## 2023-04-18 VITALS
HEART RATE: 89 BPM | TEMPERATURE: 98 F | DIASTOLIC BLOOD PRESSURE: 113 MMHG | SYSTOLIC BLOOD PRESSURE: 173 MMHG | OXYGEN SATURATION: 99 % | RESPIRATION RATE: 20 BRPM | HEIGHT: 63 IN | WEIGHT: 188.05 LBS

## 2023-04-18 DIAGNOSIS — E11.9 TYPE 2 DIABETES MELLITUS WITHOUT COMPLICATIONS: ICD-10-CM

## 2023-04-18 DIAGNOSIS — N20.1 CALCULUS OF URETER: ICD-10-CM

## 2023-04-18 DIAGNOSIS — H71.92 UNSPECIFIED CHOLESTEATOMA, LEFT EAR: Chronic | ICD-10-CM

## 2023-04-18 PROBLEM — N20.0 CALCULUS OF KIDNEY: Chronic | Status: ACTIVE | Noted: 2023-04-15

## 2023-04-18 LAB
ALBUMIN SERPL ELPH-MCNC: 3.6 G/DL — SIGNIFICANT CHANGE UP (ref 3.3–5)
ALP SERPL-CCNC: 60 U/L — SIGNIFICANT CHANGE UP (ref 40–120)
ALT FLD-CCNC: 26 U/L — SIGNIFICANT CHANGE UP (ref 10–45)
ANION GAP SERPL CALC-SCNC: 11 MMOL/L — SIGNIFICANT CHANGE UP (ref 5–17)
APPEARANCE UR: ABNORMAL
APTT BLD: 28.9 SEC — SIGNIFICANT CHANGE UP (ref 27.5–35.5)
AST SERPL-CCNC: 22 U/L — SIGNIFICANT CHANGE UP (ref 10–40)
BACTERIA # UR AUTO: PRESENT /HPF
BASOPHILS # BLD AUTO: 0.04 K/UL — SIGNIFICANT CHANGE UP (ref 0–0.2)
BASOPHILS NFR BLD AUTO: 0.4 % — SIGNIFICANT CHANGE UP (ref 0–2)
BILIRUB SERPL-MCNC: 0.3 MG/DL — SIGNIFICANT CHANGE UP (ref 0.2–1.2)
BILIRUB UR-MCNC: NEGATIVE — SIGNIFICANT CHANGE UP
BLD GP AB SCN SERPL QL: NEGATIVE — SIGNIFICANT CHANGE UP
BUN SERPL-MCNC: 18 MG/DL — SIGNIFICANT CHANGE UP (ref 7–23)
CALCIUM SERPL-MCNC: 8.9 MG/DL — SIGNIFICANT CHANGE UP (ref 8.4–10.5)
CHLORIDE SERPL-SCNC: 102 MMOL/L — SIGNIFICANT CHANGE UP (ref 96–108)
CO2 SERPL-SCNC: 19 MMOL/L — LOW (ref 22–31)
COLOR SPEC: YELLOW — SIGNIFICANT CHANGE UP
CREAT SERPL-MCNC: 0.97 MG/DL — SIGNIFICANT CHANGE UP (ref 0.5–1.3)
DIFF PNL FLD: ABNORMAL
EGFR: 79 ML/MIN/1.73M2 — SIGNIFICANT CHANGE UP
EOSINOPHIL # BLD AUTO: 0.26 K/UL — SIGNIFICANT CHANGE UP (ref 0–0.5)
EOSINOPHIL NFR BLD AUTO: 2.7 % — SIGNIFICANT CHANGE UP (ref 0–6)
EPI CELLS # UR: ABNORMAL /HPF (ref 0–5)
GLUCOSE BLDC GLUCOMTR-MCNC: 205 MG/DL — HIGH (ref 70–99)
GLUCOSE SERPL-MCNC: 337 MG/DL — HIGH (ref 70–99)
GLUCOSE UR QL: >=1000
HCT VFR BLD CALC: 41.8 % — SIGNIFICANT CHANGE UP (ref 34.5–45)
HGB BLD-MCNC: 13.8 G/DL — SIGNIFICANT CHANGE UP (ref 11.5–15.5)
IMM GRANULOCYTES NFR BLD AUTO: 0.3 % — SIGNIFICANT CHANGE UP (ref 0–0.9)
INR BLD: 1.05 — SIGNIFICANT CHANGE UP (ref 0.88–1.16)
KETONES UR-MCNC: 15 MG/DL
LEUKOCYTE ESTERASE UR-ACNC: NEGATIVE — SIGNIFICANT CHANGE UP
LIDOCAIN IGE QN: 38 U/L — SIGNIFICANT CHANGE UP (ref 7–60)
LYMPHOCYTES # BLD AUTO: 1.14 K/UL — SIGNIFICANT CHANGE UP (ref 1–3.3)
LYMPHOCYTES # BLD AUTO: 12 % — LOW (ref 13–44)
MCHC RBC-ENTMCNC: 29.7 PG — SIGNIFICANT CHANGE UP (ref 27–34)
MCHC RBC-ENTMCNC: 33 GM/DL — SIGNIFICANT CHANGE UP (ref 32–36)
MCV RBC AUTO: 90.1 FL — SIGNIFICANT CHANGE UP (ref 80–100)
MONOCYTES # BLD AUTO: 0.54 K/UL — SIGNIFICANT CHANGE UP (ref 0–0.9)
MONOCYTES NFR BLD AUTO: 5.7 % — SIGNIFICANT CHANGE UP (ref 2–14)
NEUTROPHILS # BLD AUTO: 7.49 K/UL — HIGH (ref 1.8–7.4)
NEUTROPHILS NFR BLD AUTO: 78.9 % — HIGH (ref 43–77)
NITRITE UR-MCNC: NEGATIVE — SIGNIFICANT CHANGE UP
NRBC # BLD: 0 /100 WBCS — SIGNIFICANT CHANGE UP (ref 0–0)
PH UR: 6 — SIGNIFICANT CHANGE UP (ref 5–8)
PLATELET # BLD AUTO: 310 K/UL — SIGNIFICANT CHANGE UP (ref 150–400)
POTASSIUM SERPL-MCNC: 4.7 MMOL/L — SIGNIFICANT CHANGE UP (ref 3.5–5.3)
POTASSIUM SERPL-SCNC: 4.7 MMOL/L — SIGNIFICANT CHANGE UP (ref 3.5–5.3)
PROT SERPL-MCNC: 7.2 G/DL — SIGNIFICANT CHANGE UP (ref 6–8.3)
PROT UR-MCNC: 100 MG/DL
PROTHROM AB SERPL-ACNC: 12.5 SEC — SIGNIFICANT CHANGE UP (ref 10.5–13.4)
RBC # BLD: 4.64 M/UL — SIGNIFICANT CHANGE UP (ref 3.8–5.2)
RBC # FLD: 12.3 % — SIGNIFICANT CHANGE UP (ref 10.3–14.5)
RBC CASTS # UR COMP ASSIST: > 10 /HPF
RH IG SCN BLD-IMP: POSITIVE — SIGNIFICANT CHANGE UP
SARS-COV-2 RNA SPEC QL NAA+PROBE: NEGATIVE — SIGNIFICANT CHANGE UP
SODIUM SERPL-SCNC: 132 MMOL/L — LOW (ref 135–145)
SP GR SPEC: >=1.03 — SIGNIFICANT CHANGE UP (ref 1–1.03)
UROBILINOGEN FLD QL: 0.2 E.U./DL — SIGNIFICANT CHANGE UP
WBC # BLD: 9.5 K/UL — SIGNIFICANT CHANGE UP (ref 3.8–10.5)
WBC # FLD AUTO: 9.5 K/UL — SIGNIFICANT CHANGE UP (ref 3.8–10.5)
WBC UR QL: < 5 /HPF — SIGNIFICANT CHANGE UP

## 2023-04-18 PROCEDURE — 99285 EMERGENCY DEPT VISIT HI MDM: CPT

## 2023-04-18 PROCEDURE — 88300 SURGICAL PATH GROSS: CPT | Mod: 26

## 2023-04-18 PROCEDURE — 74420 UROGRAPHY RTRGR +-KUB: CPT | Mod: 26

## 2023-04-18 PROCEDURE — 52310 CYSTOSCOPY AND TREATMENT: CPT

## 2023-04-18 RX ORDER — HYDROMORPHONE HYDROCHLORIDE 2 MG/ML
0.5 INJECTION INTRAMUSCULAR; INTRAVENOUS; SUBCUTANEOUS EVERY 6 HOURS
Refills: 0 | Status: DISCONTINUED | OUTPATIENT
Start: 2023-04-18 | End: 2023-04-18

## 2023-04-18 RX ORDER — MORPHINE SULFATE 50 MG/1
4 CAPSULE, EXTENDED RELEASE ORAL ONCE
Refills: 0 | Status: DISCONTINUED | OUTPATIENT
Start: 2023-04-18 | End: 2023-04-18

## 2023-04-18 RX ORDER — SODIUM CHLORIDE 9 MG/ML
1000 INJECTION, SOLUTION INTRAVENOUS
Refills: 0 | Status: DISCONTINUED | OUTPATIENT
Start: 2023-04-18 | End: 2023-04-20

## 2023-04-18 RX ORDER — METOCLOPRAMIDE HCL 10 MG
10 TABLET ORAL ONCE
Refills: 0 | Status: COMPLETED | OUTPATIENT
Start: 2023-04-18 | End: 2023-04-18

## 2023-04-18 RX ORDER — ALBUTEROL 90 UG/1
2 AEROSOL, METERED ORAL EVERY 6 HOURS
Refills: 0 | Status: DISCONTINUED | OUTPATIENT
Start: 2023-04-18 | End: 2023-04-18

## 2023-04-18 RX ORDER — ALBUTEROL 90 UG/1
2 AEROSOL, METERED ORAL EVERY 6 HOURS
Refills: 0 | Status: DISCONTINUED | OUTPATIENT
Start: 2023-04-18 | End: 2023-04-20

## 2023-04-18 RX ORDER — DEXTROSE 50 % IN WATER 50 %
15 SYRINGE (ML) INTRAVENOUS ONCE
Refills: 0 | Status: DISCONTINUED | OUTPATIENT
Start: 2023-04-18 | End: 2023-04-20

## 2023-04-18 RX ORDER — ALBUTEROL 90 UG/1
2.5 AEROSOL, METERED ORAL EVERY 4 HOURS
Refills: 0 | Status: DISCONTINUED | OUTPATIENT
Start: 2023-04-18 | End: 2023-04-18

## 2023-04-18 RX ORDER — IPRATROPIUM/ALBUTEROL SULFATE 18-103MCG
3 AEROSOL WITH ADAPTER (GRAM) INHALATION ONCE
Refills: 0 | Status: COMPLETED | OUTPATIENT
Start: 2023-04-18 | End: 2023-04-18

## 2023-04-18 RX ORDER — ONDANSETRON 8 MG/1
4 TABLET, FILM COATED ORAL ONCE
Refills: 0 | Status: COMPLETED | OUTPATIENT
Start: 2023-04-18 | End: 2023-04-18

## 2023-04-18 RX ORDER — SODIUM CHLORIDE 9 MG/ML
1000 INJECTION INTRAMUSCULAR; INTRAVENOUS; SUBCUTANEOUS ONCE
Refills: 0 | Status: COMPLETED | OUTPATIENT
Start: 2023-04-18 | End: 2023-04-18

## 2023-04-18 RX ORDER — TAMSULOSIN HYDROCHLORIDE 0.4 MG/1
0.4 CAPSULE ORAL DAILY
Refills: 0 | Status: DISCONTINUED | OUTPATIENT
Start: 2023-04-18 | End: 2023-04-20

## 2023-04-18 RX ORDER — DEXTROSE 50 % IN WATER 50 %
25 SYRINGE (ML) INTRAVENOUS ONCE
Refills: 0 | Status: DISCONTINUED | OUTPATIENT
Start: 2023-04-18 | End: 2023-04-20

## 2023-04-18 RX ORDER — IPRATROPIUM/ALBUTEROL SULFATE 18-103MCG
3 AEROSOL WITH ADAPTER (GRAM) INHALATION ONCE
Refills: 0 | Status: DISCONTINUED | OUTPATIENT
Start: 2023-04-18 | End: 2023-04-20

## 2023-04-18 RX ORDER — GLUCAGON INJECTION, SOLUTION 0.5 MG/.1ML
1 INJECTION, SOLUTION SUBCUTANEOUS ONCE
Refills: 0 | Status: DISCONTINUED | OUTPATIENT
Start: 2023-04-18 | End: 2023-04-20

## 2023-04-18 RX ORDER — ONDANSETRON 8 MG/1
4 TABLET, FILM COATED ORAL EVERY 6 HOURS
Refills: 0 | Status: DISCONTINUED | OUTPATIENT
Start: 2023-04-18 | End: 2023-04-20

## 2023-04-18 RX ORDER — SODIUM CHLORIDE 9 MG/ML
1000 INJECTION INTRAMUSCULAR; INTRAVENOUS; SUBCUTANEOUS
Refills: 0 | Status: DISCONTINUED | OUTPATIENT
Start: 2023-04-18 | End: 2023-04-19

## 2023-04-18 RX ORDER — ALBUTEROL 90 UG/1
2.5 AEROSOL, METERED ORAL ONCE
Refills: 0 | Status: COMPLETED | OUTPATIENT
Start: 2023-04-18 | End: 2023-04-18

## 2023-04-18 RX ORDER — ACETAMINOPHEN 500 MG
650 TABLET ORAL EVERY 6 HOURS
Refills: 0 | Status: DISCONTINUED | OUTPATIENT
Start: 2023-04-18 | End: 2023-04-20

## 2023-04-18 RX ORDER — INSULIN LISPRO 100/ML
VIAL (ML) SUBCUTANEOUS
Refills: 0 | Status: DISCONTINUED | OUTPATIENT
Start: 2023-04-18 | End: 2023-04-20

## 2023-04-18 RX ADMIN — SODIUM CHLORIDE 1000 MILLILITER(S): 9 INJECTION INTRAMUSCULAR; INTRAVENOUS; SUBCUTANEOUS at 17:41

## 2023-04-18 RX ADMIN — Medication 4: at 21:33

## 2023-04-18 RX ADMIN — SODIUM CHLORIDE 1000 MILLILITER(S): 9 INJECTION INTRAMUSCULAR; INTRAVENOUS; SUBCUTANEOUS at 15:17

## 2023-04-18 RX ADMIN — ALBUTEROL 2.5 MILLIGRAM(S): 90 AEROSOL, METERED ORAL at 20:35

## 2023-04-18 RX ADMIN — SODIUM CHLORIDE 100 MILLILITER(S): 9 INJECTION INTRAMUSCULAR; INTRAVENOUS; SUBCUTANEOUS at 20:58

## 2023-04-18 RX ADMIN — ONDANSETRON 4 MILLIGRAM(S): 8 TABLET, FILM COATED ORAL at 18:42

## 2023-04-18 RX ADMIN — TAMSULOSIN HYDROCHLORIDE 0.4 MILLIGRAM(S): 0.4 CAPSULE ORAL at 21:35

## 2023-04-18 RX ADMIN — MORPHINE SULFATE 4 MILLIGRAM(S): 50 CAPSULE, EXTENDED RELEASE ORAL at 16:26

## 2023-04-18 RX ADMIN — ONDANSETRON 4 MILLIGRAM(S): 8 TABLET, FILM COATED ORAL at 15:54

## 2023-04-18 RX ADMIN — MORPHINE SULFATE 4 MILLIGRAM(S): 50 CAPSULE, EXTENDED RELEASE ORAL at 15:17

## 2023-04-18 RX ADMIN — MORPHINE SULFATE 4 MILLIGRAM(S): 50 CAPSULE, EXTENDED RELEASE ORAL at 18:11

## 2023-04-18 RX ADMIN — MORPHINE SULFATE 4 MILLIGRAM(S): 50 CAPSULE, EXTENDED RELEASE ORAL at 17:41

## 2023-04-18 RX ADMIN — Medication 10 MILLIGRAM(S): at 22:44

## 2023-04-18 RX ADMIN — Medication 3 MILLILITER(S): at 21:08

## 2023-04-18 RX ADMIN — SODIUM CHLORIDE 1000 MILLILITER(S): 9 INJECTION INTRAMUSCULAR; INTRAVENOUS; SUBCUTANEOUS at 16:26

## 2023-04-18 NOTE — ED PROVIDER NOTE - OBJECTIVE STATEMENT
33 yo PMhx of DM, renal stones w persisting pain since being seen here three days ago, pt w assoc nausea, vomiting. no abd pain, urinary symptoms, f/c, was told for supportive care since last being seen by urology from prior. 31 yo PMhx of DM, renal stones w persisting pain since being seen here three days ago, pt w assoc nausea, vomiting. no abd pain, urinary symptoms, f/c, was told for supportive care since last being seen by urology from prior. pain constant in nature, mainly around R side states where prior stone was. CT showing 6mm obstructing stone on last visit.

## 2023-04-18 NOTE — PRE-ANESTHESIA EVALUATION ADULT - NSPROPOSEDPROCEDFT_GEN_ALL_CORE
Cystoscopy right retrograde pyelogram. possible right ureteroscopy with laser lithotrypsy stone extraction.  Right ureteral stent placement

## 2023-04-18 NOTE — DISCHARGE NOTE PROVIDER - CARE PROVIDER_API CALL
Carlos Coates)  Urology  [address] [place] [address], [place] [address]  Phone: [phone]  Fax: [phone]  Follow Up Time:

## 2023-04-18 NOTE — DISCHARGE NOTE PROVIDER - NSDCFUSCHEDAPPT_GEN_ALL_CORE_FT
Robb Maddox  [facility]  UROLOGY [address]  Scheduled Appointment: 04/21/2023

## 2023-04-18 NOTE — BRIEF OPERATIVE NOTE - OPERATION/FINDINGS
Preoperative Diagnosis: Right Ureteral Calculus  Postoperative Diagnosis: Same  Procedure: Cystocopy, Right Retrograde Pyelography    Upon entry into urinary bladder, stone visualized inside bladder. Right retrograde pyelography performed with no filling defects seen, contrast effluxing well from the right UO. Procedure terminated. Stone taken for chemical analysis.

## 2023-04-18 NOTE — DISCHARGE NOTE PROVIDER - NSDCCPTREATMENT_GEN_ALL_CORE_FT
PRINCIPAL PROCEDURE  Procedure: Cystoscopy with retrograde pyelography, and, if indicated, any combination of ureteroscopy, holmium YAG laser lithotripsy, basket manipulation of calculus, or insertion of ureteral stent  Findings and Treatment:

## 2023-04-18 NOTE — ED PROVIDER NOTE - PHYSICAL EXAMINATION

## 2023-04-18 NOTE — ED ADULT NURSE REASSESSMENT NOTE - NS ED NURSE REASSESS COMMENT FT1
Patient a/oX3, c/o of right flank with nausea and dysuria, no fever, no vomitting since arrival to ED.  Vital signs stable.  Right AC PIV #20 in place, all labs sent, no complications.  Pain and nausea improved /sp Morphine, Zofran IVP.  Seen by Urology.  Stable and comfortable.
Patient a/ox X3, right flank pain improved s/p Morphine 4mg IVP dose #2, no nausea/vomitting.  Sinus bradycardia on EKG, other vitals stable.  NPO observed.  Patient brought up to OR in stable condition.  ENdorsment report received by RN Lily [facility]; patient to go to [facility] after OR.
report given to Kalli in same day admissions on pt. Kalli states pt is scheduled for a procedure in the OR. Unable to confirm what procedure or when it will take place but VS and medications given as well as pregnancy status pain level and NPO status reviewed. Pt informed to remove all clothing and jewelry in preparation
security at bedside to collect pt belongings prior to transport to OR. Pt rates pain 0/10, reports nausea. Zofran prn given. respirations unlabored abd nondistended no distress noted

## 2023-04-18 NOTE — H&P ADULT - HISTORY OF PRESENT ILLNESS
31 yo PMhx of DM, renal stones w persisting pain since being seen here three days ago, pt w assoc nausea, vomiting. no abd pain, urinary symptoms, f/c, was told for supportive care since last being seen by urology from prior. pain constant in nature, mainly around R side states where prior stone was. CT showing 6mm obstructing stone on last visit.     Note: Above as discussed. Patient was discahrge home on medical expulsion therapy 3 days ago. She reports that her pain was relatively controlled on Toradol however yesterday began to escalate. She reports her pain is very poorly controlled now with associated dysuria. Her pain is poorly controlled by moprhine in the ED as well. Has not followed up with Dr. Maddox since her previous emergent stone procedure.

## 2023-04-18 NOTE — PROGRESS NOTE ADULT - ASSESSMENT
31yo female with PMH DM, HTN, kidney stones s/p cystoscopy, stone removal and retrograde pyelogram POD#0.

## 2023-04-18 NOTE — ED ADULT TRIAGE NOTE - STATUS:
8/2/2018    Mariela Ruiz is here today for worsening knee pain. Pt has undergone injection of the knee in the past with good resolution of symptoms. Pt is requesting a repeat injection.     KNEE Injection Procedure Note:    Large Joint Arthrocentesis  Date/Time: 8/2/2018 9:52 AM  Consent given by: patient  Site marked: site marked  Timeout: Immediately prior to procedure a time out was called to verify the correct patient, procedure, equipment, support staff and site/side marked as required   Supporting Documentation  Indications: pain   Procedure Details  Location: knee - R knee  Needle size: 25 G  Approach: anteromedial  Medications administered: 48 mg hylan 48 MG/6ML; 2 mL lidocaine PF 1% 1 %  Patient tolerance: patient tolerated the procedure well with no immediate complications      Prior to injection risks were discussed including pain and infection.  Patient verbalized understanding would like to proceed with injection    At the conclusion of the injection I discussed the importance of continued quad strengthening exercises on a daily basis. I will see the patient back if the symptoms should fail to improve or worsen.    Caroline Moran APRN  8/2/2018      
[employer]

## 2023-04-18 NOTE — H&P ADULT - NSHPLABSRESULTS_GEN_ALL_CORE
VITALS:    T(F): 98.4 (04-18-23 @ 14:54), Max: 98.4 (04-18-23 @ 14:54)  HR: 89 (04-18-23 @ 14:54) (89 - 89)  BP: 173/113 (04-18-23 @ 14:54) (173/113 - 173/113)  RR: 20 (04-18-23 @ 14:54) (20 - 20)  SpO2: 99% (04-18-23 @ 14:54) (99% - 99%)  Wt(kg): --    LABS:                        13.8   9.50  )-----------( 310      ( 18 Apr 2023 15:03 )             41.8     04-18    132<L>  |  102  |  18  ----------------------------<  337<H>  4.7   |  19<L>  |  0.97    Ca    8.9      18 Apr 2023 15:03    TPro  7.2  /  Alb  3.6  /  TBili  0.3  /  DBili  x   /  AST  22  /  ALT  26  /  AlkPhos  60  04-18    PT/INR - ( 18 Apr 2023 17:26 )   PT: 12.5 sec;   INR: 1.05          PTT - ( 18 Apr 2023 17:26 )  PTT:28.9 sec  Urinalysis Basic - ( 18 Apr 2023 15:03 )    Color: Yellow / Appearance: SL Cloudy / SG: >=1.030 / pH: x  Gluc: x / Ketone: 15 mg/dL  / Bili: Negative / Urobili: 0.2 E.U./dL   Blood: x / Protein: 100 mg/dL / Nitrite: NEGATIVE   Leuk Esterase: NEGATIVE / RBC: > 10 /HPF / WBC < 5 /HPF   Sq Epi: x / Non Sq Epi: x / Bacteria: Present /HPF      ACC: [id number] EXAM:  CT RENAL STONE CHATMAN   ORDERED BY: CORWIN KEEN     PROCEDURE DATE:  04/15/2023          INTERPRETATION:  CLINICAL INFORMATION: Right flank pain. History of stone.    COMPARISON: Comparison made with most recent pelvic ultrasound from   3/23/2022 and additional imaging studies dating back to 4/14/2012    CONTRAST/COMPLICATIONS:  IV Contrast: None.  Oral Contrast: None.  Complications: None reported time of dictation.    PROCEDURE:  CT of the Abdomen and Pelvis was performed.  Sagittal and coronal reformats were performed.    FINDINGS:  Evaluation of the abdominopelvic viscera is limited due to noncontrast   technique.    LOWER CHEST: Within normal limits.    LIVER: Hepatic steatosis. The liver is enlarged. Right lobe liver   measures 21.1 cm in superoinferior dimension.  BILE DUCTS: Normal caliber.  GALLBLADDER: Within normal limits.  SPLEEN: Within normal limits.  PANCREAS: Mild fatty atrophy.  ADRENALS: Within normal limits.  KIDNEYS/URETERS: Obstructing 6 mm right proximal ureteral stone (342 HU)   with associated moderate right hydroureteronephrosis and   perinephric/periureteral fat stranding. No left-sided renal stones or   hydronephrosis.    BLADDER: Under filled. No bladder stones.  REPRODUCTIVE ORGANS: Uterus and ovaries/adnexa within normal limits.    BOWEL: Evaluation is limited secondary to lack of enteric contrast.   Colonic diverticulosis. No bowel obstruction. Appendix is normal.  PERITONEUM: No ascites.  VESSELS: Within normal limits.  RETROPERITONEUM/LYMPH NODES: No lymphadenopathy.  ABDOMINAL WALL: Within normal limits.  BONES: Within normal limits.    IMPRESSION:  Obstructing 6 mm right proximal ureteral stone with associated mild to   moderate right hydroureteronephrosis and perinephric/periureteral fat   stranding.

## 2023-04-18 NOTE — DISCHARGE NOTE PROVIDER - HOSPITAL COURSE
Patient is a 32F with radiographic evidence of R ureteral stone who presents with flank pain and was taken to OR for R stent with possible R ureteroscopy and laser lithotripsy. Intraoperatively the patient was found to have passed the stone, which was found in the bladder. Her perioperative and postoperative courses were unremarkable. She is optimized for discharge in stable condition.

## 2023-04-18 NOTE — PRE-ANESTHESIA EVALUATION ADULT - NSANTHOSAYNRD_GEN_A_CORE
No. KAUSHAL screening performed.  STOP BANG Legend: 0-2 = LOW Risk; 3-4 = INTERMEDIATE Risk; 5-8 = HIGH Risk

## 2023-04-18 NOTE — DISCHARGE NOTE PROVIDER - NSDCMRMEDTOKEN_GEN_ALL_CORE_FT
albuterol 2.5 mg/3 mL (0.083%) inhalation solution: 3 milliliter(s) by nebulizer 4 times a day  albuterol 90 mcg/inh inhalation aerosol: 2 puff(s) inhaled 4 times a day  metFORMIN 1000 mg oral tablet: 1 tab(s) orally 2 times a day   metFORMIN 500 mg oral tablet: 1 tab(s) orally 2 times a day   ondansetron 4 mg oral tablet, disintegratin tab(s) orally every 8 hours as needed for  nausea   albuterol 2.5 mg/3 mL (0.083%) inhalation solution: 3 milliliter(s) by nebulizer 4 times a day  albuterol 90 mcg/inh inhalation aerosol: 2 puff(s) inhaled 4 times a day  BD insulin pen needles 32G: use with your insulin administration  contour glucometer: use as instructed  contour glucometer lancets: twice daily  contour glucometer strips: twice daily  HumaLOG 100 units/mL injectable solution: 10 injectable 3 times a day before meals  Lantus 100 units/mL subcutaneous solution: 24 unit(s) subcutaneous once a day (at bedtime)  metFORMIN 1000 mg oral tablet: 1 tab(s) orally 2 times a day   ondansetron 4 mg oral tablet, disintegratin tab(s) orally every 8 hours as needed for  nausea

## 2023-04-18 NOTE — ED ADULT NURSE NOTE - OBJECTIVE STATEMENT
Right flank pain worse today, was seen in ED 3 days ago, Dx w/ kidney stones and sent home with Toradol, states ran out of Toradol still in pain.  C/O of nausea and vomitting episodes, dysuria, no fever/chills, no hematuria.  Pain upgrade to Dr. Burroughs.

## 2023-04-18 NOTE — DISCHARGE NOTE PROVIDER - DISCHARGE DATE
Need to assess if truly hypoxic. No low sats charted but has been on venti and now 4L NC. Will try to wean. Repeat CXR.   1/11/19 O2 sat 95-99%      18-Apr-2023 19-Apr-2023 20-Apr-2023

## 2023-04-18 NOTE — H&P ADULT - PROBLEM SELECTOR PLAN 1
-admit to   -pain control  -add on for emergent cysto, stent insertion  -NPO  -IVF  -DVT prophylaxis  -Abx  -Ucx

## 2023-04-18 NOTE — H&P ADULT - NSHPPHYSICALEXAM_GEN_ALL_CORE
GEN: NAD  ABD: soft, minimally tender to palpation, mild R CVAT  : no suprapubic tenderness to palpation

## 2023-04-18 NOTE — DISCHARGE NOTE PROVIDER - NSDCFUADDINST_GEN_ALL_CORE_FT
Advance diet as tolerated, activity as tolerated. No heavy lifting or straining. Stay well hydrated. If fever >100.4 or any change or worsening of symptoms please call doctor or report to ED. Make follow up appointment with Dr Coates in 2 weeks.      Advance diet as tolerated, activity as tolerated. No heavy lifting or straining. Stay well hydrated. If fever >100.4 or any change or worsening of symptoms please call doctor or report to ED. Make follow up appointment with Dr Coates in 2 weeks.     General Discharge Instructions:  Use Tylenol for pain control as needed  Please resume all regular home medications unless specifically advised not to take a particular medication. Also, please take any new medications as prescribed.  Please get plenty of rest, continue to ambulate several times per day, and drink adequate amounts of fluids.     Warning Signs:  Please call your doctor if you experience the following:  *You experience new chest pain, pressure, squeezing or tightness.  *New or worsening cough, shortness of breath, or wheeze.  *If you are vomiting and cannot keep down fluids or your medications.  *You are getting dehydrated due to continued vomiting, diarrhea, or other reasons. Signs of dehydration include dry mouth, rapid heartbeat, or feeling dizzy or faint when standing.  *You see blood or dark/black material when you vomit or have a bowel movement.  *You experience burning when you urinate, have blood in your urine, or experience a discharge.  *Your pain is not improving within 8-12 hours or is not gone within 24 hours. Call or return immediately if your pain is getting worse, changes location, or moves to your chest or back.  *You have shaking chills, or fever greater than 100.4 degrees Fahrenheit.  *Any change in your symptoms, or any new symptoms that concern you.

## 2023-04-18 NOTE — DISCHARGE NOTE PROVIDER - NSDCCPCAREPLAN_GEN_ALL_CORE_FT
PRINCIPAL DISCHARGE DIAGNOSIS  Diagnosis: Obstructive uropathy  Assessment and Plan of Treatment:       SECONDARY DISCHARGE DIAGNOSES  Diagnosis: Kidney stone  Assessment and Plan of Treatment:      PRINCIPAL DISCHARGE DIAGNOSIS  Diagnosis: Obstructive uropathy  Assessment and Plan of Treatment: s/p cysto stone extraction. Continue diabetes medications and follow up with  as needed      SECONDARY DISCHARGE DIAGNOSES  Diagnosis: Kidney stone  Assessment and Plan of Treatment: s/p cysto stone extraction. Continue diabetes medications and follow up with  as needed.    Diagnosis: Diabetes mellitus with hyperglycemia  Assessment and Plan of Treatment: Continue Lantus 24 units at bedtime as instruction. Continue insulin lispro (humalog) 10 units before meals. Continue Metformin 1000mg twice daily. Check your blood sugar daily. If you begin to experience nausea, dizziness, lightheadedness, vision changes check your sugar immediately. If your sugar is >400 seek emergency medical care. If your sugar is <70 consume a quick carbohydrate(sugar) snack and contact your doctor or seek emergency medical care.   Pt can follow up at discharge with [facility] by calling  to make an appointment.  Office will call to schedule.  Lifestyle modifications strongly encouraged such as improving diet, increasing exercise and weight loss.

## 2023-04-18 NOTE — ED PROVIDER NOTE - CLINICAL SUMMARY MEDICAL DECISION MAKING FREE TEXT BOX
pt w persisting pain since being diagnosed w kidney stone, in distress, will provide pain meds, reassess.

## 2023-04-18 NOTE — PRE-ANESTHESIA EVALUATION ADULT - NSANTHPMHFT_GEN_ALL_CORE
Pmhx: depression PTSD, anxiety 31 yo PMhx of DM, renal stones w persisting pain since being seen here three days ago, pt w assoc nausea, vomiting. no abd pain, urinary symptoms, f/c, was told for supportive care since last being seen by urology from prior. pain constant in nature, mainly around R side states where prior stone was. CT showing 6mm obstructing stone on last visit.  Pmhx: depression PTSD, anxiety

## 2023-04-19 ENCOUNTER — TRANSCRIPTION ENCOUNTER (OUTPATIENT)
Age: 33
End: 2023-04-19

## 2023-04-19 LAB
A1C WITH ESTIMATED AVERAGE GLUCOSE RESULT: 11.1 % — HIGH (ref 4–5.6)
ESTIMATED AVERAGE GLUCOSE: 272 MG/DL — HIGH (ref 68–114)
GLUCOSE BLDC GLUCOMTR-MCNC: 295 MG/DL — HIGH (ref 70–99)
GLUCOSE BLDC GLUCOMTR-MCNC: 296 MG/DL — HIGH (ref 70–99)
GLUCOSE BLDC GLUCOMTR-MCNC: 324 MG/DL — HIGH (ref 70–99)
GLUCOSE BLDC GLUCOMTR-MCNC: 348 MG/DL — HIGH (ref 70–99)

## 2023-04-19 RX ORDER — INSULIN LISPRO 100/ML
6 VIAL (ML) SUBCUTANEOUS
Refills: 0 | Status: DISCONTINUED | OUTPATIENT
Start: 2023-04-19 | End: 2023-04-20

## 2023-04-19 RX ORDER — HYDRALAZINE HCL 50 MG
10 TABLET ORAL ONCE
Refills: 0 | Status: COMPLETED | OUTPATIENT
Start: 2023-04-19 | End: 2023-04-19

## 2023-04-19 RX ORDER — INSULIN GLARGINE 100 [IU]/ML
18 INJECTION, SOLUTION SUBCUTANEOUS AT BEDTIME
Refills: 0 | Status: DISCONTINUED | OUTPATIENT
Start: 2023-04-19 | End: 2023-04-20

## 2023-04-19 RX ORDER — IBUPROFEN 200 MG
600 TABLET ORAL DAILY
Refills: 0 | Status: DISCONTINUED | OUTPATIENT
Start: 2023-04-19 | End: 2023-04-20

## 2023-04-19 RX ADMIN — TAMSULOSIN HYDROCHLORIDE 0.4 MILLIGRAM(S): 0.4 CAPSULE ORAL at 12:42

## 2023-04-19 RX ADMIN — Medication 10 MILLIGRAM(S): at 17:37

## 2023-04-19 RX ADMIN — INSULIN GLARGINE 18 UNIT(S): 100 INJECTION, SOLUTION SUBCUTANEOUS at 22:10

## 2023-04-19 RX ADMIN — Medication 600 MILLIGRAM(S): at 18:30

## 2023-04-19 RX ADMIN — Medication 650 MILLIGRAM(S): at 14:21

## 2023-04-19 RX ADMIN — Medication 8: at 12:42

## 2023-04-19 RX ADMIN — Medication 650 MILLIGRAM(S): at 15:21

## 2023-04-19 RX ADMIN — Medication 8: at 06:49

## 2023-04-19 RX ADMIN — Medication 6 UNIT(S): at 17:02

## 2023-04-19 RX ADMIN — Medication 600 MILLIGRAM(S): at 17:37

## 2023-04-19 RX ADMIN — Medication 6: at 17:01

## 2023-04-19 RX ADMIN — Medication 6: at 21:46

## 2023-04-19 NOTE — CONSULT NOTE ADULT - ASSESSMENT
31yo female with PMH DM, HTN, kidney stones s/p cystoscopy, stone removal and retrograde pyelogram POD#1.     A1C:  Weight:  Cr/GRF:   31yo female with PMH DM, HTN, kidney stones s/p cystoscopy, stone removal and retrograde pyelogram POD#1 with hyperglycemia.    Type 2 DM:  A1C: pending  Weight: 85kg BMI 33  Cr/GRF: normal

## 2023-04-19 NOTE — CONSULT NOTE ADULT - PROBLEM SELECTOR RECOMMENDATION 9
Please continue lantus       units at night / morning.  Please continue lispro      units before each meal.  Please continue lispro moderate / low dose sliding scale four times daily with meals and at bedtime    Pt's fingerstick glucose goal is     Will continue to monitor     For discharge, pt can continue    Pt can follow up at discharge with [facility] by calling  to make an appointment.   Will discuss case with     and update primary team Please start lantus       units at night.  Please start lispro      units before each meal.  Please continue lispro moderate  dose sliding scale four times daily with meals and at bedtime    Pt's fingerstick glucose goal is 100-180    Will continue to monitor     For discharge, pt can continue basal/bolus, dose TBD    Pt can follow up at discharge with [facility] by calling  to make an appointment.  Office will call to schedule.  Will discuss case with Dr. Mullins   and update primary team Please start lantus   18    units at night.  Please start lispro   6   units before each meal.  Please continue lispro moderate  dose sliding scale four times daily with meals and at bedtime    Pt's fingerstick glucose goal is 100-180    Will continue to monitor     For discharge, pt can continue basal/bolus, dose TBD    Pt can follow up at discharge with [facility] by calling  to make an appointment.  Office will call to schedule.  Will discuss case with Dr. Mullins   and update primary team

## 2023-04-19 NOTE — DISCHARGE NOTE NURSING/CASE MANAGEMENT/SOCIAL WORK - NSDCVIVACCINE_GEN_ALL_CORE_FT
Tdap; 30-Aug-2016 03:23; Johnathon Bruner); [employer]; [id number]; IntraMuscular; Deltoid Left.; 0.5 milliLiter(s); VIS (VIS Published: 09-May-2013, VIS Presented: 30-Aug-2016);

## 2023-04-19 NOTE — ANESTHESIA FOLLOW-UP NOTE - NSEVALATIONFT_GEN_ALL_CORE
Patient had asthma exacerbation post op requiring albuterol and duonebs in PACU.  Her respiratory status has improved. Patient still recovering from URI

## 2023-04-19 NOTE — PROGRESS NOTE ADULT - ASSESSMENT
31yo female with PMH DM, HTN, kidney stones s/p cystoscopy, stone removal and retrograde pyelogram POD#1.

## 2023-04-19 NOTE — PROVIDER CONTACT NOTE (OTHER) - SITUATION
Pt express concert about blood sugar been high every check unsure of been discharge, PT blood glucose on the 300s after multiple checks, 1Pt was also hypertensive 1x and complains of seen spots

## 2023-04-19 NOTE — DISCHARGE NOTE NURSING/CASE MANAGEMENT/SOCIAL WORK - PATIENT PORTAL LINK FT
You can access the FollowMyHealth Patient Portal offered by [facility] by registering at the following website: [email or web address]. By joining [facility]’s FollowMyHealth portal, you will also be able to view your health information using other applications (apps) compatible with our system.

## 2023-04-19 NOTE — DISCHARGE NOTE NURSING/CASE MANAGEMENT/SOCIAL WORK - NSDCPEFALRISK_GEN_ALL_CORE
For information on Fall & Injury Prevention, visit: [email or web address] OR  [email or web address] OR  https://www.cdc.gov/steadi/patient.html

## 2023-04-19 NOTE — CONSULT NOTE ADULT - SUBJECTIVE AND OBJECTIVE BOX
HPI: 31 yo PMhx of DM, renal stones w persisting pain since being seen here three days ago, pt w assoc nausea, vomiting. no abd pain, urinary symptoms, f/c, was told for supportive care since last being seen by urology from prior. pain constant in nature, mainly around R side states where prior stone was. CT showing 6mm obstructing stone on last visit.     Note: Above as discussed. Patient was discahrge home on medical expulsion therapy 3 days ago. She reports that her pain was relatively controlled on Toradol however yesterday began to escalate. She reports her pain is very poorly controlled now with associated dysuria. Her pain is poorly controlled by moprhine in the ED as well. Has not followed up with Dr. Maddox since her previous emergent stone procedure.     IMPRESSION:  Obstructing 6 mm right proximal ureteral stone with associated mild to   moderate right hydroureteronephrosis and perinephric/periureteral fat   stranding.    procedure: cystoscopy, stone removal. retrograde pyelogram  FSG & insulin:    Dinner FSG   Lispro   +    Ate  Bedtime FSG     Lantus      and Lispro         Breakfast FSG   Lispro    +    Ate  Lunch FSG      Lispro    +    Ate      Age at Dx:  How dx:  Hx and duration of insulin:  Current Therapy:  Hx of other regimens:    Home FSG:  Fasting  Lunch  Dinner  Bed    Diet:  Exercise:    Hx of hypoglycemia:  Hx of DKA/HHS:  Complications:  Outpatient Endo:    FH:  DM:  Thyroid:  Autoimmune:  Other:    SH:  Smoking  Etoh:  Recreational Drugs:  Social Life:      Current Meds:  acetaminophen     Tablet .. 650 milliGRAM(s) Oral every 6 hours PRN  albuterol    90 MICROgram(s) HFA Inhaler 2 Puff(s) Inhalation every 6 hours PRN  albuterol/ipratropium for Nebulization. 3 milliLiter(s) Nebulizer once PRN  dextrose 5%. 1000 milliLiter(s) IV Continuous <Continuous>  dextrose 50% Injectable 25 Gram(s) IV Push once  dextrose Oral Gel 15 Gram(s) Oral once PRN  glucagon  Injectable 1 milliGRAM(s) IntraMuscular once  insulin lispro (ADMELOG) corrective regimen sliding scale   SubCutaneous Before meals and at bedtime  ondansetron Injectable 4 milliGRAM(s) IV Push every 6 hours PRN  tamsulosin 0.4 milliGRAM(s) Oral daily      Allergies:  No Known Allergies      ROS:  Denies the following except as indicated.    General: weight loss/weight gain, decreased appetite, fatigue  Eyes: Blurry vision, double vision, visual changes  ENT: Throat pain, changes in voice,   CV: palpitations, SOB, CP, cough  GI: NVD, difficulty swallowing, abdominal pain  : polyuria, dysuria  Endo: abnormal menses, temperature intolerance, decreased libido  MSK: weakness, joint pain  Skin: rash, dryness, diaphoresis  Heme: Easy bruising, bleeding  Neuro: HA, dizziness, lightheadedness, numbness/ tingling  Psych: Anxiety, Depression    Vital Signs Last 24 Hrs  T(C): 36.8 (19 Apr 2023 14:09), Max: 37.1 (18 Apr 2023 18:02)  T(F): 98.2 (19 Apr 2023 14:09), Max: 98.8 (18 Apr 2023 18:02)  HR: 98 (19 Apr 2023 12:14) (56 - 118)  BP: 149/92 (19 Apr 2023 12:14) (124/72 - 173/113)  BP(mean): 114 (19 Apr 2023 12:14) (91 - 120)  RR: 17 (19 Apr 2023 12:14) (15 - 39)  SpO2: 96% (19 Apr 2023 03:35) (95% - 100%)    Parameters below as of 19 Apr 2023 12:14  Patient On (Oxygen Delivery Method): room air      Height (cm): 160 (04-18 @ 19:15)  Weight (kg): 85.3 (04-18 @ 19:15)  BMI (kg/m2): 33.3 (04-18 @ 19:15)      Constitutional: NAD.   HEENT: NCAT, MMM, OP clear, EOMI, , no proptosis or lid retraction  Neck: no thyromegaly or palpable thyroid nodules   Respiratory: lungs CTAB.  Cardiovascular: regular rhythm, normal S1 and S2, no audible murmurs, no peripheral edema  GI: soft, NT/ND, no masses/HSM appreciated.  Neurology: no tremors, DTR 2+  Skin: no visible rashes/lesions.   Psychiatric: AAO x 3, normal affect/mood.  Ext: radial pulses intact, DP pulses intact, extremities warm, no cyanosis, clubbing or edema.       LABS:                        13.8   9.50  )-----------( 310      ( 18 Apr 2023 15:03 )             41.8     04-18    132<L>  |  102  |  18  ----------------------------<  337<H>  4.7   |  19<L>  |  0.97    Ca    8.9      18 Apr 2023 15:03    TPro  7.2  /  Alb  3.6  /  TBili  0.3  /  DBili  x   /  AST  22  /  ALT  26  /  AlkPhos  60  04-18    PT/INR - ( 18 Apr 2023 17:26 )   PT: 12.5 sec;   INR: 1.05          PTT - ( 18 Apr 2023 17:26 )  PTT:28.9 sec  Urinalysis Basic - ( 18 Apr 2023 15:03 )    Color: Yellow / Appearance: SL Cloudy / SG: >=1.030 / pH: x  Gluc: x / Ketone: 15 mg/dL  / Bili: Negative / Urobili: 0.2 E.U./dL   Blood: x / Protein: 100 mg/dL / Nitrite: NEGATIVE   Leuk Esterase: NEGATIVE / RBC: > 10 /HPF / WBC < 5 /HPF   Sq Epi: x / Non Sq Epi: x / Bacteria: Present /HPF      CAPILLARY BLOOD GLUCOSE  POCT Blood Glucose.: 324 mg/dL (19 Apr 2023 12:10)  POCT Blood Glucose.: 348 mg/dL (19 Apr 2023 06:44)  POCT Blood Glucose.: 205 mg/dL (18 Apr 2023 21:23)     HPI: 33 yo PMhx of T2DM, renal stones w persisting pain and vomiting since being seen here three days ago, when she was discahrged home on medical expulsion therapy 3 days ago. She was found to have an obstructing 6 mm right proximal ureteral stone with associated mild to moderate right hydroureteronephrosis and perinephric/periureteral fat stranding. She is s/p cystoscopy, stone removal and retrograde pyelogram. She was planned for discharge today, but BG elevated to 300s, so endocrine was consulted. A1C ordered.   Patient seen and examined at bedside. Pain and vomiting resolved since stone removal. Denies fever/chills. She is eating well.     Known to endocrine service from pregnancy in 2022.   She was diagnosed with T2DM at age 25 and started on metformin She required insulin during pregnancy, but was discharged home on metformin 1000mg BID in aug 2022. She has not had f/u appt. with endo ( Dr Amaya since then). She reports FSG were <180-190 until 2 months ago when she experienced new family stressors, had bronchitis/asthma exacerbation (no steroids) and kidney stones. Since then her fasting BG 200s and as high as 460 throughout the day. She reports no change to diet. She has polyuria, polydipsia blurry vision, headache, and 20lb weight loss in past 2 months. Denies neuropathy. last eye exam May 2021, no retinopathy.    Thyroid nodules:  FNA April 2022  Right 3.4 cm nodule lower pole - [place] 2  Right 2.1 cm nodule lower pole/isthmus - [place] 2      FSG & insulin:  Yesterday:  3PM   930PM FSg 205. Lispro 4  Today:      Breakfast   Lispro 8   Ate juevos rancheros.  Lunch FSG  324    Lispro 8   Ate tarragon chicken wrap.      Current Meds:  acetaminophen     Tablet .. 650 milliGRAM(s) Oral every 6 hours PRN  albuterol    90 MICROgram(s) HFA Inhaler 2 Puff(s) Inhalation every 6 hours PRN  albuterol/ipratropium for Nebulization. 3 milliLiter(s) Nebulizer once PRN  dextrose 5%. 1000 milliLiter(s) IV Continuous <Continuous>  dextrose 50% Injectable 25 Gram(s) IV Push once  dextrose Oral Gel 15 Gram(s) Oral once PRN  glucagon  Injectable 1 milliGRAM(s) IntraMuscular once  insulin lispro (ADMELOG) corrective regimen sliding scale   SubCutaneous Before meals and at bedtime  ondansetron Injectable 4 milliGRAM(s) IV Push every 6 hours PRN  tamsulosin 0.4 milliGRAM(s) Oral daily      Allergies:  No Known Allergies      ROS:  Denies the following except as indicated.    General: weight loss/weight gain, decreased appetite, fatigue  Eyes: Blurry vision, double vision, visual changes  ENT: Throat pain, changes in voice,   CV: palpitations, SOB, CP, cough  GI: NVD, difficulty swallowing, abdominal pain  : polyuria, dysuria  Endo: temperature intolerance  MSK: weakness, joint pain  Skin: rash, dryness, diaphoresis  Heme: Easy bruising, bleeding  Neuro: HA, dizziness, lightheadedness, numbness/ tingling  Psych: Anxiety, Depression    Vital Signs Last 24 Hrs  T(C): 36.8 (19 Apr 2023 14:09), Max: 37.1 (18 Apr 2023 18:02)  T(F): 98.2 (19 Apr 2023 14:09), Max: 98.8 (18 Apr 2023 18:02)  HR: 98 (19 Apr 2023 12:14) (56 - 118)  BP: 149/92 (19 Apr 2023 12:14) (124/72 - 173/113)  BP(mean): 114 (19 Apr 2023 12:14) (91 - 120)  RR: 17 (19 Apr 2023 12:14) (15 - 39)  SpO2: 96% (19 Apr 2023 03:35) (95% - 100%)    Parameters below as of 19 Apr 2023 12:14  Patient On (Oxygen Delivery Method): room air      Height (cm): 160 (04-18 @ 19:15)  Weight (kg): 85.3 (04-18 @ 19:15)  BMI (kg/m2): 33.3 (04-18 @ 19:15)      Constitutional: NAD.   HEENT: NCAT, MMM, OP clear, EOMI, , no proptosis or lid retraction  Neck: right side thyroid nodules   Respiratory: lungs CTAB.  Cardiovascular: regular rhythm, normal S1 and S2, no audible murmurs, no peripheral edema  GI: soft, NT/ND, no masses/HSM appreciated.  Neurology: no tremors, DTR 2+  Skin: no visible rashes/lesions.   Psychiatric: AAO x 3, normal affect/mood.  Ext: radial pulses intact, DP pulses intact, extremities warm, no cyanosis, clubbing or edema.       LABS:                        13.8   9.50  )-----------( 310      ( 18 Apr 2023 15:03 )             41.8     04-18    132<L>  |  102  |  18  ----------------------------<  337<H>  4.7   |  19<L>  |  0.97    Ca    8.9      18 Apr 2023 15:03    TPro  7.2  /  Alb  3.6  /  TBili  0.3  /  DBili  x   /  AST  22  /  ALT  26  /  AlkPhos  60  04-18    PT/INR - ( 18 Apr 2023 17:26 )   PT: 12.5 sec;   INR: 1.05          PTT - ( 18 Apr 2023 17:26 )  PTT:28.9 sec  Urinalysis Basic - ( 18 Apr 2023 15:03 )    Color: Yellow / Appearance: SL Cloudy / SG: >=1.030 / pH: x  Gluc: x / Ketone: 15 mg/dL  / Bili: Negative / Urobili: 0.2 E.U./dL   Blood: x / Protein: 100 mg/dL / Nitrite: NEGATIVE   Leuk Esterase: NEGATIVE / RBC: > 10 /HPF / WBC < 5 /HPF   Sq Epi: x / Non Sq Epi: x / Bacteria: Present /HPF      CAPILLARY BLOOD GLUCOSE  POCT Blood Glucose.: 324 mg/dL (19 Apr 2023 12:10)  POCT Blood Glucose.: 348 mg/dL (19 Apr 2023 06:44)  POCT Blood Glucose.: 205 mg/dL (18 Apr 2023 21:23)

## 2023-04-19 NOTE — CONSULT NOTE ADULT - NS ATTEND AMEND GEN_ALL_CORE FT
Diabetes consultation was requested for this 32 year-old female with 6 mm obstructing ureteral stone.She has had cystoscopy and retrograde pyelogram. Glucose levels have been mostly in the low 300.s. She weighs 85 kg. I agree with treating her with 18 units Lantus Insulin and 6 units pre-meal Insulin.

## 2023-04-20 VITALS
RESPIRATION RATE: 18 BRPM | OXYGEN SATURATION: 96 % | DIASTOLIC BLOOD PRESSURE: 89 MMHG | HEART RATE: 70 BPM | SYSTOLIC BLOOD PRESSURE: 139 MMHG

## 2023-04-20 LAB
CULTURE RESULTS: SIGNIFICANT CHANGE UP
GLUCOSE BLDC GLUCOMTR-MCNC: 191 MG/DL — HIGH (ref 70–99)
GLUCOSE BLDC GLUCOMTR-MCNC: 231 MG/DL — HIGH (ref 70–99)
GLUCOSE BLDC GLUCOMTR-MCNC: 307 MG/DL — HIGH (ref 70–99)
SPECIMEN SOURCE: SIGNIFICANT CHANGE UP

## 2023-04-20 PROCEDURE — 85730 THROMBOPLASTIN TIME PARTIAL: CPT

## 2023-04-20 PROCEDURE — 96375 TX/PRO/DX INJ NEW DRUG ADDON: CPT

## 2023-04-20 PROCEDURE — 99285 EMERGENCY DEPT VISIT HI MDM: CPT

## 2023-04-20 PROCEDURE — 94640 AIRWAY INHALATION TREATMENT: CPT

## 2023-04-20 PROCEDURE — 86900 BLOOD TYPING SEROLOGIC ABO: CPT

## 2023-04-20 PROCEDURE — 86901 BLOOD TYPING SEROLOGIC RH(D): CPT

## 2023-04-20 PROCEDURE — 82962 GLUCOSE BLOOD TEST: CPT

## 2023-04-20 PROCEDURE — 83690 ASSAY OF LIPASE: CPT

## 2023-04-20 PROCEDURE — 87086 URINE CULTURE/COLONY COUNT: CPT

## 2023-04-20 PROCEDURE — 96361 HYDRATE IV INFUSION ADD-ON: CPT

## 2023-04-20 PROCEDURE — 36415 COLL VENOUS BLD VENIPUNCTURE: CPT

## 2023-04-20 PROCEDURE — 83036 HEMOGLOBIN GLYCOSYLATED A1C: CPT

## 2023-04-20 PROCEDURE — 96376 TX/PRO/DX INJ SAME DRUG ADON: CPT

## 2023-04-20 PROCEDURE — 99232 SBSQ HOSP IP/OBS MODERATE 35: CPT

## 2023-04-20 PROCEDURE — 87635 SARS-COV-2 COVID-19 AMP PRB: CPT

## 2023-04-20 PROCEDURE — 96374 THER/PROPH/DIAG INJ IV PUSH: CPT

## 2023-04-20 PROCEDURE — 85025 COMPLETE CBC W/AUTO DIFF WBC: CPT

## 2023-04-20 PROCEDURE — 85610 PROTHROMBIN TIME: CPT

## 2023-04-20 PROCEDURE — 76000 FLUOROSCOPY <1 HR PHYS/QHP: CPT

## 2023-04-20 PROCEDURE — 86850 RBC ANTIBODY SCREEN: CPT

## 2023-04-20 PROCEDURE — 81001 URINALYSIS AUTO W/SCOPE: CPT

## 2023-04-20 PROCEDURE — 80053 COMPREHEN METABOLIC PANEL: CPT

## 2023-04-20 PROCEDURE — 88300 SURGICAL PATH GROSS: CPT

## 2023-04-20 PROCEDURE — 82365 CALCULUS SPECTROSCOPY: CPT

## 2023-04-20 RX ORDER — INSULIN GLARGINE 100 [IU]/ML
24 INJECTION, SOLUTION SUBCUTANEOUS
Qty: 1 | Refills: 0
Start: 2023-04-20 | End: 2023-05-19

## 2023-04-20 RX ORDER — INSULIN LISPRO 100/ML
10 VIAL (ML) SUBCUTANEOUS
Qty: 1 | Refills: 0
Start: 2023-04-20 | End: 2023-05-19

## 2023-04-20 RX ORDER — INSULIN GLARGINE 100 [IU]/ML
24 INJECTION, SOLUTION SUBCUTANEOUS
Qty: 3 | Refills: 0
Start: 2023-04-20 | End: 2023-05-19

## 2023-04-20 RX ORDER — INSULIN LISPRO 100/ML
10 VIAL (ML) SUBCUTANEOUS
Qty: 3 | Refills: 0
Start: 2023-04-20 | End: 2023-05-19

## 2023-04-20 RX ORDER — INSULIN LISPRO 100/ML
4 VIAL (ML) SUBCUTANEOUS ONCE
Refills: 0 | Status: COMPLETED | OUTPATIENT
Start: 2023-04-20 | End: 2023-04-20

## 2023-04-20 RX ADMIN — Medication 650 MILLIGRAM(S): at 02:05

## 2023-04-20 RX ADMIN — TAMSULOSIN HYDROCHLORIDE 0.4 MILLIGRAM(S): 0.4 CAPSULE ORAL at 12:17

## 2023-04-20 RX ADMIN — Medication 4 UNIT(S): at 09:29

## 2023-04-20 RX ADMIN — Medication 600 MILLIGRAM(S): at 12:17

## 2023-04-20 RX ADMIN — Medication 2: at 12:17

## 2023-04-20 RX ADMIN — Medication 650 MILLIGRAM(S): at 04:44

## 2023-04-20 RX ADMIN — Medication 6 UNIT(S): at 12:29

## 2023-04-20 RX ADMIN — Medication 600 MILLIGRAM(S): at 12:45

## 2023-04-20 RX ADMIN — Medication 8: at 06:21

## 2023-04-20 RX ADMIN — Medication 6 UNIT(S): at 06:21

## 2023-04-20 NOTE — PROGRESS NOTE ADULT - ASSESSMENT
31yo female with PMH DM, HTN, kidney stones s/p cystoscopy, stone removal and retrograde pyelogram POD#1 with hyperglycemia.    A1C: 11.1 %  BUN: 18 mg/dL  Creatinine: 0.97 mg/dL  GFR: 79 mL/min/1.73m2  Weight (kg): 85.3  BMI (kg/m2): 33.3   31yo female with PMH DM, HTN, kidney stones s/p cystoscopy, stone removal and retrograde pyelogram POD#2 with hyperglycemia.    A1C: 11.1 %  BUN: 18 mg/dL  Creatinine: 0.97 mg/dL  GFR: 79 mL/min/1.73m2  Weight (kg): 85.3  BMI (kg/m2): 33.3

## 2023-04-20 NOTE — PROGRESS NOTE ADULT - PROBLEM SELECTOR PLAN 1
- in OR on cystoscopy stone seen in bladder. sent for analysis   - diet: advance as tolerated   - duonebs PRN for asthma   - f/u cultures   - OOB/IS   - SCDs
- s/p cysto with stone removal.   - diet: diabetic diet   - OOB/IS   - SCDs   - duonebs PRN for bronchospasm
-stable  -OOB  -SCD's, IS  -f/u labs  -continue present care
Expect glucose to start improving now that stone is removed. Unable to predict when glucose toxicity will break, so will increase insulin cautiously. Explained this to patient, and to be aware of lowering glucose and need to adjust insulin dose accordingly.    In patient: Please increase lantus  to 26  units at night.  Please increase lispro to 14 units before each meal.  Please continue lispro moderate  dose sliding scale four times daily with meals and at bedtime    Pt's fingerstick glucose goal is 100-180    For discharge: Lantus 24 units at night, lispro 10 units before meals, resume metformin 1000mg BID.   Please send insulin and pen needles to [facility] for insurance verification. She has testing supplies. Endocrine office will call her to schedule f/u appt.  Discussed adding GLP-1 as OP.    Updated primary team.

## 2023-04-20 NOTE — PROGRESS NOTE ADULT - SUBJECTIVE AND OBJECTIVE BOX
Interval HPI and subjective -   Patient seen and examined at bedside. Reports intermittent headache, resolved this AM with food. Denies pain, N/V, SOB, cough.  Feels well enough to be discharged. Glucoses are improving, but insulin needs to be increased for discharge.    Diet:  Dinner - chicken and quinoa  Breakfast - gold hernandez    CAPILLARY BLOOD GLUCOSE & INSULIN RECEIVED  191 mg/dL (04-20 @ 11:39) - Lispro 6+2  231 mg/dL (04-20 @ 09:21)  307 mg/dL (04-20 @ 06:07) - Lispro 6+8  296 mg/dL (04-19 @ 21:14) - Lantus 18 + lispro 6  295 mg/dL (04-19 @ 16:24) - Lispro 6+6  324 mg/dL (04-19 @ 12:10) - Lispro 8    REVIEW OF SYSTEMS  Constitutional:  Negative fever, chills or loss of appetite. + headache  Eyes:  Negative blurry vision or double vision.  Cardiovascular:  Negative for chest pain or palpitations.  Respiratory:  Negative for cough, wheezing, or shortness of breath.    Gastrointestinal:  Negative for nausea, vomiting, diarrhea, constipation, or abdominal pain.  Genitourinary:  Negative frequency, urgency or dysuria.  Neurologic:  No headache, confusion, dizziness, lightheadedness.    PHYSICAL EXAM  Vital Signs Last 24 Hrs  T(C): 36.9 (20 Apr 2023 09:16), Max: 37.2 (19 Apr 2023 22:07)  T(F): 98.4 (20 Apr 2023 09:16), Max: 98.9 (19 Apr 2023 22:07)  HR: 66 (20 Apr 2023 09:16) (60 - 98)  BP: 113/62 (20 Apr 2023 09:00) (101/55 - 168/96)  BP(mean): 82 (20 Apr 2023 09:00) (69 - 124)  RR: 18 (20 Apr 2023 09:00) (17 - 18)  SpO2: 96% (20 Apr 2023 09:16) (95% - 97%)    Parameters below as of 20 Apr 2023 09:00  Patient On (Oxygen Delivery Method): room air    Constitutional: Awake, alert, in no acute distress.   HEENT: Normocephalic, atraumatic, KARLA, no proptosis or lid retraction.   Neck: supple, no acanthosis, no thyromegaly or palpable thyroid nodules.  Respiratory: Lungs clear to ausculation bilaterally.   Cardiovascular: regular rhythm, normal S1 and S2, no audible murmurs.   GI: soft, non-tender, non-distended, bowel sounds present, no masses appreciated.  Extremities: No lower extremity edema, peripheral pulses present.   Skin: no rashes.   Psychiatric: AAO x 3. Normal affect/mood.     LABS  CBC - WBC/HGB/HTC/PLT: 9.50/13.8/41.8/310 (04-18-23)  BMP - Na/K/Cl/Bicarb/BUN/Cr/Gluc: 132/4.7/102/19/18/0.97/337 (04-18-23)  Anion Gap: 11 (04-18-23)  eGFR: 79 (04-18-23)  Calcium: 8.9 (04-18-23)  Phosphorus: -- (04-18-23)  Magnesium: -- (04-18-23)  LFT - Alb/Tprot/Tbili/Dbili/AlkPhos/ALT/AST: 3.6/--/0.3/--/60/26/22 (04-18-23)  PT/aPTT/INR: 12.5/28.9/1.05 (04-18-23)     Urinalysis Basic - ( 18 Apr 2023 15:03 )    Color: Yellow / Appearance: SL Cloudy / SG: >=1.030 / pH: x  Gluc: x / Ketone: 15 mg/dL  / Bili: Negative / Urobili: 0.2 E.U./dL   Blood: x / Protein: 100 mg/dL / Nitrite: NEGATIVE   Leuk Esterase: NEGATIVE / RBC: > 10 /HPF / WBC < 5 /HPF   Sq Epi: x / Non Sq Epi: x / Bacteria: Present /HPF        MEDICATIONS  MEDICATIONS  (STANDING):  dextrose 5%. 1000 milliLiter(s) (50 mL/Hr) IV Continuous <Continuous>  dextrose 50% Injectable 25 Gram(s) IV Push once  glucagon  Injectable 1 milliGRAM(s) IntraMuscular once  ibuprofen  Tablet. 600 milliGRAM(s) Oral daily  insulin glargine Injectable (LANTUS) 18 Unit(s) SubCutaneous at bedtime  insulin lispro (ADMELOG) corrective regimen sliding scale   SubCutaneous Before meals and at bedtime  insulin lispro Injectable (ADMELOG) 6 Unit(s) SubCutaneous three times a day before meals  tamsulosin 0.4 milliGRAM(s) Oral daily    MEDICATIONS  (PRN):  acetaminophen     Tablet .. 650 milliGRAM(s) Oral every 6 hours PRN Temp greater or equal to 38C (100.4F), Mild Pain (1 - 3)  albuterol    90 MICROgram(s) HFA Inhaler 2 Puff(s) Inhalation every 6 hours PRN Shortness of Breath and/or Wheezing  albuterol/ipratropium for Nebulization. 3 milliLiter(s) Nebulizer once PRN Shortness of Breath and/or Wheezing  dextrose Oral Gel 15 Gram(s) Oral once PRN Blood Glucose LESS THAN 70 milliGRAM(s)/deciliter  ondansetron Injectable 4 milliGRAM(s) IV Push every 6 hours PRN Nausea and/or Vomiting  
   POST OP NOTE:  procedure: cystoscopy, stone removal. retrograde pyelogram  Patient denies any acute complaints. Post op patient require duoneb and albuterol for asthma exacerbation. Patient states she feels improved. Currently denies shortness of breath. Denies chest pain. Patient incontinent on bed.       04-18-23 @ 07:01  -  04-18-23 @ 21:52  --------------------------------------------------------  IN: 200 mL / OUT: 0 mL / NET: 200 mL      T(C): 35.9 (04-18-23 @ 20:32), Max: 37.1 (04-18-23 @ 18:02)  HR: 78 (04-18-23 @ 21:17) (56 - 118)  BP: 134/79 (04-18-23 @ 21:17) (131/76 - 173/113)  RR: 17 (04-18-23 @ 21:17) (16 - 39)  SpO2: 100% (04-18-23 @ 21:17) (96% - 100%)    GEN: NAD  ABD: Soft, ND, NT  : Due to void        
INTERVAL HPI/OVERNIGHT EVENTS:  No acute events overnight.    VITALS:    T(F): 98.5 (04-18-23 @ 23:15), Max: 98.8 (04-18-23 @ 18:02)  HR: 94 (04-19-23 @ 03:35) (56 - 118)  BP: 124/72 (04-19-23 @ 03:35) (124/72 - 173/113)  RR: 17 (04-19-23 @ 03:35) (15 - 39)  SpO2: 96% (04-19-23 @ 03:35) (95% - 100%)  Wt(kg): --    I&O's Detail    18 Apr 2023 07:01  -  19 Apr 2023 04:27  --------------------------------------------------------  IN:    Oral Fluid: 120 mL    sodium chloride 0.9%: 300 mL  Total IN: 420 mL    OUT:  Total OUT: 0 mL    Total NET: 420 mL          MEDICATIONS:    ANTIBIOTICS:      PAIN CONTROL:  acetaminophen     Tablet .. 650 milliGRAM(s) Oral every 6 hours PRN  ondansetron Injectable 4 milliGRAM(s) IV Push every 6 hours PRN       MEDS:  tamsulosin 0.4 milliGRAM(s) Oral daily      HEME/ONC        PHYSICAL EXAM:  General: No acute distress.  Alert and Oriented  Abdominal Exam: soft, non-tender, non-distended    Exam: no right CVAT, no left CVAT, no suprapubic tenderness       LABS:                        13.8   9.50  )-----------( 310      ( 18 Apr 2023 15:03 )             41.8     04-18    132<L>  |  102  |  18  ----------------------------<  337<H>  4.7   |  19<L>  |  0.97    Ca    8.9      18 Apr 2023 15:03    TPro  7.2  /  Alb  3.6  /  TBili  0.3  /  DBili  x   /  AST  22  /  ALT  26  /  AlkPhos  60  04-18    PT/INR - ( 18 Apr 2023 17:26 )   PT: 12.5 sec;   INR: 1.05          PTT - ( 18 Apr 2023 17:26 )  PTT:28.9 sec  Urinalysis Basic - ( 18 Apr 2023 15:03 )    Color: Yellow / Appearance: SL Cloudy / SG: >=1.030 / pH: x  Gluc: x / Ketone: 15 mg/dL  / Bili: Negative / Urobili: 0.2 E.U./dL   Blood: x / Protein: 100 mg/dL / Nitrite: NEGATIVE   Leuk Esterase: NEGATIVE / RBC: > 10 /HPF / WBC < 5 /HPF   Sq Epi: x / Non Sq Epi: x / Bacteria: Present /HPF        
INTERVAL HPI/OVERNIGHT EVENTS:  No acute events overnight.    VITALS:    T(F): 98.2 (04-20-23 @ 04:50), Max: 98.9 (04-19-23 @ 22:07)  HR: 60 (04-20-23 @ 03:45) (60 - 98)  BP: 146/87 (04-20-23 @ 03:45) (101/55 - 168/96)  RR: 17 (04-20-23 @ 03:45) (16 - 17)  SpO2: 97% (04-20-23 @ 03:45) (95% - 97%)  Wt(kg): --    I&O's Detail    18 Apr 2023 07:01  -  19 Apr 2023 07:00  --------------------------------------------------------  IN:    Oral Fluid: 120 mL    sodium chloride 0.9%: 800 mL  Total IN: 920 mL    OUT:  Total OUT: 0 mL    Total NET: 920 mL      19 Apr 2023 07:01  -  20 Apr 2023 05:29  --------------------------------------------------------  IN:  Total IN: 0 mL    OUT:    Voided (mL): 800 mL  Total OUT: 800 mL    Total NET: -800 mL          MEDICATIONS:    ANTIBIOTICS:      PAIN CONTROL:  acetaminophen     Tablet .. 650 milliGRAM(s) Oral every 6 hours PRN  ibuprofen  Tablet. 600 milliGRAM(s) Oral daily  ondansetron Injectable 4 milliGRAM(s) IV Push every 6 hours PRN       MEDS:  tamsulosin 0.4 milliGRAM(s) Oral daily      HEME/ONC        PHYSICAL EXAM:  General: No acute distress.  Alert and Oriented  Abdominal Exam: soft, NT, ND   Exam: no CVAT      LABS:                        13.8   9.50  )-----------( 310      ( 18 Apr 2023 15:03 )             41.8     04-18    132<L>  |  102  |  18  ----------------------------<  337<H>  4.7   |  19<L>  |  0.97    Ca    8.9      18 Apr 2023 15:03    TPro  7.2  /  Alb  3.6  /  TBili  0.3  /  DBili  x   /  AST  22  /  ALT  26  /  AlkPhos  60  04-18    PT/INR - ( 18 Apr 2023 17:26 )   PT: 12.5 sec;   INR: 1.05          PTT - ( 18 Apr 2023 17:26 )  PTT:28.9 sec  Urinalysis Basic - ( 18 Apr 2023 15:03 )    Color: Yellow / Appearance: SL Cloudy / SG: >=1.030 / pH: x  Gluc: x / Ketone: 15 mg/dL  / Bili: Negative / Urobili: 0.2 E.U./dL   Blood: x / Protein: 100 mg/dL / Nitrite: NEGATIVE   Leuk Esterase: NEGATIVE / RBC: > 10 /HPF / WBC < 5 /HPF   Sq Epi: x / Non Sq Epi: x / Bacteria: Present /HPF        RADIOLOGY & ADDITIONAL TESTS:

## 2023-04-21 ENCOUNTER — APPOINTMENT (OUTPATIENT)
Dept: UROLOGY | Facility: CLINIC | Age: 33
End: 2023-04-21

## 2023-04-25 DIAGNOSIS — E28.2 POLYCYSTIC OVARIAN SYNDROME: ICD-10-CM

## 2023-04-25 DIAGNOSIS — J45.901 UNSPECIFIED ASTHMA WITH (ACUTE) EXACERBATION: ICD-10-CM

## 2023-04-25 DIAGNOSIS — F12.99 CANNABIS USE, UNSPECIFIED WITH UNSPECIFIED CANNABIS-INDUCED DISORDER: ICD-10-CM

## 2023-04-25 DIAGNOSIS — G89.29 OTHER CHRONIC PAIN: ICD-10-CM

## 2023-04-25 DIAGNOSIS — I10 ESSENTIAL (PRIMARY) HYPERTENSION: ICD-10-CM

## 2023-04-25 DIAGNOSIS — E11.65 TYPE 2 DIABETES MELLITUS WITH HYPERGLYCEMIA: ICD-10-CM

## 2023-04-25 DIAGNOSIS — M54.9 DORSALGIA, UNSPECIFIED: ICD-10-CM

## 2023-04-25 DIAGNOSIS — N13.2 HYDRONEPHROSIS WITH RENAL AND URETERAL CALCULOUS OBSTRUCTION: ICD-10-CM

## 2023-04-25 DIAGNOSIS — Z79.4 LONG TERM (CURRENT) USE OF INSULIN: ICD-10-CM

## 2023-04-25 DIAGNOSIS — R32 UNSPECIFIED URINARY INCONTINENCE: ICD-10-CM

## 2023-04-25 DIAGNOSIS — F43.10 POST-TRAUMATIC STRESS DISORDER, UNSPECIFIED: ICD-10-CM

## 2023-04-25 DIAGNOSIS — N20.1 CALCULUS OF URETER: ICD-10-CM

## 2023-05-01 LAB — SURGICAL PATHOLOGY STUDY: SIGNIFICANT CHANGE UP

## 2023-05-03 ENCOUNTER — APPOINTMENT (OUTPATIENT)
Dept: UROLOGY | Facility: CLINIC | Age: 33
End: 2023-05-03

## 2023-05-05 LAB
CELL MATERIAL STONE EST-MCNT: SIGNIFICANT CHANGE UP
LABORATORY COMMENT REPORT: SIGNIFICANT CHANGE UP
NIDUS STONE QN: SIGNIFICANT CHANGE UP

## 2023-05-08 NOTE — PROGRESS NOTE ADULT - SUBJECTIVE AND OBJECTIVE BOX
INTERVAL HPI/OVERNIGHT EVENTS:    Patient is a 31y old  Female who presents with a chief complaint of DM2 in pregnancy (04 Apr 2022 08:38)      Pt reports the following symptoms:    CONSTITUTIONAL:  Negative fever or chills, feels well, good appetite  EYES:  Negative  blurry vision or double vision  CARDIOVASCULAR:  Negative for chest pain or palpitations  RESPIRATORY:  Negative for cough, wheezing, or SOB   GASTROINTESTINAL:  Negative for nausea, vomiting, diarrhea, constipation, or abdominal pain  GENITOURINARY:  Negative frequency, urgency or dysuria  NEUROLOGIC:  No headache, confusion, dizziness, lightheadedness    MEDICATIONS  (STANDING):  aspirin enteric coated 81 milliGRAM(s) Oral every 12 hours  dextrose 5%. 1000 milliLiter(s) (100 mL/Hr) IV Continuous <Continuous>  dextrose 5%. 1000 milliLiter(s) (50 mL/Hr) IV Continuous <Continuous>  dextrose 50% Injectable 25 Gram(s) IV Push once  dextrose 50% Injectable 12.5 Gram(s) IV Push once  dextrose 50% Injectable 25 Gram(s) IV Push once  glucagon  Injectable 1 milliGRAM(s) IntraMuscular once  insulin glargine Injectable (LANTUS) 55 Unit(s) SubCutaneous at bedtime  insulin lispro (ADMELOG) corrective regimen sliding scale   SubCutaneous Before meals and at bedtime  insulin lispro Injectable (ADMELOG) 22 Unit(s) SubCutaneous three times a day before meals  prenatal multivitamin 1 Tablet(s) Oral daily    MEDICATIONS  (PRN):  acetaminophen     Tablet .. 650 milliGRAM(s) Oral every 6 hours PRN Mild Pain (1 - 3)  dextrose Oral Gel 15 Gram(s) Oral once PRN Blood Glucose LESS THAN 70 milliGRAM(s)/deciliter  sodium chloride 0.65% Nasal 1 Spray(s) Both Nostrils four times a day PRN Nasal Congestion      PHYSICAL EXAM  Vital Signs Last 24 Hrs  T(C): 36.9 (04 Apr 2022 09:30), Max: 36.9 (04 Apr 2022 09:30)  T(F): 98.4 (04 Apr 2022 09:30), Max: 98.4 (04 Apr 2022 09:30)  HR: 94 (04 Apr 2022 09:30) (74 - 94)  BP: 110/71 (04 Apr 2022 09:30) (94/58 - 118/78)  BP(mean): --  RR: 17 (04 Apr 2022 09:30) (17 - 18)  SpO2: 98% (04 Apr 2022 09:30) (95% - 98%)    Constitutional: wn/wd in NAD.   HEENT: NCAT, MMM, OP clear, EOMI, , no proptosis or lid retraction  Neck: no thyromegaly or palpable thyroid nodules   Respiratory: lungs CTAB.  Cardiovascular: regular rhythm, normal S1 and S2, no audible murmurs, no peripheral edema  GI: soft, NT/ND, no masses/HSM appreciated.  Neurology: no tremors, DTR 2+  Skin: no visible rashes/lesions  Psychiatric: AAO x 3, normal affect/mood.    LABS:                        12.4   14.52 )-----------( 305      ( 03 Apr 2022 07:17 )             37.5     04-03    136  |  104  |  15  ----------------------------<  119<H>  4.2   |  22  |  0.64    Ca    9.9      03 Apr 2022 12:18  Phos  5.2     04-03  Mg     1.9     04-03    TPro  6.8  /  Alb  3.7  /  TBili  0.2  /  DBili  x   /  AST  9<L>  /  ALT  6<L>  /  AlkPhos  51  04-03            HbA1C:         Insulin Sliding Scale requirements X 24 Hours:      RADIOLOGY & ADDITIONAL TESTS:      A/P:31y Female type 2 DM with increased insulin resistance in pregnancy, presenting with uncontrolled GDM  A1C: 6.8%- outpatient  Wt:83.9kg  Cr:0.47  GFR:130  Home regimen: 10u Basaglar AM, 20u Basaglar PM, 6u Admelog AM  1.  DM type 2- GDM uncontrolled     Please continue lantus 55 units at night.    Please continue lispro   units before each meal.    Please continue lispro moderate dose sliding scale four times daily with meals and at bedtime.  Please get fingersticks before meals and 1 hour after meals      Pt's fingerstick glucose goal is Premeal: 95 or less , One hour postprandial <140, two hour postprandial <120    Will continue to monitor     For discharge, pt can continue insulin therapy, regimen TBD    Pt can follow up at discharge with Dr. Amaya   Will discuss case with Dr. Mcclure and update primary team   INTERVAL HPI/OVERNIGHT EVENTS:    Patient is a 31y old  Female who presents with a chief complaint of DM2 in pregnancy (04 Apr 2022 08:38)      Pt reports the following symptoms:    CONSTITUTIONAL:  Negative fever or chills, feels well, good appetite  EYES:  Negative  blurry vision or double vision  CARDIOVASCULAR:  Negative for chest pain or palpitations  RESPIRATORY:  Negative for cough, wheezing, or SOB   GASTROINTESTINAL:  Negative for nausea, vomiting, diarrhea, constipation, or abdominal pain  GENITOURINARY:  Negative frequency, urgency or dysuria  NEUROLOGIC:  No headache, confusion, dizziness, lightheadedness    MEDICATIONS  (STANDING):  aspirin enteric coated 81 milliGRAM(s) Oral every 12 hours  dextrose 5%. 1000 milliLiter(s) (100 mL/Hr) IV Continuous <Continuous>  dextrose 5%. 1000 milliLiter(s) (50 mL/Hr) IV Continuous <Continuous>  dextrose 50% Injectable 25 Gram(s) IV Push once  dextrose 50% Injectable 12.5 Gram(s) IV Push once  dextrose 50% Injectable 25 Gram(s) IV Push once  glucagon  Injectable 1 milliGRAM(s) IntraMuscular once  insulin glargine Injectable (LANTUS) 55 Unit(s) SubCutaneous at bedtime  insulin lispro (ADMELOG) corrective regimen sliding scale   SubCutaneous Before meals and at bedtime  insulin lispro Injectable (ADMELOG) 22 Unit(s) SubCutaneous three times a day before meals  prenatal multivitamin 1 Tablet(s) Oral daily    MEDICATIONS  (PRN):  acetaminophen     Tablet .. 650 milliGRAM(s) Oral every 6 hours PRN Mild Pain (1 - 3)  dextrose Oral Gel 15 Gram(s) Oral once PRN Blood Glucose LESS THAN 70 milliGRAM(s)/deciliter  sodium chloride 0.65% Nasal 1 Spray(s) Both Nostrils four times a day PRN Nasal Congestion      PHYSICAL EXAM  Vital Signs Last 24 Hrs  T(C): 36.9 (04 Apr 2022 09:30), Max: 36.9 (04 Apr 2022 09:30)  T(F): 98.4 (04 Apr 2022 09:30), Max: 98.4 (04 Apr 2022 09:30)  HR: 94 (04 Apr 2022 09:30) (74 - 94)  BP: 110/71 (04 Apr 2022 09:30) (94/58 - 118/78)  BP(mean): --  RR: 17 (04 Apr 2022 09:30) (17 - 18)  SpO2: 98% (04 Apr 2022 09:30) (95% - 98%)    Constitutional: wn/wd in NAD.   HEENT: NCAT, MMM, OP clear, EOMI, , no proptosis or lid retraction  Neck: no thyromegaly or palpable thyroid nodules   Respiratory: lungs CTAB.  Cardiovascular: regular rhythm, normal S1 and S2, no audible murmurs, no peripheral edema  GI: soft, NT/ND, no masses/HSM appreciated.  Neurology: no tremors, DTR 2+  Skin: no visible rashes/lesions  Psychiatric: AAO x 3, normal affect/mood.    LABS:                        12.4   14.52 )-----------( 305      ( 03 Apr 2022 07:17 )             37.5     04-03    136  |  104  |  15  ----------------------------<  119<H>  4.2   |  22  |  0.64    Ca    9.9      03 Apr 2022 12:18  Phos  5.2     04-03  Mg     1.9     04-03    TPro  6.8  /  Alb  3.7  /  TBili  0.2  /  DBili  x   /  AST  9<L>  /  ALT  6<L>  /  AlkPhos  51  04-03            HbA1C:         Insulin Sliding Scale requirements X 24 Hours:      RADIOLOGY & ADDITIONAL TESTS:      A/P:31y Female type 2 DM with increased insulin resistance in pregnancy, presenting with uncontrolled GDM  A1C: 6.8%- outpatient  Wt:83.9kg  Cr:0.47  GFR:130  Home regimen: 10u Basaglar AM, 20u Basaglar PM, 6u Admelog AM  1.  DM type 2- GDM uncontrolled     Please continue lantus  units at night.    Please continue lispro   units before each meal.    Please continue lispro moderate dose sliding scale four times daily with meals and at bedtime.  Please get fingersticks before meals and 1 hour after meals      Pt's fingerstick glucose goal is Premeal: 95 or less , One hour postprandial <140, two hour postprandial <120    Will continue to monitor     For discharge, pt can continue insulin therapy, regimen TBD    Pt can follow up at discharge with Dr. Amaya   Will discuss case with Dr. Mcclure and update primary team   INTERVAL HPI/OVERNIGHT EVENTS:    Patient is a 31y old  Female who presents with a chief complaint of DM2 in pregnancy (04 Apr 2022 08:38)  Patient eating well, no complaints. Postprandial sugars remain somewhat elevated.     Pt reports the following symptoms:    CONSTITUTIONAL:  Negative fever or chills, feels well, good appetite  EYES:  Negative  blurry vision or double vision  CARDIOVASCULAR:  Negative for chest pain or palpitations  RESPIRATORY:  Negative for cough, wheezing, or SOB   GASTROINTESTINAL:  Negative for nausea, vomiting, diarrhea, constipation, or abdominal pain  GENITOURINARY:  Negative frequency, urgency or dysuria  NEUROLOGIC:  No headache, confusion, dizziness, lightheadedness    MEDICATIONS  (STANDING):  aspirin enteric coated 81 milliGRAM(s) Oral every 12 hours  dextrose 5%. 1000 milliLiter(s) (100 mL/Hr) IV Continuous <Continuous>  dextrose 5%. 1000 milliLiter(s) (50 mL/Hr) IV Continuous <Continuous>  dextrose 50% Injectable 25 Gram(s) IV Push once  dextrose 50% Injectable 12.5 Gram(s) IV Push once  dextrose 50% Injectable 25 Gram(s) IV Push once  glucagon  Injectable 1 milliGRAM(s) IntraMuscular once  insulin glargine Injectable (LANTUS) 55 Unit(s) SubCutaneous at bedtime  insulin lispro (ADMELOG) corrective regimen sliding scale   SubCutaneous Before meals and at bedtime  insulin lispro Injectable (ADMELOG) 22 Unit(s) SubCutaneous three times a day before meals  prenatal multivitamin 1 Tablet(s) Oral daily    MEDICATIONS  (PRN):  acetaminophen     Tablet .. 650 milliGRAM(s) Oral every 6 hours PRN Mild Pain (1 - 3)  dextrose Oral Gel 15 Gram(s) Oral once PRN Blood Glucose LESS THAN 70 milliGRAM(s)/deciliter  sodium chloride 0.65% Nasal 1 Spray(s) Both Nostrils four times a day PRN Nasal Congestion      PHYSICAL EXAM  Vital Signs Last 24 Hrs  T(C): 36.9 (04 Apr 2022 09:30), Max: 36.9 (04 Apr 2022 09:30)  T(F): 98.4 (04 Apr 2022 09:30), Max: 98.4 (04 Apr 2022 09:30)  HR: 94 (04 Apr 2022 09:30) (74 - 94)  BP: 110/71 (04 Apr 2022 09:30) (94/58 - 118/78)  BP(mean): --  RR: 17 (04 Apr 2022 09:30) (17 - 18)  SpO2: 98% (04 Apr 2022 09:30) (95% - 98%)    Constitutional:NAD.   HEENT: NCAT, MMM, OP clear, EOMI, , no proptosis or lid retraction  Neck: no thyromegaly or palpable thyroid nodules   Respiratory: lungs CTAB.  Cardiovascular: regular rhythm, normal S1 and S2, no audible murmurs, no peripheral edema  GI: soft, NT/ND, no masses/HSM appreciated.  Neurology: no tremors, DTR 2+  Skin: no visible rashes/lesions  Psychiatric: AAO x 3, normal affect/mood.    LABS:                        12.4   14.52 )-----------( 305      ( 03 Apr 2022 07:17 )             37.5     04-03    136  |  104  |  15  ----------------------------<  119<H>  4.2   |  22  |  0.64    Ca    9.9      03 Apr 2022 12:18  Phos  5.2     04-03  Mg     1.9     04-03    TPro  6.8  /  Alb  3.7  /  TBili  0.2  /  DBili  x   /  AST  9<L>  /  ALT  6<L>  /  AlkPhos  51  04-03            HbA1C:         Insulin Sliding Scale requirements X 24 Hours:      RADIOLOGY & ADDITIONAL TESTS:      A/P:31y Female type 2 DM with increased insulin resistance in pregnancy, presenting with uncontrolled GDM  A1C: 6.8%- outpatient  Wt:83.9kg  Cr:0.47  GFR:130  Home regimen: 10u Basaglar AM, 20u Basaglar PM, 6u Admelog AM  1.  DM type 2- GDM uncontrolled     Please continue lantus 60 units at night.    Please continue lispro 25  units before each meal.    Please continue lispro moderate dose sliding scale four times daily with meals and at bedtime.  Please get fingersticks before meals and 1 hour after meals      Pt's fingerstick glucose goal is Premeal: 95 or less , One hour postprandial <140, two hour postprandial <120    Will continue to monitor     For discharge, pt can continue insulin therapy    Pt can follow up at discharge with Dr. Busta   Will discuss case with Dr. Mattson and update primary team   CMV

## 2023-05-11 ENCOUNTER — APPOINTMENT (OUTPATIENT)
Dept: UROLOGY | Facility: CLINIC | Age: 33
End: 2023-05-11

## 2023-06-05 ENCOUNTER — EMERGENCY (EMERGENCY)
Facility: HOSPITAL | Age: 33
LOS: 1 days | Discharge: HOME | End: 2023-06-05
Attending: STUDENT IN AN ORGANIZED HEALTH CARE EDUCATION/TRAINING PROGRAM | Admitting: STUDENT IN AN ORGANIZED HEALTH CARE EDUCATION/TRAINING PROGRAM
Payer: COMMERCIAL

## 2023-06-05 VITALS
DIASTOLIC BLOOD PRESSURE: 64 MMHG | HEART RATE: 69 BPM | TEMPERATURE: 98 F | RESPIRATION RATE: 16 BRPM | OXYGEN SATURATION: 99 % | SYSTOLIC BLOOD PRESSURE: 136 MMHG

## 2023-06-05 VITALS
DIASTOLIC BLOOD PRESSURE: 89 MMHG | SYSTOLIC BLOOD PRESSURE: 149 MMHG | TEMPERATURE: 98 F | RESPIRATION RATE: 18 BRPM | OXYGEN SATURATION: 98 % | HEART RATE: 97 BPM | WEIGHT: 181.66 LBS | HEIGHT: 63 IN

## 2023-06-05 DIAGNOSIS — R51.9 HEADACHE, UNSPECIFIED: ICD-10-CM

## 2023-06-05 DIAGNOSIS — Z79.84 LONG TERM (CURRENT) USE OF ORAL HYPOGLYCEMIC DRUGS: ICD-10-CM

## 2023-06-05 DIAGNOSIS — Z79.4 LONG TERM (CURRENT) USE OF INSULIN: ICD-10-CM

## 2023-06-05 DIAGNOSIS — Z87.442 PERSONAL HISTORY OF URINARY CALCULI: ICD-10-CM

## 2023-06-05 DIAGNOSIS — J45.909 UNSPECIFIED ASTHMA, UNCOMPLICATED: ICD-10-CM

## 2023-06-05 DIAGNOSIS — Z87.42 PERSONAL HISTORY OF OTHER DISEASES OF THE FEMALE GENITAL TRACT: ICD-10-CM

## 2023-06-05 DIAGNOSIS — I10 ESSENTIAL (PRIMARY) HYPERTENSION: ICD-10-CM

## 2023-06-05 DIAGNOSIS — G43.909 MIGRAINE, UNSPECIFIED, NOT INTRACTABLE, WITHOUT STATUS MIGRAINOSUS: ICD-10-CM

## 2023-06-05 DIAGNOSIS — H71.92 UNSPECIFIED CHOLESTEATOMA, LEFT EAR: Chronic | ICD-10-CM

## 2023-06-05 DIAGNOSIS — E11.9 TYPE 2 DIABETES MELLITUS WITHOUT COMPLICATIONS: ICD-10-CM

## 2023-06-05 LAB
ANION GAP SERPL CALC-SCNC: 14 MMOL/L — SIGNIFICANT CHANGE UP (ref 5–17)
BASOPHILS # BLD AUTO: 0.04 K/UL — SIGNIFICANT CHANGE UP (ref 0–0.2)
BASOPHILS NFR BLD AUTO: 0.3 % — SIGNIFICANT CHANGE UP (ref 0–2)
BUN SERPL-MCNC: 11 MG/DL — SIGNIFICANT CHANGE UP (ref 7–23)
CALCIUM SERPL-MCNC: 9.4 MG/DL — SIGNIFICANT CHANGE UP (ref 8.4–10.5)
CHLORIDE SERPL-SCNC: 103 MMOL/L — SIGNIFICANT CHANGE UP (ref 96–108)
CO2 SERPL-SCNC: 20 MMOL/L — LOW (ref 22–31)
CREAT SERPL-MCNC: 0.56 MG/DL — SIGNIFICANT CHANGE UP (ref 0.5–1.3)
EGFR: 124 ML/MIN/1.73M2 — SIGNIFICANT CHANGE UP
EOSINOPHIL # BLD AUTO: 0.21 K/UL — SIGNIFICANT CHANGE UP (ref 0–0.5)
EOSINOPHIL NFR BLD AUTO: 1.8 % — SIGNIFICANT CHANGE UP (ref 0–6)
GLUCOSE SERPL-MCNC: 273 MG/DL — HIGH (ref 70–99)
HCG SERPL-ACNC: <0 MIU/ML — SIGNIFICANT CHANGE UP
HCT VFR BLD CALC: 45 % — SIGNIFICANT CHANGE UP (ref 34.5–45)
HGB BLD-MCNC: 15.1 G/DL — SIGNIFICANT CHANGE UP (ref 11.5–15.5)
IMM GRANULOCYTES NFR BLD AUTO: 0.4 % — SIGNIFICANT CHANGE UP (ref 0–0.9)
LYMPHOCYTES # BLD AUTO: 1.64 K/UL — SIGNIFICANT CHANGE UP (ref 1–3.3)
LYMPHOCYTES # BLD AUTO: 14.2 % — SIGNIFICANT CHANGE UP (ref 13–44)
MCHC RBC-ENTMCNC: 30.1 PG — SIGNIFICANT CHANGE UP (ref 27–34)
MCHC RBC-ENTMCNC: 33.6 GM/DL — SIGNIFICANT CHANGE UP (ref 32–36)
MCV RBC AUTO: 89.8 FL — SIGNIFICANT CHANGE UP (ref 80–100)
MONOCYTES # BLD AUTO: 0.65 K/UL — SIGNIFICANT CHANGE UP (ref 0–0.9)
MONOCYTES NFR BLD AUTO: 5.6 % — SIGNIFICANT CHANGE UP (ref 2–14)
NEUTROPHILS # BLD AUTO: 8.97 K/UL — HIGH (ref 1.8–7.4)
NEUTROPHILS NFR BLD AUTO: 77.7 % — HIGH (ref 43–77)
NRBC # BLD: 0 /100 WBCS — SIGNIFICANT CHANGE UP (ref 0–0)
PLATELET # BLD AUTO: 296 K/UL — SIGNIFICANT CHANGE UP (ref 150–400)
POTASSIUM SERPL-MCNC: 3.9 MMOL/L — SIGNIFICANT CHANGE UP (ref 3.5–5.3)
POTASSIUM SERPL-SCNC: 3.9 MMOL/L — SIGNIFICANT CHANGE UP (ref 3.5–5.3)
RBC # BLD: 5.01 M/UL — SIGNIFICANT CHANGE UP (ref 3.8–5.2)
RBC # FLD: 12.7 % — SIGNIFICANT CHANGE UP (ref 10.3–14.5)
SODIUM SERPL-SCNC: 137 MMOL/L — SIGNIFICANT CHANGE UP (ref 135–145)
WBC # BLD: 11.56 K/UL — HIGH (ref 3.8–10.5)
WBC # FLD AUTO: 11.56 K/UL — HIGH (ref 3.8–10.5)

## 2023-06-05 PROCEDURE — 70496 CT ANGIOGRAPHY HEAD: CPT | Mod: MA

## 2023-06-05 PROCEDURE — 85025 COMPLETE CBC W/AUTO DIFF WBC: CPT

## 2023-06-05 PROCEDURE — 70450 CT HEAD/BRAIN W/O DYE: CPT | Mod: MA

## 2023-06-05 PROCEDURE — 84702 CHORIONIC GONADOTROPIN TEST: CPT

## 2023-06-05 PROCEDURE — 36415 COLL VENOUS BLD VENIPUNCTURE: CPT

## 2023-06-05 PROCEDURE — 96374 THER/PROPH/DIAG INJ IV PUSH: CPT | Mod: XU

## 2023-06-05 PROCEDURE — 99284 EMERGENCY DEPT VISIT MOD MDM: CPT | Mod: 25

## 2023-06-05 PROCEDURE — 70496 CT ANGIOGRAPHY HEAD: CPT | Mod: 26,MA

## 2023-06-05 PROCEDURE — 70450 CT HEAD/BRAIN W/O DYE: CPT | Mod: 26,MA,59

## 2023-06-05 PROCEDURE — 99284 EMERGENCY DEPT VISIT MOD MDM: CPT

## 2023-06-05 PROCEDURE — 80048 BASIC METABOLIC PNL TOTAL CA: CPT

## 2023-06-05 RX ORDER — METOCLOPRAMIDE HCL 10 MG
10 TABLET ORAL ONCE
Refills: 0 | Status: COMPLETED | OUTPATIENT
Start: 2023-06-05 | End: 2023-06-05

## 2023-06-05 RX ORDER — ACETAMINOPHEN 500 MG
650 TABLET ORAL ONCE
Refills: 0 | Status: COMPLETED | OUTPATIENT
Start: 2023-06-05 | End: 2023-06-05

## 2023-06-05 RX ORDER — SODIUM CHLORIDE 9 MG/ML
1000 INJECTION INTRAMUSCULAR; INTRAVENOUS; SUBCUTANEOUS ONCE
Refills: 0 | Status: COMPLETED | OUTPATIENT
Start: 2023-06-05 | End: 2023-06-05

## 2023-06-05 RX ADMIN — Medication 10 MILLIGRAM(S): at 21:11

## 2023-06-05 RX ADMIN — Medication 650 MILLIGRAM(S): at 21:11

## 2023-06-05 RX ADMIN — SODIUM CHLORIDE 1000 MILLILITER(S): 9 INJECTION INTRAMUSCULAR; INTRAVENOUS; SUBCUTANEOUS at 21:10

## 2023-06-05 NOTE — ED PROVIDER NOTE - NSFOLLOWUPINSTRUCTIONS_ED_ALL_ED_FT
Take tylenol / motirn as needed for headache.     Follow up with your primary care doctor within 1 week for continued evaluation.     Follow up with NEUROLOGY if your headaches continue.     Return to the Emergency Department for persistent, worsening, or new symptoms including loss of consciousness, dizziness/lightheadedness, severe headache, change in vision or loss of vision, uncontrollable nausea/vomiting, or any other serious concerns.

## 2023-06-05 NOTE — ED PROVIDER NOTE - PROGRESS NOTE DETAILS
MD Isaias: Patient signed out to night team pending final read of CT MD Isaias: Patient signed out to night team pending final read of CT And reevaluation aracely / guido -   received on sign out. pt here w headache. at time of sign out pending head CT and final dispo. aracely - ct head w/o acute findings. after tylenol / reglan headache entirely resolved. remains neuro intact. ok for dc.     All results reviewed with the patient verbally. Discharge plan and return precautions d/w pt who verbalized understanding and agrees with plan. All questions answered. Vitals WNL. Ready for d/c.

## 2023-06-05 NOTE — ED PROVIDER NOTE - CLINICAL SUMMARY MEDICAL DECISION MAKING FREE TEXT BOX
32-year-old female with past medical history of asthma, diabetes, hypertension, kidney stone, past surgical history of left ear surgery presents with 1 day history of headache.  patient reports a sudden onset headache that started  8 out of 10 in severity and progressed to a 10 out of 10 in severity, has had headaches similar to this in the past, however it is different today as the pain is also behind her eyes.  On exam, blood pressure 149/89, vital signs otherwise within normal limits, nonfocal neuro exam, no remarkable exam findings.      DDx: Migraine versus tension headache.  No rash or fever that would suggest meningitis.   subarachnoid hemorrhage not considered at this time as the headache was progressive and similar to headaches in the past with the exception of pain radiating behind both eyes.      Plan:  –Labs  –CT head, CT angio head and neck  –Tylenol, IV fluids, Reglan  –reassess

## 2023-06-05 NOTE — ED ADULT TRIAGE NOTE - CHIEF COMPLAINT QUOTE
Pt presents with c/o "left sided HA" x 2 days, now radiating to rt side. Reported "pressure and throbbing".

## 2023-06-05 NOTE — ED PROVIDER NOTE - PHYSICAL EXAMINATION
GENERAL:  Awake, alert and in NAD, non-toxic appearing  ENMT: Airway patent  EYES: conjunctiva clear  CARDIAC: Regular rate, regular rhythm.  Heart sounds S1, S2, no S3, S4. No murmurs, rubs or gallops.  RESPIRATORY: Breath sounds clear and equal in bilateral anterior lung fields, no wheezes/ronchi/stridor; pt breathing and speaking comfortably with no increased WOB, no accessory mm. use, no intercostal retractions, no nasal flaring  GI: abdomen soft, non-distended, non-tender, no rebound or guarding.  SKIN: warm and dry, no rashes  PSYCH: awake, alert, calm and cooperative   extremities: no ble edema  NEURO: pupils X mm, PERRL, EOMI (CN III, IV, VI), facial sensation intact to light touch in all 3 divisions bilat (CN V), face is symmetric with normal eye closure, eye opening, and smile (CN VII), hearing is normal to rubbing fingers (CN VII), palate elevates symmetrically, phonation is normal (CN IX, X),  shoulder shrug intact bilat (CN XI), tongue is midline with nl movements and no atrophy (CN XII), speech is clear; 5/5 motor strength BUE and BLE: deltoids, biceps, triceps, wrist flexors/extensors, hand , hip flexors, knee flexors/extensors, plantar/dorsiflexors, hallux flexors/extensors; sensation intact to light touch BUE and BLE: C5-T1 and L3-S1   gcs 15  gait wnl  no meningismus, neck supple

## 2023-06-05 NOTE — ED PROVIDER NOTE - OBJECTIVE STATEMENT
32-year-old female with past medical history of asthma, diabetes, hypertension, kidney stone, past surgical history of left ear surgery presents with 1 day history of headache.  Patient states she was resting on her bed yesterday at 11 AM and she had a sudden onset headache that started at an 8 out of 10 in severity, pulsatile, that is bilateral and also behind both eyes.  The headache improved with Tylenol and then progressively worsened to a 10 out of 10 in severity.  Patient states that she has headaches in the past that occur "out of the blue"and in the past have had a similar severity and were also pulsatile however the difference is today that the pain radiated to behind both of her eyes which has not happened in the past.  Patient denies syncope, fever, rash, nausea, vomiting, chest pain, shortness of breath, focal numbness or weaknes, denies neck pain.  Patient denies trauma.  No blood thinner use.

## 2023-06-05 NOTE — ED PROVIDER NOTE - PATIENT PORTAL LINK FT
You can access the FollowMyHealth Patient Portal offered by [facility] by registering at the following website: [email or web address]. By joining [facility]’s FollowMyHealth portal, you will also be able to view your health information using other applications (apps) compatible with our system.

## 2023-06-05 NOTE — ED ADULT NURSE NOTE - NSFALLUNIVINTERV_ED_ALL_ED
Bed/Stretcher in lowest position, wheels locked, appropriate side rails in place/Call bell, personal items and telephone in reach/Instruct patient to call for assistance before getting out of bed/chair/stretcher/Non-slip footwear applied when patient is off stretcher/[place] to call system/Physically safe environment - no spills, clutter or unnecessary equipment/Purposeful proactive rounding/Room/bathroom lighting operational, light cord in reach

## 2023-06-05 NOTE — ED PROVIDER NOTE - NS ED ROS FT
Positive: Headache   negative: Fever, chills, congestion, rhinorrhea, cough, chest pain, shortness of breath, nausea, vomiting, diarrhea, abdominal pain, dysuria, hematuria, leg edema, rash, focal numbness or weakness, syncope, neck pain, photophobia

## 2023-06-05 NOTE — ED ADULT NURSE NOTE - OBJECTIVE STATEMENT
Pt arrived d/t worsening headache. Pt denies blurry vision, lightheadedness, dizziness, sob, n, v, cp, Pt breathing even and unlabored. Pt endorsed she feels a shooting pain to the right side of her face and right eye. Pt endorsed she has had migraines in the past but not like the one she currently has at this time. Pt able to open both eyes, pupils PERRLA.

## 2023-06-19 ENCOUNTER — EMERGENCY (EMERGENCY)
Facility: HOSPITAL | Age: 33
LOS: 1 days | Discharge: HOME | End: 2023-06-19
Attending: STUDENT IN AN ORGANIZED HEALTH CARE EDUCATION/TRAINING PROGRAM | Admitting: STUDENT IN AN ORGANIZED HEALTH CARE EDUCATION/TRAINING PROGRAM
Payer: COMMERCIAL

## 2023-06-19 VITALS
TEMPERATURE: 98 F | OXYGEN SATURATION: 98 % | SYSTOLIC BLOOD PRESSURE: 150 MMHG | HEART RATE: 80 BPM | RESPIRATION RATE: 18 BRPM | DIASTOLIC BLOOD PRESSURE: 95 MMHG

## 2023-06-19 VITALS
RESPIRATION RATE: 18 BRPM | SYSTOLIC BLOOD PRESSURE: 151 MMHG | DIASTOLIC BLOOD PRESSURE: 100 MMHG | WEIGHT: 181 LBS | HEIGHT: 63 IN | TEMPERATURE: 99 F | HEART RATE: 89 BPM | OXYGEN SATURATION: 98 %

## 2023-06-19 DIAGNOSIS — Z87.42 PERSONAL HISTORY OF OTHER DISEASES OF THE FEMALE GENITAL TRACT: ICD-10-CM

## 2023-06-19 DIAGNOSIS — Z79.84 LONG TERM (CURRENT) USE OF ORAL HYPOGLYCEMIC DRUGS: ICD-10-CM

## 2023-06-19 DIAGNOSIS — Z87.442 PERSONAL HISTORY OF URINARY CALCULI: ICD-10-CM

## 2023-06-19 DIAGNOSIS — H71.92 UNSPECIFIED CHOLESTEATOMA, LEFT EAR: Chronic | ICD-10-CM

## 2023-06-19 DIAGNOSIS — N89.8 OTHER SPECIFIED NONINFLAMMATORY DISORDERS OF VAGINA: ICD-10-CM

## 2023-06-19 DIAGNOSIS — I10 ESSENTIAL (PRIMARY) HYPERTENSION: ICD-10-CM

## 2023-06-19 DIAGNOSIS — E11.9 TYPE 2 DIABETES MELLITUS WITHOUT COMPLICATIONS: ICD-10-CM

## 2023-06-19 DIAGNOSIS — Z79.4 LONG TERM (CURRENT) USE OF INSULIN: ICD-10-CM

## 2023-06-19 DIAGNOSIS — J45.909 UNSPECIFIED ASTHMA, UNCOMPLICATED: ICD-10-CM

## 2023-06-19 DIAGNOSIS — N76.4 ABSCESS OF VULVA: ICD-10-CM

## 2023-06-19 LAB
GRAM STN FLD: SIGNIFICANT CHANGE UP
SPECIMEN SOURCE: SIGNIFICANT CHANGE UP

## 2023-06-19 PROCEDURE — 99283 EMERGENCY DEPT VISIT LOW MDM: CPT | Mod: 24

## 2023-06-19 PROCEDURE — 87205 SMEAR GRAM STAIN: CPT

## 2023-06-19 PROCEDURE — 96372 THER/PROPH/DIAG INJ SC/IM: CPT | Mod: XU

## 2023-06-19 PROCEDURE — 10060 I&D ABSCESS SIMPLE/SINGLE: CPT

## 2023-06-19 PROCEDURE — 56405 I&D VULVA/PERINEAL ABSCESS: CPT

## 2023-06-19 PROCEDURE — 87070 CULTURE OTHR SPECIMN AEROBIC: CPT

## 2023-06-19 PROCEDURE — 99284 EMERGENCY DEPT VISIT MOD MDM: CPT | Mod: 25

## 2023-06-19 RX ORDER — HYDROMORPHONE HYDROCHLORIDE 2 MG/ML
1 INJECTION INTRAMUSCULAR; INTRAVENOUS; SUBCUTANEOUS ONCE
Refills: 0 | Status: DISCONTINUED | OUTPATIENT
Start: 2023-06-19 | End: 2023-06-19

## 2023-06-19 RX ADMIN — Medication 450 MILLIGRAM(S): at 20:31

## 2023-06-19 RX ADMIN — HYDROMORPHONE HYDROCHLORIDE 1 MILLIGRAM(S): 2 INJECTION INTRAMUSCULAR; INTRAVENOUS; SUBCUTANEOUS at 18:22

## 2023-06-19 NOTE — ED PROVIDER NOTE - OBJECTIVE STATEMENT
33 y/o f presents c/o pain, swelling to left labia x 3 days.  Pt stating she has had frequent axillary abscesses in the past but never on the labia.  Denies fever, chills, all other ROS negative.

## 2023-06-19 NOTE — ED ADULT NURSE NOTE - NSFALLUNIVINTERV_ED_ALL_ED
Bed/Stretcher in lowest position, wheels locked, appropriate side rails in place/Call bell, personal items and telephone in reach/Instruct patient to call for assistance before getting out of bed/chair/stretcher/Non-slip footwear applied when patient is off stretcher/[place] to call system/Physically safe environment - no spills, clutter or unnecessary equipment/Purposeful proactive rounding/Room/bathroom lighting operational, light cord in reach

## 2023-06-19 NOTE — CONSULT NOTE ADULT - SUBJECTIVE AND OBJECTIVE BOX
PONCHO GARNICA  32y  Female [id number]    HPI: 33yo  with hx of T2DM, hypertension, asthma, PCOS, and hidradenitis suppurativa presenting with a labial cyst. Pt states she noticed a small pimple on the labia 3 days ago which has since become bigger in size and pain. She denies taking pain medication, but has tried warm compresses without relief. Pt also reports difficulty ambulating and sitting due to this. She denies recent waxing or shaving in the area. She denies drainage or bleeding from the abscess. Pt reports hx of cysts in the past that required drainage, but denies having labial cysts. She denies CP, SOB, palpitations, fever, chills, abdominal pain, n/v, dysuria, hematuria, vaginal bleeding, and vaginal discharge.    ObHx: : SAB in   G2: c section for NRFHT in Aug 2022  GynHx:  Menses at age 12, irregular menses due to hx of PCOS, currently with nexplanon placed in 2022  Denies hx of abnormal paps, uterine fibroids, STI  PMH: T2DM, hypertension (not on meds), asthma, PCOS, and hidradenitis suppurativa, anxiety/depression  SurgHx: c section in Aug 2022, lithotripsy May 2023  Medications: metformin 1000mg BID, lantus 25mg QHS, novalog 10-15u w/meals, albuterol PRN (last used 1 month ago)  Allergies: NKDA     REVIEW OF SYSTEMS:  As above    MEDICATIONS  (STANDING):    MEDICATIONS  (PRN):      Allergies    No Known Allergies    Intolerances        PAST MEDICAL & SURGICAL HISTORY:  Diabetes      Asthma      PCOS (polycystic ovarian syndrome)      Chronic back pain      HTN (hypertension)      Kidney stone      Cholesteatoma of left ear  s/p surgery 2 years ago          OB/GYN HISTORY:   Menarchy           Cycle Length              Days Flow                                       Last Menstrual Period                                         G    P                                       Last Pap smear:   [id number]                                            FAMILY HISTORY:      SOCIAL HISTORY:    Vital Signs Last 24 Hrs  T(C): 36.9 (2023 19:47), Max: 37.1 (2023 17:44)  T(F): 98.4 (2023 19:47), Max: 98.7 (2023 17:44)  HR: 80 (2023 19:47) (80 - 89)  BP: 150/95 (2023 19:47) (150/95 - 151/100)  BP(mean): --  RR: 18 (2023 19:47) (18 - 18)  SpO2: 98% (2023 19:47) (98% - 98%)    Parameters below as of 2023 19:47  Patient On (Oxygen Delivery Method): room air        PHYSICAL EXAM:  General: NAD  Abdomen: soft, NT, ND, no guarding, no rebound tenderness  Pelvic: 4cm abscess on the L labia to mons, mildly erythematous, tender to palpation, no drainage or bleeding, +fluctuance and induration; no tracking palpated    LABS:  Pregnancy Test:          I&O's Detail          RADIOLOGY & ADDITIONAL STUDIES:

## 2023-06-19 NOTE — PROCEDURE NOTE - ADDITIONAL PROCEDURE DETAILS
A 4cm fluctuant and indurated abscess on L labia to mons. Lidocaine was injected to the area. A scalpel was used to make an incision. Pus and bloody fluid was drained. A culture was obtained. Pt tolerated the procedure well.

## 2023-06-19 NOTE — ED PROVIDER NOTE - NSFOLLOWUPINSTRUCTIONS_ED_ALL_ED_FT
Please follow up with GYN;   [facility]  [address] .  [phone]    Skin Abscess  Close-up of an abscess on the skin.  A skin abscess is an infected area on or under your skin that contains a collection of pus and other material. An abscess may also be called a furuncle, carbuncle, or boil. An abscess can occur in or on almost any part of your body.    Some abscesses break open (rupture) on their own. Most continue to get worse unless they are treated. The infection can spread deeper into the body and eventually into your blood, which can make you feel ill. Treatment usually involves draining the abscess.    What are the causes?  An abscess occurs when germs, like bacteria, pass through your skin and cause an infection. This may be caused by:  A scrape or cut on your skin.  A puncture wound through your skin, including a needle injection or insect bite.  Blocked oil or sweat glands.  Blocked and infected hair follicles.  A cyst that forms beneath your skin (sebaceous cyst) and becomes infected.  What increases the risk?  This condition is more likely to develop in people who:  Have a weak body defense system (immune system).  Have diabetes.  Have dry and irritated skin.  Get frequent injections or use illegal IV drugs.  Have a foreign body in a wound, such as a splinter.  Have problems with their lymph system or veins.  What are the signs or symptoms?  Symptoms of this condition include:  A painful, firm bump under the skin.  A bump with pus at the top. This may break through the skin and drain.  Other symptoms include:  Redness surrounding the abscess site.  Warmth.  Swelling of the lymph nodes (glands) near the abscess.  Tenderness.  A sore on the skin.  How is this diagnosed?  This condition may be diagnosed based on:  A physical exam.  Your medical history.  A sample of pus. This may be used to find out what is causing the infection.  Blood tests.  Imaging tests, such as an ultrasound, CT scan, or MRI.  How is this treated?  A small abscess that drains on its own may not need treatment. Treatment for larger abscesses may include:  Moist heat or heat pack applied to the area several times a day.  A procedure to drain the abscess (incision and drainage).  Antibiotic medicines. For a severe abscess, you may first get antibiotics through an IV and then change to antibiotics by mouth.  Follow these instructions at home:  Medicines    A prescription pill bottle with an example of a pill.  Take over-the-counter and prescription medicines only as told by your health care provider.  If you were prescribed an antibiotic medicine, take it as told by your health care provider. Do not stop taking the antibiotic even if you start to feel better.  Abscess care    Washing hands with soap and water.  If you have an abscess that has not drained, apply heat to the affected area. Use the heat source that your health care provider recommends, such as a moist heat pack or a heating pad.  Place a towel between your skin and the heat source.  Leave the heat on for 20–30 minutes.  Remove the heat if your skin turns bright red. This is especially important if you are unable to feel pain, heat, or cold. You may have a greater risk of getting burned.  Follow instructions from your health care provider about how to take care of your abscess. Make sure you:  Cover the abscess with a bandage (dressing).  Change your dressing or gauze as told by your health care provider.  Wash your hands with soap and water before you change the dressing or gauze. If soap and water are not available, use hand .  Check your abscess every day for signs of a worsening infection. Check for:  More redness, swelling, or pain.  More fluid or blood.  Warmth.  More pus or a bad smell.  General instructions    To avoid spreading the infection:  Do not share personal care items, towels, or hot tubs with others.  Avoid making skin contact with other people.  Keep all follow-up visits as told by your health care provider. This is important.  Contact a health care provider if you have:  More redness, swelling, or pain around your abscess.  More fluid or blood coming from your abscess.  Warm skin around your abscess.  More pus or a bad smell coming from your abscess.  Muscle aches.  Chills or a general ill feeling.  Get help right away if you:  Have severe pain.  See red streaks on your skin spreading away from the abscess.  See redness that spreads quickly.  Have a fever or chills.  Summary  A skin abscess is an infected area on or under your skin that contains a collection of pus and other material.  A small abscess that drains on its own may not need treatment.  Treatment for larger abscesses may include having a procedure to drain the abscess and taking an antibiotic.  This information is not intended to replace advice given to you by your health care provider. Make sure you discuss any questions you have with your health care provider.

## 2023-06-19 NOTE — ED PROVIDER NOTE - CLINICAL SUMMARY MEDICAL DECISION MAKING FREE TEXT BOX
31 y/o f presents c/o left labia abscess x 3 days; pt afebrile in ED, GYN evaluated and performed I&D, recommend d/c on clindamycin, will f/u in clinic, return precautions given for worsening symptoms.

## 2023-06-19 NOTE — ED PROVIDER NOTE - PATIENT PORTAL LINK FT
You can access the FollowMyHealth Patient Portal offered by [facility] by registering at the following website: [email or web address]. By joining [employer]’s FollowMyHealth portal, you will also be able to view your health information using other applications (apps) compatible with our system.

## 2023-06-19 NOTE — ED ADULT NURSE NOTE - OBJECTIVE STATEMENT
Pt is 32 y.o female pt c/o abscess on the L cheek of the vagina for 3 days. Pt denies fever and chills,  sx, abd pain. Assessment ongoing. Will cont to monitor.

## 2023-06-19 NOTE — ED PROVIDER NOTE - CROS ED ROS STATEMENT
"[facility] [[id number]]  Chief Complaint   Patient presents with     RECHECK     follow up     Initial BP 96/55   Pulse 82   Temp 98.6  F (37  C)   Resp 20   Ht 5' 2.52\" (158.8 cm)   Wt 195 lb 12.3 oz (88.8 kg)   SpO2 96%   BMI 35.21 kg/m   Estimated body mass index is 35.21 kg/m  as calculated from the following:    Height as of this encounter: 5' 2.52\" (158.8 cm).    Weight as of this encounter: 195 lb 12.3 oz (88.8 kg).  Medication Reconciliation: complete    Has the patient received a flu shot this year? y    If no, do they want one today? n  "
all other ROS negative except as per HPI

## 2023-06-19 NOTE — CONSULT NOTE ADULT - ASSESSMENT
31yo  with hx of T2DM, hypertension, asthma, PCOS, and hidradenitis suppurativa presenting with a labial abscess, s/p I&D (see separate procedure note). Pt is afebrile and hemodynamically stable.  [] Rx for clindamycin 450mg TID for 1 week  [] F/u in [facility] in 1 week  [] Tylenol and ibuprofen for pain  [] Strict return precautions given. Pt repots understanding  [] Stable for discharge home from gyn perspective     D/w Dr. Juan

## 2023-06-22 NOTE — PRE-OP CHECKLIST - TEMPERATURE IN CELSIUS (DEGREES C)
Progress Note  Nephrology      Referring physician: Filiberto Vega MD    Reason for visit: CKD     SUBJECTIVE:   60 y.o. female   has a past medical history of Amblyopia, Cataract, Diabetes mellitus, Essential hypertension (6/22/2023), Glaucoma, Head concussion, Pulmonary embolism, and S/P gastric bypass. who has been following up in renal clinic for ckd. She has DMII for a while, and chronic diarrhea from metformin got better now, she had AD in 2020 due to NSAIDs use, her renal function has been stable but worse than before early 2019. Patient feels well today, no urinary or cardiac symptoms, no NSAIDs intake.     Arrives today feeling fine.     OBJECTIVE:     Vitals:    06/22/23 0935   BP: 106/74   Pulse: 78          Physical Exam:  General: no distress, well nourished  HENT: PERRLA, Normal mouth nose and ears.  Neck: no JVD and thyroid not enlarged, symmetric, no tenderness/mass/nodules  Lungs: clear to auscultation bilaterally and normal respiratory effort  Cardiovascular: regular rate and rhythm, S1, S2 normal, no murmur, click, rub or gallop.   Abdomen: soft, non-tender non-distented; bowel sounds normal  Skin: No rashes or lesions  Musculoskeletal:no edema in LE, no deformities.   Lymph Nodes: No cervical or supraclavicular adenopathy  Neurologic: Normal strength and tone. No focal numbness or weakness    Dialysis Access: Not applicable.        Medications:    Current Outpatient Medications:     amitriptyline (ELAVIL) 50 MG tablet, Take 1 tablet (50 mg total) by mouth nightly as needed for Insomnia., Disp: 90 tablet, Rfl: 3    b complex vitamins capsule, Take 1 capsule by mouth once daily., Disp: , Rfl:     blood sugar diagnostic Strp, 1 strip by Misc.(Non-Drug; Combo Route) route 3 (three) times daily., Disp: 200 strip, Rfl: 6    blood-glucose meter kit, PLEASE PROVIDE WITH INSURANCE COVERED METER, Disp: 1 each, Rfl: 0    brimonidine 0.2% (ALPHAGAN) 0.2 % Drop, Place 1 drop into the right eye 2  "(two) times a day., Disp: 10 mL, Rfl: 11    butalbital-acetaminophen-caffeine -40 mg (FIORICET, ESGIC) -40 mg per tablet, Take 1 tablet by mouth every 6 to 8 hours as needed., Disp: 30 tablet, Rfl: 0    cholecalciferol, vitamin D3, 1,000 unit capsule, Take 1 tablet by mouth. Capsule Oral , Disp: , Rfl:     ferrous sulfate 325 (65 FE) MG EC tablet, TAKE 1 TABLET BY MOUTH ONCE DAILY, Disp: 30 tablet, Rfl: 2    FLOWFLEX COVID-19 AG HOME TEST Kit, FOLLOW INSTRUCTIONS INCLUDED WITH THE PACKAGE., Disp: , Rfl:     glucose 4 GM chewable tablet, Take 4 tablets (16 g total) by mouth as needed for Low blood sugar (If having symptoms of blurry vision, palpitations, confusion, shakiness.  Please check sugars and if sugar below 70 please take 4 tablets and re-check sugar everry 15 minutes until sugars are above 70 and symptoms resolve.)., Disp: 50 tablet, Rfl: 12    hydroCHLOROthiazide (HYDRODIURIL) 12.5 MG Tab, TAKE 1 TABLET BY MOUTH EVERY DAY, Disp: 90 tablet, Rfl: 3    insulin detemir U-100 (LEVEMIR FLEXTOUCH U-100 INSULN) 100 unit/mL (3 mL) InPn pen, Inject 10 Units into the skin every evening., Disp: 12 mL, Rfl: 6    L.acid/L.casei/B.bif/B.robin/FOS (PROBIOTIC BLEND ORAL), Take by mouth once daily., Disp: , Rfl:     lancets Misc, 1 Device by Misc.(Non-Drug; Combo Route) route 3 (three) times daily., Disp: 200 each, Rfl: 3    latanoprost 0.005 % ophthalmic solution, INSTILL ONE DROP INTO BOTH EYES EVERY DAY, Disp: 7.5 mL, Rfl: 3    losartan (COZAAR) 25 MG tablet, Take 1 tablet (25 mg total) by mouth once daily., Disp: 90 tablet, Rfl: 3    multivitamin capsule, Take 1 capsule by mouth once daily., Disp: , Rfl:     needle, disp, 18 G 18 gauge x 1 1/2" Ndle, 1 each by Misc.(Non-Drug; Combo Route) route every evening., Disp: 100 each, Rfl: 2    ondansetron (ZOFRAN-ODT) 4 MG TbDL, Take 1 tablet (4 mg total) by mouth every 8 (eight) hours as needed., Disp: 12 tablet, Rfl: 0    paroxetine (PAXIL) 10 MG tablet, Take 1 " "tablet (10 mg total) by mouth once daily., Disp: 90 tablet, Rfl: 3    pen needle, diabetic 32 gauge x 5/32" Ndle, 1 each by Misc.(Non-Drug; Combo Route) route Daily., Disp: 30 each, Rfl: 6    semaglutide (OZEMPIC) 0.25 mg or 0.5 mg(2 mg/1.5 mL) pen injector, Inject 0.5 mg into the skin every 7 days. 0.25mg injection once a week for 4 weeks. On week 5 increase to 0.5mg injection once a week and continue on this dose., Disp: 1 each, Rfl: 11    semaglutide (OZEMPIC) 1 mg/dose (4 mg/3 mL), Inject 1 mg into the skin every 7 days., Disp: 3 mL, Rfl: 3    sodium bicarbonate 650 MG tablet, Take 2 tablets (1,300 mg total) by mouth 2 (two) times daily., Disp: 360 tablet, Rfl: 3    timolol maleate 0.5% (TIMOPTIC) 0.5 % Drop, Place 1 drop into both eyes 2 (two) times daily., Disp: 10 mL, Rfl: 11    valACYclovir (VALTREX) 1000 MG tablet, TAKE 1 TABLET BY MOUTH TWICE A DAY, Disp: 180 tablet, Rfl: 2  No current facility-administered medications for this visit.         Laboratory:  Lab Results   Component Value Date    CREATININE 2.2 (H) 06/16/2023       Prot/Creat Ratio, Urine   Date Value Ref Range Status   06/16/2023 0.16 0.00 - 0.20 Final   09/12/2022 0.20 0.00 - 0.20 Final   01/08/2021 0.21 (H) 0.00 - 0.20 Final       Lab Results   Component Value Date     06/16/2023    K 4.3 06/16/2023    CO2 25 06/16/2023       Lab Results   Component Value Date    .1 (H) 06/16/2023    CALCIUM 9.1 06/16/2023    PHOS 2.7 06/16/2023       Lab Results   Component Value Date    HGB 12.3 06/16/2023        Lab Results   Component Value Date    HGBA1C 7.5 (H) 06/16/2023       Lab Results   Component Value Date    LDLCALC 138.2 06/16/2023       Other Labs were reviewed      ASSESSMENT/PLAN:     CKD stage 4  -likely from DMII, chronic diarrhea and previous AD from NSAIDs  -Cr baseline ~ 2.0 -2.2; eGFR ~ 25 - 30 ml/min; stable for the last 2 years  -UPCR 200 mg/g, no overt proteinuria  -Renal US c/w ckd  - already on ARB  -low risk for " CKD progression    HTN  Controlled with ARB and thiazide. In fact, it is low normal. Will stop HCTZ.     Anemia  -from ckd  -Hgb goal ~ 10  -Iron stores not available    Secondary Hyperparathyroidism  -Phos/Ca acceptable  -PTH na    Acid/Base  -on NaHCO3 for Metabolic acidosis      RTC in 6 months with labs       36.8

## 2023-06-23 LAB
CULTURE RESULTS: SIGNIFICANT CHANGE UP
SPECIMEN SOURCE: SIGNIFICANT CHANGE UP

## 2023-06-26 LAB
-  CLINDAMYCIN: SIGNIFICANT CHANGE UP
-  CLINDAMYCIN: SIGNIFICANT CHANGE UP
-  MEROPENEM: SIGNIFICANT CHANGE UP
-  MEROPENEM: SIGNIFICANT CHANGE UP
-  METRONIDAZOLE: SIGNIFICANT CHANGE UP
-  METRONIDAZOLE: SIGNIFICANT CHANGE UP
-  PENICILLIN: SIGNIFICANT CHANGE UP
-  PENICILLIN: SIGNIFICANT CHANGE UP
METHOD TYPE: SIGNIFICANT CHANGE UP
METHOD TYPE: SIGNIFICANT CHANGE UP

## 2023-07-12 NOTE — ED PROVIDER NOTE - CPE EDP NEURO NORM
normal... Quality 130: Documentation Of Current Medications In The Medical Record: Current Medications Documented Quality 226: Preventive Care And Screening: Tobacco Use: Screening And Cessation Intervention: Patient screened for tobacco use and is an ex/non-smoker Quality 110: Preventive Care And Screening: Influenza Immunization: Influenza immunization was not ordered or administered, reason not given Detail Level: Detailed Quality 431: Preventive Care And Screening: Unhealthy Alcohol Use - Screening: Patient not identified as an unhealthy alcohol user when screened for unhealthy alcohol use using a systematic screening method

## 2023-09-07 NOTE — ED PROVIDER NOTE - NS ED MD DISPO DISCHARGE
Quality 110: Preventive Care And Screening: Influenza Immunization: Influenza Immunization Administered during Influenza season
Detail Level: Detailed
Home

## 2023-09-25 NOTE — ED ADULT TRIAGE NOTE - PATIENT'S PREFERRED PRONOUN
[phone]  From: shaji  RE: Evi child  need to know how to access chart    RN attempted to contact pt regarding perfect serve. RN unable to contact pt, left VMM to call office.    Her/She

## 2023-10-02 ENCOUNTER — INPATIENT (INPATIENT)
Facility: HOSPITAL | Age: 33
LOS: 2 days | Discharge: HOME | DRG: 392 | End: 2023-10-05
Attending: STUDENT IN AN ORGANIZED HEALTH CARE EDUCATION/TRAINING PROGRAM | Admitting: INTERNAL MEDICINE
Payer: COMMERCIAL

## 2023-10-02 VITALS
DIASTOLIC BLOOD PRESSURE: 91 MMHG | OXYGEN SATURATION: 98 % | HEIGHT: 63 IN | TEMPERATURE: 98 F | HEART RATE: 81 BPM | WEIGHT: 177.91 LBS | SYSTOLIC BLOOD PRESSURE: 143 MMHG | RESPIRATION RATE: 17 BRPM

## 2023-10-02 DIAGNOSIS — H71.92 UNSPECIFIED CHOLESTEATOMA, LEFT EAR: Chronic | ICD-10-CM

## 2023-10-02 DIAGNOSIS — Z98.891 HISTORY OF UTERINE SCAR FROM PREVIOUS SURGERY: Chronic | ICD-10-CM

## 2023-10-02 LAB
ALBUMIN SERPL ELPH-MCNC: 4.2 G/DL — SIGNIFICANT CHANGE UP (ref 3.3–5)
ALP SERPL-CCNC: 120 U/L — SIGNIFICANT CHANGE UP (ref 40–120)
ALT FLD-CCNC: 13 U/L — SIGNIFICANT CHANGE UP (ref 10–45)
ANION GAP SERPL CALC-SCNC: 10 MMOL/L — SIGNIFICANT CHANGE UP (ref 5–17)
ANION GAP SERPL CALC-SCNC: 14 MMOL/L — SIGNIFICANT CHANGE UP (ref 5–17)
APPEARANCE UR: CLEAR — SIGNIFICANT CHANGE UP
AST SERPL-CCNC: 13 U/L — SIGNIFICANT CHANGE UP (ref 10–40)
BACTERIA # UR AUTO: SIGNIFICANT CHANGE UP /HPF
BASOPHILS # BLD AUTO: 0.04 K/UL — SIGNIFICANT CHANGE UP (ref 0–0.2)
BASOPHILS NFR BLD AUTO: 0.3 % — SIGNIFICANT CHANGE UP (ref 0–2)
BILIRUB SERPL-MCNC: 0.4 MG/DL — SIGNIFICANT CHANGE UP (ref 0.2–1.2)
BILIRUB UR-MCNC: NEGATIVE — SIGNIFICANT CHANGE UP
BUN SERPL-MCNC: 11 MG/DL — SIGNIFICANT CHANGE UP (ref 7–23)
BUN SERPL-MCNC: 17 MG/DL — SIGNIFICANT CHANGE UP (ref 7–23)
CALCIUM SERPL-MCNC: 10 MG/DL — SIGNIFICANT CHANGE UP (ref 8.4–10.5)
CALCIUM SERPL-MCNC: 9.2 MG/DL — SIGNIFICANT CHANGE UP (ref 8.4–10.5)
CHLORIDE SERPL-SCNC: 101 MMOL/L — SIGNIFICANT CHANGE UP (ref 96–108)
CHLORIDE SERPL-SCNC: 101 MMOL/L — SIGNIFICANT CHANGE UP (ref 96–108)
CO2 SERPL-SCNC: 21 MMOL/L — LOW (ref 22–31)
CO2 SERPL-SCNC: 23 MMOL/L — SIGNIFICANT CHANGE UP (ref 22–31)
COLOR SPEC: YELLOW — SIGNIFICANT CHANGE UP
CREAT SERPL-MCNC: 0.54 MG/DL — SIGNIFICANT CHANGE UP (ref 0.5–1.3)
CREAT SERPL-MCNC: 0.6 MG/DL — SIGNIFICANT CHANGE UP (ref 0.5–1.3)
DIFF PNL FLD: ABNORMAL
EGFR: 121 ML/MIN/1.73M2 — SIGNIFICANT CHANGE UP
EGFR: 124 ML/MIN/1.73M2 — SIGNIFICANT CHANGE UP
EOSINOPHIL # BLD AUTO: 0.16 K/UL — SIGNIFICANT CHANGE UP (ref 0–0.5)
EOSINOPHIL NFR BLD AUTO: 1.1 % — SIGNIFICANT CHANGE UP (ref 0–6)
EPI CELLS # UR: SIGNIFICANT CHANGE UP /HPF (ref 0–5)
GLUCOSE SERPL-MCNC: 274 MG/DL — HIGH (ref 70–99)
GLUCOSE SERPL-MCNC: 281 MG/DL — HIGH (ref 70–99)
GLUCOSE SERPL-MCNC: 459 MG/DL — CRITICAL HIGH (ref 70–99)
GLUCOSE UR QL: >=1000
HCG SERPL-ACNC: <0 MIU/ML — SIGNIFICANT CHANGE UP
HCT VFR BLD CALC: 42.8 % — SIGNIFICANT CHANGE UP (ref 34.5–45)
HGB BLD-MCNC: 14.3 G/DL — SIGNIFICANT CHANGE UP (ref 11.5–15.5)
IMM GRANULOCYTES NFR BLD AUTO: 0.5 % — SIGNIFICANT CHANGE UP (ref 0–0.9)
KETONES UR-MCNC: 15 MG/DL
LEUKOCYTE ESTERASE UR-ACNC: NEGATIVE — SIGNIFICANT CHANGE UP
LIDOCAIN IGE QN: 44 U/L — SIGNIFICANT CHANGE UP (ref 7–60)
LYMPHOCYTES # BLD AUTO: 1.28 K/UL — SIGNIFICANT CHANGE UP (ref 1–3.3)
LYMPHOCYTES # BLD AUTO: 9.2 % — LOW (ref 13–44)
MCHC RBC-ENTMCNC: 30.2 PG — SIGNIFICANT CHANGE UP (ref 27–34)
MCHC RBC-ENTMCNC: 33.4 GM/DL — SIGNIFICANT CHANGE UP (ref 32–36)
MCV RBC AUTO: 90.5 FL — SIGNIFICANT CHANGE UP (ref 80–100)
MONOCYTES # BLD AUTO: 0.68 K/UL — SIGNIFICANT CHANGE UP (ref 0–0.9)
MONOCYTES NFR BLD AUTO: 4.9 % — SIGNIFICANT CHANGE UP (ref 2–14)
NEUTROPHILS # BLD AUTO: 11.75 K/UL — HIGH (ref 1.8–7.4)
NEUTROPHILS NFR BLD AUTO: 84 % — HIGH (ref 43–77)
NITRITE UR-MCNC: NEGATIVE — SIGNIFICANT CHANGE UP
NRBC # BLD: 0 /100 WBCS — SIGNIFICANT CHANGE UP (ref 0–0)
PH UR: 6 — SIGNIFICANT CHANGE UP (ref 5–8)
PLATELET # BLD AUTO: 261 K/UL — SIGNIFICANT CHANGE UP (ref 150–400)
POTASSIUM SERPL-MCNC: 3.8 MMOL/L — SIGNIFICANT CHANGE UP (ref 3.5–5.3)
POTASSIUM SERPL-MCNC: 4.1 MMOL/L — SIGNIFICANT CHANGE UP (ref 3.5–5.3)
POTASSIUM SERPL-SCNC: 3.8 MMOL/L — SIGNIFICANT CHANGE UP (ref 3.5–5.3)
POTASSIUM SERPL-SCNC: 4.1 MMOL/L — SIGNIFICANT CHANGE UP (ref 3.5–5.3)
PROT SERPL-MCNC: 7.7 G/DL — SIGNIFICANT CHANGE UP (ref 6–8.3)
PROT UR-MCNC: NEGATIVE MG/DL — SIGNIFICANT CHANGE UP
RBC # BLD: 4.73 M/UL — SIGNIFICANT CHANGE UP (ref 3.8–5.2)
RBC # FLD: 12.2 % — SIGNIFICANT CHANGE UP (ref 10.3–14.5)
RBC CASTS # UR COMP ASSIST: < 5 /HPF — SIGNIFICANT CHANGE UP
SODIUM SERPL-SCNC: 134 MMOL/L — LOW (ref 135–145)
SODIUM SERPL-SCNC: 136 MMOL/L — SIGNIFICANT CHANGE UP (ref 135–145)
SP GR SPEC: 1.01 — SIGNIFICANT CHANGE UP (ref 1–1.03)
UROBILINOGEN FLD QL: 0.2 E.U./DL — SIGNIFICANT CHANGE UP
WBC # BLD: 13.98 K/UL — HIGH (ref 3.8–10.5)
WBC # FLD AUTO: 13.98 K/UL — HIGH (ref 3.8–10.5)
WBC UR QL: < 5 /HPF — SIGNIFICANT CHANGE UP

## 2023-10-02 PROCEDURE — 99285 EMERGENCY DEPT VISIT HI MDM: CPT

## 2023-10-02 PROCEDURE — 74177 CT ABD & PELVIS W/CONTRAST: CPT | Mod: 26,MA

## 2023-10-02 RX ORDER — MORPHINE SULFATE 50 MG/1
4 CAPSULE, EXTENDED RELEASE ORAL ONCE
Refills: 0 | Status: DISCONTINUED | OUTPATIENT
Start: 2023-10-02 | End: 2023-10-02

## 2023-10-02 RX ORDER — HYDROMORPHONE HYDROCHLORIDE 2 MG/ML
1 INJECTION INTRAMUSCULAR; INTRAVENOUS; SUBCUTANEOUS EVERY 4 HOURS
Refills: 0 | Status: DISCONTINUED | OUTPATIENT
Start: 2023-10-02 | End: 2023-10-05

## 2023-10-02 RX ORDER — SODIUM CHLORIDE 9 MG/ML
1000 INJECTION, SOLUTION INTRAVENOUS
Refills: 0 | Status: DISCONTINUED | OUTPATIENT
Start: 2023-10-02 | End: 2023-10-05

## 2023-10-02 RX ORDER — HEPARIN SODIUM 5000 [USP'U]/ML
5000 INJECTION INTRAVENOUS; SUBCUTANEOUS EVERY 8 HOURS
Refills: 0 | Status: DISCONTINUED | OUTPATIENT
Start: 2023-10-02 | End: 2023-10-05

## 2023-10-02 RX ORDER — DEXTROSE 50 % IN WATER 50 %
25 SYRINGE (ML) INTRAVENOUS ONCE
Refills: 0 | Status: DISCONTINUED | OUTPATIENT
Start: 2023-10-02 | End: 2023-10-05

## 2023-10-02 RX ORDER — GLUCAGON INJECTION, SOLUTION 0.5 MG/.1ML
1 INJECTION, SOLUTION SUBCUTANEOUS ONCE
Refills: 0 | Status: DISCONTINUED | OUTPATIENT
Start: 2023-10-02 | End: 2023-10-05

## 2023-10-02 RX ORDER — SODIUM CHLORIDE 9 MG/ML
1000 INJECTION INTRAMUSCULAR; INTRAVENOUS; SUBCUTANEOUS ONCE
Refills: 0 | Status: DISCONTINUED | OUTPATIENT
Start: 2023-10-02 | End: 2023-10-02

## 2023-10-02 RX ORDER — SODIUM CHLORIDE 9 MG/ML
1000 INJECTION, SOLUTION INTRAVENOUS
Refills: 0 | Status: DISCONTINUED | OUTPATIENT
Start: 2023-10-02 | End: 2023-10-04

## 2023-10-02 RX ORDER — ACETAMINOPHEN 500 MG
1000 TABLET ORAL ONCE
Refills: 0 | Status: COMPLETED | OUTPATIENT
Start: 2023-10-02 | End: 2023-10-02

## 2023-10-02 RX ORDER — CEFTRIAXONE 500 MG/1
1000 INJECTION, POWDER, FOR SOLUTION INTRAMUSCULAR; INTRAVENOUS ONCE
Refills: 0 | Status: COMPLETED | OUTPATIENT
Start: 2023-10-02 | End: 2023-10-02

## 2023-10-02 RX ORDER — ONDANSETRON 8 MG/1
4 TABLET, FILM COATED ORAL EVERY 6 HOURS
Refills: 0 | Status: DISCONTINUED | OUTPATIENT
Start: 2023-10-02 | End: 2023-10-05

## 2023-10-02 RX ORDER — HYDRALAZINE HCL 50 MG
10 TABLET ORAL ONCE
Refills: 0 | Status: DISCONTINUED | OUTPATIENT
Start: 2023-10-02 | End: 2023-10-02

## 2023-10-02 RX ORDER — DEXTROSE 50 % IN WATER 50 %
12.5 SYRINGE (ML) INTRAVENOUS ONCE
Refills: 0 | Status: DISCONTINUED | OUTPATIENT
Start: 2023-10-02 | End: 2023-10-05

## 2023-10-02 RX ORDER — INSULIN GLARGINE 100 [IU]/ML
13 INJECTION, SOLUTION SUBCUTANEOUS AT BEDTIME
Refills: 0 | Status: DISCONTINUED | OUTPATIENT
Start: 2023-10-02 | End: 2023-10-03

## 2023-10-02 RX ORDER — INSULIN LISPRO 100/ML
VIAL (ML) SUBCUTANEOUS EVERY 6 HOURS
Refills: 0 | Status: DISCONTINUED | OUTPATIENT
Start: 2023-10-02 | End: 2023-10-04

## 2023-10-02 RX ORDER — METRONIDAZOLE 500 MG
500 TABLET ORAL EVERY 8 HOURS
Refills: 0 | Status: COMPLETED | OUTPATIENT
Start: 2023-10-02 | End: 2023-10-02

## 2023-10-02 RX ORDER — ALBUTEROL 90 UG/1
2 AEROSOL, METERED ORAL EVERY 6 HOURS
Refills: 0 | Status: DISCONTINUED | OUTPATIENT
Start: 2023-10-02 | End: 2023-10-05

## 2023-10-02 RX ORDER — INSULIN GLARGINE 100 [IU]/ML
13 INJECTION, SOLUTION SUBCUTANEOUS AT BEDTIME
Refills: 0 | Status: DISCONTINUED | OUTPATIENT
Start: 2023-10-02 | End: 2023-10-02

## 2023-10-02 RX ORDER — HYDROMORPHONE HYDROCHLORIDE 2 MG/ML
0.5 INJECTION INTRAMUSCULAR; INTRAVENOUS; SUBCUTANEOUS EVERY 4 HOURS
Refills: 0 | Status: DISCONTINUED | OUTPATIENT
Start: 2023-10-02 | End: 2023-10-05

## 2023-10-02 RX ORDER — DEXTROSE 50 % IN WATER 50 %
15 SYRINGE (ML) INTRAVENOUS ONCE
Refills: 0 | Status: DISCONTINUED | OUTPATIENT
Start: 2023-10-02 | End: 2023-10-05

## 2023-10-02 RX ORDER — IOHEXOL 300 MG/ML
30 INJECTION, SOLUTION INTRAVENOUS ONCE
Refills: 0 | Status: COMPLETED | OUTPATIENT
Start: 2023-10-02 | End: 2023-10-02

## 2023-10-02 RX ORDER — METRONIDAZOLE 500 MG
500 TABLET ORAL EVERY 8 HOURS
Refills: 0 | Status: DISCONTINUED | OUTPATIENT
Start: 2023-10-02 | End: 2023-10-05

## 2023-10-02 RX ORDER — CEFTRIAXONE 500 MG/1
1000 INJECTION, POWDER, FOR SOLUTION INTRAMUSCULAR; INTRAVENOUS EVERY 24 HOURS
Refills: 0 | Status: DISCONTINUED | OUTPATIENT
Start: 2023-10-03 | End: 2023-10-05

## 2023-10-02 RX ORDER — METRONIDAZOLE 500 MG
TABLET ORAL
Refills: 0 | Status: COMPLETED | OUTPATIENT
Start: 2023-10-02 | End: 2023-10-02

## 2023-10-02 RX ORDER — SODIUM CHLORIDE 9 MG/ML
1000 INJECTION INTRAMUSCULAR; INTRAVENOUS; SUBCUTANEOUS ONCE
Refills: 0 | Status: COMPLETED | OUTPATIENT
Start: 2023-10-02 | End: 2023-10-02

## 2023-10-02 RX ORDER — METRONIDAZOLE 500 MG
500 TABLET ORAL ONCE
Refills: 0 | Status: COMPLETED | OUTPATIENT
Start: 2023-10-02 | End: 2023-10-02

## 2023-10-02 RX ADMIN — CEFTRIAXONE 100 MILLIGRAM(S): 500 INJECTION, POWDER, FOR SOLUTION INTRAMUSCULAR; INTRAVENOUS at 16:51

## 2023-10-02 RX ADMIN — ONDANSETRON 4 MILLIGRAM(S): 8 TABLET, FILM COATED ORAL at 23:41

## 2023-10-02 RX ADMIN — Medication 100 MILLIGRAM(S): at 23:42

## 2023-10-02 RX ADMIN — HEPARIN SODIUM 5000 UNIT(S): 5000 INJECTION INTRAVENOUS; SUBCUTANEOUS at 23:41

## 2023-10-02 RX ADMIN — Medication 400 MILLIGRAM(S): at 21:23

## 2023-10-02 RX ADMIN — MORPHINE SULFATE 4 MILLIGRAM(S): 50 CAPSULE, EXTENDED RELEASE ORAL at 16:52

## 2023-10-02 RX ADMIN — IOHEXOL 30 MILLILITER(S): 300 INJECTION, SOLUTION INTRAVENOUS at 14:16

## 2023-10-02 RX ADMIN — Medication 100 MILLIGRAM(S): at 17:39

## 2023-10-02 RX ADMIN — SODIUM CHLORIDE 100 MILLILITER(S): 9 INJECTION, SOLUTION INTRAVENOUS at 23:41

## 2023-10-02 RX ADMIN — SODIUM CHLORIDE 1000 MILLILITER(S): 9 INJECTION INTRAMUSCULAR; INTRAVENOUS; SUBCUTANEOUS at 14:16

## 2023-10-02 RX ADMIN — MORPHINE SULFATE 4 MILLIGRAM(S): 50 CAPSULE, EXTENDED RELEASE ORAL at 14:16

## 2023-10-02 NOTE — ED PROVIDER NOTE - ATTENDING CONTRIBUTION TO CARE
34 yo PMHx of asthma, DM, htn, PTSD, depression ? remote hx of PCOS w 3 days sharp pain, epigastric and lower left pain and pelvic pressure. Cramping in sensation, w back pain. One week ago had viral gastroenteritis, took peptobismol at that time, taking tylenol and advil, preg test negative at home. Well appearing, nad, nc/at, lung cta, heart reg, abd soft, nt, ext no gross deformity, no gross neuro deficits, llq ttp, Ddx appendicitis colitis SBO other, pending labs, CT a/p reassess. 32 yo PMHx of asthma, DM, htn, PTSD, depression ? remote hx of PCOS w 3 days sharp pain, epigastric and lower left pain and pelvic pressure. Cramping in sensation, w back pain. One week ago had viral gastroenteritis, took peptobismol at that time, taking tylenol and advil, preg test negative at home. Well appearing, nad, nc/at, lung cta, heart reg, abd soft, mild ttp in lower quadrants, ext no gross deformity, no gross neuro deficits, llq ttp, Ddx appendicitis colitis, SBO other, pending labs, CT a/p reassess. hyperglycemia but no anion gap

## 2023-10-02 NOTE — ED PROVIDER NOTE - NS ED ATTENDING STATEMENT MOD
I have seen and examined this patient and fully participated in the care of this patient as the teaching attending.  The service was shared with the DELON.  I reviewed and verified the documentation and independently performed the documented:

## 2023-10-02 NOTE — ED ADULT NURSE REASSESSMENT NOTE - NS ED NURSE REASSESS COMMENT FT1
Received report from day shift RN. PT resting comfortably in stretcher. Pending admission. Resp even and unlabored. Updated pt on plan of care. Medicated per MAR

## 2023-10-02 NOTE — H&P ADULT - NSHPPHYSICALEXAM_GEN_ALL_CORE
General: Acute distress   Head/neck: PERRLA, no palpable cervical lymphadenopathy   Resp: No respiratory distress, appropriate O2 sat in RA   Card: Normal heart rate and rhythm   GI: Abdomen soft, non-distended, focal tenderness/guarding in LLQ, referred LLQ pain w/ RLQ palpation   Extremities: WNL, no peripheral edema, intact strength & sensation   Neuro: A&Ox3, no FND   Psych: Normal mood and affect; appropriate response to questions

## 2023-10-02 NOTE — H&P ADULT - NSHPLABSRESULTS_GEN_ALL_CORE
CT A/P: Acute diverticulitis of the proximal sigmoid colon with microperforation. No drainable fluid collection. Inflammatory changes extend to the left bladder wall

## 2023-10-02 NOTE — ED PROVIDER NOTE - OBJECTIVE STATEMENT
32 yo F with PMH of asthma, DM, HTN, PTSD, depression, anxiety and ?PCOS presents with 3 days of sharp epigastric and L lower abdominal pain and 1 day of pelvic pressure. Patient reports she had a stomach virus with emesis and diarrhea 1 week ago, which has since resolved. Starting 3 days ago, she developed the abdominal pain which she describes as "feels like someone is kicking me". She has taken Tylenol and Advil for the pain and reports only minimal relief. She reports lying still helps the pain, and moving, coughing or sneezing exacerbates it. She reports residual mild nausea and diarrhea from the stomach virus. She developed pelvic pressure and back pain yesterday. She denies blood in stool but notes it was dark 2x. However, patient reports she took Pepto-Bismol for stomach virus symptoms. Patient reports she has not had a menstrual period in 2 years, which is normal for her. She reports remote history of PCOS, which she believes resolved. She reports negative pregnancy test yesterday. Denies tobacco use but endorses daily marijuana use. 34 yo F with PMH of asthma, DM, HTN, PTSD, depression, anxiety and ?PCOS and PSH of  presents with 3 days of sharp epigastric and L lower abdominal pain and 1 day of pelvic pressure. Patient reports she had a stomach virus with emesis and diarrhea 1 week ago, which has since resolved. Starting 3 days ago, she developed the abdominal pain which she describes as "feels like someone is kicking me". She has taken Tylenol and Advil for the pain and reports only minimal relief. She reports lying still helps the pain, and moving, coughing or sneezing exacerbates it. She reports residual mild nausea and diarrhea from the stomach virus. She developed pelvic pressure and back pain yesterday. She denies blood in stool but notes it was dark 2x. However, patient reports she took Pepto-Bismol for stomach virus symptoms. Patient reports she has not had a menstrual period in 2 years, which is normal for her. She reports remote history of PCOS, which she believes resolved. She reports negative pregnancy test yesterday. Denies tobacco use but endorses daily marijuana use.

## 2023-10-02 NOTE — H&P ADULT - HISTORY OF PRESENT ILLNESS
34 yo F h/o Asthma, DM, PCOS, HTN, nephrolithiasis, diverticulosis presenting for 2 days of worsening abdominal pain and chills. She endorses history of LLQ with menstrual cramps, but current episode is sustained, more severe and unrelieved by OTC pain meds. About a week ago she had a stomach virus that spontaneously resolved. She has been having regular bowel movements. Patient denied nausea, emesis or SOB. She has never had a colonoscopy. Patient vaguely recalled a prior episode of LLQ abdominal pain few years ago which she said was treated with short course of antibiotics

## 2023-10-02 NOTE — ED ADULT NURSE NOTE - OBJECTIVE STATEMENT
endorses abdominal pain x 3 days, started umbilical, moved down, denies dysuria, denies n/v, endorses some diarrhea

## 2023-10-02 NOTE — ED ADULT TRIAGE NOTE - CHIEF COMPLAINT QUOTE
Pt presents to ED here for abd pain, pelvic pain and pressure over 24 hours. Pt has hx of DM, HTN and PTSD. Pt took tylenol and motrin w/ mild relief. Denies dizziness, fever, chills, n/v, cp or sob. Pt have difficulty ambulating due to pain. Pt A&Ox4 and conversive in full sentences.

## 2023-10-02 NOTE — ED PROVIDER NOTE - CLINICAL SUMMARY MEDICAL DECISION MAKING FREE TEXT BOX
34 yo F with PMH asthma, DM, HTN, PTSD, depression, anxiety and ?PCOS presents with 3 days of sharp epigastric/LLQ pain and 1 day of pelvic pressure i/s/o recent gastroenteritis. Patient is afebrile, /91 upon arrival, HR unremarkable. Exam significant for LLQ tenderness. Differentials include pregnancy (normal, ectopic, ), residual pain from gastroenteritis, ovarian cyst rupture, ovarian torsion, PID, appendicitis, hernia. Will obtain CBC, CMP, bHCG, lipase, lactate, UA, and CT AP w/ PO/IV contrast. Will manage pain with one dose IV morphine 4mg. 34 yo F with PMH asthma, DM, HTN, PTSD, depression, anxiety and ?PCOS presents with 3 days of sharp epigastric/LLQ pain and 1 day of pelvic pressure i/s/o recent gastroenteritis. Patient is afebrile, /91 upon arrival, HR unremarkable. Exam significant for LLQ tenderness. Differentials include pregnancy (normal, ectopic, ), residual pain from gastroenteritis, SBO, appendicitis, pancreatitis, mesenteric ischemia, ovarian cyst rupture, ovarian torsion, PID. Will obtain CBC, CMP, bHCG, lipase, lactate, UA, and CT AP w/ PO/IV contrast. Will manage pain with one dose IV morphine 4mg.

## 2023-10-02 NOTE — H&P ADULT - ASSESSMENT
34 yo F w/ hx of Asthma, DM, PCOS, HTN, nephrolithiasis, diverticulosis presenting for 2 days of worsening abdominal pain and chills. Abdomen soft and non-distended but focally tender w/ guarding in LLQ, as well as referred LLQ w/ RLQ palpation. Patient hemodynamically normal and afebrile, however labs notable for leukocytosis with left shift. CT A/P significant for dane-sigmoid fat stranding and microperforation. Clinical picture consistent with acute sigmoid diverticulitis Mariama 1A     Plan:   - Obtain beta-hydroxybutyrate level  32 yo F w/ hx of Asthma, DM, PCOS, HTN, nephrolithiasis, diverticulosis presenting for 2 days of worsening abdominal pain and chills. Abdomen soft and non-distended but focally tender w/ guarding in LLQ, as well as referred LLQ w/ RLQ palpation. Patient hemodynamically normal and afebrile, however labs notable for leukocytosis with left shift. CT A/P significant for dane-sigmoid fat stranding and microperforation. Clinical picture consistent with acute sigmoid diverticulitis Mariama 1A     Plan:   - Admit to regional - Dr. Fitzgerald   - Follow up beta-hydroxybutyrate level   - NPO/IVF   - IV Abx (CTX & Flagyl)   - Pain and PRN nausea control   - Strict glycemic control   - Endocrine consult   - DVT ppx     D/w chief resident. Attending aware

## 2023-10-02 NOTE — ED PROVIDER NOTE - NSICDXPASTSURGICALHX_GEN_ALL_CORE_FT
PAST SURGICAL HISTORY:  Cholesteatoma of left ear s/p surgery 2 years ago     PAST SURGICAL HISTORY:  Cholesteatoma of left ear s/p surgery 2 years ago    H/O  section

## 2023-10-03 LAB
A1C WITH ESTIMATED AVERAGE GLUCOSE RESULT: 10.7 % — HIGH (ref 4–5.6)
ANION GAP SERPL CALC-SCNC: 9 MMOL/L — SIGNIFICANT CHANGE UP (ref 5–17)
B-OH-BUTYR SERPL-SCNC: 0.5 MMOL/L — HIGH
BASOPHILS # BLD AUTO: 0.02 K/UL — SIGNIFICANT CHANGE UP (ref 0–0.2)
BASOPHILS NFR BLD AUTO: 0.2 % — SIGNIFICANT CHANGE UP (ref 0–2)
BUN SERPL-MCNC: 8 MG/DL — SIGNIFICANT CHANGE UP (ref 7–23)
CALCIUM SERPL-MCNC: 8.8 MG/DL — SIGNIFICANT CHANGE UP (ref 8.4–10.5)
CHLORIDE SERPL-SCNC: 103 MMOL/L — SIGNIFICANT CHANGE UP (ref 96–108)
CO2 SERPL-SCNC: 21 MMOL/L — LOW (ref 22–31)
CREAT SERPL-MCNC: 0.53 MG/DL — SIGNIFICANT CHANGE UP (ref 0.5–1.3)
EGFR: 124 ML/MIN/1.73M2 — SIGNIFICANT CHANGE UP
EOSINOPHIL # BLD AUTO: 0.11 K/UL — SIGNIFICANT CHANGE UP (ref 0–0.5)
EOSINOPHIL NFR BLD AUTO: 1.4 % — SIGNIFICANT CHANGE UP (ref 0–6)
ESTIMATED AVERAGE GLUCOSE: 260 MG/DL — HIGH (ref 68–114)
GLUCOSE BLDC GLUCOMTR-MCNC: 181 MG/DL — HIGH (ref 70–99)
GLUCOSE BLDC GLUCOMTR-MCNC: 188 MG/DL — HIGH (ref 70–99)
GLUCOSE BLDC GLUCOMTR-MCNC: 216 MG/DL — HIGH (ref 70–99)
GLUCOSE BLDC GLUCOMTR-MCNC: 223 MG/DL — HIGH (ref 70–99)
GLUCOSE BLDC GLUCOMTR-MCNC: 233 MG/DL — HIGH (ref 70–99)
GLUCOSE BLDC GLUCOMTR-MCNC: 241 MG/DL — HIGH (ref 70–99)
GLUCOSE BLDC GLUCOMTR-MCNC: 255 MG/DL — HIGH (ref 70–99)
GLUCOSE BLDC GLUCOMTR-MCNC: 280 MG/DL — HIGH (ref 70–99)
GLUCOSE SERPL-MCNC: 259 MG/DL — HIGH (ref 70–99)
HCT VFR BLD CALC: 39.8 % — SIGNIFICANT CHANGE UP (ref 34.5–45)
HGB BLD-MCNC: 13.1 G/DL — SIGNIFICANT CHANGE UP (ref 11.5–15.5)
IMM GRANULOCYTES NFR BLD AUTO: 0.6 % — SIGNIFICANT CHANGE UP (ref 0–0.9)
LYMPHOCYTES # BLD AUTO: 1.42 K/UL — SIGNIFICANT CHANGE UP (ref 1–3.3)
LYMPHOCYTES # BLD AUTO: 17.5 % — SIGNIFICANT CHANGE UP (ref 13–44)
MAGNESIUM SERPL-MCNC: 1.9 MG/DL — SIGNIFICANT CHANGE UP (ref 1.6–2.6)
MCHC RBC-ENTMCNC: 30 PG — SIGNIFICANT CHANGE UP (ref 27–34)
MCHC RBC-ENTMCNC: 32.9 GM/DL — SIGNIFICANT CHANGE UP (ref 32–36)
MCV RBC AUTO: 91.3 FL — SIGNIFICANT CHANGE UP (ref 80–100)
MONOCYTES # BLD AUTO: 0.5 K/UL — SIGNIFICANT CHANGE UP (ref 0–0.9)
MONOCYTES NFR BLD AUTO: 6.2 % — SIGNIFICANT CHANGE UP (ref 2–14)
NEUTROPHILS # BLD AUTO: 6 K/UL — SIGNIFICANT CHANGE UP (ref 1.8–7.4)
NEUTROPHILS NFR BLD AUTO: 74.1 % — SIGNIFICANT CHANGE UP (ref 43–77)
NRBC # BLD: 0 /100 WBCS — SIGNIFICANT CHANGE UP (ref 0–0)
PHOSPHATE SERPL-MCNC: 3.1 MG/DL — SIGNIFICANT CHANGE UP (ref 2.5–4.5)
PLATELET # BLD AUTO: 243 K/UL — SIGNIFICANT CHANGE UP (ref 150–400)
POTASSIUM SERPL-MCNC: 3.9 MMOL/L — SIGNIFICANT CHANGE UP (ref 3.5–5.3)
POTASSIUM SERPL-SCNC: 3.9 MMOL/L — SIGNIFICANT CHANGE UP (ref 3.5–5.3)
RBC # BLD: 4.36 M/UL — SIGNIFICANT CHANGE UP (ref 3.8–5.2)
RBC # FLD: 12.2 % — SIGNIFICANT CHANGE UP (ref 10.3–14.5)
SODIUM SERPL-SCNC: 133 MMOL/L — LOW (ref 135–145)
WBC # BLD: 8.1 K/UL — SIGNIFICANT CHANGE UP (ref 3.8–10.5)
WBC # FLD AUTO: 8.1 K/UL — SIGNIFICANT CHANGE UP (ref 3.8–10.5)

## 2023-10-03 PROCEDURE — 99223 1ST HOSP IP/OBS HIGH 75: CPT

## 2023-10-03 PROCEDURE — 99231 SBSQ HOSP IP/OBS SF/LOW 25: CPT

## 2023-10-03 PROCEDURE — 99222 1ST HOSP IP/OBS MODERATE 55: CPT | Mod: GC

## 2023-10-03 RX ORDER — INFLUENZA VIRUS VACCINE 15; 15; 15; 15 UG/.5ML; UG/.5ML; UG/.5ML; UG/.5ML
0.5 SUSPENSION INTRAMUSCULAR ONCE
Refills: 0 | Status: DISCONTINUED | OUTPATIENT
Start: 2023-10-03 | End: 2023-10-05

## 2023-10-03 RX ORDER — HUMAN INSULIN 100 [IU]/ML
15 INJECTION, SUSPENSION SUBCUTANEOUS ONCE
Refills: 0 | Status: COMPLETED | OUTPATIENT
Start: 2023-10-03 | End: 2023-10-03

## 2023-10-03 RX ORDER — ACETAMINOPHEN 500 MG
1000 TABLET ORAL ONCE
Refills: 0 | Status: COMPLETED | OUTPATIENT
Start: 2023-10-03 | End: 2023-10-03

## 2023-10-03 RX ORDER — INSULIN GLARGINE 100 [IU]/ML
30 INJECTION, SOLUTION SUBCUTANEOUS AT BEDTIME
Refills: 0 | Status: DISCONTINUED | OUTPATIENT
Start: 2023-10-03 | End: 2023-10-04

## 2023-10-03 RX ORDER — ACETAMINOPHEN 500 MG
650 TABLET ORAL EVERY 6 HOURS
Refills: 0 | Status: DISCONTINUED | OUTPATIENT
Start: 2023-10-03 | End: 2023-10-05

## 2023-10-03 RX ADMIN — ONDANSETRON 4 MILLIGRAM(S): 8 TABLET, FILM COATED ORAL at 23:39

## 2023-10-03 RX ADMIN — Medication 2: at 23:41

## 2023-10-03 RX ADMIN — HUMAN INSULIN 15 UNIT(S): 100 INJECTION, SUSPENSION SUBCUTANEOUS at 10:35

## 2023-10-03 RX ADMIN — CEFTRIAXONE 100 MILLIGRAM(S): 500 INJECTION, POWDER, FOR SOLUTION INTRAMUSCULAR; INTRAVENOUS at 16:39

## 2023-10-03 RX ADMIN — Medication 100 MILLIGRAM(S): at 06:52

## 2023-10-03 RX ADMIN — Medication 400 MILLIGRAM(S): at 08:16

## 2023-10-03 RX ADMIN — Medication 4: at 13:02

## 2023-10-03 RX ADMIN — HYDROMORPHONE HYDROCHLORIDE 0.5 MILLIGRAM(S): 2 INJECTION INTRAMUSCULAR; INTRAVENOUS; SUBCUTANEOUS at 00:57

## 2023-10-03 RX ADMIN — Medication 4: at 18:44

## 2023-10-03 RX ADMIN — HEPARIN SODIUM 5000 UNIT(S): 5000 INJECTION INTRAVENOUS; SUBCUTANEOUS at 06:52

## 2023-10-03 RX ADMIN — HEPARIN SODIUM 5000 UNIT(S): 5000 INJECTION INTRAVENOUS; SUBCUTANEOUS at 15:24

## 2023-10-03 RX ADMIN — INSULIN GLARGINE 30 UNIT(S): 100 INJECTION, SOLUTION SUBCUTANEOUS at 22:43

## 2023-10-03 RX ADMIN — Medication 100 MILLIGRAM(S): at 15:24

## 2023-10-03 RX ADMIN — Medication 100 MILLIGRAM(S): at 22:44

## 2023-10-03 RX ADMIN — HYDROMORPHONE HYDROCHLORIDE 0.5 MILLIGRAM(S): 2 INJECTION INTRAMUSCULAR; INTRAVENOUS; SUBCUTANEOUS at 02:00

## 2023-10-03 RX ADMIN — INSULIN GLARGINE 13 UNIT(S): 100 INJECTION, SOLUTION SUBCUTANEOUS at 00:54

## 2023-10-03 RX ADMIN — HEPARIN SODIUM 5000 UNIT(S): 5000 INJECTION INTRAVENOUS; SUBCUTANEOUS at 22:44

## 2023-10-03 RX ADMIN — Medication 6: at 06:52

## 2023-10-03 RX ADMIN — Medication 6: at 00:34

## 2023-10-03 NOTE — PATIENT PROFILE ADULT - ARE SIGNIFICANT INDICATORS COMPLETE.
Patient:   MICKI NELSON            MRN: [id number]            FIN: [id number]              Age:   2 years     Sex:  FEMALE     :  15   Associated Diagnoses:   None   Author:   ELI SANCHEZ     History of Present Illness   Ksenia is a 2 year old female with a history of Trisomy 21,  Hirschsprung's disease, s/p sigmoid colostomy in , s/p GT placement , s/p pull through at [facility] approximately 1.5 years prior to presentation, presenting with 1-2 weeks of worsening abdominal distension found on XR to have distal obstruction appears attributable to rectal impaction with dense stool and round foreign body in the rectum and LUQ. Distention likely 2/2  to rectal stricture. Patient continues on telemetry due to runs of VTs seen on . Patient is now  s/p end colostomy with mucous fistula and rectal biopsy performed on .  Events: Patient was evaluated after returning from PACU s/p end colostomy with mucous fistula and rectal biopsy. No reported intraoperative or postoperative complications. Patient is doing well with pain adequately controlled.      Health Status   Allergies:    Allergic Reactions (All)  NKA   Current medications:    Medications (8) Active  Scheduled: (2)  Chlorhexidine gluconate 2% topical CLOTH 2s  4 pad, Topical, Daily  metroNIDAZOLE-NaCl 0.9% pediatric  100 mg 20 mL, IVPB, Q8H  Continuous: (2)  fat emulsion 20% 125 mL  125 mL, IV, 5.21 mL/hr  TPN pediatric CENTRAL LINE 1,100 mL  1,100 mL, IV, 45.83 mL/hr  PRN: (4)  Acetaminophen 160 mg/5 mL oral susp UD  144 mg 4.5 mL, PEG-tube, Q6H  Heparin flush PF 10 unit/mL inj 5 mL SDV  30 unit 3 mL, Flush, As Directed PRN  Ketorolac 15 mg/1 mL inj SDV  5 mg 0.33 mL, IV Push, Q6H  Lidocaine 4% cream 5 gm  1 application, Topical, As Directed PRN      Histories   Past Medical History: Problem List / Past Medical History   Failure to thrive (0-17)  Chronic pain  Colostomy  Colostomy in place  Disturbance in speech  Downs  syndrome  Gross motor development delay  H/O: blood transfusion  Hirschsprung's disease  Hirschsprung's disease  Impaired cognition  Premature  Premature delivery  Trisomy 21  VSD (ventricular septal defect)  VSD - Ventricular septal defect    Family History:    Cardiac arrhythmia  GRANDMOTHER  Diabetes mellitus type 2  GRANDMOTHER  Asthma  FATHER  Hypertension  FATHER  GRANDMOTHER  Eczema  SISTER    Procedure history:    Colostomy of sigmoid ([id number]) in the month of 8/2016 at 5 Days.  PEG - Percutaneous endoscopic gastrostomy ([id number]) on 2016 at 1 Days.  PEG - Percutaneous endoscopic gastrostomy feeding ([id number]).  Colostomy ([id number]).   Social History       Alcohol  Details: Alcohol Abuse in Household: No.  Exercise  Comment(s): none  Home/Environment  Details: Alcohol Abuse in Household: No.  Substance Abuse in Household: No.  Smoker in Household: No.  Primary Care Giver / Parent Name: Mom-Gabbi; Dad-Vermillion.  Patient Lives With: Mother, Grandmother, two of mom's cousins.  Living Situation: Lives with Parent/Guardian, Lives With Family.  Comfort Items: Voss, Pacifier.  Ambulation: Not Performed.  Bathing: Total Assist.  Dressing: Total Assist.  Driving: Not Performed.  Eating: Total  Assist.  Elimination: 1 - Total Assistance.  Grooming: Total Assist.  Preparing Meal: Not Performed.  Taking Meds: Total Assist.  Toileting: Not Performed.  Transfers: Not Performed.  Current Home Treatments: Tube/Enteral Feedings.  Details: Alcohol Abuse in Household: No.  Substance Abuse in Household: No.  Smoker in Household: No.  Patient Lives With: Mother, Grandmother, Aunt.  Living Situation: Lives With Family.  Ambulation: Not Performed.  Bathing: Total Assist.  Dressing: Total Assist.  Driving: Not Performed.  Eating: Total Assist.  Elimination: Required Assistance.  Grooming: Total Assist.  Preparing Meal: Total Assist.  Taking Meds: Total Assist.  Toileting:  Total Assist.  Transfers: Total Assist.   Rehab Consulted No.  Substance Abuse  Details: Substance Abuse in Household: No.  Tobacco  Details: Smoked/Smokeless Tobacco Last 30 Days: No.  Cultural/Rastafari Practices  Details: Rastafari or Cultural Practices While in Hospital: No.  .       Physical Examination   VS/Measurements   Measurements from flowsheet : Height and Weight   05/07/20 18:20 CDT CLINICALWEIGHT 10.035 kg    Weight Method Measured    Weight Scale Infant Scale     ,    Vitals between:   07-MAY-2020 14:00:31   TO   08-MAY-2020 14:00:31                   LAST RESULT MINIMUM MAXIMUM  Temperature 36.7 36.6 37.2  Heart Rate 128 108 134  Respiratory Rate 28 24 32  NISBP           111 95 111  NIDBP           77 52 77  NIMBP           88 66 88  SpO2                    100 97 100  FiO2                    0.21 0.21 0.21    General:  No acute distress, Resting comfortably, Non-toxic appearing,  Eye:  Extraocular movements are intact, Normal conjunctiva, Strabismus.  HENT:  Normocephalic, Oral mucosa is moist, Cracked lips.   Neck:  Supple  Respiratory:  Lungs are clear to auscultation bilaterally, Respirations are non-labored, No rhonchi, wheezes or rales.  Cardiovascular:  Normal rate, Regular rhythm, 2/6 systolic murmur, Good pulses equal in all extremities, Normal peripheral perfusion, No edema.   Gastrointestinal:  Abdomen distended but soft and compressible, Normal bowel sounds, Gtube stoma with circumfrential scab, no active bleeding. Colostomy bag in place with pink mucosa visualized - serosanginous drainage in bag.  Musculoskeletal: Normal range of motion. Normal strength. No tenderness. No swelling. No deformity.    Integumentary:  Warm, Dry, No pallor, No rash, No jaundice, No bruising.  Neurologic:  Alert, Moves all extremities appropriately      Review / Management   Results review:     No Qualifying Labs are resulted on this patient in the last 24 hours  , Lab results   05/07/20 12:08 CDT Glucose Bedside (POC) 114 mg/dL  HI   05/07/20 12:00  CDT Sodium 139 mmol/L    Potassium 4.0 mmol/L    Chloride 109 mmol/L  HI    Carbon Dioxide (CO2) 26 mmol/L    Anion Gap 8 mmol/L  LOW    BUN 11 mg/dL    Creatinine 0.28 mg/dL    BUN/Creatinine Ratio 39  HI    GFR ESTIMATE  Not calculated.    GFR ESTIMATE NON  Not calculated.    Calcium 8.6 mg/dL    Magnesium 2.1 mg/dL    Phosphorus 4.1 mg/dL    Glucose Level 121 mg/dL  HI   05/07/20 10:40 CDT Inspired CO2 ANES 0 % %   05/07/20 10:35 CDT Inspired CO2 ANES 0.4 % %   05/07/20 10:30 CDT Inspired CO2 ANES 0.2 % %   05/07/20 10:25 CDT Inspired CO2 ANES 0.2 % %   05/07/20 10:20 CDT Inspired CO2 ANES 0.2 % %   05/07/20 10:15 CDT Inspired CO2 ANES 0.2 % %   05/07/20 10:10 CDT Inspired CO2 ANES 0.2 % %   05/07/20 10:05 CDT Inspired CO2 ANES 0.2 % %   05/07/20 10:00 CDT Inspired CO2 ANES 0.2 % %   05/07/20 09:55 CDT Inspired CO2 ANES 0.2 % %   05/07/20 09:50 CDT Inspired CO2 ANES 0.2 % %   05/07/20 09:45 CDT Inspired CO2 ANES 0.2 % %   05/07/20 09:40 CDT Inspired CO2 ANES 0.2 % %   05/07/20 09:35 CDT Inspired CO2 ANES 0.2 % %   05/07/20 09:30 CDT Inspired CO2 ANES 0.2 % %   05/07/20 09:25 CDT Inspired CO2 ANES 0.2 % %   05/07/20 09:20 CDT Inspired CO2 ANES 0.2 % %   05/07/20 09:15 CDT Inspired CO2 ANES 0.2 % %   05/07/20 09:10 CDT Inspired CO2 ANES 0.2 % %   05/07/20 09:05 CDT Inspired CO2 ANES 0.2 % %   05/07/20 09:00 CDT Inspired CO2 ANES 0.2 % %   05/07/20 08:55 CDT Inspired CO2 ANES 0.2 % %   05/07/20 08:50 CDT Inspired CO2 ANES 0.2 % %   05/07/20 08:45 CDT Inspired CO2 ANES 0.2 % %   05/07/20 08:40 CDT Inspired CO2 ANES 0.2 % %   05/07/20 08:35 CDT Inspired CO2 ANES 0.2 % %   05/07/20 08:30 CDT Inspired CO2 ANES 0.2 % %   05/07/20 08:25 CDT Inspired CO2 ANES 0.2 % %   05/07/20 08:20 CDT Inspired CO2 ANES 0.2 % %   05/07/20 08:15 CDT Inspired CO2 ANES 0.2 % %   05/07/20 08:10 CDT Inspired CO2 ANES 0.1 % %   05/07/20 08:05 CDT Inspired CO2 ANES 0 % %   05/07/20 02:02 CDT Glucose Bedside (POC)  106 mg/dL  HI   05/07/20 00:50 CDT Sodium 143 mmol/L    Potassium 3.8 mmol/L    Chloride 110 mmol/L  HI    Carbon Dioxide (CO2) 28 mmol/L    Anion Gap 9 mmol/L  LOW    BUN 9 mg/dL    Creatinine 0.31 mg/dL    BUN/Creatinine Ratio 29  HI    GFR ESTIMATE  Not calculated.    GFR ESTIMATE NON  Not calculated.    Calcium 8.7 mg/dL    Magnesium 2.3 mg/dL    Phosphorus 4.0 mg/dL    Glucose Level 56 mg/dL  LOW    GOT/AST 24 unit/L    GPT/ALT 16 unit/L    Alk Phosphatase 123 unit/L  LOW    Bilirubin Total 0.2 mg/dL    Protein, Total 5.9 gm/dL  LOW    Albumin 2.8 gm/dL  LOW    Globulin 3.1 gm/dL    A/G Ratio 0.9  LOW   .    Radiology results                  Impression and Plan   Joyce is a 2 year old female with a past medical history of VSD, trisomy 21, and Hirschprung's disease s/p colostomy and G-tube placement presenting for abdominal distension also found to have two foreign bodies in the abdomen. Patient with abdominal distension and concern for malnutrition on exam.  Abdominal distention likely secondary to rectal impaction 2/2 to rectal stricture and poor bowel motility. KUB also showed metallic discoid FB  in rectum and LUQ. Patient is now  s/p endo colostomy with mucous fistula and suction rectal biopsy on 5/7. The patient is stable on the floor requiring post-op pain control, antibiotics, and observation.  CV: history of VSD    - Echo: perimembranous vsd , small asd, small pda, no pulmonary htn.   - Will need outpatient cardiology follow-up    - Cleared by cardiology for non cardiac surgeries, will need cardiac anesthesia  - Patient discussed at cardiac cath conference 5/6 for potential of itnervention of ASD/PDA - No interventions are planned for this admission  - Cardioprotective electrolytes: Maintain K>4 and Mg>2  Neuro  - Tylenol 144mg Q6H PRN mild pain  - Toradol 5mg q6hr PRN moderate pain          FEN/GI: severe protein calorie malnutrition, coin in rectum   FEN:   - NPO  until patient stools from ostomy   - TPN [id number] s4/29    > BMP concerning for BG in 50s. GIR increased to 16% on 5/6.     > q4h fingerstick BG, if stable, can discontiue.    - Will obtain BMP, Mg, and Phos post op. Will obtain accucheck at the same time to evaluate discrepancy between accuchecks and BMP glucose levels.   Severe Protein Calorie Malnutrition   - Nutrition consulted    Abdominal Distension and coin in rectum   - Surgery consulted    - Nothing per rectum following rectal biopsy  performed in OR    - GTube to gravity/ventilation to help with decompression  :   - Gary catheter placed in the OR to remain in place for 24 hours post operatively. D/C 5/8.  ID: concern for  low grade Hirschsprung enterocolitis   - Flagyl 100 mg Q6H IV s4/28 - Will continue 24 hours post operatively (stop 5/8)  - Ancef 266mg Q8H IV for 24h post operatively (start 5/7 - stop 5/8)  Social:   - Coordinate with care managers to ensure delivery of durable medical equipment  - CPS consulted for severe malnutrition and poor surgery follow-up  Lines: R forearm PICC (placed 4/29) functional utilized and necessary for IV abx and TPN.  VTE: 1 - Surgery (no interventions indicated at this time - Encourage maximal mobility for age)  Dispo: Pending post op course  Plan discussed with senior resident and attending.  Plan discussed with senior resident and attending.  Vish Valenzuela DO  Family Medicine PGY1  Phone:    No Yes

## 2023-10-03 NOTE — CONSULT NOTE ADULT - SUBJECTIVE AND OBJECTIVE BOX
Patient is a 33y old  Female who presents with a chief complaint of Diverticulitis (03 Oct 2023 14:48)      HPI:  34 yo F h/o Asthma, DM, PCOS, HTN, nephrolithiasis, diverticulosis presenting for 2 days of worsening abdominal pain and chills. She endorses history of LLQ with menstrual cramps, but current episode is sustained, more severe and unrelieved by OTC pain meds. About a week ago she had a stomach virus that spontaneously resolved. She has been having regular bowel movements. Patient denied nausea, emesis or SOB. She has never had a colonoscopy. Patient vaguely recalled a prior episode of LLQ abdominal pain few years ago which she said was treated with short course of antibiotics  (02 Oct 2023 18:28)      Allergies    No Known Allergies    Intolerances        MEDICATIONS  (STANDING):  cefTRIAXone   IVPB 1000 milliGRAM(s) IV Intermittent every 24 hours  dextrose 5%. 1000 milliLiter(s) (50 mL/Hr) IV Continuous <Continuous>  dextrose 5%. 1000 milliLiter(s) (100 mL/Hr) IV Continuous <Continuous>  dextrose 50% Injectable 25 Gram(s) IV Push once  dextrose 50% Injectable 25 Gram(s) IV Push once  dextrose 50% Injectable 12.5 Gram(s) IV Push once  glucagon  Injectable 1 milliGRAM(s) IntraMuscular once  heparin   Injectable 5000 Unit(s) SubCutaneous every 8 hours  influenza   Vaccine 0.5 milliLiter(s) IntraMuscular once  insulin glargine Injectable (LANTUS) 13 Unit(s) SubCutaneous at bedtime  insulin lispro (ADMELOG) corrective regimen sliding scale   SubCutaneous every 6 hours  lactated ringers. 1000 milliLiter(s) (100 mL/Hr) IV Continuous <Continuous>  metroNIDAZOLE  IVPB 500 milliGRAM(s) IV Intermittent every 8 hours    MEDICATIONS  (PRN):  albuterol    90 MICROgram(s) HFA Inhaler 2 Puff(s) Inhalation every 6 hours PRN Shortness of Breath and/or Wheezing  dextrose Oral Gel 15 Gram(s) Oral once PRN Blood Glucose LESS THAN 70 milliGRAM(s)/deciliter  HYDROmorphone  Injectable 0.5 milliGRAM(s) IV Push every 4 hours PRN Moderate Pain (4 - 6)  HYDROmorphone  Injectable 1 milliGRAM(s) IV Push every 4 hours PRN Severe Pain (7 - 10)  ondansetron Injectable 4 milliGRAM(s) IV Push every 6 hours PRN Nausea      Daily     Daily     Drug Dosing Weight  Height (cm): 160 (02 Oct 2023 12:42)  Weight (kg): 80.7 (02 Oct 2023 12:42)  BMI (kg/m2): 31.5 (02 Oct 2023 12:42)  BSA (m2): 1.84 (02 Oct 2023 12:42)    PAST MEDICAL & SURGICAL HISTORY:  Diabetes      Asthma      PCOS (polycystic ovarian syndrome)      Chronic back pain      HTN (hypertension)      Kidney stone      Cholesteatoma of left ear  s/p surgery 2 years ago      H/O  section          FAMILY HISTORY:        REVIEW OF SYSTEMS:    CONSTITUTIONAL: No fever, weight loss, or fatigue  EYES: No eye pain, visual disturbances, or discharge  ENMT:  No difficulty hearing, tinnitus, vertigo; No sinus or throat pain  NECK: No pain or stiffness  RESPIRATORY: No cough, wheezing, chills or hemoptysis; No shortness of breath  CARDIOVASCULAR: No chest pain, palpitations, dizziness, or leg swelling  GASTROINTESTINAL:+ pain.    GENITOURINARY: No dysuria, frequency, hematuria, or incontinence  LYMPH NODES: No enlarged glands  ENDOCRINE: No heat or cold intolerance; No hair loss  MUSCULOSKELETAL: No joint pain or swelling; No muscle, back, or extremity pain  NEUROLOGICAL: No headaches, memory loss, loss of strength, numbness, or tremors  SKIN: No itching, burning, rashes, or lesion                Vital Signs Last 24 Hrs  T(C): 36.8 (03 Oct 2023 13:42), Max: 37.2 (02 Oct 2023 20:34)  T(F): 98.2 (03 Oct 2023 13:42), Max: 98.9 (02 Oct 2023 20:34)  HR: 72 (03 Oct 2023 13:42) (71 - 93)  BP: 110/72 (03 Oct 2023 13:42) (101/67 - 137/90)  BP(mean): 82 (03 Oct 2023 05:20) (82 - 82)  ABP: --  ABP(mean): --  RR: 17 (03 Oct 2023 13:42) (16 - 18)  SpO2: 98% (03 Oct 2023 13:42) (96% - 98%)    O2 Parameters below as of 03 Oct 2023 13:42  Patient On (Oxygen Delivery Method): room air                I&O's Detail    02 Oct 2023 07:01  -  03 Oct 2023 07:00  --------------------------------------------------------  IN:    Lactated Ringers: 800 mL  Total IN: 800 mL    OUT:    Voided (mL): 800 mL  Total OUT: 800 mL    Total NET: 0 mL      03 Oct 2023 07:01  -  03 Oct 2023 14:50  --------------------------------------------------------  IN:    Lactated Ringers: 700 mL  Total IN: 700 mL    OUT:    Oral Fluid: 0 mL    Voided (mL): 250 mL  Total OUT: 250 mL    Total NET: 450 mL          PHYSICAL EXAM:    GENERAL: NAD, well-groomed, well-developed  HEAD:  Atraumatic, Normocephalic  EYES: EOMI, PERRLA, conjunctiva and sclera clear  ENMT: No tonsillar erythema, exudates, or enlargement; Moist mucous membranes, Good dentition, No lesions  NECK: Supple, No JVD, Normal thyroid  NERVOUS SYSTEM:  Alert & Oriented X3, Good concentration;   CHEST/LUNG: Clear to percussion bilaterally; No rales, rhonchi, wheezing, or rubs  HEART: Regular rate and rhythm; No murmurs, rubs, or gallops  ABDOMEN: Soft, TTP , Nondistended; Bowel sounds present  EXTREMITIES:  2+ Peripheral Pulses, No clubbing, cyanosis, or edema  LYMPH: No lymphadenopathy noted  SKIN: No rashes or lesions    LABS:  CBC Full  -  ( 03 Oct 2023 06:59 )  WBC Count : 8.10 K/uL  RBC Count : 4.36 M/uL  Hemoglobin : 13.1 g/dL  Hematocrit : 39.8 %  Platelet Count - Automated : 243 K/uL  Mean Cell Volume : 91.3 fl  Mean Cell Hemoglobin : 30.0 pg  Mean Cell Hemoglobin Concentration : 32.9 gm/dL  Auto Neutrophil # : 6.00 K/uL  Auto Lymphocyte # : 1.42 K/uL  Auto Monocyte # : 0.50 K/uL  Auto Eosinophil # : 0.11 K/uL  Auto Basophil # : 0.02 K/uL  Auto Neutrophil % : 74.1 %  Auto Lymphocyte % : 17.5 %  Auto Monocyte % : 6.2 %  Auto Eosinophil % : 1.4 %  Auto Basophil % : 0.2 %    10    133<L>  |  103  |  8   ----------------------------<  259<H>  3.9   |  21<L>  |  0.53    Ca    8.8      03 Oct 2023 06:59  Phos  3.1     10-  Mg     1.9     10-03    TPro  7.7  /  Alb  4.2  /  TBili  0.4  /  DBili  x   /  AST  13  /  ALT  13  /  AlkPhos  120  10    CAPILLARY BLOOD GLUCOSE      POCT Blood Glucose.: 233 mg/dL (03 Oct 2023 12:41)      Urinalysis Basic - ( 03 Oct 2023 06:59 )    Color: x / Appearance: x / SG: x / pH: x  Gluc: 259 mg/dL / Ketone: x  / Bili: x / Urobili: x   Blood: x / Protein: x / Nitrite: x   Leuk Esterase: x / RBC: x / WBC x   Sq Epi: x / Non Sq Epi: x / Bacteria: x              EKG:    ECHO, US:    RADIOLOGY:

## 2023-10-03 NOTE — PROGRESS NOTE ADULT - SUBJECTIVE AND OBJECTIVE BOX
SUBJECTIVE: Patient seen and examined bedside by chief resident. complains of abdominal pain and some nausea.     cefTRIAXone   IVPB 1000 milliGRAM(s) IV Intermittent every 24 hours  heparin   Injectable 5000 Unit(s) SubCutaneous every 8 hours  metroNIDAZOLE  IVPB 500 milliGRAM(s) IV Intermittent every 8 hours      Vital Signs Last 24 Hrs  T(C): 37.2 (03 Oct 2023 05:20), Max: 37.2 (02 Oct 2023 20:34)  T(F): 98.9 (03 Oct 2023 05:20), Max: 98.9 (02 Oct 2023 20:34)  HR: 74 (03 Oct 2023 05:20) (74 - 93)  BP: 106/70 (03 Oct 2023 05:20) (106/70 - 143/91)  BP(mean): 82 (03 Oct 2023 05:20) (82 - 82)  RR: 18 (03 Oct 2023 05:20) (2 - 18)  SpO2: 96% (03 Oct 2023 05:20) (96% - 98%)    Parameters below as of 03 Oct 2023 05:20  Patient On (Oxygen Delivery Method): room air      I&O's Detail    02 Oct 2023 07:01  -  03 Oct 2023 07:00  --------------------------------------------------------  IN:    Lactated Ringers: 800 mL  Total IN: 800 mL    OUT:    Voided (mL): 800 mL  Total OUT: 800 mL    Total NET: 0 mL          General: NAD, resting comfortably in bed  C/V: NSR  Pulm: Nonlabored breathing, no respiratory distress  Abd: soft, ND, +LLQ TTP  Extrem: WWP, no edema, SCDs in place        LABS:                        14.3   13.98 )-----------( 261      ( 02 Oct 2023 13:11 )             42.8     10-02    x   |  x   |  x   ----------------------------<  274<H>  x    |  x   |  x     Ca    9.2      02 Oct 2023 18:55    TPro  7.7  /  Alb  4.2  /  TBili  0.4  /  DBili  x   /  AST  13  /  ALT  13  /  AlkPhos  120  10-02      Urinalysis Basic - ( 02 Oct 2023 19:30 )    Color: x / Appearance: x / SG: x / pH: x  Gluc: 274 mg/dL / Ketone: x  / Bili: x / Urobili: x   Blood: x / Protein: x / Nitrite: x   Leuk Esterase: x / RBC: x / WBC x   Sq Epi: x / Non Sq Epi: x / Bacteria: x        RADIOLOGY & ADDITIONAL STUDIES:

## 2023-10-03 NOTE — PROGRESS NOTE ADULT - SUBJECTIVE AND OBJECTIVE BOX
SUBJECTIVE: Feeling better, pain improving, +flatus in AM, no BM, c/o hunger and nausea 2/2 hunger.    cefTRIAXone   IVPB 1000 milliGRAM(s) IV Intermittent every 24 hours  heparin   Injectable 5000 Unit(s) SubCutaneous every 8 hours  metroNIDAZOLE  IVPB 500 milliGRAM(s) IV Intermittent every 8 hours    MEDICATIONS  (PRN):  albuterol    90 MICROgram(s) HFA Inhaler 2 Puff(s) Inhalation every 6 hours PRN Shortness of Breath and/or Wheezing  dextrose Oral Gel 15 Gram(s) Oral once PRN Blood Glucose LESS THAN 70 milliGRAM(s)/deciliter  HYDROmorphone  Injectable 0.5 milliGRAM(s) IV Push every 4 hours PRN Moderate Pain (4 - 6)  HYDROmorphone  Injectable 1 milliGRAM(s) IV Push every 4 hours PRN Severe Pain (7 - 10)  ondansetron Injectable 4 milliGRAM(s) IV Push every 6 hours PRN Nausea      I&O's Detail    02 Oct 2023 07:01  -  03 Oct 2023 07:00  --------------------------------------------------------  IN:    Lactated Ringers: 800 mL  Total IN: 800 mL    OUT:    Voided (mL): 800 mL  Total OUT: 800 mL    Total NET: 0 mL      03 Oct 2023 07:01  -  03 Oct 2023 14:49  --------------------------------------------------------  IN:    Lactated Ringers: 700 mL  Total IN: 700 mL    OUT:    Oral Fluid: 0 mL    Voided (mL): 250 mL  Total OUT: 250 mL    Total NET: 450 mL          Vital Signs Last 24 Hrs  T(C): 36.8 (03 Oct 2023 13:42), Max: 37.2 (02 Oct 2023 20:34)  T(F): 98.2 (03 Oct 2023 13:42), Max: 98.9 (02 Oct 2023 20:34)  HR: 72 (03 Oct 2023 13:42) (71 - 93)  BP: 110/72 (03 Oct 2023 13:42) (101/67 - 137/90)  BP(mean): 82 (03 Oct 2023 05:20) (82 - 82)  RR: 17 (03 Oct 2023 13:42) (16 - 18)  SpO2: 98% (03 Oct 2023 13:42) (96% - 98%)    Parameters below as of 03 Oct 2023 13:42  Patient On (Oxygen Delivery Method): room air        General: NAD, resting comfortably in bed  C/V: NSR  Pulm: Nonlabored breathing, no respiratory distress  Abd: soft, nondistended, mild TTP LLQ, no rebound/guarding  Ext: SCDs in place    LABS:                        13.1   8.10  )-----------( 243      ( 03 Oct 2023 06:59 )             39.8     10-03    133<L>  |  103  |  8   ----------------------------<  259<H>  3.9   |  21<L>  |  0.53    Ca    8.8      03 Oct 2023 06:59  Phos  3.1     10-03  Mg     1.9     10-03    TPro  7.7  /  Alb  4.2  /  TBili  0.4  /  DBili  x   /  AST  13  /  ALT  13  /  AlkPhos  120  10-02      Urinalysis Basic - ( 03 Oct 2023 06:59 )    Color: x / Appearance: x / SG: x / pH: x  Gluc: 259 mg/dL / Ketone: x  / Bili: x / Urobili: x   Blood: x / Protein: x / Nitrite: x   Leuk Esterase: x / RBC: x / WBC x   Sq Epi: x / Non Sq Epi: x / Bacteria: x        RADIOLOGY & ADDITIONAL STUDIES:  CT Abdomen and Pelvis w/ Oral Cont and w/ IV Cont:   ACC: [id number] EXAM:  CT ABDOMEN AND PELVIS OC IC   ORDERED BY: PIERO JACOBS     PROCEDURE DATE:  10/02/2023          INTERPRETATION:  CLINICAL INFORMATION: Mid abdominal and left lower   quadrant pain    COMPARISON: CT abdomen and pelvis from 4/15/2023 and multiple prior CT   abdomen and pelvis from dating to 4/14/2012    CONTRAST/COMPLICATIONS:  IV Contrast: Isovue 370  90 cc administered   10 cc discarded  Oral Contrast: Omnipaque 300  Complications: None reported at time of study completion    PROCEDURE:  CT of the Abdomen and Pelvis was performed.  Sagittal and coronal reformats were performed.    FINDINGS:  LOWER CHEST: Within normal limits.    LIVER: Liver is again steatotic and mildly enlarged  BILE DUCTS: Normal caliber.  GALLBLADDER: Within normal limits.  SPLEEN: Within normal limits.  PANCREAS: Within normal limits.  ADRENALS: Within normal limits.  KIDNEYS/URETERS: No urolithiasis or hydronephrosis. Small rounded   hypodensity in the left kidney mid to lower pole is too small to   characterize.    BLADDER: Within normal limits.  REPRODUCTIVE ORGANS: Uterus and adnexa within normal limits.    BOWEL: Oral contrast reaches the jejunum There is wall thickening within   the proximal sigmoid colon in a region with numerous diverticula with   pericolonic stranding and a few locules of extraluminal air consistent   with perforation. No drainable fluid collection. Inflammatory changes   extend to the left bladder wall which is mildly thickened. No bowel   obstruction. Appendix is normal.  PERITONEUM: No ascites.  VESSELS: Within normal limits.  RETROPERITONEUM/LYMPH NODES: No lymphadenopathy.  ABDOMINAL WALL: Within normal limits.  BONES: Mild degenerative changes of the spine at L5-S1.      IMPRESSION:  Acute diverticulitisof the proximal sigmoid colon with microperforation.   No drainable fluid collection. Inflammatory changes extend to the left   bladder wall    Again enlarged steatotic liver.      Findings discussed with Manjeet LAURENT at 4:10 PM.    --- End of Report ---          GIANFRANCO VIDES MD; Resident Radiologist  This document has been electronically signed.  ARTURO SOLER MD; Attending Radiologist  This document has been electronically signed. Oct  2 2023  5:48PM (10-02-23 @ 15:58)

## 2023-10-03 NOTE — PATIENT PROFILE ADULT - HISTORY OF COVID-19 VACCINATION
Subjective   Yesi Gloria is a 25 y.o. female who presents with Medication Management (Cymbalta/)    1. Moderate episode of recurrent major depressive disorder (HCC)  Patient states that she is starting to feel much better since starting on the medication. She states that while she notices a difference she does not feel that it is quite strong enough and has asked to increase the dose. She is doing well with no bothersome side effects. Will increase her to 60 mg daily. We will reevaluate in 6 weeks when she returns for her diabetes. She understands to contact the office or go to the ER for worsening of symptoms, any changes in mental status or intolerable side effects.   DULoxetine (CYMBALTA) 30 MG Cap DR Particles; Take 2 Capsules by mouth every day.  Dispense: 60 Capsule; Refill: 1    2. Generalized anxiety disorder   DULoxetine (CYMBALTA) 30 MG Cap DR Particles; Take 2 Capsules by mouth every day.  Dispense: 60 Capsule; Refill: 1    Past Medical History:  No date: Allergy      Comment:  IV contrast, seasonal  No date: Anemia  No date: Anxiety  No date: Asthma  2/12/2021: Dehydration  No date: Depression  No date: Diabetes (HCC)  3/2/2016: Exercise-induced bronchospasm  No date: IBD (inflammatory bowel disease)      Comment:  Chronic constipation  No date: Kidney disease  No date: Migraine  7/26/2017: Recurrent UTI      Comment:  Formatting of this note might be different from the                original. 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 ([facility])  12/22/2019: Swelling of both lower extremities  No date: Urinary tract infection  Past Surgical History:  No date: BARTHOLIN GLAND EXCISION  No date: PILONIDAL CYST EXCISION  No date: TUBAL COAGULATION LAPAROSCOPIC BILATERAL  Social History     Tobacco Use      Smoking status: Never Smoker      Smokeless tobacco: Never Used    Vaping Use      Vaping Use: Never used    Alcohol use: Yes      Alcohol/week: 1.2 oz      Types: 2 Standard drinks or equivalent per week      Comment: rarely    Drug use: Never    Review of patient's family history indicates:  Problem: Diabetes      Relation: Mother          Age of Onset: (Not Specified)  Problem: Obesity      Relation: Mother          Age of Onset: (Not Specified)  Problem: Hyperlipidemia      Relation: Mother          Age of Onset: (Not Specified)  Problem: Diabetes      Relation: Father          Age of Onset: (Not Specified)  Problem: Hyperlipidemia      Relation: Father          Age of Onset: (Not Specified)  Problem: Obesity      Relation: Father          Age of Onset: (Not Specified)  Problem: Diabetes      Relation: Brother          Age of Onset: (Not Specified)          Comment: Type 1  Problem: Lung Disease      Relation: Brother          Age of Onset: (Not Specified)  Problem: Alcohol abuse      Relation: Maternal Aunt          Age of Onset: (Not Specified)  Problem: Diabetes      Relation: Maternal Grandmother          Age of Onset: (Not Specified)  Problem: Kidney Disease      Relation: Maternal Grandmother          Age of Onset: (Not Specified)  Problem: Diabetes      Relation: Paternal Grandfather          Age of Onset: (Not Specified)          Comment: Type 1  Problem: Hyperlipidemia      Relation: Paternal Grandfather          Age of Onset: (Not Specified)  Problem: Breast Cancer      Relation: Other          Age of Onset: (Not Specified)      Current Outpatient Medications: •  DULoxetine (CYMBALTA) 30 MG Cap DR Particles, Take 2 Capsules by mouth every day., Disp: 60 Capsule, Rfl: 1•  Continuous Blood Gluc Sensor (FREESTYLE AIDE 2 SENSOR) Misc, , Disp: , Rfl: •  insulin glargine (LANTUS SOLOSTAR) 100 UNIT/ML Solution Pen-injector injection, Inject 25 Units under the skin every evening., Disp: 15  "mL, Rfl: 1•  insulin lispro (HUMALOG) 100 UNIT/ML, Inject subcutaneously as directed to control blood glucose, Disp: 20 mL, Rfl: 1•  Blood Glucose Test Strips, Use one Freestyle James 2 strip to test blood sugar four times daily before meals and at bedtime., Disp: 400 Strip, Rfl: 3•  Blood Glucose Meter Kit, Test blood sugar as recommended by provider. Freestyle James 2 blood glucose monitoring kit., Disp: 1 Kit, Rfl: 0•  Lancets, Use one Freestyle James 2 lancet to test blood sugar four times a day, before meals and at bedtime., Disp: 400 Each, Rfl: 3•  ondansetron (ZOFRAN ODT) 4 MG TABLET DISPERSIBLE, Place 4 mg under the tongue., Disp: , Rfl: •  sumatriptan (IMITREX) 100 MG tablet, Take 1 tablet by mouth at onset of migraine headache. May repeat after 2 hours if migraine is not relieved. Do not exceed 2 tablets in 24 hours, Disp: , Rfl:     Patient was instructed on the use of medications, either prescriptions or OTC and informed on when the appropriate follow up time period should be. In addition, patient was also instructed that should any acute worsening occur that they should notify this clinic asap or call 911.      Review of Systems   Constitutional: Negative.    HENT: Negative.    Eyes: Negative.    Respiratory: Negative.    Cardiovascular: Negative.    Gastrointestinal: Negative.    Genitourinary: Negative.    Musculoskeletal: Negative.    Skin: Negative.    Neurological: Negative.    Endo/Heme/Allergies: Negative.    Psychiatric/Behavioral: Positive for depression. Negative for hallucinations, memory loss, substance abuse and suicidal ideas. The patient is nervous/anxious. The patient does not have insomnia.      Objective     /54 (BP Location: Right arm, Patient Position: Sitting, BP Cuff Size: Adult)   Pulse 92   Temp 36.6 °C (97.9 °F) (Temporal)   Resp 16   Ht 1.575 m (5' 2\") Comment: stated by pt  Wt 60.3 kg (133 lb) Comment: with shoes on  LMP 03/03/2022 (Approximate)   SpO2 97%   " Breastfeeding No   BMI 24.33 kg/m²      Physical Exam  Vitals and nursing note reviewed.   Constitutional:       Appearance: Normal appearance. She is well-developed and well-groomed.   HENT:      Head: Normocephalic and atraumatic.      Nose: Nose normal.      Mouth/Throat:      Lips: Pink. No lesions.      Mouth: Mucous membranes are moist.   Eyes:      General: Lids are normal. Vision grossly intact. Gaze aligned appropriately.      Extraocular Movements: Extraocular movements intact.      Conjunctiva/sclera: Conjunctivae normal.      Pupils: Pupils are equal, round, and reactive to light.   Neck:      Thyroid: No thyromegaly.      Vascular: No carotid bruit or JVD.      Trachea: Trachea and phonation normal.   Cardiovascular:      Rate and Rhythm: Normal rate and regular rhythm.      Heart sounds: Normal heart sounds. No murmur heard.    No friction rub. No gallop.   Pulmonary:      Effort: Pulmonary effort is normal.      Breath sounds: Normal breath sounds. No wheezing, rhonchi or rales.   Musculoskeletal:         General: Normal range of motion.      Cervical back: Normal range of motion and neck supple.      Right lower leg: No edema.      Left lower leg: No edema.   Lymphadenopathy:      Cervical: No cervical adenopathy.   Skin:     General: Skin is warm and dry.      Capillary Refill: Capillary refill takes less than 2 seconds.      Findings: No lesion or rash.   Neurological:      Mental Status: She is alert and oriented to person, place, and time.      Cranial Nerves: Cranial nerves are intact.   Psychiatric:         Attention and Perception: Attention and perception normal.         Mood and Affect: Affect normal. Mood is anxious.         Speech: Speech normal.         Behavior: Behavior normal. Behavior is cooperative.         Thought Content: Thought content normal. Thought content is not paranoid or delusional. Thought content does not include homicidal or suicidal ideation. Thought content does not  include homicidal or suicidal plan.         Judgment: Judgment normal.       Assessment & Plan      1. Moderate episode of recurrent major depressive disorder (HCC)  - DULoxetine (CYMBALTA) 30 MG Cap DR Particles; Take 2 Capsules by mouth every day.  Dispense: 60 Capsule; Refill: 1    2. Generalized anxiety disorder  - DULoxetine (CYMBALTA) 30 MG Cap DR Particles; Take 2 Capsules by mouth every day.  Dispense: 60 Capsule; Refill: 1      Yes

## 2023-10-03 NOTE — PATIENT PROFILE ADULT - FALL HARM RISK - UNIVERSAL INTERVENTIONS
Bed in lowest position, wheels locked, appropriate side rails in place/Call bell, personal items and telephone in reach/Instruct patient to call for assistance before getting out of bed or chair/Non-slip footwear when patient is out of bed/[place] to call system/Physically safe environment - no spills, clutter or unnecessary equipment/Purposeful Proactive Rounding/Room/bathroom lighting operational, light cord in reach

## 2023-10-03 NOTE — CONSULT NOTE ADULT - ASSESSMENT
34 yo F w/ hx of Asthma, DM, PCOS, HTN, nephrolithiasis, diverticulosis presenting for 2 days of worsening abdominal pain and chills. Abdomen soft and non-distended but focally tender w/ guarding in LLQ, as well as referred LLQ w/ RLQ palpation. CT A/P significant for dane-sigmoid fat stranding and microperforation. Pt admitted for acute sigmoid diverticulitis  w microperforation    acute sigmoid diverticulitis  w microperforation  NPO w ivf   cw abx ctx, flagyl   rest of care per primary team     uncontrolled DM TYPE 2    A1c 11.1 in 05/23, 6.3 in 06/22--> A1C 10.7   at home on lantus 12 bedtime, lispro 10 tid and metformin 1000 bid   fu endo recs and fu A1C       HTN   controlled w diet, not on medications     asthma   on prn albuterol   continue with home medications    dvt ppx hsq

## 2023-10-03 NOTE — CONSULT NOTE ADULT - ATTENDING COMMENTS
Pt seen on rounds this afternoon.  33-yo woman with insulin-requiring type 2 DM who presented with LLQ pain and was found to have acute diverticulitis with a microperforation.    Has a 7-year history of type 2 DM.  Was previously followed by Dr. Amaya in our division during and briefly after her last pregnancy, and was seen by us during an admission while pregnant.  Her current outpatient regime is metformin 1000 mg BID, Lantus 25 units hs, premeal lispro 15 units.  Describes fingersticks at home which are in the 200-300 range and current Ac1c level is markedly elevated at 10.7%  Diet is a major contributor to her poor control--primarily excessive portions of rice at her main meal, excessive fruit, and daily intake of regular Pepsi (as much as 3 cans a day), and juice.  She is currently NPO.  Fingersticks during the past 24 hours have been in the mid-20 range.  Received 13 Lantus last night, plus 15 units NPH this morning.  Is on moderate dose sliding scale coverage.  Abdomen is soft, with only mild LLQ guarding  --Would increase the Lantus to 30 units for tonight  --Continue IV fluids  --Moderate dose coverage q6h

## 2023-10-03 NOTE — CONSULT NOTE ADULT - SUBJECTIVE AND OBJECTIVE BOX
HISTORY OF PRESENT ILLNESS  PONCHO GARNICA is a 33y Female with a past medical history of     A1c 11.1 in , 6.3 in     34 yo F h/o Asthma, DM, PCOS, HTN, nephrolithiasis, diverticulosis presenting for 2 days of worsening abdominal pain and chills. She endorses history of LLQ with menstrual cramps, but current episode is sustained, more severe and unrelieved by OTC pain meds. About a week ago she had a stomach virus that spontaneously resolved. She has been having regular bowel movements. Patient denied nausea, emesis or SOB. She has never had a colonoscopy. Patient vaguely recalled a prior episode of LLQ abdominal pain few years ago which she said was treated with short course of antibiotics     33 yo PMhx of T2DM, renal stones w persisting pain and vomiting since being seen here three days ago, when she was discahrged home on medical expulsion therapy 3 days ago. She was found to have an obstructing 6 mm right proximal ureteral stone with associated mild to moderate right hydroureteronephrosis and perinephric/periureteral fat stranding. She is s/p cystoscopy, stone removal and retrograde pyelogram. She was planned for discharge today, but BG elevated to 300s, so endocrine was consulted. A1C ordered.   Patient seen and examined at bedside. Pain and vomiting resolved since stone removal. Denies fever/chills. She is eating well.     Known to endocrine service from pregnancy in .   She was diagnosed with T2DM at age 25 and started on metformin She required insulin during pregnancy, but was discharged home on metformin 1000mg BID in aug 2022. She has not had f/u appt. with endo ( Dr Amaya since then). She reports FSG were <180-190 until 2 months ago when she experienced new family stressors, had bronchitis/asthma exacerbation (no steroids) and kidney stones. Since then her fasting BG 200s and as high as 460 throughout the day. She reports no change to diet. She has polyuria, polydipsia blurry vision, headache, and 20lb weight loss in past 2 months. Denies neuropathy. last eye exam May 2021, no retinopathy.    Thyroid nodules:  FNA 2022  Right 3.4 cm nodule lower pole - [place] 2  Right 2.1 cm nodule lower pole/isthmus - [place] 2'    For discharge: Lantus 24 units at night, lispro 10 units before meals, resume metformin 1000mg BID.   Please send insulin and pen needles to [facility] for insurance verification. She has testing supplies. Endocrine office will call her to schedule f/u appt.  Discussed adding GLP-1 as OP.      She is followed outpatient by Dr. Amaya in our division. Patient has a history of type 2 diabetes mellitus, diagnosed ~6 years ago, which was treated only with metformin 1,000 mg twice daily prior to her current pregnancy, and on insulin during pregnancy. Prior to pregnancy, her last hemoglobin A1C on file is 8.8% (2021). Her last hemoglobin A1C during current pregnancy is 6.3% (22). She was previously evaluated by inpatient endocrinology during a recent admission for abdominal pain on 22 - 22. At that time, she was taking Lantus 60 units at bedtime and Admelog 25-30 units three times daily before meals. She reported eating a low carbohydrate diet. However, despite diet she had been unable to achieve a good glycemic control during pregnancy. During last admission, her insulin regimen was modified and she was discharged on Lantus 70 units at bedtime and Novolog 30-35 units before meals. Patient was instructed to contact Dr. Amaya about adding a morning dose of Lantus to her regimen if her morning fingersticks remained >95 mg/dL on that dose. Impression was that she was likely going to need more pre-meal insulin as well.     Upon evaluation, patient says that she was recently instructed by a doctor last Monday (she thinks was a gynecologist) to increase her bedtime Lantus to 80 units at bedtime. However, despite this change, her fingerstick glucose remained out of goal. She had a blood glucose log from 22 - 22 which showed an fasting fingerstick glucose around ~110's in average and a 1-hour postprandial fingerstick glucose of 130-150s.  She mentioned that this numbers were similar to the ones she was seen prior to increasing her long-acting insulin.     Patient says she had an appointment with Dr. Amaya for 22; however, she got infected with COVID-19 on 22 and had to cancel the appointment. She missed rescheduling it. Patient says that she was not feeling her baby moving as much as he usually does, and had a sonogram last friday (22) which showed that her baby was not moving that much. She was told that maybe the baby was sleeping at the time. The next day, she only felt one kick during the morning. Therefore, she called her gynecologist in the evening who instructed her to come to the hospital for further evaluation, and later underwent emergent . Her last dose of insulin was prior to having dinner (22 ~9 PM, 35 units of Lispro after eating an Kevin pasta at [employer]). Her last dose of long-acting insulin was the night before yesterday (40 units of Lantus on the evening of 22). Patient was evaluated in the morning after her procedure, reports having decreased appetite; denies having nausea or vomiting.         Endocrinology has been consulted for Diabetes Management.    DIABETES HISTORY  - Age at diagnosis:   - Symptoms at time of diagnosis:   - Current Therapy:  - History of other regimens:   - History of hypoglycemia:   - History of DKA/HHS:   - Diabetic Complications:   - Home FSG:        > Fasting: *** mg/dL.        > Before meals: *** mg/dL.        > Bedtime: *** mg/dL.  - Diet:          > Breakfast:         > Lunch:        > Dinner:        > Snacks:  - Physical activity:    - Diabetes managed outpatient by:    FAMILY HISTORY  - Diabetes:  - Thyroid:  - Autoimmune:  - Other:    SOCIAL HISTORY  - Work:  - Alcohol:  - Smoking:  - Recreational Drugs:    ALLERGIES  No Known Allergies      CURRENT MEDICATIONS  albuterol    90 MICROgram(s) HFA Inhaler 2 Puff(s) Inhalation every 6 hours PRN  cefTRIAXone   IVPB 1000 milliGRAM(s) IV Intermittent every 24 hours  dextrose 5%. 1000 milliLiter(s) IV Continuous <Continuous>  dextrose 5%. 1000 milliLiter(s) IV Continuous <Continuous>  dextrose 50% Injectable 25 Gram(s) IV Push once  dextrose 50% Injectable 12.5 Gram(s) IV Push once  dextrose 50% Injectable 25 Gram(s) IV Push once  dextrose Oral Gel 15 Gram(s) Oral once PRN  glucagon  Injectable 1 milliGRAM(s) IntraMuscular once  heparin   Injectable 5000 Unit(s) SubCutaneous every 8 hours  HYDROmorphone  Injectable 0.5 milliGRAM(s) IV Push every 4 hours PRN  HYDROmorphone  Injectable 1 milliGRAM(s) IV Push every 4 hours PRN  influenza   Vaccine 0.5 milliLiter(s) IntraMuscular once  insulin glargine Injectable (LANTUS) 13 Unit(s) SubCutaneous at bedtime  insulin lispro (ADMELOG) corrective regimen sliding scale   SubCutaneous every 6 hours  insulin NPH human recombinant 15 Unit(s) SubCutaneous once  lactated ringers. 1000 milliLiter(s) IV Continuous <Continuous>  metroNIDAZOLE  IVPB 500 milliGRAM(s) IV Intermittent every 8 hours  ondansetron Injectable 4 milliGRAM(s) IV Push every 6 hours PRN      REVIEW OF SYSTEMS  Constitutional:  Negative fever, chills or loss of appetite.  Eyes:  Negative blurry vision or double vision.  Cardiovascular:  Negative for chest pain or palpitations.  Respiratory:  Negative for cough, wheezing, or shortness of breath.   Gastrointestinal:  Negative for nausea, vomiting, diarrhea, constipation, or abdominal pain.  Genitourinary:  Negative frequency, urgency or dysuria.  Neurologic:  No headache, confusion, dizziness, lightheadedness.    PHYSICAL EXAM  Vital Signs Last 24 Hrs  T(C): 36.9 (03 Oct 2023 08:30), Max: 37.2 (02 Oct 2023 20:34)  T(F): 98.5 (03 Oct 2023 08:30), Max: 98.9 (02 Oct 2023 20:34)  HR: 71 (03 Oct 2023 08:30) (71 - 93)  BP: 101/67 (03 Oct 2023 08:30) (101/67 - 143/91)  BP(mean): 82 (03 Oct 2023 05:20) (82 - 82)  RR: 16 (03 Oct 2023 08:30) (2 - 18)  SpO2: 98% (03 Oct 2023 08:30) (96% - 98%)    Parameters below as of 03 Oct 2023 08:30  Patient On (Oxygen Delivery Method): room air    Constitutional: Awake, alert, in no acute distress.   HEENT: Normocephalic, atraumatic, KARLA, no proptosis or lid retraction.   Neck: supple, no acanthosis, no thyromegaly or palpable thyroid nodules.  Respiratory: Lungs clear to ausculation bilaterally.   Cardiovascular: regular rhythm, normal S1 and S2, no audible murmurs.   GI: soft, non-tender, non-distended, bowel sounds present, no masses appreciated.  Extremities: No lower extremity edema, peripheral pulses present.   Skin: no rashes.   Psychiatric: AAO x 3. Normal affect/mood.     LABS  CBC - WBC/HGB/HTC/PLT: 8.10/13.1/39.8/243 (10-03-23)  BMP: Na/K/Cl/Bicarb/BUN/Cr/Gluc: 133/3.9/103/21/8/0.53/259 (10-03-23)  Anion Gap: 9 (10-03-23)  eGFR: 124 (10-03-23)  Calcium: 8.8 (10-03-23)  Phosphorus: 3.1 (10-03-23)  Magnesium: 1.9 (10-03-23)  LFT - Alb/Tprot/Tbili/Dbili/AlkPhos/ALT/AST: 4.2/--/0.4/--/120//13 (10-02-23)      CBC - WBC/HGB/HTC/PLT: 8.10/13.1/39.8/243 (10-03-23)BMP: Na/K/Cl/Bicarb/BUN/Cr/Gluc: 133/3.9/103/21/8/0.53/259 (10-03-23)  Anion Gap: 9 (10-03-23)  eGFR: 124 (10-03-23)  Calcium: 8.8 (10-03-23)  Phosphorus: 3.1 (10-03-23)  Magnesium: 1.9 (10-03-23)    CAPILLARY BLOOD GLUCOSE & INSULIN RECEIVED  322 mg/dL (10-02 @ 17:22)  280 mg/dL (10-03 @ 00:21) - lantus 13 + 6 u lispro  223 mg/dL (10-03 @ 02:34)  255 mg/dL (10-03 @ 06:50) 6 u lispro  216 mg/dL (10-03 @ 09:36) NPH 15        10-02-23 @ 07:01  -  10-03-23 @ 07:00  --------------------------------------------------------  IN: 800 mL / OUT: 800 mL / NET: 0 mL    10-03-23 @ 07:01  -  10-03-23 @ 10:21  --------------------------------------------------------  IN: 300 mL / OUT: 0 mL / NET: 300 mL        ASSESSMENT / RECOMMENDATIONS    A1C: 10.7 %  BUN: 8  Creatinine: 0.53  GFR: 124  Weight: 80.7  BMI: 31.5  EF:     # Type 2 diabetes mellitus with hyperglycemia  - Please continue lantus *** units at bedtime.   - Continue lispro *** units before each meal.  - Continue lispro *** dose sliding scale before meals and at bedtime.  - Patient's fingerstick glucose goal is 100-180 mg/dL.    - Discharge recommendations to be discussed.   - Patient can follow up at discharge with [facility] by calling [phone] to make an appointment.      Case discussed with Dr. Mattson. Primary team updated.       Gavi Loya  Endocrinology Fellow    Service Pager: [phone]  HISTORY OF PRESENT ILLNESS  PONCHO GARNICA is a 33y Female with a past medical history of T2DM, asthma presented to [facility] on 10/02 with abdominal pain that localizes in left lower quadrant, accompanied by chills.   Pt was hemodynamically stable.   Labs reveal Co2 21, glucose 459, Cr 0.6, WBC 14, a1c 10.7 .  CT abdomen shows acute diverticulitis of the proximal sigmoid colon with microperforation.   Pt has been kept NPO and been treated with IV abx under surgical service.    Endocrinology has been consulted for Diabetes Management.   She used to see Dr. Amaya from our division. Patient has a history of type 2 diabetes mellitus, diagnosed 7 years ago, which was treated only with metformin 1,000 mg twice daily prior to her current pregnancy, and on insulin during pregnancy. Prior to pregnancy, her last hemoglobin A1C on file was 8.8% (4/2021). Her last hemoglobin A1C during last pregnancy was 6.3% (7/25/22). Pt states she has not followed up with Dr. Amaya since 2022.  Again seen by our service in 04/2023 when she was admitted with nephrolithiasis, a1c at that admission was 11.1.  Pt was discharged with Lantus 24 units at night, lispro 10 units before meals, resume metformin 1000mg BID.   SHe has not come off from insulin since the pregnancy.  She currently takes metformin 1000 bid + lantus 25 u nightly + humalog 15 u tid.   In terms of diet, pt states she does not usually eat breakfast.  She eats chicken, rice and sald for lunch.  She eats pasta, steak rice, bean and potatoes for dinner.  Pt states she eats apples, bananas for snacking.  She drinks 2-3 cups of pepsi or orange juice/apple juice daily.   Her average glucose ranges 200-300 at home.           Thyroid nodules:  FNA April 2022  Right 3.4 cm nodule lower pole - [place] 2  Right 2.1 cm nodule lower pole/isthmus - [place] 2'      ALLERGIES  No Known Allergies      CURRENT MEDICATIONS  albuterol    90 MICROgram(s) HFA Inhaler 2 Puff(s) Inhalation every 6 hours PRN  cefTRIAXone   IVPB 1000 milliGRAM(s) IV Intermittent every 24 hours  dextrose 5%. 1000 milliLiter(s) IV Continuous <Continuous>  dextrose 5%. 1000 milliLiter(s) IV Continuous <Continuous>  dextrose 50% Injectable 25 Gram(s) IV Push once  dextrose 50% Injectable 12.5 Gram(s) IV Push once  dextrose 50% Injectable 25 Gram(s) IV Push once  dextrose Oral Gel 15 Gram(s) Oral once PRN  glucagon  Injectable 1 milliGRAM(s) IntraMuscular once  heparin   Injectable 5000 Unit(s) SubCutaneous every 8 hours  HYDROmorphone  Injectable 0.5 milliGRAM(s) IV Push every 4 hours PRN  HYDROmorphone  Injectable 1 milliGRAM(s) IV Push every 4 hours PRN  influenza   Vaccine 0.5 milliLiter(s) IntraMuscular once  insulin glargine Injectable (LANTUS) 13 Unit(s) SubCutaneous at bedtime  insulin lispro (ADMELOG) corrective regimen sliding scale   SubCutaneous every 6 hours  insulin NPH human recombinant 15 Unit(s) SubCutaneous once  lactated ringers. 1000 milliLiter(s) IV Continuous <Continuous>  metroNIDAZOLE  IVPB 500 milliGRAM(s) IV Intermittent every 8 hours  ondansetron Injectable 4 milliGRAM(s) IV Push every 6 hours PRN      REVIEW OF SYSTEMS  Constitutional:  Negative fever, chills or loss of appetite.  Eyes:  Negative blurry vision or double vision.  Cardiovascular:  Negative for chest pain or palpitations.  Respiratory:  Negative for cough, wheezing, or shortness of breath.   Gastrointestinal:  Negative for nausea, vomiting, diarrhea, constipation, or abdominal pain.  Genitourinary:  Negative frequency, urgency or dysuria.  Neurologic:  No headache, confusion, dizziness, lightheadedness.    PHYSICAL EXAM  Vital Signs Last 24 Hrs  T(C): 36.9 (03 Oct 2023 08:30), Max: 37.2 (02 Oct 2023 20:34)  T(F): 98.5 (03 Oct 2023 08:30), Max: 98.9 (02 Oct 2023 20:34)  HR: 71 (03 Oct 2023 08:30) (71 - 93)  BP: 101/67 (03 Oct 2023 08:30) (101/67 - 143/91)  BP(mean): 82 (03 Oct 2023 05:20) (82 - 82)  RR: 16 (03 Oct 2023 08:30) (2 - 18)  SpO2: 98% (03 Oct 2023 08:30) (96% - 98%)    Parameters below as of 03 Oct 2023 08:30  Patient On (Oxygen Delivery Method): room air    Constitutional: Awake, alert, in no acute distress.   HEENT: Normocephalic, atraumatic, KARLA, no proptosis or lid retraction.   Neck: supple, no acanthosis, no thyromegaly or palpable thyroid nodules.  Respiratory: Lungs clear to ausculation bilaterally.   Cardiovascular: regular rhythm, normal S1 and S2, no audible murmurs.   GI: soft, non-tender, non-distended, bowel sounds present, no masses appreciated.  Extremities: No lower extremity edema, peripheral pulses present.   Skin: no rashes.   Psychiatric: AAO x 3. Normal affect/mood.     LABS  CBC - WBC/HGB/HTC/PLT: 8.10/13.1/39.8/243 (10-03-23)  BMP: Na/K/Cl/Bicarb/BUN/Cr/Gluc: 133/3.9/103/21/8/0.53/259 (10-03-23)  Anion Gap: 9 (10-03-23)  eGFR: 124 (10-03-23)  Calcium: 8.8 (10-03-23)  Phosphorus: 3.1 (10-03-23)  Magnesium: 1.9 (10-03-23)  LFT - Alb/Tprot/Tbili/Dbili/AlkPhos/ALT/AST: 4.2/--/0.4/--/120/13/13 (10-02-23)      CBC - WBC/HGB/HTC/PLT: 8.10/13.1/39.8/243 (10-03-23)BMP: Na/K/Cl/Bicarb/BUN/Cr/Gluc: 133/3.9/103/21/8/0.53/259 (10-03-23)  Anion Gap: 9 (10-03-23)  eGFR: 124 (10-03-23)  Calcium: 8.8 (10-03-23)  Phosphorus: 3.1 (10-03-23)  Magnesium: 1.9 (10-03-23)    CAPILLARY BLOOD GLUCOSE & INSULIN RECEIVED  322 mg/dL (10-02 @ 17:22)  280 mg/dL (10-03 @ 00:21) - lantus 13 + 6 u lispro  223 mg/dL (10-03 @ 02:34)  255 mg/dL (10-03 @ 06:50) 6 u lispro  216 mg/dL (10-03 @ 09:36) NPH 15  233  - 4 u lispro        10-02-23 @ 07:01  -  10-03-23 @ 07:00  --------------------------------------------------------  IN: 800 mL / OUT: 800 mL / NET: 0 mL    10-03-23 @ 07:01  -  10-03-23 @ 10:21  --------------------------------------------------------  IN: 300 mL / OUT: 0 mL / NET: 300 mL        ASSESSMENT / RECOMMENDATIONS    PONCHO GARNICA is a 33y Female with a past medical history of T2DM, asthma presented to [facility] on 10/02 with abdominal pain that localizes in left lower quadrant, accompanied by chills.   Pt was hemodynamically stable.   Labs reveal Co2 21, glucose 459, Cr 0.6, WBC 14, a1c 10.7 .  CT abdomen shows acute diverticulitis of the proximal sigmoid colon with microperforation.   Pt has been kept NPO and been treated with IV abx under surgical service.  Endocrinology has been consulted for DMT2.      A1C: 10.7 %  BUN: 8  Creatinine: 0.53  GFR: 124  Weight: 80.7  BMI: 31.5    # Type 2 diabetes mellitus with hyperglycemia  - A1c 11.1 in 05/23, 6.3 in 06/22  - Please continue lantus  units at bedtime.   - Continue lispro *** units before each meal.  - Continue lispro moderate dose sliding scale before meals and at bedtime.  - Patient's fingerstick glucose goal is 100-180 mg/dL.    - Discharge recommendations to be discussed.   - Patient can follow up at discharge with [facility] by calling [phone] to make an appointment.      Case discussed with Dr. Mattson. Primary team updated.       Gavi Loya  Endocrinology Fellow    Service Pager: [phone]  HISTORY OF PRESENT ILLNESS  PONCHO GARNICA is a 33y Female with a past medical history of T2DM, asthma presented to [facility] on 10/02 with abdominal pain that localizes in left lower quadrant, accompanied by chills.   Pt was hemodynamically stable.   Labs reveal Co2 21, glucose 459, Cr 0.6, WBC 14, a1c 10.7 .  CT abdomen shows acute diverticulitis of the proximal sigmoid colon with microperforation.   Pt has been kept NPO and been treated with IV abx under surgical service.    Endocrinology has been consulted for Diabetes Management.   She used to see Dr. Amaya from our division. Patient has a history of type 2 diabetes mellitus, diagnosed 7 years ago, which was treated only with metformin 1,000 mg twice daily prior to her current pregnancy, and on insulin during pregnancy. Prior to pregnancy, her last hemoglobin A1C on file was 8.8% (4/2021). Her last hemoglobin A1C during last pregnancy was 6.3% (7/25/22). Pt states she has not followed up with Dr. Amaya since 2022.  Again seen by our service in 04/2023 when she was admitted with nephrolithiasis, a1c at that admission was 11.1.  Pt was discharged with Lantus 24 units at night, lispro 10 units before meals, resume metformin 1000mg BID.   SHe has not come off from insulin since the pregnancy.  She currently takes metformin 1000 bid + lantus 25 u nightly + humalog 15 u tid.   In terms of diet, pt states she does not usually eat breakfast.  She eats chicken, rice and sald for lunch.  She eats pasta, steak rice, bean and potatoes for dinner.  Pt states she eats apples, bananas for snacking.  She drinks 2-3 cups of pepsi or orange juice/apple juice daily.   Her average glucose ranges 200-300 at home.           Thyroid nodules:  FNA April 2022  Right 3.4 cm nodule lower pole - [place] 2  Right 2.1 cm nodule lower pole/isthmus - [place] 2'      ALLERGIES  No Known Allergies      CURRENT MEDICATIONS  albuterol    90 MICROgram(s) HFA Inhaler 2 Puff(s) Inhalation every 6 hours PRN  cefTRIAXone   IVPB 1000 milliGRAM(s) IV Intermittent every 24 hours  dextrose 5%. 1000 milliLiter(s) IV Continuous <Continuous>  dextrose 5%. 1000 milliLiter(s) IV Continuous <Continuous>  dextrose 50% Injectable 25 Gram(s) IV Push once  dextrose 50% Injectable 12.5 Gram(s) IV Push once  dextrose 50% Injectable 25 Gram(s) IV Push once  dextrose Oral Gel 15 Gram(s) Oral once PRN  glucagon  Injectable 1 milliGRAM(s) IntraMuscular once  heparin   Injectable 5000 Unit(s) SubCutaneous every 8 hours  HYDROmorphone  Injectable 0.5 milliGRAM(s) IV Push every 4 hours PRN  HYDROmorphone  Injectable 1 milliGRAM(s) IV Push every 4 hours PRN  influenza   Vaccine 0.5 milliLiter(s) IntraMuscular once  insulin glargine Injectable (LANTUS) 13 Unit(s) SubCutaneous at bedtime  insulin lispro (ADMELOG) corrective regimen sliding scale   SubCutaneous every 6 hours  insulin NPH human recombinant 15 Unit(s) SubCutaneous once  lactated ringers. 1000 milliLiter(s) IV Continuous <Continuous>  metroNIDAZOLE  IVPB 500 milliGRAM(s) IV Intermittent every 8 hours  ondansetron Injectable 4 milliGRAM(s) IV Push every 6 hours PRN      REVIEW OF SYSTEMS  Constitutional:  Negative fever, chills or loss of appetite.  Eyes:  Negative blurry vision or double vision.  Cardiovascular:  Negative for chest pain or palpitations.  Respiratory:  Negative for cough, wheezing, or shortness of breath.   Gastrointestinal:  Negative for nausea, vomiting, diarrhea, constipation, or abdominal pain.  Genitourinary:  Negative frequency, urgency or dysuria.  Neurologic:  No headache, confusion, dizziness, lightheadedness.    PHYSICAL EXAM  Vital Signs Last 24 Hrs  T(C): 36.9 (03 Oct 2023 08:30), Max: 37.2 (02 Oct 2023 20:34)  T(F): 98.5 (03 Oct 2023 08:30), Max: 98.9 (02 Oct 2023 20:34)  HR: 71 (03 Oct 2023 08:30) (71 - 93)  BP: 101/67 (03 Oct 2023 08:30) (101/67 - 143/91)  BP(mean): 82 (03 Oct 2023 05:20) (82 - 82)  RR: 16 (03 Oct 2023 08:30) (2 - 18)  SpO2: 98% (03 Oct 2023 08:30) (96% - 98%)    Parameters below as of 03 Oct 2023 08:30  Patient On (Oxygen Delivery Method): room air    Constitutional: Awake, alert, in no acute distress.   HEENT: Normocephalic, atraumatic, KARLA, no proptosis or lid retraction.   Neck: supple, no acanthosis, no thyromegaly or palpable thyroid nodules.  Respiratory: Lungs clear to ausculation bilaterally.   Cardiovascular: regular rhythm, normal S1 and S2, no audible murmurs.   GI: soft, non-tender, non-distended, bowel sounds present, no masses appreciated.  Extremities: No lower extremity edema, peripheral pulses present.   Skin: no rashes.   Psychiatric: AAO x 3. Normal affect/mood.     LABS  CBC - WBC/HGB/HTC/PLT: 8.10/13.1/39.8/243 (10-03-23)  BMP: Na/K/Cl/Bicarb/BUN/Cr/Gluc: 133/3.9/103/21/8/0.53/259 (10-03-23)  Anion Gap: 9 (10-03-23)  eGFR: 124 (10-03-23)  Calcium: 8.8 (10-03-23)  Phosphorus: 3.1 (10-03-23)  Magnesium: 1.9 (10-03-23)  LFT - Alb/Tprot/Tbili/Dbili/AlkPhos/ALT/AST: 4.2/--/0.4/--/120/13/13 (10-02-23)      CBC - WBC/HGB/HTC/PLT: 8.10/13.1/39.8/243 (10-03-23)BMP: Na/K/Cl/Bicarb/BUN/Cr/Gluc: 133/3.9/103/21/8/0.53/259 (10-03-23)  Anion Gap: 9 (10-03-23)  eGFR: 124 (10-03-23)  Calcium: 8.8 (10-03-23)  Phosphorus: 3.1 (10-03-23)  Magnesium: 1.9 (10-03-23)    CAPILLARY BLOOD GLUCOSE & INSULIN RECEIVED  322 mg/dL (10-02 @ 17:22)  280 mg/dL (10-03 @ 00:21) - lantus 13 + 6 u lispro  223 mg/dL (10-03 @ 02:34)  255 mg/dL (10-03 @ 06:50) 6 u lispro  216 mg/dL (10-03 @ 09:36) NPH 15  233  - 4 u lispro        10-02-23 @ 07:01  -  10-03-23 @ 07:00  --------------------------------------------------------  IN: 800 mL / OUT: 800 mL / NET: 0 mL    10-03-23 @ 07:01  -  10-03-23 @ 10:21  --------------------------------------------------------  IN: 300 mL / OUT: 0 mL / NET: 300 mL        ASSESSMENT / RECOMMENDATIONS    PONCHO GARNICA is a 33y Female with a past medical history of T2DM, asthma presented to [facility] on 10/02 with abdominal pain that localizes in left lower quadrant, accompanied by chills.   Pt was hemodynamically stable.   Labs reveal Co2 21, glucose 459, Cr 0.6, WBC 14, a1c 10.7 .  CT abdomen shows acute diverticulitis of the proximal sigmoid colon with microperforation.   Pt has been kept NPO and been treated with IV abx under surgical service.  Endocrinology has been consulted for DMT2.      A1C: 10.7 %  BUN: 8  Creatinine: 0.53  GFR: 124  Weight: 80.7  BMI: 31.5    # Type 2 diabetes mellitus with hyperglycemia  - A1c 11.1 in 05/23, 6.3 in 06/22  - Please continue lantus 30 units at bedtime.   - No premeals lispro at this time since pt is NPO  - Continue lispro moderate dose sliding scale q6  - Patient's fingerstick glucose goal is 100-180 mg/dL.    - Discharge recommendations to be discussed.   - Patient can follow up at discharge with [facility] by calling [phone] to make an appointment.      Case discussed with Dr. Mattson. Primary team updated.       Gavi Loya  Endocrinology Fellow    Service Pager: [phone]  HISTORY OF PRESENT ILLNESS  PONCHO GARNICA is a 33y Female with a past medical history of T2DM, asthma presented to [facility] on 10/02 with abdominal pain that localizes in left lower quadrant, accompanied by chills.   Pt was hemodynamically stable.   Labs reveal Co2 21, glucose 459, Cr 0.6, WBC 14, a1c 10.7 .  CT abdomen shows acute diverticulitis of the proximal sigmoid colon with microperforation.   Pt has been kept NPO and been treated with IV abx under surgical service.    Endocrinology has been consulted for Diabetes Management.   She used to see Dr. Amaya from our division. Patient has a history of type 2 diabetes mellitus, diagnosed 7 years ago, which was treated only with metformin 1,000 mg twice daily prior to her current pregnancy, and on insulin during pregnancy. Prior to pregnancy, her last hemoglobin A1C on file was 8.8% (4/2021). Her last hemoglobin A1C during last pregnancy was 6.3% (7/25/22). Pt states she has not followed up with Dr. Amaya since 2022.  Again seen by our service in 04/2023 when she was admitted with nephrolithiasis, a1c at that admission was 11.1.  Pt was discharged with Lantus 24 units at night, lispro 10 units before meals, resume metformin 1000mg BID.   SHe has not come off from insulin since the pregnancy.  She currently takes metformin 1000 bid + lantus 25 u nightly + humalog 15 u tid.   In terms of diet, pt states she does not usually eat breakfast.  She eats chicken, rice and sald for lunch.  She eats pasta, steak rice, bean and potatoes for dinner.  Pt states she eats apples, bananas for snacking.  She drinks 2-3 cups of pepsi or orange juice/apple juice daily.   Her average glucose ranges 200-300 at home.           Thyroid nodules:  FNA April 2022  Right 3.4 cm nodule lower pole - [place] 2  Right 2.1 cm nodule lower pole/isthmus - [place] 2'      ALLERGIES  No Known Allergies      CURRENT MEDICATIONS  albuterol    90 MICROgram(s) HFA Inhaler 2 Puff(s) Inhalation every 6 hours PRN  cefTRIAXone   IVPB 1000 milliGRAM(s) IV Intermittent every 24 hours  dextrose 5%. 1000 milliLiter(s) IV Continuous <Continuous>  dextrose 5%. 1000 milliLiter(s) IV Continuous <Continuous>  dextrose 50% Injectable 25 Gram(s) IV Push once  dextrose 50% Injectable 12.5 Gram(s) IV Push once  dextrose 50% Injectable 25 Gram(s) IV Push once  dextrose Oral Gel 15 Gram(s) Oral once PRN  glucagon  Injectable 1 milliGRAM(s) IntraMuscular once  heparin   Injectable 5000 Unit(s) SubCutaneous every 8 hours  HYDROmorphone  Injectable 0.5 milliGRAM(s) IV Push every 4 hours PRN  HYDROmorphone  Injectable 1 milliGRAM(s) IV Push every 4 hours PRN  influenza   Vaccine 0.5 milliLiter(s) IntraMuscular once  insulin glargine Injectable (LANTUS) 13 Unit(s) SubCutaneous at bedtime  insulin lispro (ADMELOG) corrective regimen sliding scale   SubCutaneous every 6 hours  insulin NPH human recombinant 15 Unit(s) SubCutaneous once  lactated ringers. 1000 milliLiter(s) IV Continuous <Continuous>  metroNIDAZOLE  IVPB 500 milliGRAM(s) IV Intermittent every 8 hours  ondansetron Injectable 4 milliGRAM(s) IV Push every 6 hours PRN      REVIEW OF SYSTEMS  Constitutional:  Negative fever, chills or loss of appetite.  Eyes:  Negative blurry vision or double vision.  Cardiovascular:  Negative for chest pain or palpitations.  Respiratory:  Negative for cough, wheezing, or shortness of breath.    Gastrointestinal:  + abd pain  Genitourinary:  Negative frequency, urgency or dysuria.  Neurologic:  No headache, confusion, dizziness, lightheadedness.    PHYSICAL EXAM  Vital Signs Last 24 Hrs  T(C): 36.9 (03 Oct 2023 08:30), Max: 37.2 (02 Oct 2023 20:34)  T(F): 98.5 (03 Oct 2023 08:30), Max: 98.9 (02 Oct 2023 20:34)  HR: 71 (03 Oct 2023 08:30) (71 - 93)  BP: 101/67 (03 Oct 2023 08:30) (101/67 - 143/91)  BP(mean): 82 (03 Oct 2023 05:20) (82 - 82)  RR: 16 (03 Oct 2023 08:30) (2 - 18)  SpO2: 98% (03 Oct 2023 08:30) (96% - 98%)    Parameters below as of 03 Oct 2023 08:30  Patient On (Oxygen Delivery Method): room air      Constitutional: Awake, alert, in no acute distress.   HEENT: Normocephalic, atraumatic, KARLA.  Respiratory: Lungs clear to ausculation bilaterally.   Cardiovascular: regular rhythm, normal S1 and S2, no audible murmurs.   GI: soft, slightly tender, non-distended, bowel sounds present.  Extremities: No lower extremity edema.  Psychiatric: AAO x 3. Normal affect/mood.     LABS  CBC - WBC/HGB/HTC/PLT: 8.10/13.1/39.8/243 (10-03-23)  BMP: Na/K/Cl/Bicarb/BUN/Cr/Gluc: 133/3.9/103/21/8/0.53/259 (10-03-23)  Anion Gap: 9 (10-03-23)  eGFR: 124 (10-03-23)  Calcium: 8.8 (10-03-23)  Phosphorus: 3.1 (10-03-23)  Magnesium: 1.9 (10-03-23)  LFT - Alb/Tprot/Tbili/Dbili/AlkPhos/ALT/AST: 4.2/--/0.4/--/120/13/13 (10-02-23)      CBC - WBC/HGB/HTC/PLT: 8.10/13.1/39.8/243 (10-03-23)BMP: Na/K/Cl/Bicarb/BUN/Cr/Gluc: 133/3.9/103/21/8/0.53/259 (10-03-23)  Anion Gap: 9 (10-03-23)  eGFR: 124 (10-03-23)  Calcium: 8.8 (10-03-23)  Phosphorus: 3.1 (10-03-23)  Magnesium: 1.9 (10-03-23)    CAPILLARY BLOOD GLUCOSE & INSULIN RECEIVED  322 mg/dL (10-02 @ 17:22)  280 mg/dL (10-03 @ 00:21) - lantus 13 + 6 u lispro  223 mg/dL (10-03 @ 02:34)  255 mg/dL (10-03 @ 06:50) 6 u lispro  216 mg/dL (10-03 @ 09:36) NPH 15  233  - 4 u lispro        10-02-23 @ 07:01  -  10-03-23 @ 07:00  --------------------------------------------------------  IN: 800 mL / OUT: 800 mL / NET: 0 mL    10-03-23 @ 07:01  -  10-03-23 @ 10:21  --------------------------------------------------------  IN: 300 mL / OUT: 0 mL / NET: 300 mL        ASSESSMENT / RECOMMENDATIONS    PONCHO GARNICA is a 33y Female with a past medical history of T2DM, asthma presented to [facility] on 10/02 with abdominal pain that localizes in left lower quadrant, accompanied by chills.   Pt was hemodynamically stable.   Labs reveal Co2 21, glucose 459, Cr 0.6, WBC 14, a1c 10.7 .  CT abdomen shows acute diverticulitis of the proximal sigmoid colon with microperforation.   Pt has been kept NPO and been treated with IV abx under surgical service.  Endocrinology has been consulted for DMT2.      A1C: 10.7 %  BUN: 8  Creatinine: 0.53  GFR: 124  Weight: 80.7  BMI: 31.5    # Type 2 diabetes mellitus with hyperglycemia  - A1c 11.1 in 05/23, 6.3 in 06/22  - Please continue lantus 30 units at bedtime.   - No premeals lispro at this time since pt is NPO  - Continue lispro moderate dose sliding scale q6  - Patient's fingerstick glucose goal is 100-180 mg/dL.    - Discharge recommendations to be discussed.   - Patient can follow up at discharge with [facility] by calling [phone] to make an appointment.      Case discussed with Dr. Mattson. Primary team updated.       Gavi Loya  Endocrinology Fellow    Service Pager: [phone]

## 2023-10-04 ENCOUNTER — TRANSCRIPTION ENCOUNTER (OUTPATIENT)
Age: 33
End: 2023-10-04

## 2023-10-04 LAB
ANION GAP SERPL CALC-SCNC: 9 MMOL/L — SIGNIFICANT CHANGE UP (ref 5–17)
BUN SERPL-MCNC: 7 MG/DL — SIGNIFICANT CHANGE UP (ref 7–23)
CALCIUM SERPL-MCNC: 8.8 MG/DL — SIGNIFICANT CHANGE UP (ref 8.4–10.5)
CHLORIDE SERPL-SCNC: 103 MMOL/L — SIGNIFICANT CHANGE UP (ref 96–108)
CO2 SERPL-SCNC: 25 MMOL/L — SIGNIFICANT CHANGE UP (ref 22–31)
CREAT SERPL-MCNC: 0.6 MG/DL — SIGNIFICANT CHANGE UP (ref 0.5–1.3)
EGFR: 121 ML/MIN/1.73M2 — SIGNIFICANT CHANGE UP
GLUCOSE BLDC GLUCOMTR-MCNC: 169 MG/DL — HIGH (ref 70–99)
GLUCOSE BLDC GLUCOMTR-MCNC: 178 MG/DL — HIGH (ref 70–99)
GLUCOSE BLDC GLUCOMTR-MCNC: 200 MG/DL — HIGH (ref 70–99)
GLUCOSE BLDC GLUCOMTR-MCNC: 204 MG/DL — HIGH (ref 70–99)
GLUCOSE SERPL-MCNC: 153 MG/DL — HIGH (ref 70–99)
HCT VFR BLD CALC: 36.4 % — SIGNIFICANT CHANGE UP (ref 34.5–45)
HGB BLD-MCNC: 12.4 G/DL — SIGNIFICANT CHANGE UP (ref 11.5–15.5)
MAGNESIUM SERPL-MCNC: 2 MG/DL — SIGNIFICANT CHANGE UP (ref 1.6–2.6)
MCHC RBC-ENTMCNC: 31.1 PG — SIGNIFICANT CHANGE UP (ref 27–34)
MCHC RBC-ENTMCNC: 34.1 GM/DL — SIGNIFICANT CHANGE UP (ref 32–36)
MCV RBC AUTO: 91.2 FL — SIGNIFICANT CHANGE UP (ref 80–100)
NRBC # BLD: 0 /100 WBCS — SIGNIFICANT CHANGE UP (ref 0–0)
PHOSPHATE SERPL-MCNC: 2.9 MG/DL — SIGNIFICANT CHANGE UP (ref 2.5–4.5)
PLATELET # BLD AUTO: 265 K/UL — SIGNIFICANT CHANGE UP (ref 150–400)
POTASSIUM SERPL-MCNC: 3.5 MMOL/L — SIGNIFICANT CHANGE UP (ref 3.5–5.3)
POTASSIUM SERPL-SCNC: 3.5 MMOL/L — SIGNIFICANT CHANGE UP (ref 3.5–5.3)
RBC # BLD: 3.99 M/UL — SIGNIFICANT CHANGE UP (ref 3.8–5.2)
RBC # FLD: 12 % — SIGNIFICANT CHANGE UP (ref 10.3–14.5)
SODIUM SERPL-SCNC: 137 MMOL/L — SIGNIFICANT CHANGE UP (ref 135–145)
WBC # BLD: 7.36 K/UL — SIGNIFICANT CHANGE UP (ref 3.8–10.5)
WBC # FLD AUTO: 7.36 K/UL — SIGNIFICANT CHANGE UP (ref 3.8–10.5)

## 2023-10-04 PROCEDURE — 99232 SBSQ HOSP IP/OBS MODERATE 35: CPT | Mod: GC

## 2023-10-04 PROCEDURE — 99233 SBSQ HOSP IP/OBS HIGH 50: CPT

## 2023-10-04 RX ORDER — INSULIN LISPRO 100/ML
7 VIAL (ML) SUBCUTANEOUS
Refills: 0 | Status: DISCONTINUED | OUTPATIENT
Start: 2023-10-04 | End: 2023-10-05

## 2023-10-04 RX ORDER — INSULIN LISPRO 100/ML
VIAL (ML) SUBCUTANEOUS
Refills: 0 | Status: DISCONTINUED | OUTPATIENT
Start: 2023-10-04 | End: 2023-10-05

## 2023-10-04 RX ORDER — POTASSIUM CHLORIDE 20 MEQ
40 PACKET (EA) ORAL ONCE
Refills: 0 | Status: COMPLETED | OUTPATIENT
Start: 2023-10-04 | End: 2023-10-04

## 2023-10-04 RX ORDER — INSULIN GLARGINE 100 [IU]/ML
34 INJECTION, SOLUTION SUBCUTANEOUS AT BEDTIME
Refills: 0 | Status: DISCONTINUED | OUTPATIENT
Start: 2023-10-04 | End: 2023-10-05

## 2023-10-04 RX ORDER — SODIUM CHLORIDE 9 MG/ML
1000 INJECTION, SOLUTION INTRAVENOUS
Refills: 0 | Status: DISCONTINUED | OUTPATIENT
Start: 2023-10-04 | End: 2023-10-04

## 2023-10-04 RX ORDER — METOCLOPRAMIDE HCL 10 MG
5 TABLET ORAL ONCE
Refills: 0 | Status: COMPLETED | OUTPATIENT
Start: 2023-10-04 | End: 2023-10-04

## 2023-10-04 RX ADMIN — Medication 4: at 18:10

## 2023-10-04 RX ADMIN — Medication 100 MILLIGRAM(S): at 14:42

## 2023-10-04 RX ADMIN — Medication 650 MILLIGRAM(S): at 03:45

## 2023-10-04 RX ADMIN — SODIUM CHLORIDE 120 MILLILITER(S): 9 INJECTION, SOLUTION INTRAVENOUS at 06:52

## 2023-10-04 RX ADMIN — Medication 40 MILLIEQUIVALENT(S): at 09:22

## 2023-10-04 RX ADMIN — Medication 7 UNIT(S): at 18:10

## 2023-10-04 RX ADMIN — INSULIN GLARGINE 34 UNIT(S): 100 INJECTION, SOLUTION SUBCUTANEOUS at 22:16

## 2023-10-04 RX ADMIN — Medication 2: at 06:16

## 2023-10-04 RX ADMIN — HEPARIN SODIUM 5000 UNIT(S): 5000 INJECTION INTRAVENOUS; SUBCUTANEOUS at 22:16

## 2023-10-04 RX ADMIN — Medication 100 MILLIGRAM(S): at 06:16

## 2023-10-04 RX ADMIN — Medication 650 MILLIGRAM(S): at 02:45

## 2023-10-04 RX ADMIN — Medication 2: at 11:34

## 2023-10-04 RX ADMIN — Medication 2: at 22:11

## 2023-10-04 RX ADMIN — HEPARIN SODIUM 5000 UNIT(S): 5000 INJECTION INTRAVENOUS; SUBCUTANEOUS at 14:42

## 2023-10-04 RX ADMIN — CEFTRIAXONE 100 MILLIGRAM(S): 500 INJECTION, POWDER, FOR SOLUTION INTRAMUSCULAR; INTRAVENOUS at 16:00

## 2023-10-04 RX ADMIN — Medication 100 MILLIGRAM(S): at 22:14

## 2023-10-04 RX ADMIN — Medication 5 MILLIGRAM(S): at 03:09

## 2023-10-04 RX ADMIN — HEPARIN SODIUM 5000 UNIT(S): 5000 INJECTION INTRAVENOUS; SUBCUTANEOUS at 06:16

## 2023-10-04 NOTE — DISCHARGE NOTE PROVIDER - NSDCMRMEDTOKEN_GEN_ALL_CORE_FT
albuterol 2.5 mg/3 mL (0.083%) inhalation solution: 3 milliliter(s) by nebulizer 4 times a day  albuterol 90 mcg/inh inhalation aerosol: 2 puff(s) inhaled 4 times a day  BD insulin pen needles 32G: use with your insulin administration  clindamycin 150 mg oral capsule: 3 cap(s) orally 3 times a day  contour glucometer: use as instructed  contour glucometer lancets: twice daily  contour glucometer strips: twice daily  HumaLOG KwikPen 100 units/mL injectable solution: 10 injectable 3 times a day before meals  Lantus Solostar Pen 100 units/mL subcutaneous solution: 24 subcutaneous once a day (at bedtime)  metFORMIN 1000 mg oral tablet: 1 tab(s) orally 2 times a day   ondansetron 4 mg oral tablet, disintegratin tab(s) orally every 8 hours as needed for  nausea   albuterol 2.5 mg/3 mL (0.083%) inhalation solution: 3 milliliter(s) by nebulizer 4 times a day  albuterol 90 mcg/inh inhalation aerosol: 2 puff(s) inhaled 4 times a day  BD insulin pen needles 32G: use with your insulin administration  cefpodoxime 200 mg oral tablet: 1 tab(s) orally 2 times a day  contour glucometer: use as instructed  contour glucometer lancets: twice daily  contour glucometer strips: twice daily  HumaLOG KwikPen 100 units/mL injectable solution: 10 injectable 3 times a day before meals  Lantus Solostar Pen 100 units/mL subcutaneous solution: 24 subcutaneous once a day (at bedtime)  metFORMIN 1000 mg oral tablet: 1 tab(s) orally 2 times a day   metroNIDAZOLE 500 mg oral tablet: 1 tab(s) orally 3 times a day  ondansetron 4 mg oral tablet, disintegratin tab(s) orally every 8 hours as needed for  nausea   acetaminophen 325 mg oral tablet: 2 tab(s) orally every 6 hours as needed for  mild pain  albuterol 2.5 mg/3 mL (0.083%) inhalation solution: 3 milliliter(s) by nebulizer 4 times a day  albuterol 90 mcg/inh inhalation aerosol: 2 puff(s) inhaled 4 times a day  BD insulin pen needles 32G: use with your insulin administration  cefpodoxime 200 mg oral tablet: 1 tab(s) orally 2 times a day  contour glucometer: use as instructed  contour glucometer lancets: twice daily  contour glucometer strips: twice daily  HumaLOG KwikPen 100 units/mL injectable solution: 10 unit(s) injectable 3 times a day (before meals)  Lantus Solostar Pen 100 units/mL subcutaneous solution: 35 unit(s) subcutaneous once a day (at bedtime)  metroNIDAZOLE 500 mg oral tablet: 1 tab(s) orally 3 times a day  ondansetron 4 mg oral tablet, disintegratin tab(s) orally every 8 hours as needed for  nausea   acetaminophen 325 mg oral tablet: 2 tab(s) orally every 6 hours as needed for  mild pain  albuterol 2.5 mg/3 mL (0.083%) inhalation solution: 3 milliliter(s) by nebulizer 4 times a day  albuterol 90 mcg/inh inhalation aerosol: 2 puff(s) inhaled 4 times a day  amoxicillin-clavulanate 875 mg-125 mg oral tablet: 875 milligram(s) orally 2 times a day  BD insulin pen needles 32G: use with your insulin administration  contour glucometer: use as instructed  contour glucometer lancets: twice daily  contour glucometer strips: twice daily  HumaLOG KwikPen 100 units/mL injectable solution: 10 unit(s) injectable 3 times a day (before meals)  Lantus Solostar Pen 100 units/mL subcutaneous solution: 35 unit(s) subcutaneous once a day (at bedtime)  ondansetron 4 mg oral tablet, disintegratin tab(s) orally every 8 hours as needed for  nausea

## 2023-10-04 NOTE — DISCHARGE NOTE PROVIDER - PROVIDER TOKENS
PROVIDER:[TOKEN:[[id number]]] PROVIDER:[TOKEN:[[id number]]],PROVIDER:[TOKEN:[[id number]]]

## 2023-10-04 NOTE — DISCHARGE NOTE PROVIDER - NSDCCPCAREPLAN_GEN_ALL_CORE_FT
PRINCIPAL DISCHARGE DIAGNOSIS  Diagnosis: Diverticulitis of colon with perforation  Assessment and Plan of Treatment:       SECONDARY DISCHARGE DIAGNOSES  Diagnosis: Diabetes mellitus  Assessment and Plan of Treatment:

## 2023-10-04 NOTE — DISCHARGE NOTE PROVIDER - NSDCFUADDAPPT_GEN_ALL_CORE_FT
Please follow up with Dr. Lees in 1-2 weeks; you may call the office to make an appointment at your earliest convenience.  Please follow up with Dr. Lees in 1-2 weeks; you may call the office to make an appointment at your earliest convenience.     Please follow up with Dr. Rousseau to discuss bariatric surgery; you may call the office to make an appointment at your earliest convenience.  Please follow up with Dr. Lees in 1-2 weeks; you may call the office to make an appointment at your earliest convenience.     Please follow up with Dr. Rousseau to discuss bariatric surgery; you may call the office to make an appointment at your earliest convenience.     Follow up at discharge with [facility] by calling [phone] to make an appointment

## 2023-10-04 NOTE — PROGRESS NOTE ADULT - SUBJECTIVE AND OBJECTIVE BOX
SUBJECTIVE: Feeling better, pain improving, no N/V currently but had one episode of clear small volume vomiting after being given tylenol PO, +flatus, 1 small BM yesterday. Patient seen and examined bedside by chief resident.    cefTRIAXone   IVPB 1000 milliGRAM(s) IV Intermittent every 24 hours  heparin   Injectable 5000 Unit(s) SubCutaneous every 8 hours  metroNIDAZOLE  IVPB 500 milliGRAM(s) IV Intermittent every 8 hours    MEDICATIONS  (PRN):  acetaminophen     Tablet .. 650 milliGRAM(s) Oral every 6 hours PRN Mild Pain (1 - 3), Moderate Pain (4 - 6), Severe Pain (7 - 10)  albuterol    90 MICROgram(s) HFA Inhaler 2 Puff(s) Inhalation every 6 hours PRN Shortness of Breath and/or Wheezing  dextrose Oral Gel 15 Gram(s) Oral once PRN Blood Glucose LESS THAN 70 milliGRAM(s)/deciliter  HYDROmorphone  Injectable 0.5 milliGRAM(s) IV Push every 4 hours PRN Moderate Pain (4 - 6)  HYDROmorphone  Injectable 1 milliGRAM(s) IV Push every 4 hours PRN Severe Pain (7 - 10)  ondansetron Injectable 4 milliGRAM(s) IV Push every 6 hours PRN Nausea      I&O's Detail    03 Oct 2023 07:01  -  04 Oct 2023 07:00  --------------------------------------------------------  IN:    IV PiggyBack: 50 mL    IV PiggyBack: 200 mL    Lactated Ringers: 1550 mL  Total IN: 1800 mL    OUT:    Oral Fluid: 0 mL    Voided (mL): 800 mL  Total OUT: 800 mL    Total NET: 1000 mL          Vital Signs Last 24 Hrs  T(C): 37 (04 Oct 2023 05:01), Max: 37.1 (03 Oct 2023 20:25)  T(F): 98.6 (04 Oct 2023 05:01), Max: 98.7 (03 Oct 2023 20:25)  HR: 55 (04 Oct 2023 05:01) (55 - 72)  BP: 100/68 (04 Oct 2023 05:01) (100/68 - 122/83)  BP(mean): --  RR: 17 (04 Oct 2023 05:01) (16 - 18)  SpO2: 96% (04 Oct 2023 05:01) (96% - 99%)    Parameters below as of 04 Oct 2023 05:01  Patient On (Oxygen Delivery Method): room air        General: NAD, resting comfortably in bed  C/V: NSR  Pulm: Nonlabored breathing, no respiratory distress  Abd: soft, nondistended, mild TTP LLQ, no rebound/guarding  Extrem: SCDs in place    LABS:                        13.1   8.10  )-----------( 243      ( 03 Oct 2023 06:59 )             39.8     10-03    133<L>  |  103  |  8   ----------------------------<  259<H>  3.9   |  21<L>  |  0.53    Ca    8.8      03 Oct 2023 06:59  Phos  3.1     10-03  Mg     1.9     10-03    TPro  7.7  /  Alb  4.2  /  TBili  0.4  /  DBili  x   /  AST  13  /  ALT  13  /  AlkPhos  120  10-02      Urinalysis Basic - ( 03 Oct 2023 06:59 )    Color: x / Appearance: x / SG: x / pH: x  Gluc: 259 mg/dL / Ketone: x  / Bili: x / Urobili: x   Blood: x / Protein: x / Nitrite: x   Leuk Esterase: x / RBC: x / WBC x   Sq Epi: x / Non Sq Epi: x / Bacteria: x        RADIOLOGY & ADDITIONAL STUDIES:  CT Abdomen and Pelvis w/ Oral Cont and w/ IV Cont:   ACC: [id number] EXAM:  CT ABDOMEN AND PELVIS OC IC   ORDERED BY: PIERO JACOBS     PROCEDURE DATE:  10/02/2023          INTERPRETATION:  CLINICAL INFORMATION: Mid abdominal and left lower   quadrant pain    COMPARISON: CT abdomen and pelvis from 4/15/2023 and multiple prior CT   abdomen and pelvis from dating to 4/14/2012    CONTRAST/COMPLICATIONS:  IV Contrast: Isovue 370  90 cc administered   10 cc discarded  Oral Contrast: Omnipaque 300  Complications: None reported at time of study completion    PROCEDURE:  CT of the Abdomen and Pelvis was performed.  Sagittal and coronal reformats were performed.    FINDINGS:  LOWER CHEST: Within normal limits.    LIVER: Liver is again steatotic and mildly enlarged  BILE DUCTS: Normal caliber.  GALLBLADDER: Within normal limits.  SPLEEN: Within normal limits.  PANCREAS: Within normal limits.  ADRENALS: Within normal limits.  KIDNEYS/URETERS: No urolithiasis or hydronephrosis. Small rounded   hypodensity in the left kidney mid to lower pole is too small to   characterize.    BLADDER: Within normal limits.  REPRODUCTIVE ORGANS: Uterus and adnexa within normal limits.    BOWEL: Oral contrast reaches the jejunum There is wall thickening within   the proximal sigmoid colon in a region with numerous diverticula with   pericolonic stranding and a few locules of extraluminal air consistent   with perforation. No drainable fluid collection. Inflammatory changes   extend to the left bladder wall which is mildly thickened. No bowel   obstruction. Appendix is normal.  PERITONEUM: No ascites.  VESSELS: Within normal limits.  RETROPERITONEUM/LYMPH NODES: No lymphadenopathy.  ABDOMINAL WALL: Within normal limits.  BONES: Mild degenerative changes of the spine at L5-S1.      IMPRESSION:  Acute diverticulitisof the proximal sigmoid colon with microperforation.   No drainable fluid collection. Inflammatory changes extend to the left   bladder wall    Again enlarged steatotic liver.      Findings discussed with Manjeet LAURENT at 4:10 PM.    --- End of Report ---          GIANFRANCO VIDES MD; Resident Radiologist  This document has been electronically signed.  ARTURO SOLER MD; Attending Radiologist  This document has been electronically signed. Oct  2 2023  5:48PM (10-02-23 @ 15:58)

## 2023-10-04 NOTE — PROGRESS NOTE ADULT - SUBJECTIVE AND OBJECTIVE BOX
SUBJECTIVE / INTERVAL HPI: Patient was seen and examined this morning.     Overnight events:    Endocrine course  10/3: lantus 30 u with no premeals    CAPILLARY BLOOD GLUCOSE & INSULIN RECEIVED  280 mg/dL (10-03 @ 00:21)  223 mg/dL (10-03 @ 02:34)  255 mg/dL (10-03 @ 06:50)  216 mg/dL (10-03 @ 09:36)  233 mg/dL (10-03 @ 12:41)  241 mg/dL (10-03 @ 18:16)  181 mg/dL (10-03 @ 22:42)  188 mg/dL (10-03 @ 23:25)  178 mg/dL (10-04 @ 06:06)      REVIEW OF SYSTEMS  Constitutional:  Negative fever, chills or loss of appetite.  Eyes:  Negative blurry vision or double vision.  Cardiovascular:  Negative for chest pain or palpitations.  Respiratory:  Negative for cough, wheezing, or shortness of breath.    Gastrointestinal:  + abd pain  Genitourinary:  Negative frequency, urgency or dysuria.  Neurologic:  No headache, confusion, dizziness, lightheadedness.    PHYSICAL EXAM  Vital Signs Last 24 Hrs  T(C): 37.2 (04 Oct 2023 09:20), Max: 37.2 (04 Oct 2023 09:20)  T(F): 98.9 (04 Oct 2023 09:20), Max: 98.9 (04 Oct 2023 09:20)  HR: 60 (04 Oct 2023 09:20) (55 - 72)  BP: 105/60 (04 Oct 2023 09:20) (100/68 - 122/83)  BP(mean): --  RR: 18 (04 Oct 2023 09:20) (17 - 18)  SpO2: 99% (04 Oct 2023 09:20) (96% - 99%)    Parameters below as of 04 Oct 2023 09:20  Patient On (Oxygen Delivery Method): room air        Constitutional: Awake, alert, in no acute distress.   HEENT: Normocephalic, atraumatic, KARLA.  Respiratory: Lungs clear to ausculation bilaterally.   Cardiovascular: regular rhythm, normal S1 and S2, no audible murmurs.   GI: soft, slightly tender, non-distended, bowel sounds present.  Extremities: No lower extremity edema.  Psychiatric: AAO x 3. Normal affect/mood.     LABS  CBC - WBC/HGB/HTC/PLT: 7.36/12.4/36.4/265 (10-04-23)  BMP - Na/K/Cl/Bicarb/BUN/Cr/Gluc/AG/eGFR: 137/3.5/103/25/7/0.60/153/9/121 (10-04-23)  Ca - 8.8 (10-04-23)  Phos - 2.9 (10-04-23)  Mg - 2.0 (10-04-23)  LFT - Alb/Tprot/Tbili/Dbili/AlkPhos/ALT/AST: 4.2/--/0.4/--/120/13/13 (10-02-23)          10-03-23 @ 07:01  -  10-04-23 @ 07:00  --------------------------------------------------------  IN: 1800 mL / OUT: 800 mL / NET: 1000 mL        MEDICATIONS  MEDICATIONS  (STANDING):  cefTRIAXone   IVPB 1000 milliGRAM(s) IV Intermittent every 24 hours  dextrose 5%. 1000 milliLiter(s) (100 mL/Hr) IV Continuous <Continuous>  dextrose 5%. 1000 milliLiter(s) (50 mL/Hr) IV Continuous <Continuous>  dextrose 50% Injectable 25 Gram(s) IV Push once  dextrose 50% Injectable 12.5 Gram(s) IV Push once  dextrose 50% Injectable 25 Gram(s) IV Push once  glucagon  Injectable 1 milliGRAM(s) IntraMuscular once  heparin   Injectable 5000 Unit(s) SubCutaneous every 8 hours  influenza   Vaccine 0.5 milliLiter(s) IntraMuscular once  insulin glargine Injectable (LANTUS) 30 Unit(s) SubCutaneous at bedtime  insulin lispro (ADMELOG) corrective regimen sliding scale   SubCutaneous every 6 hours  metroNIDAZOLE  IVPB 500 milliGRAM(s) IV Intermittent every 8 hours    MEDICATIONS  (PRN):  acetaminophen     Tablet .. 650 milliGRAM(s) Oral every 6 hours PRN Mild Pain (1 - 3), Moderate Pain (4 - 6), Severe Pain (7 - 10)  albuterol    90 MICROgram(s) HFA Inhaler 2 Puff(s) Inhalation every 6 hours PRN Shortness of Breath and/or Wheezing  dextrose Oral Gel 15 Gram(s) Oral once PRN Blood Glucose LESS THAN 70 milliGRAM(s)/deciliter  HYDROmorphone  Injectable 0.5 milliGRAM(s) IV Push every 4 hours PRN Moderate Pain (4 - 6)  HYDROmorphone  Injectable 1 milliGRAM(s) IV Push every 4 hours PRN Severe Pain (7 - 10)  ondansetron Injectable 4 milliGRAM(s) IV Push every 6 hours PRN Nausea    ASSESSMENT / RECOMMENDATIONS    PONCHO GARNICA is a 33y Female with a past medical history of T2DM, asthma presented to [facility] on 10/02 with abdominal pain that localizes in left lower quadrant, accompanied by chills.   Pt was hemodynamically stable.   Labs reveal Co2 21, glucose 459, Cr 0.6, WBC 14, a1c 10.7 .  CT abdomen shows acute diverticulitis of the proximal sigmoid colon with microperforation.   Pt has been kept NPO and been treated with IV abx under surgical service.  Endocrinology has been consulted for DMT2.        A1C: 10.7 %  BUN: 7  Creatinine: 0.60  GFR: 121  Weight: 80.7  BMI: 31.5    # Type 2 diabetes mellitus with hyperglycemia  - A1c 11.1 in 05/23, 6.3 in 06/22  - Please keep lantus  units at bedtime.   - No premeals lispro at this time since pt is NPO  - Continue lispro moderate dose sliding scale q6  - Patient's fingerstick glucose goal is 100-180 mg/dL.    - Discharge recommendations to be discussed.   - Patient can follow up at discharge with [facility] by calling [phone] to make an appointment.       Case discussed with Dr. Mattson. Primary team updated.       Gavi Loya  Endocrinology Fellow    Service Pager: [phone]    SUBJECTIVE / INTERVAL HPI: Patient was seen and examined this morning.     Overnight events:    Endocrine course  10/3: lantus 30 u with no premeals    CAPILLARY BLOOD GLUCOSE & INSULIN RECEIVED  280 mg/dL (10-03 @ 00:21)  223 mg/dL (10-03 @ 02:34)  255 mg/dL (10-03 @ 06:50)  216 mg/dL (10-03 @ 09:36)  233 mg/dL (10-03 @ 12:41)  241 mg/dL (10-03 @ 18:16) - lispro 2  181 mg/dL (10-03 @ 22:42) - lantus 30  188 mg/dL (10-03 @ 23:25)  178 mg/dL (10-04 @ 06:06)  lispro 2  200 at 12pm - lispro 2      REVIEW OF SYSTEMS  Constitutional:  Negative fever, chills or loss of appetite.  Eyes:  Negative blurry vision or double vision.  Cardiovascular:  Negative for chest pain or palpitations.  Respiratory:  Negative for cough, wheezing, or shortness of breath.    Gastrointestinal:  + abd pain  Genitourinary:  Negative frequency, urgency or dysuria.  Neurologic:  No headache, confusion, dizziness, lightheadedness.    PHYSICAL EXAM  Vital Signs Last 24 Hrs  T(C): 37.2 (04 Oct 2023 09:20), Max: 37.2 (04 Oct 2023 09:20)  T(F): 98.9 (04 Oct 2023 09:20), Max: 98.9 (04 Oct 2023 09:20)  HR: 60 (04 Oct 2023 09:20) (55 - 72)  BP: 105/60 (04 Oct 2023 09:20) (100/68 - 122/83)  BP(mean): --  RR: 18 (04 Oct 2023 09:20) (17 - 18)  SpO2: 99% (04 Oct 2023 09:20) (96% - 99%)    Parameters below as of 04 Oct 2023 09:20  Patient On (Oxygen Delivery Method): room air        Constitutional: Awake, alert, in no acute distress.   HEENT: Normocephalic, atraumatic, KARLA.  Respiratory: Lungs clear to ausculation bilaterally.   Cardiovascular: regular rhythm, normal S1 and S2, no audible murmurs.   GI: soft, slightly tender, non-distended, bowel sounds present.  Extremities: No lower extremity edema.  Psychiatric: AAO x 3. Normal affect/mood.     LABS  CBC - WBC/HGB/HTC/PLT: 7.36/12.4/36.4/265 (10-04-23)  BMP - Na/K/Cl/Bicarb/BUN/Cr/Gluc/AG/eGFR: 137/3.5/103/25/7/0.60/153/9/121 (10-04-23)  Ca - 8.8 (10-04-23)  Phos - 2.9 (10-04-23)  Mg - 2.0 (10-04-23)  LFT - Alb/Tprot/Tbili/Dbili/AlkPhos/ALT/AST: 4.2/--/0.4/--/120/13/13 (10-02-23)          10-03-23 @ 07:01  -  10-04-23 @ 07:00  --------------------------------------------------------  IN: 1800 mL / OUT: 800 mL / NET: 1000 mL        MEDICATIONS  MEDICATIONS  (STANDING):  cefTRIAXone   IVPB 1000 milliGRAM(s) IV Intermittent every 24 hours  dextrose 5%. 1000 milliLiter(s) (100 mL/Hr) IV Continuous <Continuous>  dextrose 5%. 1000 milliLiter(s) (50 mL/Hr) IV Continuous <Continuous>  dextrose 50% Injectable 25 Gram(s) IV Push once  dextrose 50% Injectable 12.5 Gram(s) IV Push once  dextrose 50% Injectable 25 Gram(s) IV Push once  glucagon  Injectable 1 milliGRAM(s) IntraMuscular once  heparin   Injectable 5000 Unit(s) SubCutaneous every 8 hours  influenza   Vaccine 0.5 milliLiter(s) IntraMuscular once  insulin glargine Injectable (LANTUS) 30 Unit(s) SubCutaneous at bedtime  insulin lispro (ADMELOG) corrective regimen sliding scale   SubCutaneous every 6 hours  metroNIDAZOLE  IVPB 500 milliGRAM(s) IV Intermittent every 8 hours    MEDICATIONS  (PRN):  acetaminophen     Tablet .. 650 milliGRAM(s) Oral every 6 hours PRN Mild Pain (1 - 3), Moderate Pain (4 - 6), Severe Pain (7 - 10)  albuterol    90 MICROgram(s) HFA Inhaler 2 Puff(s) Inhalation every 6 hours PRN Shortness of Breath and/or Wheezing  dextrose Oral Gel 15 Gram(s) Oral once PRN Blood Glucose LESS THAN 70 milliGRAM(s)/deciliter  HYDROmorphone  Injectable 0.5 milliGRAM(s) IV Push every 4 hours PRN Moderate Pain (4 - 6)  HYDROmorphone  Injectable 1 milliGRAM(s) IV Push every 4 hours PRN Severe Pain (7 - 10)  ondansetron Injectable 4 milliGRAM(s) IV Push every 6 hours PRN Nausea    ASSESSMENT / RECOMMENDATIONS    PONCHO GARNICA is a 33y Female with a past medical history of T2DM, asthma presented to [facility] on 10/02 with abdominal pain that localizes in left lower quadrant, accompanied by chills.   Pt was hemodynamically stable.   Labs reveal Co2 21, glucose 459, Cr 0.6, WBC 14, a1c 10.7 .  CT abdomen shows acute diverticulitis of the proximal sigmoid colon with microperforation.   Pt has been kept NPO and been treated with IV abx under surgical service.  Endocrinology has been consulted for DMT2.        A1C: 10.7 %  BUN: 7  Creatinine: 0.60  GFR: 121  Weight: 80.7  BMI: 31.5    # Type 2 diabetes mellitus with hyperglycemia  - A1c 11.1 in 05/23, 6.3 in 06/22  - Please keep lantus  units at bedtime.   - No premeals lispro at this time since pt is NPO  - Continue lispro moderate dose sliding scale q6  - Patient's fingerstick glucose goal is 100-180 mg/dL.    - Discharge recommendations to be discussed.   - Patient can follow up at discharge with [facility] by calling [phone] to make an appointment.       Case discussed with Dr. Mattson. Primary team updated.       Gavi Loya  Endocrinology Fellow    Service Pager: [phone]    SUBJECTIVE / INTERVAL HPI: Patient was seen and examined this morning.     Overnight events:  pt reports abdominal pain is improving, unable to have bowel movement but passing gas  tolerated clear liquid diet today.    Endocrine course  10/3: lantus 30 u with no premeals    CAPILLARY BLOOD GLUCOSE & INSULIN RECEIVED  280 mg/dL (10-03 @ 00:21)  223 mg/dL (10-03 @ 02:34)  255 mg/dL (10-03 @ 06:50)  216 mg/dL (10-03 @ 09:36)  233 mg/dL (10-03 @ 12:41)  241 mg/dL (10-03 @ 18:16) - lispro 2  181 mg/dL (10-03 @ 22:42) - lantus 30  188 mg/dL (10-03 @ 23:25)  178 mg/dL (10-04 @ 06:06)  lispro 2  200 at 12pm - lispro 2      REVIEW OF SYSTEMS  Constitutional:  Negative fever, chills or loss of appetite.  Eyes:  Negative blurry vision or double vision.  Cardiovascular:  Negative for chest pain or palpitations.  Respiratory:  Negative for cough, wheezing, or shortness of breath.    Gastrointestinal:  + abd pain  Genitourinary:  Negative frequency, urgency or dysuria.  Neurologic:  No headache, confusion, dizziness, lightheadedness.    PHYSICAL EXAM  Vital Signs Last 24 Hrs  T(C): 37.2 (04 Oct 2023 09:20), Max: 37.2 (04 Oct 2023 09:20)  T(F): 98.9 (04 Oct 2023 09:20), Max: 98.9 (04 Oct 2023 09:20)  HR: 60 (04 Oct 2023 09:20) (55 - 72)  BP: 105/60 (04 Oct 2023 09:20) (100/68 - 122/83)  BP(mean): --  RR: 18 (04 Oct 2023 09:20) (17 - 18)  SpO2: 99% (04 Oct 2023 09:20) (96% - 99%)    Parameters below as of 04 Oct 2023 09:20  Patient On (Oxygen Delivery Method): room air        Constitutional: Awake, alert, in no acute distress.   HEENT: Normocephalic, atraumatic, KARLA.  Respiratory: Lungs clear to ausculation bilaterally.   Cardiovascular: regular rhythm, normal S1 and S2, no audible murmurs.   GI: soft, slightly tender, non-distended, bowel sounds present.  Extremities: No lower extremity edema.  Psychiatric: AAO x 3. Normal affect/mood.     LABS  CBC - WBC/HGB/HTC/PLT: 7.36/12.4/36.4/265 (10-04-23)  BMP - Na/K/Cl/Bicarb/BUN/Cr/Gluc/AG/eGFR: 137/3.5/103/25/7/0.60/153/9/121 (10-04-23)  Ca - 8.8 (10-04-23)  Phos - 2.9 (10-04-23)  Mg - 2.0 (10-04-23)  LFT - Alb/Tprot/Tbili/Dbili/AlkPhos/ALT/AST: 4.2/--/0.4/--/120/13/13 (10-02-23)          10-03-23 @ 07:01  -  10-04-23 @ 07:00  --------------------------------------------------------  IN: 1800 mL / OUT: 800 mL / NET: 1000 mL        MEDICATIONS  MEDICATIONS  (STANDING):  cefTRIAXone   IVPB 1000 milliGRAM(s) IV Intermittent every 24 hours  dextrose 5%. 1000 milliLiter(s) (100 mL/Hr) IV Continuous <Continuous>  dextrose 5%. 1000 milliLiter(s) (50 mL/Hr) IV Continuous <Continuous>  dextrose 50% Injectable 25 Gram(s) IV Push once  dextrose 50% Injectable 12.5 Gram(s) IV Push once  dextrose 50% Injectable 25 Gram(s) IV Push once  glucagon  Injectable 1 milliGRAM(s) IntraMuscular once  heparin   Injectable 5000 Unit(s) SubCutaneous every 8 hours  influenza   Vaccine 0.5 milliLiter(s) IntraMuscular once  insulin glargine Injectable (LANTUS) 30 Unit(s) SubCutaneous at bedtime  insulin lispro (ADMELOG) corrective regimen sliding scale   SubCutaneous every 6 hours  metroNIDAZOLE  IVPB 500 milliGRAM(s) IV Intermittent every 8 hours    MEDICATIONS  (PRN):  acetaminophen     Tablet .. 650 milliGRAM(s) Oral every 6 hours PRN Mild Pain (1 - 3), Moderate Pain (4 - 6), Severe Pain (7 - 10)  albuterol    90 MICROgram(s) HFA Inhaler 2 Puff(s) Inhalation every 6 hours PRN Shortness of Breath and/or Wheezing  dextrose Oral Gel 15 Gram(s) Oral once PRN Blood Glucose LESS THAN 70 milliGRAM(s)/deciliter  HYDROmorphone  Injectable 0.5 milliGRAM(s) IV Push every 4 hours PRN Moderate Pain (4 - 6)  HYDROmorphone  Injectable 1 milliGRAM(s) IV Push every 4 hours PRN Severe Pain (7 - 10)  ondansetron Injectable 4 milliGRAM(s) IV Push every 6 hours PRN Nausea    ASSESSMENT / RECOMMENDATIONS    PONCHO GARNICA is a 33y Female with a past medical history of T2DM, asthma presented to [facility] on 10/02 with abdominal pain that localizes in left lower quadrant, accompanied by chills.   Pt was hemodynamically stable.   Labs reveal Co2 21, glucose 459, Cr 0.6, WBC 14, a1c 10.7 .  CT abdomen shows acute diverticulitis of the proximal sigmoid colon with microperforation.   Pt has been kept NPO and been treated with IV abx under surgical service.  Endocrinology has been consulted for DMT2.        A1C: 10.7 %  BUN: 7  Creatinine: 0.60  GFR: 121  Weight: 80.7  BMI: 31.5    # Type 2 diabetes mellitus with hyperglycemia  - A1c 11.1 in 05/23, 6.3 in 06/22  - Please keep lantus  units at bedtime.   - No premeals lispro at this time since pt is NPO  - Continue lispro moderate dose sliding scale q6  - Patient's fingerstick glucose goal is 100-180 mg/dL.    - Discharge recommendations to be discussed.   - Patient can follow up at discharge with [facility] by calling [phone] to make an appointment.       Case discussed with Dr. Mattson. Primary team updated.       Gavi Loya  Endocrinology Fellow    Service Pager: [phone]    SUBJECTIVE / INTERVAL HPI: Patient was seen and examined this morning.     Overnight events:  pt reports abdominal pain is improving, unable to have bowel movement but passing gas  tolerated clear liquid diet today.    Endocrine course  10/3: lantus 30 u with no premeals    CAPILLARY BLOOD GLUCOSE & INSULIN RECEIVED  280 mg/dL (10-03 @ 00:21)  223 mg/dL (10-03 @ 02:34)  255 mg/dL (10-03 @ 06:50)  216 mg/dL (10-03 @ 09:36)  233 mg/dL (10-03 @ 12:41)  241 mg/dL (10-03 @ 18:16) - lispro 2  181 mg/dL (10-03 @ 22:42) - lantus 30  188 mg/dL (10-03 @ 23:25)  178 mg/dL (10-04 @ 06:06)  lispro 2  200 at 12pm - lispro 2      REVIEW OF SYSTEMS  Constitutional:  Negative fever, chills or loss of appetite.  Eyes:  Negative blurry vision or double vision.  Cardiovascular:  Negative for chest pain or palpitations.  Respiratory:  Negative for cough, wheezing, or shortness of breath.    Gastrointestinal:  + abd pain  Genitourinary:  Negative frequency, urgency or dysuria.  Neurologic:  No headache, confusion, dizziness, lightheadedness.    PHYSICAL EXAM  Vital Signs Last 24 Hrs  T(C): 37.2 (04 Oct 2023 09:20), Max: 37.2 (04 Oct 2023 09:20)  T(F): 98.9 (04 Oct 2023 09:20), Max: 98.9 (04 Oct 2023 09:20)  HR: 60 (04 Oct 2023 09:20) (55 - 72)  BP: 105/60 (04 Oct 2023 09:20) (100/68 - 122/83)  BP(mean): --  RR: 18 (04 Oct 2023 09:20) (17 - 18)  SpO2: 99% (04 Oct 2023 09:20) (96% - 99%)    Parameters below as of 04 Oct 2023 09:20  Patient On (Oxygen Delivery Method): room air        Constitutional: Awake, alert, in no acute distress.   HEENT: Normocephalic, atraumatic, KARLA.  Respiratory: Lungs clear to ausculation bilaterally.   Cardiovascular: regular rhythm, normal S1 and S2, no audible murmurs.   GI: soft, slightly tender, non-distended, bowel sounds present.  Extremities: No lower extremity edema.  Psychiatric: AAO x 3. Normal affect/mood.     LABS  CBC - WBC/HGB/HTC/PLT: 7.36/12.4/36.4/265 (10-04-23)  BMP - Na/K/Cl/Bicarb/BUN/Cr/Gluc/AG/eGFR: 137/3.5/103/25/7/0.60/153/9/121 (10-04-23)  Ca - 8.8 (10-04-23)  Phos - 2.9 (10-04-23)  Mg - 2.0 (10-04-23)  LFT - Alb/Tprot/Tbili/Dbili/AlkPhos/ALT/AST: 4.2/--/0.4/--/120/13/13 (10-02-23)          10-03-23 @ 07:01  -  10-04-23 @ 07:00  --------------------------------------------------------  IN: 1800 mL / OUT: 800 mL / NET: 1000 mL        MEDICATIONS  MEDICATIONS  (STANDING):  cefTRIAXone   IVPB 1000 milliGRAM(s) IV Intermittent every 24 hours  dextrose 5%. 1000 milliLiter(s) (100 mL/Hr) IV Continuous <Continuous>  dextrose 5%. 1000 milliLiter(s) (50 mL/Hr) IV Continuous <Continuous>  dextrose 50% Injectable 25 Gram(s) IV Push once  dextrose 50% Injectable 12.5 Gram(s) IV Push once  dextrose 50% Injectable 25 Gram(s) IV Push once  glucagon  Injectable 1 milliGRAM(s) IntraMuscular once  heparin   Injectable 5000 Unit(s) SubCutaneous every 8 hours  influenza   Vaccine 0.5 milliLiter(s) IntraMuscular once  insulin glargine Injectable (LANTUS) 30 Unit(s) SubCutaneous at bedtime  insulin lispro (ADMELOG) corrective regimen sliding scale   SubCutaneous every 6 hours  metroNIDAZOLE  IVPB 500 milliGRAM(s) IV Intermittent every 8 hours    MEDICATIONS  (PRN):  acetaminophen     Tablet .. 650 milliGRAM(s) Oral every 6 hours PRN Mild Pain (1 - 3), Moderate Pain (4 - 6), Severe Pain (7 - 10)  albuterol    90 MICROgram(s) HFA Inhaler 2 Puff(s) Inhalation every 6 hours PRN Shortness of Breath and/or Wheezing  dextrose Oral Gel 15 Gram(s) Oral once PRN Blood Glucose LESS THAN 70 milliGRAM(s)/deciliter  HYDROmorphone  Injectable 0.5 milliGRAM(s) IV Push every 4 hours PRN Moderate Pain (4 - 6)  HYDROmorphone  Injectable 1 milliGRAM(s) IV Push every 4 hours PRN Severe Pain (7 - 10)  ondansetron Injectable 4 milliGRAM(s) IV Push every 6 hours PRN Nausea    ASSESSMENT / RECOMMENDATIONS    PONCHO GARNICA is a 33y Female with a past medical history of T2DM, asthma presented to [facility] on 10/02 with abdominal pain that localizes in left lower quadrant, accompanied by chills.   Pt was hemodynamically stable.   Labs reveal Co2 21, glucose 459, Cr 0.6, WBC 14, a1c 10.7 .  CT abdomen shows acute diverticulitis of the proximal sigmoid colon with microperforation.   Pt has been kept NPO and been treated with IV abx under surgical service.  Endocrinology has been consulted for DMT2.        A1C: 10.7 %  BUN: 7  Creatinine: 0.60  GFR: 121  Weight: 80.7  BMI: 31.5    # Type 2 diabetes mellitus with hyperglycemia  - A1c 11.1 in 05/23, 6.3 in 06/22  - Pt advanced to regular diet  - Please keep lantus 34 units at bedtime.   - please add lispro 7 units with meals TID  - Continue lispro moderate dose sliding scale with meals and nightly  - carb controlled diet  - Patient's fingerstick glucose goal is 100-180 mg/dL.    - Discharge recommendations to be discussed.   - Patient can follow up at discharge with [facility] by calling [phone] to make an appointment.       Case discussed with Dr. Mattson. Primary team updated.       Gavi Loya  Endocrinology Fellow    Service Pager: [phone]

## 2023-10-04 NOTE — DISCHARGE NOTE PROVIDER - CARE PROVIDER_API CALL
Noe Lees  Colon/Rectal Surgery  [address] [place] [address], [address]  [place] [address]  Phone: [phone]  Fax: [phone]  Follow Up Time:    Noe Lees  Colon/Rectal Surgery  [address] [place] [address], [address]  [place] [address]  Phone: [phone]  Fax: [phone]  Follow Up Time:     Jun Rousseau  Surgery  [address], [address]  [place] [address]  Phone: [phone]  Fax: [phone]  Follow Up Time:

## 2023-10-04 NOTE — CHART NOTE - NSCHARTNOTEFT_GEN_A_CORE
Pt seen and examined at bedside. Pt endorses improving abdominal pain since admission. On exam, abdomen is soft, mildly distended, ttp LLQ, non peritonitic

## 2023-10-04 NOTE — PROGRESS NOTE ADULT - SUBJECTIVE AND OBJECTIVE BOX
Patient is a 33y old  Female who presents with a chief complaint of Diverticulitis (04 Oct 2023 10:14)      INTERVAL HPI/OVERNIGHT EVENTS: offers no new complaints; current symptoms resolving    MEDICATIONS  (STANDING):  cefTRIAXone   IVPB 1000 milliGRAM(s) IV Intermittent every 24 hours  dextrose 5%. 1000 milliLiter(s) (100 mL/Hr) IV Continuous <Continuous>  dextrose 5%. 1000 milliLiter(s) (50 mL/Hr) IV Continuous <Continuous>  dextrose 50% Injectable 25 Gram(s) IV Push once  dextrose 50% Injectable 25 Gram(s) IV Push once  dextrose 50% Injectable 12.5 Gram(s) IV Push once  glucagon  Injectable 1 milliGRAM(s) IntraMuscular once  heparin   Injectable 5000 Unit(s) SubCutaneous every 8 hours  influenza   Vaccine 0.5 milliLiter(s) IntraMuscular once  insulin glargine Injectable (LANTUS) 30 Unit(s) SubCutaneous at bedtime  insulin lispro (ADMELOG) corrective regimen sliding scale   SubCutaneous every 6 hours  metroNIDAZOLE  IVPB 500 milliGRAM(s) IV Intermittent every 8 hours    MEDICATIONS  (PRN):  acetaminophen     Tablet .. 650 milliGRAM(s) Oral every 6 hours PRN Mild Pain (1 - 3), Moderate Pain (4 - 6), Severe Pain (7 - 10)  albuterol    90 MICROgram(s) HFA Inhaler 2 Puff(s) Inhalation every 6 hours PRN Shortness of Breath and/or Wheezing  dextrose Oral Gel 15 Gram(s) Oral once PRN Blood Glucose LESS THAN 70 milliGRAM(s)/deciliter  HYDROmorphone  Injectable 0.5 milliGRAM(s) IV Push every 4 hours PRN Moderate Pain (4 - 6)  HYDROmorphone  Injectable 1 milliGRAM(s) IV Push every 4 hours PRN Severe Pain (7 - 10)  ondansetron Injectable 4 milliGRAM(s) IV Push every 6 hours PRN Nausea      __________________________________________________  REVIEW OF SYSTEMS:    CONSTITUTIONAL: No fever,   EYES: no acute visual disturbances  NECK: No pain or stiffness  RESPIRATORY: No cough; No shortness of breath  CARDIOVASCULAR: No chest pain, no palpitations  GASTROINTESTINAL: mild pain. No nausea or vomiting; No diarrhea   NEUROLOGICAL: No headache or numbness, no tremors  MUSCULOSKELETAL: No joint pain, no muscle pain  GENITOURINARY: no dysuria, no frequency, no hesitancy  PSYCHIATRY: no depression , no anxiety  ALL OTHER  ROS negative        Vital Signs Last 24 Hrs  T(C): 36.3 (04 Oct 2023 13:08), Max: 37.2 (04 Oct 2023 09:20)  T(F): 97.4 (04 Oct 2023 13:08), Max: 98.9 (04 Oct 2023 09:20)  HR: 70 (04 Oct 2023 13:08) (55 - 72)  BP: 112/77 (04 Oct 2023 13:08) (100/68 - 122/83)  BP(mean): --  RR: 17 (04 Oct 2023 13:08) (17 - 18)  SpO2: 98% (04 Oct 2023 13:08) (96% - 99%)    Parameters below as of 04 Oct 2023 13:08  Patient On (Oxygen Delivery Method): room air        ________________________________________________  PHYSICAL EXAM:  GENERAL: NAD  HEENT: Normocephalic;  conjunctivae and sclerae clear; moist mucous membranes;   NECK : supple  CHEST/LUNG: Clear to auscultation bilaterally with good air entry   HEART: S1 S2  regular; no murmurs, gallops or rubs  ABDOMEN: Soft, ttp, Nondistended; Bowel sounds present  EXTREMITIES: no cyanosis; no edema; no calf tenderness  SKIN: warm and dry; no rash  NERVOUS SYSTEM:  Awake and alert; Oriented  to place, person and time ; no new deficits    _________________________________________________  LABS:                        12.4   7.36  )-----------( 265      ( 04 Oct 2023 08:12 )             36.4     10-04    137  |  103  |  7   ----------------------------<  153<H>  3.5   |  25  |  0.60    Ca    8.8      04 Oct 2023 08:12  Phos  2.9     10-04  Mg     2.0     10-04        Urinalysis Basic - ( 04 Oct 2023 08:12 )    Color: x / Appearance: x / SG: x / pH: x  Gluc: 153 mg/dL / Ketone: x  / Bili: x / Urobili: x   Blood: x / Protein: x / Nitrite: x   Leuk Esterase: x / RBC: x / WBC x   Sq Epi: x / Non Sq Epi: x / Bacteria: x      CAPILLARY BLOOD GLUCOSE      POCT Blood Glucose.: 200 mg/dL (04 Oct 2023 11:29)  POCT Blood Glucose.: 178 mg/dL (04 Oct 2023 06:06)  POCT Blood Glucose.: 188 mg/dL (03 Oct 2023 23:25)  POCT Blood Glucose.: 181 mg/dL (03 Oct 2023 22:42)  POCT Blood Glucose.: 241 mg/dL (03 Oct 2023 18:16)        RADIOLOGY & ADDITIONAL TESTS:      Plan of care was discussed with patient and /or primary care giver; all questions and concerns were addressed and care was aligned with patient's wishes.

## 2023-10-04 NOTE — PROGRESS NOTE ADULT - ATTENDING COMMENTS
Pt seen on rounds this afternoon.  Reports minimal pain, and has been tolerating clear liquid diet.  Abdomen is soft and minimally tender--only to deep LLQ palpation  Fingersticks have been in the 170-200 range since last night on 30 Lantus plus sliding scale  Diet has now been advanced to solid food but diet not ordered as consistent carb  Pt says that she is fairly hungry  --To increase the Lantus to 34 units given the elevated overnight glucoses  --Begin 7 units lispro premeal  --Moderate dose sliding scale AC/HS  --Change diet to consistent carb

## 2023-10-04 NOTE — DISCHARGE NOTE PROVIDER - NSDCFUADDINST_GEN_ALL_CORE_FT
Warning Signs:  Please call your doctor if you experience the following:  *You experience new chest pain, pressure, squeezing or tightness.  *New or worsening cough, shortness of breath, or wheeze.  *If you are vomiting and cannot keep down fluids or your medications.  *You are getting dehydrated due to continued vomiting, diarrhea, or other reasons. Signs of dehydration include dry mouth, rapid heartbeat, or feeling dizzy or faint when standing.  *You see blood or dark/black material when you vomit or have a bowel movement.  *You experience burning when you urinate, have blood in your urine, or experience a discharge.  *Your pain is not improving within 8-12 hours or is not gone within 24 hours. Call or return immediately if your pain is getting worse, changes location, or moves to your chest or back.  *You have shaking chills, or fever greater than 101.5 degrees Fahrenheit or 38 degrees Celsius.  *Any change in your symptoms, or any new symptoms that concern you.    Please continue a LOW-FIBER DIET. Listed below are some foods you may eat and those you should avoid.   --Allowed foods:  White bread without nuts and seeds  White rice, plain white pasta, and crackers  Refined hot cereals, such as Cream of Wheat, or cold cereals with less than 1 gram of fiber per serving  Pancakes or waffles made from white refined flour  Most canned or well-cooked vegetables and fruits without skins or seeds  Fruit and vegetable juice with little or no pulp, fruit-flavored drinks, and flavored meza  Tender meat, poultry, fish, eggs and tofu  Milk and foods made from milk — such as yogurt, pudding, ice cream, cheeses and sour cream — if tolerated  Butter, margarine, oils and salad dressings without seeds  --Foods to avoid:  Whole-wheat or whole-grain breads, cereals and pasta  Brown or wild rice and other whole grains, such as oats, kasha, barley and quinoa  Dried fruits and prune juice  Raw fruit, including those with seeds, skin or membranes, such as berries  Raw or undercooked vegetables, including corn  Dried beans, peas and lentils  Seeds and nuts and foods containing them, including peanut butter and other nut butters  Coconut  Popcorn  You have been prescribed oral antibiotics. Please be sure to complete the entire course as directed.    Warning Signs:  Please call your doctor if you experience the following:  *You experience new chest pain, pressure, squeezing or tightness.  *New or worsening cough, shortness of breath, or wheeze.  *If you are vomiting and cannot keep down fluids or your medications.  *You are getting dehydrated due to continued vomiting, diarrhea, or other reasons. Signs of dehydration include dry mouth, rapid heartbeat, or feeling dizzy or faint when standing.  *You see blood or dark/black material when you vomit or have a bowel movement.  *You experience burning when you urinate, have blood in your urine, or experience a discharge.  *Your pain is not improving within 8-12 hours or is not gone within 24 hours. Call or return immediately if your pain is getting worse, changes location, or moves to your chest or back.  *You have shaking chills, or fever greater than 101.5 degrees Fahrenheit or 38 degrees Celsius.  *Any change in your symptoms, or any new symptoms that concern you.    Please continue a LOW-FIBER DIET. Listed below are some foods you may eat and those you should avoid.   --Allowed foods:  White bread without nuts and seeds  White rice, plain white pasta, and crackers  Refined hot cereals, such as Cream of Wheat, or cold cereals with less than 1 gram of fiber per serving  Pancakes or waffles made from white refined flour  Most canned or well-cooked vegetables and fruits without skins or seeds  Fruit and vegetable juice with little or no pulp, fruit-flavored drinks, and flavored meza  Tender meat, poultry, fish, eggs and tofu  Milk and foods made from milk — such as yogurt, pudding, ice cream, cheeses and sour cream — if tolerated  Butter, margarine, oils and salad dressings without seeds  --Foods to avoid:  Whole-wheat or whole-grain breads, cereals and pasta  Brown or wild rice and other whole grains, such as oats, kasha, barley and quinoa  Dried fruits and prune juice  Raw fruit, including those with seeds, skin or membranes, such as berries  Raw or undercooked vegetables, including corn  Dried beans, peas and lentils  Seeds and nuts and foods containing them, including peanut butter and other nut butters  Coconut  Popcorn  You have been prescribed oral antibiotics. Please be sure to complete the entire course as directed.    Follow up with Dr. Lees in 1-2 weeks. Call the office at the number below to schedule your appointment. You may shower; soap and water over incision sites. Do not scrub. Pat dry when done. No tub bathing or swimming until cleared. Keep incision sites out of the sun as scars will darken. Ambulate as tolerated, but no heavy lifting (>10lbs) or strenuous exercise. You may resume regular diet. You should be urinating at least 3-4x per day. Call the office if you experience increasing abdominal pain, nausea, vomiting, or temperature >101 F.    Warning Signs:  Please call your doctor if you experience the following:  *You experience new chest pain, pressure, squeezing or tightness.  *New or worsening cough, shortness of breath, or wheeze.  *If you are vomiting and cannot keep down fluids or your medications.  *You are getting dehydrated due to continued vomiting, diarrhea, or other reasons. Signs of dehydration include dry mouth, rapid heartbeat, or feeling dizzy or faint when standing.  *You see blood or dark/black material when you vomit or have a bowel movement.  *You experience burning when you urinate, have blood in your urine, or experience a discharge.  *Your pain is not improving within 8-12 hours or is not gone within 24 hours. Call or return immediately if your pain is getting worse, changes location, or moves to your chest or back.  *You have shaking chills, or fever greater than 101.5 degrees Fahrenheit or 38 degrees Celsius.  *Any change in your symptoms, or any new symptoms that concern you.    Please continue a LOW-FIBER DIET. Listed below are some foods you may eat and those you should avoid.   --Allowed foods:  White bread without nuts and seeds  White rice, plain white pasta, and crackers  Refined hot cereals, such as Cream of Wheat, or cold cereals with less than 1 gram of fiber per serving  Pancakes or waffles made from white refined flour  Most canned or well-cooked vegetables and fruits without skins or seeds  Fruit and vegetable juice with little or no pulp, fruit-flavored drinks, and flavored meza  Tender meat, poultry, fish, eggs and tofu  Milk and foods made from milk — such as yogurt, pudding, ice cream, cheeses and sour cream — if tolerated  Butter, margarine, oils and salad dressings without seeds  --Foods to avoid:  Whole-wheat or whole-grain breads, cereals and pasta  Brown or wild rice and other whole grains, such as oats, kasha, barley and quinoa  Dried fruits and prune juice  Raw fruit, including those with seeds, skin or membranes, such as berries  Raw or undercooked vegetables, including corn  Dried beans, peas and lentils  Seeds and nuts and foods containing them, including peanut butter and other nut butters  Coconut  Popcorn     INSULIN REGIMEN: 35u of lantus at bedtime. 10u of lispro before meals, three times daily. Follow up at discharge with [facility] by calling [phone] to make an appointment    Please follow up with Dr. Rousseau to discuss bariatric surgery; you may call the office to make an appointment at your earliest convenience, call [phone].

## 2023-10-04 NOTE — DISCHARGE NOTE PROVIDER - HOSPITAL COURSE
34 yo F w/ hx of Asthma, DM, PCOS, HTN, nephrolithiasis, diverticulosis presenting for 2 days of worsening abdominal pain and chills. Abdomen soft and non-distended but focally tender w/ guarding in LLQ, as well as referred LLQ w/ RLQ palpation. Patient hemodynamically normal and afebrile, however labs notable for leukocytosis with left shift. CT A/P significant for dane-sigmoid fat stranding and microperforation. Clinical picture consistent with acute sigmoid diverticulitis Mariama 1A     **INCOMPLETE 32 yo F w/ hx of Asthma, DM, PCOS, HTN, nephrolithiasis, diverticulosis presenting for 2 days of worsening abdominal pain and chills. Abdomen soft and non-distended but focally tender w/ guarding in LLQ, as well as referred LLQ w/ RLQ palpation. Patient hemodynamically normal and afebrile, however labs notable for leukocytosis with left shift. CT A/P significant for dane-sigmoid fat stranding and microperforation. Clinical picture consistent with acute sigmoid diverticulitis Hinchey 1A. Patient was made NPO and started on ceftriaxone and flagyl. She is now tolerating a regular carb controlled diet. Additionally, patient found to have uncontrolled DM Type 2 with A1c 10.7. Started on lantus 34 units at bedtime, lispro 7 units with meals TID, and lispro moderate dose sliding scale with meals and nightly.         **INCOMPLETE   34 yo F w/ hx of Asthma, DM, PCOS, HTN, nephrolithiasis, diverticulosis presenting for 2 days of worsening abdominal pain and chills. Abdomen soft and non-distended but focally tender w/ guarding in LLQ, as well as referred LLQ w/ RLQ palpation. Patient hemodynamically normal and afebrile, however labs notable for leukocytosis with left shift. CT A/P significant for dane-sigmoid fat stranding and microperforation. Clinical picture consistent with acute sigmoid diverticulitis Hinchey 1A. Patient was made NPO and started on ceftriaxone and flagyl. She is now tolerating a regular carb controlled diet. Additionally, patient found to have uncontrolled DM Type 2 with A1c 10.7. Started on lantus 34 units at bedtime, lispro 7 units with meals TID, and lispro moderate dose sliding scale with meals and nightly. Endocrine recommended to have lantus 24u at bedtime and lispro 10u with meals TID for home.    Today patient is feeling well, all the VS and blood work is within normal limits, the pain is well controlled on oral pain medication, tolerating diet without nausea, and ambulating independently - patient is stable and ready for discharge today.     32 yo F w/ hx of Asthma, DM, PCOS, HTN, nephrolithiasis, diverticulosis presenting for 2 days of worsening abdominal pain and chills. Abdomen soft and non-distended but focally tender w/ guarding in LLQ, as well as referred LLQ w/ RLQ palpation. Patient hemodynamically normal and afebrile, however labs notable for leukocytosis with left shift. CT A/P significant for dane-sigmoid fat stranding and microperforation. Clinical picture consistent with acute sigmoid diverticulitis Hinchey 1A. Patient was made NPO and started on ceftriaxone and flagyl. She is now tolerating a regular carb controlled diet. Additionally, patient found to have uncontrolled DM Type 2 with A1c 10.7. Started on lantus 34 units at bedtime, lispro 7 units with meals TID, and lispro moderate dose sliding scale with meals and nightly. Endocrine recommended to have lantus 35u at bedtime and lispro 10u with meals TID for home.    Today patient is feeling well, all the VS and blood work is within normal limits, the pain is well controlled on oral pain medication, tolerating diet without nausea, and ambulating independently - patient is stable and ready for discharge today.

## 2023-10-04 NOTE — DISCHARGE NOTE PROVIDER - DISCHARGE DIET
Other Diet Instructions Consistent Carbohydrate Diabetic Diets/Low Fiber Diet/Other Diet Instructions

## 2023-10-05 ENCOUNTER — TRANSCRIPTION ENCOUNTER (OUTPATIENT)
Age: 33
End: 2023-10-05

## 2023-10-05 VITALS
HEART RATE: 64 BPM | OXYGEN SATURATION: 97 % | RESPIRATION RATE: 18 BRPM | TEMPERATURE: 98 F | DIASTOLIC BLOOD PRESSURE: 96 MMHG | SYSTOLIC BLOOD PRESSURE: 137 MMHG

## 2023-10-05 LAB
ANION GAP SERPL CALC-SCNC: 12 MMOL/L — SIGNIFICANT CHANGE UP (ref 5–17)
BUN SERPL-MCNC: 7 MG/DL — SIGNIFICANT CHANGE UP (ref 7–23)
CALCIUM SERPL-MCNC: 8.9 MG/DL — SIGNIFICANT CHANGE UP (ref 8.4–10.5)
CHLORIDE SERPL-SCNC: 105 MMOL/L — SIGNIFICANT CHANGE UP (ref 96–108)
CO2 SERPL-SCNC: 23 MMOL/L — SIGNIFICANT CHANGE UP (ref 22–31)
CREAT SERPL-MCNC: 0.59 MG/DL — SIGNIFICANT CHANGE UP (ref 0.5–1.3)
EGFR: 121 ML/MIN/1.73M2 — SIGNIFICANT CHANGE UP
GLUCOSE BLDC GLUCOMTR-MCNC: 122 MG/DL — HIGH (ref 70–99)
GLUCOSE BLDC GLUCOMTR-MCNC: 127 MG/DL — HIGH (ref 70–99)
GLUCOSE SERPL-MCNC: 118 MG/DL — HIGH (ref 70–99)
HCT VFR BLD CALC: 37.9 % — SIGNIFICANT CHANGE UP (ref 34.5–45)
HGB BLD-MCNC: 12.8 G/DL — SIGNIFICANT CHANGE UP (ref 11.5–15.5)
MAGNESIUM SERPL-MCNC: 2.1 MG/DL — SIGNIFICANT CHANGE UP (ref 1.6–2.6)
MCHC RBC-ENTMCNC: 30.1 PG — SIGNIFICANT CHANGE UP (ref 27–34)
MCHC RBC-ENTMCNC: 33.8 GM/DL — SIGNIFICANT CHANGE UP (ref 32–36)
MCV RBC AUTO: 89.2 FL — SIGNIFICANT CHANGE UP (ref 80–100)
NRBC # BLD: 0 /100 WBCS — SIGNIFICANT CHANGE UP (ref 0–0)
PHOSPHATE SERPL-MCNC: 4 MG/DL — SIGNIFICANT CHANGE UP (ref 2.5–4.5)
PLATELET # BLD AUTO: 310 K/UL — SIGNIFICANT CHANGE UP (ref 150–400)
POTASSIUM SERPL-MCNC: 3.4 MMOL/L — LOW (ref 3.5–5.3)
POTASSIUM SERPL-SCNC: 3.4 MMOL/L — LOW (ref 3.5–5.3)
RBC # BLD: 4.25 M/UL — SIGNIFICANT CHANGE UP (ref 3.8–5.2)
RBC # FLD: 12 % — SIGNIFICANT CHANGE UP (ref 10.3–14.5)
SODIUM SERPL-SCNC: 140 MMOL/L — SIGNIFICANT CHANGE UP (ref 135–145)
WBC # BLD: 7.62 K/UL — SIGNIFICANT CHANGE UP (ref 3.8–10.5)
WBC # FLD AUTO: 7.62 K/UL — SIGNIFICANT CHANGE UP (ref 3.8–10.5)

## 2023-10-05 PROCEDURE — 85027 COMPLETE CBC AUTOMATED: CPT

## 2023-10-05 PROCEDURE — 36415 COLL VENOUS BLD VENIPUNCTURE: CPT

## 2023-10-05 PROCEDURE — 96376 TX/PRO/DX INJ SAME DRUG ADON: CPT

## 2023-10-05 PROCEDURE — 84702 CHORIONIC GONADOTROPIN TEST: CPT

## 2023-10-05 PROCEDURE — 74177 CT ABD & PELVIS W/CONTRAST: CPT | Mod: MA

## 2023-10-05 PROCEDURE — 96375 TX/PRO/DX INJ NEW DRUG ADDON: CPT

## 2023-10-05 PROCEDURE — 81001 URINALYSIS AUTO W/SCOPE: CPT

## 2023-10-05 PROCEDURE — 84100 ASSAY OF PHOSPHORUS: CPT

## 2023-10-05 PROCEDURE — 96374 THER/PROPH/DIAG INJ IV PUSH: CPT

## 2023-10-05 PROCEDURE — 99285 EMERGENCY DEPT VISIT HI MDM: CPT

## 2023-10-05 PROCEDURE — 85025 COMPLETE CBC W/AUTO DIFF WBC: CPT

## 2023-10-05 PROCEDURE — 82010 KETONE BODYS QUAN: CPT

## 2023-10-05 PROCEDURE — 83735 ASSAY OF MAGNESIUM: CPT

## 2023-10-05 PROCEDURE — 82962 GLUCOSE BLOOD TEST: CPT

## 2023-10-05 PROCEDURE — 80053 COMPREHEN METABOLIC PANEL: CPT

## 2023-10-05 PROCEDURE — 80048 BASIC METABOLIC PNL TOTAL CA: CPT

## 2023-10-05 PROCEDURE — 82947 ASSAY GLUCOSE BLOOD QUANT: CPT

## 2023-10-05 PROCEDURE — 83690 ASSAY OF LIPASE: CPT

## 2023-10-05 PROCEDURE — 83036 HEMOGLOBIN GLYCOSYLATED A1C: CPT

## 2023-10-05 PROCEDURE — 99233 SBSQ HOSP IP/OBS HIGH 50: CPT

## 2023-10-05 PROCEDURE — 99231 SBSQ HOSP IP/OBS SF/LOW 25: CPT

## 2023-10-05 RX ORDER — INSULIN GLARGINE 100 [IU]/ML
35 INJECTION, SOLUTION SUBCUTANEOUS
Qty: 3 | Refills: 0
Start: 2023-10-05 | End: 2023-11-03

## 2023-10-05 RX ORDER — CEFPODOXIME PROXETIL 100 MG
1 TABLET ORAL
Qty: 22 | Refills: 0
Start: 2023-10-05 | End: 2023-10-15

## 2023-10-05 RX ORDER — POTASSIUM CHLORIDE 20 MEQ
40 PACKET (EA) ORAL ONCE
Refills: 0 | Status: COMPLETED | OUTPATIENT
Start: 2023-10-05 | End: 2023-10-05

## 2023-10-05 RX ORDER — ACETAMINOPHEN 500 MG
2 TABLET ORAL
Qty: 0 | Refills: 0 | DISCHARGE
Start: 2023-10-05

## 2023-10-05 RX ORDER — METRONIDAZOLE 500 MG
1 TABLET ORAL
Qty: 33 | Refills: 0
Start: 2023-10-05 | End: 2023-10-15

## 2023-10-05 RX ORDER — INSULIN LISPRO 100/ML
10 VIAL (ML) SUBCUTANEOUS
Qty: 3 | Refills: 0
Start: 2023-10-05 | End: 2023-11-03

## 2023-10-05 RX ADMIN — HEPARIN SODIUM 5000 UNIT(S): 5000 INJECTION INTRAVENOUS; SUBCUTANEOUS at 06:27

## 2023-10-05 RX ADMIN — ONDANSETRON 4 MILLIGRAM(S): 8 TABLET, FILM COATED ORAL at 00:37

## 2023-10-05 RX ADMIN — Medication 650 MILLIGRAM(S): at 01:38

## 2023-10-05 RX ADMIN — Medication 100 MILLIGRAM(S): at 06:27

## 2023-10-05 RX ADMIN — Medication 7 UNIT(S): at 09:07

## 2023-10-05 RX ADMIN — Medication 40 MILLIEQUIVALENT(S): at 09:10

## 2023-10-05 RX ADMIN — Medication 650 MILLIGRAM(S): at 00:38

## 2023-10-05 NOTE — CHART NOTE - NSCHARTNOTEFT_GEN_A_CORE
A1C: 10.7 %  BUN: 7  Creatinine: 0.59  GFR: 121  Weight: 80.7  BMI: 31.5    CAPILLARY BLOOD GLUCOSE      POCT Blood Glucose.: 122 mg/dL (05 Oct 2023 09:03)  POCT Blood Glucose.: 127 mg/dL (05 Oct 2023 07:07)  POCT Blood Glucose.: 169 mg/dL (04 Oct 2023 22:01)  POCT Blood Glucose.: 204 mg/dL (04 Oct 2023 18:06)  POCT Blood Glucose.: 200 mg/dL (04 Oct 2023 11:29)    # Type 2 diabetes mellitus with hyperglycemia  - A1c 11.1 in 05/23, 6.3 in 06/22  - Discharge recommendations: Lantus 35 u + humalog 10 u TID with meals  - Patient can follow up at discharge with [facility] by calling [phone] to make an appointment.       Case discussed with Dr. Mattson. Primary team updated. A1C: 10.7 %  BUN: 7  Creatinine: 0.59  GFR: 121  Weight: 80.7  BMI: 31.5    CAPILLARY BLOOD GLUCOSE      POCT Blood Glucose.: 122 mg/dL (05 Oct 2023 09:03)  POCT Blood Glucose.: 127 mg/dL (05 Oct 2023 07:07)  POCT Blood Glucose.: 169 mg/dL (04 Oct 2023 22:01)  POCT Blood Glucose.: 204 mg/dL (04 Oct 2023 18:06)  POCT Blood Glucose.: 200 mg/dL (04 Oct 2023 11:29)    # Type 2 diabetes mellitus with hyperglycemia  - A1c 11.1 in 05/23, 6.3 in 06/22  - Discharge recommendations: Lantus 35 u + humalog 10 u TID with meals  - Patient can follow up at discharge with [facility] by calling [phone] to make an appointment.       Case discussed with Dr. Mattson. Primary team updated.    Attending:  Glucoses significantly improved since last night (see above).  Discharge regimen as noted above.  Encouraged to return for follow-up as outpatient    KENDRICK Mattson MD

## 2023-10-05 NOTE — PROGRESS NOTE ADULT - SUBJECTIVE AND OBJECTIVE BOX
Patient is a 33y old  Female who presents with a chief complaint of Diverticulitis (05 Oct 2023 07:03)      INTERVAL HPI/OVERNIGHT EVENTS: offers no new complaints; current symptoms resolving    MEDICATIONS  (STANDING):  cefTRIAXone   IVPB 1000 milliGRAM(s) IV Intermittent every 24 hours  dextrose 5%. 1000 milliLiter(s) (100 mL/Hr) IV Continuous <Continuous>  dextrose 5%. 1000 milliLiter(s) (50 mL/Hr) IV Continuous <Continuous>  dextrose 50% Injectable 25 Gram(s) IV Push once  dextrose 50% Injectable 12.5 Gram(s) IV Push once  dextrose 50% Injectable 25 Gram(s) IV Push once  glucagon  Injectable 1 milliGRAM(s) IntraMuscular once  heparin   Injectable 5000 Unit(s) SubCutaneous every 8 hours  influenza   Vaccine 0.5 milliLiter(s) IntraMuscular once  insulin glargine Injectable (LANTUS) 34 Unit(s) SubCutaneous at bedtime  insulin lispro (ADMELOG) corrective regimen sliding scale   SubCutaneous Before meals and at bedtime  insulin lispro Injectable (ADMELOG) 7 Unit(s) SubCutaneous three times a day before meals  metroNIDAZOLE  IVPB 500 milliGRAM(s) IV Intermittent every 8 hours    MEDICATIONS  (PRN):  acetaminophen     Tablet .. 650 milliGRAM(s) Oral every 6 hours PRN Mild Pain (1 - 3), Moderate Pain (4 - 6), Severe Pain (7 - 10)  albuterol    90 MICROgram(s) HFA Inhaler 2 Puff(s) Inhalation every 6 hours PRN Shortness of Breath and/or Wheezing  dextrose Oral Gel 15 Gram(s) Oral once PRN Blood Glucose LESS THAN 70 milliGRAM(s)/deciliter  HYDROmorphone  Injectable 0.5 milliGRAM(s) IV Push every 4 hours PRN Moderate Pain (4 - 6)  HYDROmorphone  Injectable 1 milliGRAM(s) IV Push every 4 hours PRN Severe Pain (7 - 10)  ondansetron Injectable 4 milliGRAM(s) IV Push every 6 hours PRN Nausea      __________________________________________________  REVIEW OF SYSTEMS:    CONSTITUTIONAL: No fever,   EYES: no acute visual disturbances  NECK: No pain or stiffness  RESPIRATORY: No cough; No shortness of breath  CARDIOVASCULAR: No chest pain, no palpitations  GASTROINTESTINAL: No pain. No nausea or vomiting; No diarrhea   NEUROLOGICAL: No headache or numbness, no tremors  MUSCULOSKELETAL: No joint pain, no muscle pain  GENITOURINARY: no dysuria, no frequency, no hesitancy  PSYCHIATRY: no depression , no anxiety  ALL OTHER  ROS negative        Vital Signs Last 24 Hrs  T(C): 36.9 (05 Oct 2023 09:11), Max: 36.9 (04 Oct 2023 20:10)  T(F): 98.4 (05 Oct 2023 09:11), Max: 98.5 (04 Oct 2023 20:10)  HR: 64 (05 Oct 2023 09:11) (56 - 70)  BP: 137/96 (05 Oct 2023 09:11) (108/71 - 142/91)  BP(mean): --  RR: 18 (05 Oct 2023 09:11) (16 - 18)  SpO2: 97% (05 Oct 2023 09:11) (97% - 99%)    Parameters below as of 05 Oct 2023 09:11  Patient On (Oxygen Delivery Method): room air        ________________________________________________  PHYSICAL EXAM:  GENERAL: NAD  HEENT: Normocephalic;  conjunctivae and sclerae clear; moist mucous membranes;   NECK : supple  CHEST/LUNG: Clear to auscultation bilaterally with good air entry   HEART: S1 S2  regular; no murmurs, gallops or rubs  ABDOMEN: Soft, Nontender, Nondistended; Bowel sounds present  EXTREMITIES: no cyanosis; no edema; no calf tenderness  SKIN: warm and dry; no rash  NERVOUS SYSTEM:  Awake and alert; Oriented  to place, person and time ; no new deficits    _________________________________________________  LABS:                        12.8   7.62  )-----------( 310      ( 05 Oct 2023 05:30 )             37.9     10-05    140  |  105  |  7   ----------------------------<  118<H>  3.4<L>   |  23  |  0.59    Ca    8.9      05 Oct 2023 05:30  Phos  4.0     10-05  Mg     2.1     10-05        Urinalysis Basic - ( 05 Oct 2023 05:30 )    Color: x / Appearance: x / SG: x / pH: x  Gluc: 118 mg/dL / Ketone: x  / Bili: x / Urobili: x   Blood: x / Protein: x / Nitrite: x   Leuk Esterase: x / RBC: x / WBC x   Sq Epi: x / Non Sq Epi: x / Bacteria: x      CAPILLARY BLOOD GLUCOSE      POCT Blood Glucose.: 122 mg/dL (05 Oct 2023 09:03)  POCT Blood Glucose.: 127 mg/dL (05 Oct 2023 07:07)  POCT Blood Glucose.: 169 mg/dL (04 Oct 2023 22:01)  POCT Blood Glucose.: 204 mg/dL (04 Oct 2023 18:06)        RADIOLOGY & ADDITIONAL TESTS:      Plan of care was discussed with patient and /or primary care giver; all questions and concerns were addressed and care was aligned with patient's wishes.

## 2023-10-05 NOTE — PROGRESS NOTE ADULT - SUBJECTIVE AND OBJECTIVE BOX
SUBJECTIVE: Patient seen and examined bedside by chief resident. feeling well. tolerating diet. pain mostly has fully resolved. some nausea. +flatus/+BM    cefTRIAXone   IVPB 1000 milliGRAM(s) IV Intermittent every 24 hours  heparin   Injectable 5000 Unit(s) SubCutaneous every 8 hours  metroNIDAZOLE  IVPB 500 milliGRAM(s) IV Intermittent every 8 hours      Vital Signs Last 24 Hrs  T(C): 36.8 (05 Oct 2023 05:33), Max: 37.2 (04 Oct 2023 09:20)  T(F): 98.2 (05 Oct 2023 05:33), Max: 98.9 (04 Oct 2023 09:20)  HR: 56 (05 Oct 2023 05:33) (56 - 70)  BP: 108/71 (05 Oct 2023 05:33) (105/60 - 142/91)  BP(mean): --  RR: 18 (05 Oct 2023 05:33) (16 - 18)  SpO2: 98% (05 Oct 2023 05:33) (97% - 99%)    Parameters below as of 05 Oct 2023 05:33  Patient On (Oxygen Delivery Method): room air      I&O's Detail    04 Oct 2023 07:01  -  05 Oct 2023 07:00  --------------------------------------------------------  IN:    IV PiggyBack: 50 mL    IV PiggyBack: 300 mL    Oral Fluid: 1230 mL  Total IN: 1580 mL    OUT:    Voided (mL): 2250 mL  Total OUT: 2250 mL    Total NET: -670 mL          General: NAD, resting comfortably in bed  C/V: NSR  Pulm: Nonlabored breathing, no respiratory distress  Abd: soft, ND, some LLQ TTP  Extrem: WWP, no edema, SCDs in place        LABS:                        12.4   7.36  )-----------( 265      ( 04 Oct 2023 08:12 )             36.4     10-04    137  |  103  |  7   ----------------------------<  153<H>  3.5   |  25  |  0.60    Ca    8.8      04 Oct 2023 08:12  Phos  2.9     10-04  Mg     2.0     10-04        Urinalysis Basic - ( 04 Oct 2023 08:12 )    Color: x / Appearance: x / SG: x / pH: x  Gluc: 153 mg/dL / Ketone: x  / Bili: x / Urobili: x   Blood: x / Protein: x / Nitrite: x   Leuk Esterase: x / RBC: x / WBC x   Sq Epi: x / Non Sq Epi: x / Bacteria: x        RADIOLOGY & ADDITIONAL STUDIES:

## 2023-10-05 NOTE — DISCHARGE NOTE NURSING/CASE MANAGEMENT/SOCIAL WORK - NSDCFUADDAPPT_GEN_ALL_CORE_FT
Please follow up with Dr. Lees in 1-2 weeks; you may call the office to make an appointment at your earliest convenience.     Please follow up with Dr. Rousseau to discuss bariatric surgery; you may call the office to make an appointment at your earliest convenience.     Follow up at discharge with [facility] by calling [phone] to make an appointment

## 2023-10-05 NOTE — PROGRESS NOTE ADULT - ASSESSMENT
32 yo F w/ hx of Asthma, DM, PCOS, HTN, nephrolithiasis, diverticulosis presenting for 2 days of worsening abdominal pain and chills. Abdomen soft and non-distended but focally tender w/ guarding in LLQ, as well as referred LLQ w/ RLQ palpation. Patient hemodynamically normal and afebrile, however labs notable for leukocytosis with left shift. CT A/P significant for dane-sigmoid fat stranding and microperforation. Clinical picture consistent with acute sigmoid diverticulitis Hinchey 1A     - NPO/IVF   - IV Abx (CTX & Flagyl)   - Pain/nausea prn  - ISS  - DVT ppx   - AM labs
34 yo F w/ hx of Asthma, DM, PCOS, HTN, nephrolithiasis, diverticulosis presenting for 2 days of worsening abdominal pain and chills. Abdomen soft and non-distended but focally tender w/ guarding in LLQ, as well as referred LLQ w/ RLQ palpation. Patient hemodynamically normal and afebrile, however labs notable for leukocytosis with left shift. CT A/P significant for dane-sigmoid fat stranding and microperforation. Clinical picture consistent with acute sigmoid diverticulitis Hinchey 1A     - low res consistent carb  - IV Abx (CTX & Flagyl)   - Pain/nausea prn  - ISS, lispro 7 before every meal, lantus 34 nightly  - DVT ppx   - AM labs
 34 yo F w/ hx of Asthma, DM, PCOS, HTN, nephrolithiasis, diverticulosis presenting for 2 days of worsening abdominal pain and chills. Abdomen soft and non-distended but focally tender w/ guarding in LLQ, as well as referred LLQ w/ RLQ palpation. Patient hemodynamically normal and afebrile, however labs notable for leukocytosis with left shift. CT A/P significant for dane-sigmoid fat stranding and microperforation. Clinical picture consistent with acute sigmoid diverticulitis Hinchey 1A     - NPO w/ chips/IVF   - IV Abx (CTX & Flagyl)   - Pain/nausea prn  - ISS  - DVT ppx   - AM labs
32 yo F w/ hx of Asthma, DM, PCOS, HTN, nephrolithiasis, diverticulosis presenting for 2 days of worsening abdominal pain and chills. Abdomen soft and non-distended but focally tender w/ guarding in LLQ, as well as referred LLQ w/ RLQ palpation. CT A/P significant for dane-sigmoid fat stranding and microperforation. Pt admitted for acute sigmoid diverticulitis  w microperforation    acute sigmoid diverticulitis  w microperforation  diet resumed   abx ctx, flagyl per primary team   rest of care per primary team     uncontrolled DM TYPE 2    A1c 11.1 in 05/23, 6.3 in 06/22--> A1C 10.7   at home on lantus, lispro  and metformin 1000 bid   on lantus 34 u bedtime n lispro 7 here -   fu endo recs for dc       HTN   controlled w diet, not on medications     asthma   on prn albuterol   continue with home medications    dvt ppx hsq
32 yo F w/ hx of Asthma, DM, PCOS, HTN, nephrolithiasis, diverticulosis presenting for 2 days of worsening abdominal pain and chills. Abdomen soft and non-distended but focally tender w/ guarding in LLQ, as well as referred LLQ w/ RLQ palpation. CT A/P significant for dane-sigmoid fat stranding and microperforation. Pt admitted for acute sigmoid diverticulitis  w microperforation    acute sigmoid diverticulitis  w microperforation  diet resumed   abx ctx, flagyl per primary team   rest of care per primary team     uncontrolled DM TYPE 2    A1c 11.1 in 05/23, 6.3 in 06/22--> A1C 10.7   at home on lantus, lispro 10 tid and metformin 1000 bid   on lantus 30 u bedtime   fu endo recs       HTN   controlled w diet, not on medications     asthma   on prn albuterol   continue with home medications    dvt ppx hsq

## 2023-10-05 NOTE — DISCHARGE NOTE NURSING/CASE MANAGEMENT/SOCIAL WORK - PATIENT PORTAL LINK FT
You can access the FollowMyHealth Patient Portal offered by [facility] by registering at the following website: [email or web address]. By joining [employer]’s FollowMyHealth portal, you will also be able to view your health information using other applications (apps) compatible with our system.

## 2023-10-05 NOTE — DISCHARGE NOTE NURSING/CASE MANAGEMENT/SOCIAL WORK - NSDCVIVACCINE_GEN_ALL_CORE_FT
Tdap; 30-Aug-2016 03:23; Johnathon Bruner); [place]; [id number]; IntraMuscular; Deltoid Left.; 0.5 milliLiter(s); VIS (VIS Published: 09-May-2013, VIS Presented: 30-Aug-2016);

## 2023-10-10 DIAGNOSIS — K57.20 DIVERTICULITIS OF LARGE INTESTINE WITH PERFORATION AND ABSCESS WITHOUT BLEEDING: ICD-10-CM

## 2023-10-10 DIAGNOSIS — E28.2 POLYCYSTIC OVARIAN SYNDROME: ICD-10-CM

## 2023-10-10 DIAGNOSIS — Z79.4 LONG TERM (CURRENT) USE OF INSULIN: ICD-10-CM

## 2023-10-10 DIAGNOSIS — I10 ESSENTIAL (PRIMARY) HYPERTENSION: ICD-10-CM

## 2023-10-10 DIAGNOSIS — J45.909 UNSPECIFIED ASTHMA, UNCOMPLICATED: ICD-10-CM

## 2023-10-10 DIAGNOSIS — E11.65 TYPE 2 DIABETES MELLITUS WITH HYPERGLYCEMIA: ICD-10-CM

## 2023-11-02 ENCOUNTER — EMERGENCY (EMERGENCY)
Facility: HOSPITAL | Age: 33
LOS: 1 days | Discharge: HOME | End: 2023-11-02
Attending: EMERGENCY MEDICINE | Admitting: EMERGENCY MEDICINE
Payer: COMMERCIAL

## 2023-11-02 VITALS
RESPIRATION RATE: 16 BRPM | HEIGHT: 63 IN | TEMPERATURE: 98 F | SYSTOLIC BLOOD PRESSURE: 124 MMHG | HEART RATE: 95 BPM | WEIGHT: 179.9 LBS | OXYGEN SATURATION: 95 % | DIASTOLIC BLOOD PRESSURE: 86 MMHG

## 2023-11-02 DIAGNOSIS — H71.92 UNSPECIFIED CHOLESTEATOMA, LEFT EAR: Chronic | ICD-10-CM

## 2023-11-02 DIAGNOSIS — Z98.891 HISTORY OF UTERINE SCAR FROM PREVIOUS SURGERY: Chronic | ICD-10-CM

## 2023-11-02 PROCEDURE — 99284 EMERGENCY DEPT VISIT MOD MDM: CPT

## 2023-11-02 RX ORDER — IBUPROFEN 200 MG
600 TABLET ORAL ONCE
Refills: 0 | Status: COMPLETED | OUTPATIENT
Start: 2023-11-02 | End: 2023-11-02

## 2023-11-02 RX ADMIN — Medication 600 MILLIGRAM(S): at 23:44

## 2023-11-02 NOTE — ED CLERICAL - NS ED CLERK NOTE PRE-ARRIVAL INFORMATION; ADDITIONAL PRE-ARRIVAL INFORMATION
This patient is eligible for (or currently enrolled in) an outpatient care management program available through [facility]. This program can coordinate outpatient follow up and assist the patient in accessing a variety of outpatient resources.  If discharged from the ED, the patient will be contacted to see if any additional resources are needed.                                                                                    Please call the Nurse Clinical Call Center at [phone] with any questions or for assistance in discharge planning.

## 2023-11-02 NOTE — ED ADULT NURSE NOTE - OBJECTIVE STATEMENT
33y female c/o pain to L knee s/p "tripping over baby gait." states she is able to bear minimum weight. took tylenol with minimal relief. denies numbness/tingling.

## 2023-11-02 NOTE — ED ADULT NURSE NOTE - NSFALLUNIVINTERV_ED_ALL_ED
Bed/Stretcher in lowest position, wheels locked, appropriate side rails in place/Call bell, personal items and telephone in reach/Instruct patient to call for assistance before getting out of bed/chair/stretcher/Non-slip footwear applied when patient is off stretcher/[place] to call system/Physically safe environment - no spills, clutter or unnecessary equipment/Purposeful proactive rounding/Room/bathroom lighting operational, light cord in reach

## 2023-11-03 VITALS
RESPIRATION RATE: 16 BRPM | DIASTOLIC BLOOD PRESSURE: 82 MMHG | TEMPERATURE: 98 F | HEART RATE: 92 BPM | SYSTOLIC BLOOD PRESSURE: 122 MMHG | OXYGEN SATURATION: 96 %

## 2023-11-03 PROCEDURE — 73562 X-RAY EXAM OF KNEE 3: CPT

## 2023-11-03 PROCEDURE — 99283 EMERGENCY DEPT VISIT LOW MDM: CPT | Mod: 25

## 2023-11-03 PROCEDURE — 73562 X-RAY EXAM OF KNEE 3: CPT | Mod: 26,LT

## 2023-11-03 NOTE — ED PROVIDER NOTE - PHYSICAL EXAMINATION
small abrasion to left anterior knee, anterior ttp  no swelling, no effusion, neg med/lat laxity, neg ant/post drawer  no edema, FROM

## 2023-11-03 NOTE — ED PROVIDER NOTE - CLINICAL SUMMARY MEDICAL DECISION MAKING FREE TEXT BOX
fall on knee today, small abrasion, knee pain, no neuro deficits, no head injury  -check xray - r/o fx  -motrin

## 2023-11-03 NOTE — ED PROVIDER NOTE - CARE PROVIDERS DIRECT ADDRESSES
,DirectAddress_Unknown,DirectAddress_Unknown,[email or web address]

## 2023-11-03 NOTE — ED PROVIDER NOTE - PROGRESS NOTE DETAILS
ace wrapped knee, given cane, recommend RICE, can f/u with ortho as needed  I have discussed the discharge plan with the patient. The patient agrees with the plan, as discussed.  The patient understands Emergency Department diagnosis is a preliminary diagnosis often based on limited information and that the patient must adhere to the follow-up plan as discussed.  The patient understands that if the symptoms worsen  the patient may return to the Emergency Department at any time for further evaluation and treatment.

## 2023-11-03 NOTE — ED PROVIDER NOTE - PATIENT PORTAL LINK FT
You can access the FollowMyHealth Patient Portal offered by [facility] by registering at the following website: [email or web address]. By joining [employer]’s FollowMyHealth portal, you will also be able to view your health information using other applications (apps) compatible with our system.

## 2023-11-03 NOTE — ED PROVIDER NOTE - OBJECTIVE STATEMENT
33F hx asthma, dm, c/o left knee pain. pt states she was trying to step over baby gate and accidentally fell on her left knee. c/o pain to knee, worse with ambulation. no head injury.

## 2023-11-03 NOTE — ED PROVIDER NOTE - NSFOLLOWUPINSTRUCTIONS_ED_ALL_ED_FT
Knee Pain    WHAT YOU NEED TO KNOW:    Knee pain may start suddenly, or it may be a long-term problem. You may have pain on the side, front, or back of your knee. You may have knee stiffness and swelling. You may hear popping sounds or feel like your knee is giving way or locking up as you walk. You may feel pain when you sit, stand, walk, or climb up and down stairs. Knee pain can be caused by conditions such as obesity, inflammation, or strains or tears in ligaments or tendons.    DISCHARGE INSTRUCTIONS:    Return to the emergency department if:    Your pain is worse, even after treatment.    You cannot bend or straighten your leg completely.    The swelling around your knee does not go down even with treatment.    Your knee is painful and hot to the touch.  Contact your healthcare provider if:    You have questions or concerns about your condition or care.    Medicines: You may need any of the following:    NSAIDs help decrease swelling and pain or fever. This medicine is available with or without a doctor's order. NSAIDs can cause stomach bleeding or kidney problems in certain people. If you take blood thinner medicine, always ask your healthcare provider if NSAIDs are safe for you. Always read the medicine label and follow directions.    Acetaminophen decreases pain and fever. It is available without a doctor's order. Ask how much to take and how often to take it. Follow directions. Read the labels of all other medicines you are using to see if they also contain acetaminophen, or ask your doctor or pharmacist. Acetaminophen can cause liver damage if not taken correctly.    Prescription pain medicine may be given. Ask your healthcare provider how to take this medicine safely. Some prescription pain medicines contain acetaminophen. Do not take other medicines that contain acetaminophen without talking to your healthcare provider. Too much acetaminophen may cause liver damage. Prescription pain medicine may cause constipation. Ask your healthcare provider how to prevent or treat constipation.    Take your medicine as directed. Contact your healthcare provider if you think your medicine is not helping or if you have side effects. Tell your provider if you are allergic to any medicine. Keep a list of the medicines, vitamins, and herbs you take. Include the amounts, and when and why you take them. Bring the list or the pill bottles to follow-up visits. Carry your medicine list with you in case of an emergency.  What you can do to manage your symptoms:    Rest your knee so it can heal. Limit activities that increase your pain. Do low-impact exercises, such as walking or swimming.    Apply ice to help reduce swelling and pain. Use an ice pack, or put crushed ice in a plastic bag. Cover it with a towel before you apply it to your knee. Apply ice for 15 to 20 minutes every hour, or as directed.    Apply compression to help reduce swelling. Use a brace or bandage only as directed.    Elevate your knee to help decrease pain and swelling. Elevate your knee while you are sitting or lying down. Prop your leg on pillows to keep your knee above the level of your heart.    Prevent your knee from moving as directed. Your healthcare provider may put on a cast or splint. You may need to wear a leg brace to stabilize your knee. A leg brace can be adjusted to increase your range of motion as your knee heals.    What you can do to prevent knee pain:    Maintain a healthy weight. Extra weight increases your risk for knee pain. Ask your healthcare provider how much you should weigh. He or she can help you create a safe weight loss plan if you need to lose weight.    Exercise or train properly. Use the correct equipment for sports. Wear shoes that provide good support. Check your posture often as you exercise, play sports, or train for an event. This can help prevent stress and strain on your knees. Rest between sessions so you do not overwork your knees.  Follow up with your healthcare provider within 24 hours or as directed: You may need follow-up treatments, such as steroid injections to decrease pain. Write down your questions so you remember to ask them during your visits.

## 2023-11-03 NOTE — ED PROVIDER NOTE - PROVIDER TOKENS
PROVIDER:[TOKEN:[[id number]]],PROVIDER:[TOKEN:[[id number]]],PROVIDER:[TOKEN:[[id number]]]

## 2023-11-03 NOTE — ED PROVIDER NOTE - CARE PROVIDER_API CALL
Iain Samson  Orthopaedic Surgery  [address], [address]  [place] [address]  Phone: [phone]  Fax: [phone]  Follow Up Time:     Aquilino Uriarte  Orthopaedic Surgery  [address], [address] [place] [address]  Phone: [phone]  Fax: [phone]  Follow Up Time:     Aquilino Chauhan  Orthopaedic Surgery  [address]  [place] [address]  Phone: [phone]  Fax: [phone]  Follow Up Time:

## 2023-11-06 DIAGNOSIS — J45.909 UNSPECIFIED ASTHMA, UNCOMPLICATED: ICD-10-CM

## 2023-11-06 DIAGNOSIS — I10 ESSENTIAL (PRIMARY) HYPERTENSION: ICD-10-CM

## 2023-11-06 DIAGNOSIS — E28.2 POLYCYSTIC OVARIAN SYNDROME: ICD-10-CM

## 2023-11-06 DIAGNOSIS — Z79.4 LONG TERM (CURRENT) USE OF INSULIN: ICD-10-CM

## 2023-11-06 DIAGNOSIS — M25.562 PAIN IN LEFT KNEE: ICD-10-CM

## 2023-11-06 DIAGNOSIS — Z98.891 HISTORY OF UTERINE SCAR FROM PREVIOUS SURGERY: ICD-10-CM

## 2023-11-06 DIAGNOSIS — E11.9 TYPE 2 DIABETES MELLITUS WITHOUT COMPLICATIONS: ICD-10-CM
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
## 2023-11-06 DIAGNOSIS — S83.92XA SPRAIN OF UNSPECIFIED SITE OF LEFT KNEE, INITIAL ENCOUNTER: ICD-10-CM

## 2023-11-06 DIAGNOSIS — Z87.442 PERSONAL HISTORY OF URINARY CALCULI: ICD-10-CM

## 2023-11-13 NOTE — THERAPY
Need to see Dr. Jennifer Guzman for evaluation. [Today's Date] : [unfilled] [Lantus] : Lantus [Admelog] : Admelog [FreeTextEntry9] : 50 u  [de-identified] : 15 u ac +ss \par BS <100: +0 u\par -150: + 4 u\par -200: + 6 u\par -250: + 8 u\par -300: + 10 u\par BS above 300: + 12 u\par

## 2023-11-16 NOTE — DISCHARGE NOTE ANTEPARTUM - REGULAR UTERINE CONTRACTIONS
Is This A New Presentation, Or A Follow-Up?: Skin Lesions
Which Family Member (Optional)?: Brother
Which Family Member (Optional)?: Mother
Statement Selected

## 2023-11-20 ENCOUNTER — APPOINTMENT (OUTPATIENT)
Dept: ORTHOPEDIC SURGERY | Facility: CLINIC | Age: 33
End: 2023-11-20
Payer: MEDICAID

## 2023-11-20 VITALS
HEIGHT: 65 IN | SYSTOLIC BLOOD PRESSURE: 125 MMHG | DIASTOLIC BLOOD PRESSURE: 86 MMHG | HEART RATE: 75 BPM | BODY MASS INDEX: 29.16 KG/M2 | WEIGHT: 175 LBS | TEMPERATURE: 97 F | OXYGEN SATURATION: 97 %

## 2023-11-20 DIAGNOSIS — M25.562 PAIN IN LEFT KNEE: ICD-10-CM

## 2023-11-20 DIAGNOSIS — S80.02XA CONTUSION OF LEFT KNEE, INITIAL ENCOUNTER: ICD-10-CM

## 2023-11-20 PROCEDURE — 99203 OFFICE O/P NEW LOW 30 MIN: CPT

## 2023-11-20 RX ORDER — MELOXICAM 15 MG/1
15 TABLET ORAL DAILY
Qty: 30 | Refills: 1 | Status: ACTIVE | COMMUNITY
Start: 2023-11-20 | End: 1900-01-01

## 2023-12-13 NOTE — ED PROVIDER NOTE - NSCAREINITIATED _GEN_ER
Physical Therapy Visit    Visit Type: Daily Treatment Note  Visit: 2  Referring Provider: Armani Del Rio MD  Medical Diagnosis (from order): M25.552 - Left hip pain     SUBJECTIVE                                                                                                               Patient reports 4/10 pain in the left hip. She states that \"sometimes I can put weight on my foot, sometimes I can't.\" With questioning, she reports inconsistent use of pain medications. In addition, she notes that her dietitian states that she is not consuming adequate calories.  Functional Change: Improving tolerance to home exercise program     Pain / Symptoms  - Pain rating (out of 10): Current: 4       OBJECTIVE                                                                                                                                       Treatment     Therapeutic Exercise  - Hooklying isometric ADDuction against ball - 15x5 seconds   - Hooklying isometric ADDuction against ball with core engagement - 15x5 seconds  - Supine quadriceps sets - 10x5 seconds   - Supine calf stretch with strap - 3x45 seconds  - prone quadriceps stretch - 4x45 seconds left   - prone PROM internal rotation   - prone lying x 5 minutes - education on pain meds  - supine hamstring stretch - 2x60 seconds  - Supine circumduction PROM (Physical Therapist assist)     Gait Training  - Partial weightbearing practiced with foot flat and bilateral crutch use - 2x30 feet. Education on how holding her leg up when ambulating can result in impaired hip extension range of motion, hip flexor irritability, and increased pain levels especially with exercises.     Skilled input: verbal instruction/cues, tactile instruction/cues, posture correction, facilitation, demonstration and as detailed above    Writer verbally educated and received verbal consent for hand placement, positioning of patient, and techniques to be performed today from patient for therapist  position for techniques, hand placement and palpation for techniques and clothing adjustments for techniques as described above and how they are pertinent to the patient's plan of care.  Home Exercise Program  Access Code: [email or web address]  URL: [email or web address]  Date: 12/13/2023  Prepared by: Joy Bell    Exercises  - Supine Gluteal Sets  - 3 x daily - 10 reps - 5 hold  - Supine Quad Set  - 3 x daily - 10 reps - 5 hold  - Hooklying Isometric Hip External Rotation  - 3 x daily - 10 reps - 5 hold  - Supine Hip Adduction Isometric with Ball  - 3 x daily - 10 reps - 5 hold  - Supine Ankle Pumps  - 3 x daily - 3 sets - 10 reps  - Supine Calf Stretch with Strap  - 3 x daily - 3 reps - 45 hold  - Lying Prone  - 3 x daily - 15 reps - 5 hold      ASSESSMENT                                                                                                            Patient presents with elevated pain levels noting inconsistent use of pain medications and difficulty ambulating with partial weightbearing due to pain levels. Gait reviewed with education on how keeping the knee elevated results in increased hip flexor use, reduced hip extension range of motion and increased pain. Physical Therapist recommended appropriate use of prescribed medications so that she can work towards normalizing her gait pattern within weightbearing restrictions. Prone quadriceps stretch is tolerated well this date, however prone hamstring curls result in increased anterior groin pain. Gentle isometrics and core engagement utilized today with good response. Plan to progress as tolerated within post-operative protocol.  Pain/symptoms after session (out of 10): 4  Education:   - Results of above outlined education: Verbalizes understanding, Demonstrates understanding and Needs reinforcement    PLAN                                                                                                                            Suggestions for next session as indicated: Progress per plan of care.       Therapy procedure time and total treatment time can be found documented on the Time Entry flowsheet     Mya Gonzalez(Attending)

## 2024-01-02 NOTE — ED PROVIDER NOTE - CROS ED RESP ALL NEG
[FreeTextEntry1] : follow up ER visit, thyroid nodule, ovarian cyst [de-identified] : Ms Blanca Coronel is  a 35 yo female presents today for routine follow up. Pt reports last month on Dec 9th, 2023, pt was attempting to pull a box from top a cabinet, when she fell backward, hitting the back head, neck and lower back. Pt then proceeded to [facility], where, she was ruled out for any fracture, however an incidental finding of a thyroid nodule 0.7 X 1.0 cm right thyroid lobe nodule, advised to follow up accordingly. Pt seeking further evaluation and seek evaluation from endocrinologist. Pt advised will order a dedicated thyroid US today. Secondly, pt also reports incidental finding of ovarian cyst, plans to follow up with her ob/gyn. Pt due for new lab work, recommended to get it  completed and then return for CPE visit.  Pt also concerned for inflamed pancreas around the time of bariatric surgery. Pt concerned for family hx, brother who passed away with pancreatic cancer. Pt denies any abdominal pain, no unintentional weight loss, pt also reports incrase dyspepsia and GERD, following her bariatric surgery. With postive family hx and ongoing symptoms also seek evaluation from GI, referral entered today.  negative...

## 2024-01-04 ENCOUNTER — APPOINTMENT (OUTPATIENT)
Dept: ORTHOPEDIC SURGERY | Facility: CLINIC | Age: 34
End: 2024-01-04

## 2024-01-05 NOTE — HISTORY OF PRESENT ILLNESS
[de-identified] : PONCHO GARNICA is a 33 year old female presents to follow up left knee pain. Last visit was 11/20/2023 at which time patient was advised to start home exercises for patellofemoral conditioning, PT, recommended 150 min per week of moderate intensity aerobic activity and take Meloxicam 15mg daily prn. States pain has

## 2024-01-05 NOTE — ASSESSMENT
[FreeTextEntry1] : PONCHO GARNICA is a 33 year old female with left knee pain.    I discussed with the patient that their symptoms, signs, and imaging are most consistent with  **.  We reviewed the natural history of this condition and treatment options. We agreed on the following plan:  Encouraged to continue home exercises per handout. Continue physical therapy. Recommend 150 min per week of moderate intensity aerobic activity  Medication:    prescription provided. Imaging: Follow up in 6-8 weeks.

## 2024-01-25 NOTE — ED ADULT NURSE NOTE - CAS TRG GEN SKIN CONDITION
[Follow-Up Visit] : a follow-up visit for [FreeTextEntry2] : FVL heterozygous, history of  thrombocytopenia in her  self-care/home management/community/leisure Warm

## 2024-01-27 NOTE — DISCHARGE NOTE NURSING/CASE MANAGEMENT/SOCIAL WORK - NSTRANSFEREYEGLASSESPAIRS_GEN_A_NUR
1 pair 21y  at 40.5w GA presents c/o painful contractions q10m. States she was examined yesterday and found to be 2.5cm. Denies vaginal bleeding or leakage of fluid. Endorses fetal movement but states it's less than she's used to. No other complaints. Reports her pregnancy has been uncomplicated.    SYLVIE: 24 by LMP 23    PObGyn: NSVDx1 , medical TOPx1   PMH/PSH: asthma, used her inhaler 2x in pregnancy  Meds: pnv  All: nkda    24: vtx, posterior placenta, EFW [id number]

## 2024-02-25 ENCOUNTER — EMERGENCY (EMERGENCY)
Facility: HOSPITAL | Age: 34
LOS: 1 days | Discharge: HOME | End: 2024-02-25
Admitting: STUDENT IN AN ORGANIZED HEALTH CARE EDUCATION/TRAINING PROGRAM
Payer: COMMERCIAL

## 2024-02-25 VITALS
SYSTOLIC BLOOD PRESSURE: 107 MMHG | TEMPERATURE: 99 F | HEART RATE: 93 BPM | OXYGEN SATURATION: 100 % | RESPIRATION RATE: 18 BRPM | DIASTOLIC BLOOD PRESSURE: 69 MMHG

## 2024-02-25 VITALS
WEIGHT: 175.93 LBS | HEART RATE: 115 BPM | HEIGHT: 63 IN | SYSTOLIC BLOOD PRESSURE: 136 MMHG | RESPIRATION RATE: 18 BRPM | OXYGEN SATURATION: 95 % | DIASTOLIC BLOOD PRESSURE: 91 MMHG | TEMPERATURE: 98 F

## 2024-02-25 DIAGNOSIS — H71.92 UNSPECIFIED CHOLESTEATOMA, LEFT EAR: Chronic | ICD-10-CM

## 2024-02-25 DIAGNOSIS — B34.9 VIRAL INFECTION, UNSPECIFIED: ICD-10-CM

## 2024-02-25 DIAGNOSIS — R51.9 HEADACHE, UNSPECIFIED: ICD-10-CM

## 2024-02-25 DIAGNOSIS — E11.65 TYPE 2 DIABETES MELLITUS WITH HYPERGLYCEMIA: ICD-10-CM

## 2024-02-25 DIAGNOSIS — Z98.891 HISTORY OF UTERINE SCAR FROM PREVIOUS SURGERY: Chronic | ICD-10-CM

## 2024-02-25 DIAGNOSIS — J45.909 UNSPECIFIED ASTHMA, UNCOMPLICATED: ICD-10-CM

## 2024-02-25 DIAGNOSIS — Z20.822 CONTACT WITH AND (SUSPECTED) EXPOSURE TO COVID-19: ICD-10-CM

## 2024-02-25 DIAGNOSIS — I10 ESSENTIAL (PRIMARY) HYPERTENSION: ICD-10-CM

## 2024-02-25 LAB
ALBUMIN SERPL ELPH-MCNC: 4.2 G/DL — SIGNIFICANT CHANGE UP (ref 3.3–5)
ALP SERPL-CCNC: 68 U/L — SIGNIFICANT CHANGE UP (ref 40–120)
ALT FLD-CCNC: 49 U/L — HIGH (ref 10–45)
ANION GAP SERPL CALC-SCNC: 17 MMOL/L — SIGNIFICANT CHANGE UP (ref 5–17)
AST SERPL-CCNC: 70 U/L — HIGH (ref 10–40)
BASOPHILS # BLD AUTO: 0.02 K/UL — SIGNIFICANT CHANGE UP (ref 0–0.2)
BASOPHILS NFR BLD AUTO: 0.4 % — SIGNIFICANT CHANGE UP (ref 0–2)
BILIRUB SERPL-MCNC: 0.4 MG/DL — SIGNIFICANT CHANGE UP (ref 0.2–1.2)
BUN SERPL-MCNC: 12 MG/DL — SIGNIFICANT CHANGE UP (ref 7–23)
CALCIUM SERPL-MCNC: 9.6 MG/DL — SIGNIFICANT CHANGE UP (ref 8.4–10.5)
CHLORIDE SERPL-SCNC: 96 MMOL/L — SIGNIFICANT CHANGE UP (ref 96–108)
CO2 SERPL-SCNC: 19 MMOL/L — LOW (ref 22–31)
CREAT SERPL-MCNC: 0.66 MG/DL — SIGNIFICANT CHANGE UP (ref 0.5–1.3)
EGFR: 118 ML/MIN/1.73M2 — SIGNIFICANT CHANGE UP
EOSINOPHIL # BLD AUTO: 0.03 K/UL — SIGNIFICANT CHANGE UP (ref 0–0.5)
EOSINOPHIL NFR BLD AUTO: 0.6 % — SIGNIFICANT CHANGE UP (ref 0–6)
FLUAV AG NPH QL: DETECTED
FLUBV AG NPH QL: SIGNIFICANT CHANGE UP
GLUCOSE SERPL-MCNC: 347 MG/DL — HIGH (ref 70–99)
HCG SERPL-ACNC: <0 MIU/ML — SIGNIFICANT CHANGE UP
HCT VFR BLD CALC: 52.6 % — HIGH (ref 34.5–45)
HGB BLD-MCNC: 17.3 G/DL — HIGH (ref 11.5–15.5)
IMM GRANULOCYTES NFR BLD AUTO: 0.6 % — SIGNIFICANT CHANGE UP (ref 0–0.9)
LYMPHOCYTES # BLD AUTO: 0.85 K/UL — LOW (ref 1–3.3)
LYMPHOCYTES # BLD AUTO: 18 % — SIGNIFICANT CHANGE UP (ref 13–44)
MCHC RBC-ENTMCNC: 30.2 PG — SIGNIFICANT CHANGE UP (ref 27–34)
MCHC RBC-ENTMCNC: 32.9 GM/DL — SIGNIFICANT CHANGE UP (ref 32–36)
MCV RBC AUTO: 91.8 FL — SIGNIFICANT CHANGE UP (ref 80–100)
MONOCYTES # BLD AUTO: 0.42 K/UL — SIGNIFICANT CHANGE UP (ref 0–0.9)
MONOCYTES NFR BLD AUTO: 8.9 % — SIGNIFICANT CHANGE UP (ref 2–14)
NEUTROPHILS # BLD AUTO: 3.36 K/UL — SIGNIFICANT CHANGE UP (ref 1.8–7.4)
NEUTROPHILS NFR BLD AUTO: 71.5 % — SIGNIFICANT CHANGE UP (ref 43–77)
NRBC # BLD: 0 /100 WBCS — SIGNIFICANT CHANGE UP (ref 0–0)
PLATELET # BLD AUTO: 226 K/UL — SIGNIFICANT CHANGE UP (ref 150–400)
POTASSIUM SERPL-MCNC: 4.1 MMOL/L — SIGNIFICANT CHANGE UP (ref 3.5–5.3)
POTASSIUM SERPL-SCNC: 4.1 MMOL/L — SIGNIFICANT CHANGE UP (ref 3.5–5.3)
PROT SERPL-MCNC: 8.2 G/DL — SIGNIFICANT CHANGE UP (ref 6–8.3)
RBC # BLD: 5.73 M/UL — HIGH (ref 3.8–5.2)
RBC # FLD: 12.3 % — SIGNIFICANT CHANGE UP (ref 10.3–14.5)
RSV RNA NPH QL NAA+NON-PROBE: SIGNIFICANT CHANGE UP
SARS-COV-2 RNA SPEC QL NAA+PROBE: SIGNIFICANT CHANGE UP
SODIUM SERPL-SCNC: 132 MMOL/L — LOW (ref 135–145)
WBC # BLD: 4.71 K/UL — SIGNIFICANT CHANGE UP (ref 3.8–10.5)
WBC # FLD AUTO: 4.71 K/UL — SIGNIFICANT CHANGE UP (ref 3.8–10.5)

## 2024-02-25 PROCEDURE — 80053 COMPREHEN METABOLIC PANEL: CPT

## 2024-02-25 PROCEDURE — 71046 X-RAY EXAM CHEST 2 VIEWS: CPT

## 2024-02-25 PROCEDURE — 99284 EMERGENCY DEPT VISIT MOD MDM: CPT | Mod: 25

## 2024-02-25 PROCEDURE — 94640 AIRWAY INHALATION TREATMENT: CPT

## 2024-02-25 PROCEDURE — 85025 COMPLETE CBC W/AUTO DIFF WBC: CPT

## 2024-02-25 PROCEDURE — 87637 SARSCOV2&INF A&B&RSV AMP PRB: CPT

## 2024-02-25 PROCEDURE — 36415 COLL VENOUS BLD VENIPUNCTURE: CPT

## 2024-02-25 PROCEDURE — 96374 THER/PROPH/DIAG INJ IV PUSH: CPT

## 2024-02-25 PROCEDURE — 99284 EMERGENCY DEPT VISIT MOD MDM: CPT

## 2024-02-25 PROCEDURE — 71046 X-RAY EXAM CHEST 2 VIEWS: CPT | Mod: 26

## 2024-02-25 PROCEDURE — 84702 CHORIONIC GONADOTROPIN TEST: CPT

## 2024-02-25 RX ORDER — SODIUM CHLORIDE 9 MG/ML
1000 INJECTION INTRAMUSCULAR; INTRAVENOUS; SUBCUTANEOUS ONCE
Refills: 0 | Status: COMPLETED | OUTPATIENT
Start: 2024-02-25 | End: 2024-02-25

## 2024-02-25 RX ORDER — IPRATROPIUM/ALBUTEROL SULFATE 18-103MCG
3 AEROSOL WITH ADAPTER (GRAM) INHALATION ONCE
Refills: 0 | Status: COMPLETED | OUTPATIENT
Start: 2024-02-25 | End: 2024-02-25

## 2024-02-25 RX ORDER — KETOROLAC TROMETHAMINE 30 MG/ML
15 SYRINGE (ML) INJECTION ONCE
Refills: 0 | Status: DISCONTINUED | OUTPATIENT
Start: 2024-02-25 | End: 2024-02-25

## 2024-02-25 RX ADMIN — Medication 15 MILLIGRAM(S): at 20:24

## 2024-02-25 RX ADMIN — SODIUM CHLORIDE 1000 MILLILITER(S): 9 INJECTION INTRAMUSCULAR; INTRAVENOUS; SUBCUTANEOUS at 20:23

## 2024-02-25 RX ADMIN — Medication 3 MILLILITER(S): at 20:24

## 2024-02-25 NOTE — ED PROVIDER NOTE - CLINICAL SUMMARY MEDICAL DECISION MAKING FREE TEXT BOX
34 yo F with pmh of asthma (hasn't had an exacerbation since a child), HTN, DM c/o 4 days of HA, cough, anorexia, fever, chills, sweats. Associated with tightness in her chest. Reports 4 episodes of vomiting over the past couple of days. Denies sob, abd pain, sick contacts, recent travel. Tachy to 115. Lungs cta b/l, no wheezing, +Coughing. 32 yo F with pmh of asthma (hasn't had an exacerbation since a child), HTN, DM c/o 4 days of HA, cough, anorexia, fever, chills, sweats. Associated with tightness in her chest. Reports 4 episodes of vomiting over the past couple of days. Denies sob, abd pain, sick contacts, recent travel. Tachy to 115. Lungs cta b/l, no wheezing, +Coughing. cxr clear. Labs no elevated wbcs, Na 132, gluc elevated, no gap. Pt feeling much better after torodol and fluids. likely viral illness, supportive care, return precautions discussed

## 2024-02-25 NOTE — ED PROVIDER NOTE - PHYSICAL EXAMINATION
CONSTITUTIONAL: Well-appearing;  in no apparent distress.   HEAD: Normocephalic; atraumatic.   EYES: PERRL; EOM intact; conjunctiva and sclera clear  ENT: normal nose; no rhinorrhea; normal pharynx with no erythema or lesions.   NECK: Supple; non-tender;   CARDIOVASCULAR: rrr,  RESPIRATORY: Breathing easily; +coughing with inspiration, no wheezing, cta b/l   MSK: FROM at all extremities, normal tone   EXT: No cyanosis or edema; N/V intact  SKIN: Normal for age and race; warm; dry; good turgor; no apparent lesions or rash.

## 2024-02-25 NOTE — ED ADULT NURSE NOTE - NS ED PATIENT SAFETY CONCERN
[facility] Note    Chief Complaint      Chief Complaint   Patient presents with    Establish Care    Annual Exam    Medication Refill     History of Present Illness      Lukasz Person is a 64 y.o. male patient with chronic conditions of GERD, KUMAR-uses CPAP, kidney stones, diabetes, hypertension, fatigue, low testosterone, history of thyroidectomy who is new to me and presents today for establish care visit.  Feeling okay, denies shortness of breath or chest pain. Reviewed meds and history with patient.  Patient moved from [facility].  A few months ago and needs new physicians.  Brother had colon cancer    Colonoscopy-5 years ago  Eye exam-UTD, 5/2019  Labs-ordered  Podiatry-referral    Problem List Items Addressed This Visit     None      Visit Diagnoses     Fatigue, unspecified type    -  Primary    Relevant Orders    CBC auto differential    Comprehensive metabolic panel    TSH    T4, free    Lipid panel    Hemoglobin A1c    Uric acid    Testosterone    Annual physical exam        Relevant Orders    CBC auto differential    Comprehensive metabolic panel    TSH    T4, free    Lipid panel    Hemoglobin A1c    Uric acid    Testosterone    Kidney stones        Relevant Orders    CBC auto differential    Comprehensive metabolic panel    TSH    T4, free    Lipid panel    Hemoglobin A1c    Uric acid    Testosterone    Ambulatory referral/consult to Urology    History of gout        Relevant Orders    CBC auto differential    Comprehensive metabolic panel    TSH    T4, free    Lipid panel    Hemoglobin A1c    Uric acid    Testosterone    Type 2 diabetes mellitus without complication, without long-term current use of insulin        Relevant Medications    glimepiride (AMARYL) 2 MG tablet    metFORMIN (GLUCOPHAGE) 500 MG tablet    Other Relevant Orders    CBC auto differential    Comprehensive metabolic panel    TSH    T4, free    Lipid panel    Hemoglobin A1c    Uric acid    Testosterone    Ambulatory  referral/consult to Ophthalmology    Ambulatory referral/consult to Podiatry    Hyperlipidemia, unspecified hyperlipidemia type        Relevant Orders    CBC auto differential    Comprehensive metabolic panel    TSH    T4, free    Lipid panel    Hemoglobin A1c    Uric acid    Testosterone    Low testosterone in male        Relevant Orders    CBC auto differential    Comprehensive metabolic panel    TSH    T4, free    Lipid panel    Hemoglobin A1c    Uric acid    Testosterone    Screening for eye condition        Relevant Orders    Ambulatory referral/consult to Ophthalmology          Health Maintenance   Topic Date Due    Hepatitis C Screening  1955    Lipid Panel  1955    Hemoglobin A1c  1955    Foot Exam  03/19/1964    Eye Exam  03/19/1964    TETANUS VACCINE  03/19/1972    Low Dose Statin  03/19/1975    Colonoscopy  03/19/2004    Pneumococcal Vaccine (65+ Low/Medium Risk) (2 of 2 - PPSV23) 06/24/2020       History reviewed. No pertinent past medical history.    History reviewed. No pertinent surgical history.    family history is not on file.     Social History     Tobacco Use    Smoking status: Never Smoker    Smokeless tobacco: Never Used   Substance Use Topics    Alcohol use: Not on file    Drug use: Not on file       Review of Systems   Constitutional: Negative for chills and fever.   HENT: Negative for congestion, sinus pain and sore throat.    Eyes: Negative for blurred vision.   Respiratory: Negative for cough, shortness of breath and wheezing.    Cardiovascular: Negative for chest pain, palpitations and leg swelling.   Gastrointestinal: Negative for abdominal pain, constipation, diarrhea, nausea and vomiting.   Genitourinary: Negative for dysuria.   Musculoskeletal: Negative for myalgias.   Skin: Negative for rash.   Neurological: Negative for dizziness, weakness and headaches.   Psychiatric/Behavioral: Negative for depression. The patient is not nervous/anxious.          Outpatient Encounter Medications as of 2/27/2020   Medication Sig Dispense Refill    allopurinoL (ZYLOPRIM) 100 MG tablet allopurinol 100 mg tablet   TAKE ONE TABLET BY MOUTH ONE TIME DAILY      fenofibrate 160 MG Tab Take 160 mg by mouth once daily.      glimepiride (AMARYL) 2 MG tablet glimepiride 2 mg tablet   TAKE ONE TABLET BY MOUTH EVERY MORNING      lisinopril-hydrochlorothiazide (PRINZIDE,ZESTORETIC) 20-25 mg Tab       metFORMIN (GLUCOPHAGE) 500 MG tablet metformin 500 mg tablet   TAKE TWO TABLETS BY MOUTH EVERY MORNING AND 2 TABLETS BY MOUTH WITH DINNER      potassium citrate (UROCIT-K) 10 mEq (1,080 mg) TbSR potassium citrate ER 10 mEq (1,080 mg) tablet,extended release   TAKE ONE TABLET BY MOUTH TWICE A DAY       No facility-administered encounter medications on file as of 2/27/2020.        Review of patient's allergies indicates:  No Known Allergies    Physical Exam      Vital Signs  Temp: 98.5 °F (36.9 °C)  Temp src: Oral  Pulse: 66  Resp: 18  BP: 118/72  BP Location: Right arm  Patient Position: Sitting  Pain Score: 0-No pain  Height and Weight  Height: 6' (182.9 cm)  Weight: 124.1 kg (273 lb 9.5 oz)  BSA (Calculated - sq m): 2.51 sq meters  BMI (Calculated): 37.1  Weight in (lb) to have BMI = 25: 183.9]    Physical Exam   Constitutional: He is oriented to person, place, and time. He appears well-developed and well-nourished.   HENT:   Head: Normocephalic and atraumatic.   Right Ear: External ear normal.   Left Ear: External ear normal.   Mouth/Throat: Oropharynx is clear and moist.   Eyes: Pupils are equal, round, and reactive to light. Conjunctivae and EOM are normal.   Neck: Normal range of motion. Neck supple. No JVD present. No tracheal deviation present. No thyromegaly present.   Cardiovascular: Normal rate, regular rhythm, normal heart sounds and intact distal pulses.   Pulmonary/Chest: Effort normal and breath sounds normal. No respiratory distress.   Abdominal: Soft. Bowel sounds are  normal. He exhibits no distension. There is no tenderness.   Musculoskeletal: Normal range of motion.   Lymphadenopathy:     He has no cervical adenopathy.   Neurological: He is alert and oriented to person, place, and time.   Skin: Skin is warm and dry. No rash noted. No erythema. No pallor.   Psychiatric: He has a normal mood and affect. His behavior is normal. Judgment and thought content normal.   Nursing note and vitals reviewed.       Laboratory:  CBC:  No results for input(s): WBC, RBC, HGB, HCT, PLT, MCV, MCH, MCHC in the last 2160 hours.  CMP:  No results for input(s): GLU, CALCIUM, ALBUMIN, PROT, NA, K, CO2, CL, BUN, ALKPHOS, ALT, AST, BILITOT in the last 2160 hours.    Invalid input(s): CREATININ  URINALYSIS:  No results for input(s): COLORU, CLARITYU, SPECGRAV, PHUR, PROTEINUA, GLUCOSEU, BILIRUBINCON, BLOODU, WBCU, RBCU, BACTERIA, MUCUS, NITRITE, LEUKOCYTESUR, UROBILINOGEN, HYALINECASTS in the last 2160 hours.   LIPIDS:  No results for input(s): TSH, HDL, CHOL, TRIG, LDLCALC, CHOLHDL, NONHDLCHOL, TOTALCHOLEST in the last 2160 hours.  TSH:  No results for input(s): TSH in the last 2160 hours.  A1C:  No results for input(s): HGBA1C in the last 2160 hours.      Assessment/Plan     Lukasz Person is a 64 y.o.male with:    1. Annual physical exam  - CBC auto differential; Future  - Comprehensive metabolic panel; Future  - TSH; Future  - T4, free; Future  - Lipid panel; Future  - Hemoglobin A1c; Future  - Uric acid; Future  - Testosterone; Future    2. Fatigue, unspecified type  - CBC auto differential; Future  - Comprehensive metabolic panel; Future  - TSH; Future  - T4, free; Future  - Lipid panel; Future  - Hemoglobin A1c; Future  - Uric acid; Future  - Testosterone; Future    3. Kidney stones  - CBC auto differential; Future  - Comprehensive metabolic panel; Future  - TSH; Future  - T4, free; Future  - Lipid panel; Future  - Hemoglobin A1c; Future  - Uric acid; Future  - Testosterone; Future  - Ambulatory  referral/consult to Urology; Future    4. History of gout  - CBC auto differential; Future  - Comprehensive metabolic panel; Future  - TSH; Future  - T4, free; Future  - Lipid panel; Future  - Hemoglobin A1c; Future  - Uric acid; Future  - Testosterone; Future    5. Type 2 diabetes mellitus without complication, without long-term current use of insulin  - CBC auto differential; Future  - Comprehensive metabolic panel; Future  - TSH; Future  - T4, free; Future  - Lipid panel; Future  - Hemoglobin A1c; Future  - Uric acid; Future  - Testosterone; Future  - Ambulatory referral/consult to Ophthalmology; Future  - Ambulatory referral/consult to Podiatry; Future    6. Hyperlipidemia, unspecified hyperlipidemia type  - CBC auto differential; Future  - Comprehensive metabolic panel; Future  - TSH; Future  - T4, free; Future  - Lipid panel; Future  - Hemoglobin A1c; Future  - Uric acid; Future  - Testosterone; Future    7. Low testosterone in male  - CBC auto differential; Future  - Comprehensive metabolic panel; Future  - TSH; Future  - T4, free; Future  - Lipid panel; Future  - Hemoglobin A1c; Future  - Uric acid; Future  - Testosterone; Future    8. Screening for eye condition  - Ambulatory referral/consult to Ophthalmology; Future      -Continue current medications and maintain follow up with specialists.  Return to clinic in 3 months or sooner for any concerns        Erin Jiminez, NP-C [facility] [place]                 No

## 2024-02-25 NOTE — ED PROVIDER NOTE - OBJECTIVE STATEMENT
34 yo F with pmh of asthma (hasn't had an exacerbation since a child), HTN, DM c/o 4 days of HA, cough, anorexia, fever, chills, sweats. Associated with tightness in her chest. Reports 4 episodes of vomiting over the past couple of days. Denies sob, abd pain, sick contacts, recent travel. Denies smoking. Used her nebs at home which helps but makes her jittery.

## 2024-02-25 NOTE — ED PROVIDER NOTE - NSFOLLOWUPINSTRUCTIONS_ED_ALL_ED_FT
Viral Respiratory Infection    A viral respiratory infection is an illness that affects parts of the body used for breathing, like the lungs, nose, and throat. It is caused by a germ called a virus. Symptoms can include runny nose, coughing, sneezing, fatigue, body aches, sore throat, fever, or headache. Over the counter medicine can be used to manage the symptoms but the infection typically goes away on its own in 5 to 10 days.     SEEK IMMEDIATE MEDICAL CARE IF YOU HAVE ANY OF THE FOLLOWING SYMPTOMS: shortness of breath, chest pain, fever over 10 days, or lightheadedness/dizziness.    Viral Syndrome    WHAT YOU NEED TO KNOW:    Viral syndrome is a term used for symptoms of an infection caused by a virus. Viruses are spread easily from person to person through the air and on shared items. An illness caused by a virus usually goes away in 10 to 14 days without treatment. Antibiotics are not given for a viral infection.     DISCHARGE INSTRUCTIONS:    Call 911 for the following:     You have a seizure.       You cannot be woken.       You have chest pain or trouble breathing.     Return to the emergency department if:     You have a stiff neck, a bad headache, and sensitivity to light.       You feel weak, dizzy, or confused.       You stop urinating or urinate a lot less than normal.       You cough up blood or thick, yellow or green, mucus.       You have severe abdominal pain or your abdomen is larger than usual.     Contact your healthcare provider if:     Your symptoms do not get better with treatment, or get worse, after 3 days.       You have a rash or ear pain.       You have burning when you urinate.       You have questions or concerns about your condition or care.    Medicines: You may need any of the following:     Acetaminophen decreases pain and fever. It is available without a doctor's order. Ask how much medicine to take and how often to take it. Follow directions. Acetaminophen can cause liver damage if not taken correctly.       NSAIDs, such as ibuprofen, help decrease swelling, pain, and fever. NSAIDs can cause stomach bleeding or kidney problems in certain people. If you take blood thinner medicine, always ask your healthcare provider if NSAIDs are safe for you. Always read the medicine label and follow directions.      Cold medicine helps decrease swelling, control a cough, and relieve chest or nasal congestion.       Saline nasal spray helps decrease nasal congestion.       Take your medicine as directed. Contact your healthcare provider if you think your medicine is not helping or if you have side effects. Tell him of her if you are allergic to any medicine. Keep a list of the medicines, vitamins, and herbs you take. Include the amounts, and when and why you take them. Bring the list or the pill bottles to follow-up visits. Carry your medicine list with you in case of an emergency.    Manage your symptoms:     Drink liquids as directed to prevent dehydration. Ask how much liquid to drink each day and which liquids are best for you. Ask if you should drink an oral rehydration solution (ORS). An ORS has the right amounts of water, salts, and sugar you need to replace body fluids. This may help prevent dehydration caused by vomiting or diarrhea. Do not drink liquids with caffeine. Drinks with caffeine can make dehydration worse.       Get plenty of rest to help your body heal. Take naps throughout the day. Ask your healthcare provider when you can return to work and your normal activities.       Use a cool mist humidifier to help you breathe easier if you have nasal or chest congestion. Ask your healthcare provider how to use a cool mist humidifier.       Eat honey or use cough drops to help decrease throat discomfort. Ask your healthcare provider how much honey you should eat each day. Cough drops are available without a doctor's order. Follow directions for taking cough drops.       Do not smoke and stay away from others who smoke. Nicotine and other chemicals in cigarettes and cigars can cause lung damage. Smoking can also delay healing. Ask your healthcare provider for information if you currently smoke and need help to quit. E-cigarettes or smokeless tobacco still contain nicotine. Talk to your healthcare provider before you use these products.       Wash your hands frequently to prevent the spread of germs to others. Use soap and water. Use gel hand  when soap and water are not available. Wash your hands after you use the bathroom, cough, or sneeze. Wash your hands before you prepare or eat food.     Follow up with your healthcare provider as directed: Write down your questions so you remember to ask them during your visits.

## 2024-02-25 NOTE — ED PROVIDER NOTE - PATIENT PORTAL LINK FT
You can access the FollowMyHealth Patient Portal offered by [facility] by registering at the following website: [email or web address]. By joining [employer]’s FollowMyHealth portal, you will also be able to view your health information using other applications (apps) compatible with our system.

## 2024-02-25 NOTE — ED ADULT NURSE NOTE - OBJECTIVE STATEMENT
33yoF came to ED c/o flu-like symptoms since Thursday. Pt states she has had, HA, chills, body aches, fatigues, four episodes of vomiting, diarrhea, a productive cough ( yellow mucus). Pt states that her song was sick with similar symptoms. Talking in complete sentences. NAD. Pt states she took tylenol for fever and pain around 1500.

## 2024-03-10 NOTE — PATIENT PROFILE ADULT - FALL HARM RISK
Left message for patient that I want to talk with him about his labs.   White blood cell count is elevated.  I want him to come in for urine specimen and report any signs of infection.  
other

## 2024-03-12 ENCOUNTER — NON-APPOINTMENT (OUTPATIENT)
Age: 34
End: 2024-03-12

## 2024-05-22 NOTE — HISTORY OF PRESENT ILLNESS
[FreeTextEntry1] : PONCHO GARNICA is a 31 year female with pmhx of T2D (dx 2016), thyroid nodules who presents for T2D follow-up.\par \par Patient of Dr. Dr Amaya, last (initial) visit, 3/16/22\par \par Currently 19 weeks pregnant, VENKATESH 9/11/22 with LMP 12/5/21\par OBGYN: Dr Mary Matt\par MFM: Dr Thakkar\par \par Interval change:\par Since last visit, admitted to hospital for glycemic control by MFM from 3/31-4/6/22.\par Since discharge, BG better, but this morning elevated\par Started on MDI at end of first trimester\par Unsure of names of insulins, provided pictures and identified lantus and admelog, which she later confirmed through gustabo\par Can have BG 60s, one 54 if going long time between meals, especially following small meals, occurs 1-2x weekly\par Notices BG variability based on food choices, especially if consuming larger portions\par Patients step-daughter ran away 2 weeks ago with boyfriends foster child ++ stress\par \par \par A1C - 6.2% (POCT today, 4/21/22) from 6.8% (3/16/21) from 7.8% (2/8/22) from 8.1% (4/2021)\par Office Fingerstick -- 127 (not fasting) english muffin, tea 3 hours ago\par Fasting BG today 150\par \par Current medication:\par - lantus 55 units qhs\par - admelog 25 units TIDAC\par \par Past medication:\par - Metformin 100mg BID (holding during pregnancy)\par \par PONCHO reports they take their diabetes medication ALL of the time.\par PONCHO denies/endorses hypoglycemia symptoms or BG <70\par \par Diabetes Self-Management:\par Glucometer: contour next \par Monitoring BG 4x daily\par Glucometer manually reviewed\par --fasting BG range: , typically 110-120\par -- breakfast (pre)\par -- breakfast (1H post)\par -- lunch\par -- lunch (post) \par -- dinner (pre) 88- 108, one 54\par -- dinner (post) 83- 188\par \par Diet--\par Typically consumes 2-3 meals daily, occ snack\par Needs to reschedule with RD\par \par \par Ophthalmology: March/May 2021, no h/o DR\par  Face to face

## 2024-08-24 NOTE — ED ADULT NURSE NOTE - OBJECTIVE STATEMENT
pt 34 weeks pregnant c/o pressure in lower abd, weakness, headache. hx miscarriage. denies fever, chills, cp, sob, n/v, dizziness. pt calm, A&Ox3, Dr. Slaughter at bedside to assess. No

## 2024-09-12 ENCOUNTER — EMERGENCY (EMERGENCY)
Facility: HOSPITAL | Age: 34
LOS: 1 days | Discharge: HOME | End: 2024-09-12
Attending: EMERGENCY MEDICINE | Admitting: EMERGENCY MEDICINE
Payer: COMMERCIAL

## 2024-09-12 VITALS
TEMPERATURE: 98 F | SYSTOLIC BLOOD PRESSURE: 119 MMHG | DIASTOLIC BLOOD PRESSURE: 77 MMHG | HEART RATE: 72 BPM | OXYGEN SATURATION: 98 % | RESPIRATION RATE: 16 BRPM

## 2024-09-12 VITALS
HEART RATE: 103 BPM | WEIGHT: 171.08 LBS | SYSTOLIC BLOOD PRESSURE: 125 MMHG | RESPIRATION RATE: 18 BRPM | TEMPERATURE: 99 F | OXYGEN SATURATION: 96 % | DIASTOLIC BLOOD PRESSURE: 85 MMHG

## 2024-09-12 DIAGNOSIS — K21.9 GASTRO-ESOPHAGEAL REFLUX DISEASE WITHOUT ESOPHAGITIS: ICD-10-CM

## 2024-09-12 DIAGNOSIS — H71.92 UNSPECIFIED CHOLESTEATOMA, LEFT EAR: Chronic | ICD-10-CM

## 2024-09-12 DIAGNOSIS — E11.65 TYPE 2 DIABETES MELLITUS WITH HYPERGLYCEMIA: ICD-10-CM

## 2024-09-12 DIAGNOSIS — J45.909 UNSPECIFIED ASTHMA, UNCOMPLICATED: ICD-10-CM

## 2024-09-12 DIAGNOSIS — Z79.4 LONG TERM (CURRENT) USE OF INSULIN: ICD-10-CM

## 2024-09-12 DIAGNOSIS — I10 ESSENTIAL (PRIMARY) HYPERTENSION: ICD-10-CM

## 2024-09-12 DIAGNOSIS — Z98.891 HISTORY OF UTERINE SCAR FROM PREVIOUS SURGERY: Chronic | ICD-10-CM

## 2024-09-12 LAB
ALBUMIN SERPL ELPH-MCNC: 4.3 G/DL — SIGNIFICANT CHANGE UP (ref 3.3–5)
ALP SERPL-CCNC: 91 U/L — SIGNIFICANT CHANGE UP (ref 40–120)
ALT FLD-CCNC: 23 U/L — SIGNIFICANT CHANGE UP (ref 10–45)
ANION GAP SERPL CALC-SCNC: 10 MMOL/L — SIGNIFICANT CHANGE UP (ref 5–17)
ANION GAP SERPL CALC-SCNC: 18 MMOL/L — HIGH (ref 5–17)
APPEARANCE UR: CLEAR — SIGNIFICANT CHANGE UP
AST SERPL-CCNC: 20 U/L — SIGNIFICANT CHANGE UP (ref 10–40)
B-OH-BUTYR SERPL-SCNC: 0.3 MMOL/L — SIGNIFICANT CHANGE UP
BASE EXCESS BLDV CALC-SCNC: -1.8 MMOL/L — SIGNIFICANT CHANGE UP (ref -2–3)
BASOPHILS # BLD AUTO: 0.03 K/UL — SIGNIFICANT CHANGE UP (ref 0–0.2)
BASOPHILS NFR BLD AUTO: 0.3 % — SIGNIFICANT CHANGE UP (ref 0–2)
BILIRUB SERPL-MCNC: 0.4 MG/DL — SIGNIFICANT CHANGE UP (ref 0.2–1.2)
BILIRUB UR-MCNC: NEGATIVE — SIGNIFICANT CHANGE UP
BUN SERPL-MCNC: 14 MG/DL — SIGNIFICANT CHANGE UP (ref 7–23)
BUN SERPL-MCNC: 15 MG/DL — SIGNIFICANT CHANGE UP (ref 7–23)
CA-I SERPL-SCNC: 1.22 MMOL/L — SIGNIFICANT CHANGE UP (ref 1.15–1.33)
CALCIUM SERPL-MCNC: 8.2 MG/DL — LOW (ref 8.4–10.5)
CALCIUM SERPL-MCNC: 9.2 MG/DL — SIGNIFICANT CHANGE UP (ref 8.4–10.5)
CHLORIDE SERPL-SCNC: 103 MMOL/L — SIGNIFICANT CHANGE UP (ref 96–108)
CHLORIDE SERPL-SCNC: 96 MMOL/L — SIGNIFICANT CHANGE UP (ref 96–108)
CO2 BLDV-SCNC: 24 MMOL/L — SIGNIFICANT CHANGE UP (ref 22–26)
CO2 SERPL-SCNC: 18 MMOL/L — LOW (ref 22–31)
CO2 SERPL-SCNC: 22 MMOL/L — SIGNIFICANT CHANGE UP (ref 22–31)
COLOR SPEC: YELLOW — SIGNIFICANT CHANGE UP
CREAT SERPL-MCNC: 0.59 MG/DL — SIGNIFICANT CHANGE UP (ref 0.5–1.3)
CREAT SERPL-MCNC: 0.82 MG/DL — SIGNIFICANT CHANGE UP (ref 0.5–1.3)
DIFF PNL FLD: NEGATIVE — SIGNIFICANT CHANGE UP
EGFR: 120 ML/MIN/1.73M2 — SIGNIFICANT CHANGE UP
EGFR: 96 ML/MIN/1.73M2 — SIGNIFICANT CHANGE UP
EOSINOPHIL # BLD AUTO: 0.39 K/UL — SIGNIFICANT CHANGE UP (ref 0–0.5)
EOSINOPHIL NFR BLD AUTO: 3.8 % — SIGNIFICANT CHANGE UP (ref 0–6)
GAS PNL BLDV: 131 MMOL/L — LOW (ref 136–145)
GAS PNL BLDV: SIGNIFICANT CHANGE UP
GLUCOSE SERPL-MCNC: 324 MG/DL — HIGH (ref 70–99)
GLUCOSE SERPL-MCNC: 555 MG/DL — CRITICAL HIGH (ref 70–99)
GLUCOSE UR QL: >=1000 MG/DL
HCO3 BLDV-SCNC: 23 MMOL/L — SIGNIFICANT CHANGE UP (ref 22–29)
HCT VFR BLD CALC: 45.9 % — HIGH (ref 34.5–45)
HGB BLD-MCNC: 15.3 G/DL — SIGNIFICANT CHANGE UP (ref 11.5–15.5)
IMM GRANULOCYTES NFR BLD AUTO: 0.6 % — SIGNIFICANT CHANGE UP (ref 0–0.9)
KETONES UR-MCNC: ABNORMAL MG/DL
LEUKOCYTE ESTERASE UR-ACNC: NEGATIVE — SIGNIFICANT CHANGE UP
LIDOCAIN IGE QN: 41 U/L — SIGNIFICANT CHANGE UP (ref 7–60)
LYMPHOCYTES # BLD AUTO: 1.67 K/UL — SIGNIFICANT CHANGE UP (ref 1–3.3)
LYMPHOCYTES # BLD AUTO: 16.5 % — SIGNIFICANT CHANGE UP (ref 13–44)
MCHC RBC-ENTMCNC: 30.7 PG — SIGNIFICANT CHANGE UP (ref 27–34)
MCHC RBC-ENTMCNC: 33.3 GM/DL — SIGNIFICANT CHANGE UP (ref 32–36)
MCV RBC AUTO: 92 FL — SIGNIFICANT CHANGE UP (ref 80–100)
MONOCYTES # BLD AUTO: 0.59 K/UL — SIGNIFICANT CHANGE UP (ref 0–0.9)
MONOCYTES NFR BLD AUTO: 5.8 % — SIGNIFICANT CHANGE UP (ref 2–14)
NEUTROPHILS # BLD AUTO: 7.41 K/UL — HIGH (ref 1.8–7.4)
NEUTROPHILS NFR BLD AUTO: 73 % — SIGNIFICANT CHANGE UP (ref 43–77)
NITRITE UR-MCNC: NEGATIVE — SIGNIFICANT CHANGE UP
NRBC # BLD: 0 /100 WBCS — SIGNIFICANT CHANGE UP (ref 0–0)
PCO2 BLDV: 39 MMHG — SIGNIFICANT CHANGE UP (ref 39–42)
PH BLDV: 7.38 — SIGNIFICANT CHANGE UP (ref 7.32–7.43)
PH UR: 5.5 — SIGNIFICANT CHANGE UP (ref 5–8)
PLATELET # BLD AUTO: 252 K/UL — SIGNIFICANT CHANGE UP (ref 150–400)
PO2 BLDV: 43 MMHG — SIGNIFICANT CHANGE UP (ref 25–45)
POTASSIUM BLDV-SCNC: 4.3 MMOL/L — SIGNIFICANT CHANGE UP (ref 3.5–5.1)
POTASSIUM SERPL-MCNC: 3.7 MMOL/L — SIGNIFICANT CHANGE UP (ref 3.5–5.3)
POTASSIUM SERPL-MCNC: 4.4 MMOL/L — SIGNIFICANT CHANGE UP (ref 3.5–5.3)
POTASSIUM SERPL-SCNC: 3.7 MMOL/L — SIGNIFICANT CHANGE UP (ref 3.5–5.3)
POTASSIUM SERPL-SCNC: 4.4 MMOL/L — SIGNIFICANT CHANGE UP (ref 3.5–5.3)
PROT SERPL-MCNC: 7.8 G/DL — SIGNIFICANT CHANGE UP (ref 6–8.3)
PROT UR-MCNC: NEGATIVE MG/DL — SIGNIFICANT CHANGE UP
RBC # BLD: 4.99 M/UL — SIGNIFICANT CHANGE UP (ref 3.8–5.2)
RBC # FLD: 12.2 % — SIGNIFICANT CHANGE UP (ref 10.3–14.5)
SAO2 % BLDV: 80.5 % — SIGNIFICANT CHANGE UP (ref 67–88)
SODIUM SERPL-SCNC: 132 MMOL/L — LOW (ref 135–145)
SODIUM SERPL-SCNC: 135 MMOL/L — SIGNIFICANT CHANGE UP (ref 135–145)
SP GR SPEC: >1.03 — HIGH (ref 1–1.03)
UROBILINOGEN FLD QL: 0.2 MG/DL — SIGNIFICANT CHANGE UP (ref 0.2–1)
WBC # BLD: 10.15 K/UL — SIGNIFICANT CHANGE UP (ref 3.8–10.5)
WBC # FLD AUTO: 10.15 K/UL — SIGNIFICANT CHANGE UP (ref 3.8–10.5)

## 2024-09-12 PROCEDURE — 96374 THER/PROPH/DIAG INJ IV PUSH: CPT

## 2024-09-12 PROCEDURE — 36415 COLL VENOUS BLD VENIPUNCTURE: CPT

## 2024-09-12 PROCEDURE — 71046 X-RAY EXAM CHEST 2 VIEWS: CPT

## 2024-09-12 PROCEDURE — 82330 ASSAY OF CALCIUM: CPT

## 2024-09-12 PROCEDURE — 99284 EMERGENCY DEPT VISIT MOD MDM: CPT | Mod: 25

## 2024-09-12 PROCEDURE — 80048 BASIC METABOLIC PNL TOTAL CA: CPT

## 2024-09-12 PROCEDURE — 81003 URINALYSIS AUTO W/O SCOPE: CPT

## 2024-09-12 PROCEDURE — 82803 BLOOD GASES ANY COMBINATION: CPT

## 2024-09-12 PROCEDURE — 84295 ASSAY OF SERUM SODIUM: CPT

## 2024-09-12 PROCEDURE — 84132 ASSAY OF SERUM POTASSIUM: CPT

## 2024-09-12 PROCEDURE — 82962 GLUCOSE BLOOD TEST: CPT

## 2024-09-12 PROCEDURE — 81025 URINE PREGNANCY TEST: CPT

## 2024-09-12 PROCEDURE — 80053 COMPREHEN METABOLIC PANEL: CPT

## 2024-09-12 PROCEDURE — 71046 X-RAY EXAM CHEST 2 VIEWS: CPT | Mod: 26

## 2024-09-12 PROCEDURE — 82010 KETONE BODYS QUAN: CPT

## 2024-09-12 PROCEDURE — 83690 ASSAY OF LIPASE: CPT

## 2024-09-12 PROCEDURE — 85025 COMPLETE CBC W/AUTO DIFF WBC: CPT

## 2024-09-12 PROCEDURE — 99284 EMERGENCY DEPT VISIT MOD MDM: CPT

## 2024-09-12 RX ORDER — MAGNESIUM, ALUMINUM HYDROXIDE 200-225/5
30 SUSPENSION, ORAL (FINAL DOSE FORM) ORAL ONCE
Refills: 0 | Status: COMPLETED | OUTPATIENT
Start: 2024-09-12 | End: 2024-09-12

## 2024-09-12 RX ORDER — ACETAMINOPHEN 325 MG/1
975 TABLET ORAL ONCE
Refills: 0 | Status: COMPLETED | OUTPATIENT
Start: 2024-09-12 | End: 2024-09-12

## 2024-09-12 RX ORDER — INSULIN REGULAR, HUMAN 100/ML (3)
10 INSULIN PEN (ML) SUBCUTANEOUS ONCE
Refills: 0 | Status: COMPLETED | OUTPATIENT
Start: 2024-09-12 | End: 2024-09-12

## 2024-09-12 RX ORDER — SODIUM CHLORIDE 9 MG/ML
1000 INJECTION INTRAMUSCULAR; INTRAVENOUS; SUBCUTANEOUS ONCE
Refills: 0 | Status: COMPLETED | OUTPATIENT
Start: 2024-09-12 | End: 2024-09-12

## 2024-09-12 RX ORDER — LIDOCAINE/BENZALKONIUM/ALCOHOL
1 SOLUTION, NON-ORAL TOPICAL ONCE
Refills: 0 | Status: COMPLETED | OUTPATIENT
Start: 2024-09-12 | End: 2024-09-12

## 2024-09-12 RX ORDER — FAMOTIDINE 10 MG/ML
20 INJECTION INTRAVENOUS ONCE
Refills: 0 | Status: COMPLETED | OUTPATIENT
Start: 2024-09-12 | End: 2024-09-12

## 2024-09-12 RX ADMIN — SODIUM CHLORIDE 1000 MILLILITER(S): 9 INJECTION INTRAMUSCULAR; INTRAVENOUS; SUBCUTANEOUS at 15:01

## 2024-09-12 RX ADMIN — Medication 1 PATCH: at 15:01

## 2024-09-12 RX ADMIN — Medication 10 UNIT(S): at 16:55

## 2024-09-12 RX ADMIN — Medication 30 MILLILITER(S): at 15:02

## 2024-09-12 RX ADMIN — FAMOTIDINE 20 MILLIGRAM(S): 10 INJECTION INTRAVENOUS at 15:02

## 2024-09-12 RX ADMIN — SODIUM CHLORIDE 1000 MILLILITER(S): 9 INJECTION INTRAMUSCULAR; INTRAVENOUS; SUBCUTANEOUS at 16:26

## 2024-09-12 RX ADMIN — ACETAMINOPHEN 975 MILLIGRAM(S): 325 TABLET ORAL at 15:02

## 2024-09-12 NOTE — ED PROVIDER NOTE - NS ED ATTENDING STATEMENT MOD
I have seen and examined this patient and fully participated in the care of this patient as the teaching attending.  The service was shared with the DELON.  I reviewed and verified the documentation.

## 2024-09-12 NOTE — ED PROVIDER NOTE - PHYSICAL EXAMINATION
CONSTITUTIONAL: Appears well, in no apparent distress  HEAD: Normocephalic, no obvious signs of trauma  EENT: PERRL, EOMI w/o pain, no nystagmus, nares patent no drainage, no pharyngeal erythema, swelling, or exudates  NECK: Trachea midline, no goiter  RESP: L/S present with mild wheeze, no tachypnea, no accessory muscle use, speaking full sentences  CARDIC: RRR, +S1/S2, no peripheral edema  GI: ABD soft, nondistended, nontender on palpation, no palpable masses  : No CVA Tenderness  MSK: 5/5 strength extremities x 4, full ROM without pain, no midline spinal tenderness on palpation. Left upper thorax not tender on palpation, no crepitus, deformity, ecchymosis. Pain worse with twisting motion.   SKIN: No rashes, normal color/condition   NEURO: A&OX4, CN intact, No focal motor deficits/weakness, no sensory deficits, no dysmetria, no slurred speech, no facial droop, normal gait

## 2024-09-12 NOTE — ED PROVIDER NOTE - CARE PLAN
Principal Discharge DX:	Musculoskeletal pain  Secondary Diagnosis:	GERD (gastroesophageal reflux disease)   1 Principal Discharge DX:	Hyperglycemia  Secondary Diagnosis:	GERD (gastroesophageal reflux disease)

## 2024-09-12 NOTE — ED PROVIDER NOTE - CLINICAL SUMMARY MEDICAL DECISION MAKING FREE TEXT BOX
- 34-year-old female past medical history of hypertension, DM2 on insulin, asthma, renal stones presenting to ED complaining of 1 week of upper left flank, nonradiating epigastric pain described as cramping sensation.  Patient said first noticed flank pain after lifting something heavy while camping.  States has been taking Tylenol and ibuprofen without relief, states pain does not feel similar to previous renal stones.  Denies nausea/vomiting/urinary symptoms.  States epigastric pain is intermittent over the last week, nonradiating, exacerbated by eating hot/spicy foods.  Denies any burning sensation/chest pain/throat pain, no palpitations, shortness of breath, cough, fever or chills diarrhea  - Left upper flank pain most likely MSK related given experienced pain after lifting, worse with twisting - will order Tylenol and lidocaine patch. Low suspicion for pneumonia/pneumo given no hypoxia, SOB, not worse with deep inspiration - will obtain chest xray to assess. Low suspicion for renal stones given no urinary symptoms, no nausea - will obtain UA to assess hematuria/UTI, CMP to assess electrolyte imbalance. Epigastric pain most likely GERD, will obtain lipase to assess pancreatitis, pepcid/maalox to treat symptoms and reassess. cbc for leukocytosis. Disp pending results and reassessment.

## 2024-09-12 NOTE — ED PROVIDER NOTE - PROGRESS NOTE DETAILS
IRON Ontiveros NP: Patient states resolved s/p meds. Initial , Gap 18, beta neg, pH 7.38. Patient states had not taken insulin in 3 days to home stressors. Repeat laps s/p insulin and fluids , gap 10. Will DC patient with outpatient follow up and instruct to take home insulin as prescribed. Patient updated on results and is agreeable to plan. Questions answered, patient verbalizes understanding. Return precautions given.

## 2024-09-12 NOTE — ED PROVIDER NOTE - ATTENDING CONTRIBUTION TO CARE
33 yo pmh htn, DM, asthma, kidney stones w 1 week of upper left flank pain, mild cramping abd pain. No f/c, states not as severe when had prior diverticulitis, no GI bleed sx.  states has not had insulin in the last few days due to personal stressors. Per records 34 units at night, 7 units premeal.  CONSTITUTIONAL: Awake, alert and in no apparent distress.  HEENT: Head is atraumatic. Eyes clear bilaterally, normal EOMI. Airway patent.  CARDIAC: Normal rate, regular rhythm.  Heart sounds S1, S2.   RESPIRATORY: Breath sounds clear and equal bilaterally. no tachypnea, respiratory distress.   GASTROINTESTINAL: Abdomen soft, non-tender, no guarding, distension.  MUSCULOSKELETAL: Spine appears normal, no midline spinal tenderness, range of motion is not limited, no muscle or joint tenderness. no bony tenderness.   NEUROLOGICAL: Alert, no focal deficits, no motor or sensory deficits.  SKIN: Skin normal color for race, warm, dry and intact. No evidence of rash.  PSYCHIATRIC: Normal mood and affect. no apparent risk to self or others.  L Back pain, hyperglycemia, back appearing as msk, no red flags, hyperglycemia, plan for ivf, insulin, reassess. vbg without acidosis, neg beta hydroxybutyrate

## 2024-09-12 NOTE — ED PROVIDER NOTE - CARE PROVIDER_API CALL
Sterling Landon  Gastroenterology  [address] [place] [address], [address]  [place] [address]  Phone: [phone]  Fax: [phone]  Follow Up Time:

## 2024-09-12 NOTE — ED PROVIDER NOTE - PATIENT PORTAL LINK FT
You can access the FollowMyHealth Patient Portal offered by [facility] by registering at the following website: [email or web address]. By joining [employer]’s FollowMyHealth portal, you will also be able to view your health information using other applications (apps) compatible with our system.

## 2024-09-12 NOTE — ED PROVIDER NOTE - NSFOLLOWUPINSTRUCTIONS_ED_ALL_ED_FT
- You were seen in the Emergency Department Today for abdominal pain and flank pain  - Your chest xray was normal. Your lab results showed your Glucose to be very high. PLEASE take your home insulin as directed and follow up with a primary care doctor/endocrinologist   - You may take Pepcid and Maalox over the counter as indicate don the packaging for your epigastric pain. Please follow up with the gastroenterologist.   - Return to the Emergency Department IMMEDIATELY if you experience chest pain, palpitations, difficulty breathing, worsening abdominal pain, your abdomen becomes hard or distended, blood in your stool or vomit, cannot tolerate food or drink, lightheadedness/dizziness, nausea/vomiting, lethargy, you pass out, fevers       English    Hyperglycemia  Hyperglycemia is when the amount of sugar, or glucose, in your blood is too high. High blood sugar can happen if you have diabetes or if you don't have diabetes. It may be an emergency.    What are the causes?  If you have diabetes, high blood sugar may be caused by:  Medicines that increase blood sugar.  Not giving yourself enough insulin (if you take it).  Being less active than normal.  Eating more than planned.  Illness, an injury, or an infection.  Having surgery.  Stress.  If you don't have diabetes, high blood sugar may be caused by:  Certain medicines, such as steroids or thiazide diuretics.  Stress.  A bad illness or infection.  Having surgery.  Diseases of the pancreas.  What increases the risk?  You're more likely to have high blood sugar if:  Someone in your family has diabetes.  You're overweight.  You aren't active.  You have or have had:  Prediabetes.  Diabetes when pregnant.  Polycystic ovarian syndrome (PCOS).  What are the signs or symptoms?  High blood sugar may not cause symptoms. If you do have symptoms, they may include:  Feeling more thirsty than normal.  Needing to pee more often than normal.  Hunger.  Feeling very tired.  Blurry eyesight.  You may have other symptoms if your high blood sugar isn't treated. These may include:  Pain in your belly.  A headache.  Weakness.  Weight loss that's not planned.  A tingling or numb feeling in your hands or feet.  Cuts or bruises that heal slowly.  How is this diagnosed?  A person taking blood from a finger to check blood sugar levels.  High blood sugar is diagnosed with a blood test. This test tells you how much sugar is in your blood. It's done while you're having symptoms.    Your health care provider may also do a physical exam, look at your medical history, and do more blood tests. These tests may include:  A fasting blood glucose (FBG) test. You can't eat for at least 8 hours before this test.  An A1C test.  A glucose tolerance test.  How is this treated?  Treatment may include:  Taking medicine to control your blood sugar levels.  Changing your medicine or how much you take if you take insulin or other diabetes medicines.  Checking your blood sugar more often.  Making changes to your daily life. These may include:  Being more active.  Eating healthier foods.  Losing weight.  Treating an illness or infection.  Stopping or taking less steroids.  If your high blood sugar stays high, you may need to be treated in the hospital.    Follow these instructions at home:  If you have diabetes:    A plate with healthy, colorful foods.  Know the symptoms of high blood sugar.  Follow your diabetes care plan. Make sure you:  Take insulin and medicines as told.  Check your blood sugar as often as told.  Eat on time. Do not skip meals.  Check your blood sugar before and after you exercise. If you exercise longer or harder than normal, check your blood sugar more often.  Follow your sick day plan when you can't eat or drink like normal. Make this plan ahead of time with your provider.  Share your diabetes care plan with:  Your work or school.  The people you live with.  Wear an alert bracelet or carry a card that says you have diabetes.  General instructions    Take medicines only as told by your provider.  Drink enough fluid to keep your pee (urine) pale yellow. Make sure you drink enough when you:  Exercise.  Get sick.  Are in hot places.  If you drink alcohol:  Limit how much you have to:  0–1 drink a day if you're female.  0–2 drinks a day if you're male.  Know how much alcohol is in your drink. In the [place] one drink is one 12 oz bottle of beer (355 mL), one 5 oz glass of wine (148 mL), or one 1½ oz glass of hard liquor (44 mL).  Manage stress. If you need help with this, ask your provider.  Exercise as told. Try to stay at a healthy weight.  Keep all follow-up visits. Your provider will want to make sure your high blood sugar is treated.  Where to find more information  American Diabetes Association (ADA): diabetes.org  Contact a health care provider if:  You have diabetes and have trouble keeping your blood sugar in the right range.  Your blood sugar is at or above 240 mg/dL (13.3 mmol/L) for 2 days in a row.  You have high blood sugar often.  You have signs of illness, such as:  Nausea or vomiting.  A headache.  A fever.  You can't stop vomiting.  Get help right away if:  Your blood sugar monitor reads "high" even when you're taking insulin.  You have trouble breathing.  These symptoms may be an emergency. Call 911 right away.  Do not wait to see if the symptoms will go away.  Do not drive yourself to the hospital.  This information is not intended to replace advice given to you by your health care provider. Make sure you discuss any questions you have with your health care provider.

## 2024-09-12 NOTE — ED PROVIDER NOTE - OBJECTIVE STATEMENT
34-year-old female past medical history of hypertension, DM2 on insulin, asthma, renal stones presenting to ED complaining of 1 week of upper left flank, nonradiating epigastric pain described as cramping sensation.  Patient said first noticed flank pain after lifting something heavy while camping.  States has been taking Tylenol and ibuprofen without relief, states pain does not feel similar to previous renal stones.  Denies nausea/vomiting/urinary symptoms.  States epigastric pain is intermittent over the last week, nonradiating, exacerbated by eating hot/spicy foods.  Denies any burning sensation/chest pain/throat pain, no palpitations, shortness of breath, cough, fever or chills diarrhea.

## 2024-09-12 NOTE — ED ADULT NURSE NOTE - OBJECTIVE STATEMENT
Pt is a 35 yo F c/o upper L flank pain and epigastric pain x 1 week. Denies n/v/d, urinary sx.  Pmhx HTN, DM on insulin.  On exam, pt in NAD, respirations even and unlabored, abd soft and nondistended. Ambulatory with even and steady gait.

## 2024-11-02 ENCOUNTER — EMERGENCY (EMERGENCY)
Facility: HOSPITAL | Age: 34
LOS: 1 days | Discharge: HOME | End: 2024-11-02
Attending: STUDENT IN AN ORGANIZED HEALTH CARE EDUCATION/TRAINING PROGRAM | Admitting: STUDENT IN AN ORGANIZED HEALTH CARE EDUCATION/TRAINING PROGRAM
Payer: COMMERCIAL

## 2024-11-02 VITALS
DIASTOLIC BLOOD PRESSURE: 79 MMHG | OXYGEN SATURATION: 97 % | TEMPERATURE: 98 F | RESPIRATION RATE: 16 BRPM | SYSTOLIC BLOOD PRESSURE: 117 MMHG | HEART RATE: 78 BPM

## 2024-11-02 VITALS
SYSTOLIC BLOOD PRESSURE: 116 MMHG | RESPIRATION RATE: 18 BRPM | HEIGHT: 63 IN | HEART RATE: 112 BPM | DIASTOLIC BLOOD PRESSURE: 81 MMHG | WEIGHT: 169.98 LBS | TEMPERATURE: 100 F | OXYGEN SATURATION: 95 %

## 2024-11-02 DIAGNOSIS — Z98.891 HISTORY OF UTERINE SCAR FROM PREVIOUS SURGERY: Chronic | ICD-10-CM

## 2024-11-02 DIAGNOSIS — H71.92 UNSPECIFIED CHOLESTEATOMA, LEFT EAR: Chronic | ICD-10-CM

## 2024-11-02 LAB
ANION GAP SERPL CALC-SCNC: 13 MMOL/L — SIGNIFICANT CHANGE UP (ref 5–17)
BASOPHILS # BLD AUTO: 0.04 K/UL — SIGNIFICANT CHANGE UP (ref 0–0.2)
BASOPHILS NFR BLD AUTO: 0.3 % — SIGNIFICANT CHANGE UP (ref 0–2)
BUN SERPL-MCNC: 14 MG/DL — SIGNIFICANT CHANGE UP (ref 7–23)
CALCIUM SERPL-MCNC: 8.9 MG/DL — SIGNIFICANT CHANGE UP (ref 8.4–10.5)
CHLORIDE SERPL-SCNC: 99 MMOL/L — SIGNIFICANT CHANGE UP (ref 96–108)
CO2 SERPL-SCNC: 21 MMOL/L — LOW (ref 22–31)
CREAT SERPL-MCNC: 0.68 MG/DL — SIGNIFICANT CHANGE UP (ref 0.5–1.3)
EGFR: 116 ML/MIN/1.73M2 — SIGNIFICANT CHANGE UP
EOSINOPHIL # BLD AUTO: 0.35 K/UL — SIGNIFICANT CHANGE UP (ref 0–0.5)
EOSINOPHIL NFR BLD AUTO: 2.9 % — SIGNIFICANT CHANGE UP (ref 0–6)
FLUAV AG NPH QL: SIGNIFICANT CHANGE UP
FLUBV AG NPH QL: SIGNIFICANT CHANGE UP
GLUCOSE SERPL-MCNC: 423 MG/DL — HIGH (ref 70–99)
HCT VFR BLD CALC: 44.8 % — SIGNIFICANT CHANGE UP (ref 34.5–45)
HGB BLD-MCNC: 15.1 G/DL — SIGNIFICANT CHANGE UP (ref 11.5–15.5)
IMM GRANULOCYTES NFR BLD AUTO: 0.4 % — SIGNIFICANT CHANGE UP (ref 0–0.9)
LYMPHOCYTES # BLD AUTO: 1.39 K/UL — SIGNIFICANT CHANGE UP (ref 1–3.3)
LYMPHOCYTES # BLD AUTO: 11.6 % — LOW (ref 13–44)
MCHC RBC-ENTMCNC: 30 PG — SIGNIFICANT CHANGE UP (ref 27–34)
MCHC RBC-ENTMCNC: 33.7 G/DL — SIGNIFICANT CHANGE UP (ref 32–36)
MCV RBC AUTO: 89.1 FL — SIGNIFICANT CHANGE UP (ref 80–100)
MONOCYTES # BLD AUTO: 0.61 K/UL — SIGNIFICANT CHANGE UP (ref 0–0.9)
MONOCYTES NFR BLD AUTO: 5.1 % — SIGNIFICANT CHANGE UP (ref 2–14)
NEUTROPHILS # BLD AUTO: 9.57 K/UL — HIGH (ref 1.8–7.4)
NEUTROPHILS NFR BLD AUTO: 79.7 % — HIGH (ref 43–77)
NRBC # BLD: 0 /100 WBCS — SIGNIFICANT CHANGE UP (ref 0–0)
PLATELET # BLD AUTO: 261 K/UL — SIGNIFICANT CHANGE UP (ref 150–400)
POTASSIUM SERPL-MCNC: 4.1 MMOL/L — SIGNIFICANT CHANGE UP (ref 3.5–5.3)
POTASSIUM SERPL-SCNC: 4.1 MMOL/L — SIGNIFICANT CHANGE UP (ref 3.5–5.3)
RBC # BLD: 5.03 M/UL — SIGNIFICANT CHANGE UP (ref 3.8–5.2)
RBC # FLD: 11.9 % — SIGNIFICANT CHANGE UP (ref 10.3–14.5)
RSV RNA NPH QL NAA+NON-PROBE: SIGNIFICANT CHANGE UP
SARS-COV-2 RNA SPEC QL NAA+PROBE: SIGNIFICANT CHANGE UP
SODIUM SERPL-SCNC: 133 MMOL/L — LOW (ref 135–145)
WBC # BLD: 12.01 K/UL — HIGH (ref 3.8–10.5)
WBC # FLD AUTO: 12.01 K/UL — HIGH (ref 3.8–10.5)

## 2024-11-02 PROCEDURE — 82962 GLUCOSE BLOOD TEST: CPT

## 2024-11-02 PROCEDURE — 87637 SARSCOV2&INF A&B&RSV AMP PRB: CPT

## 2024-11-02 PROCEDURE — 71046 X-RAY EXAM CHEST 2 VIEWS: CPT

## 2024-11-02 PROCEDURE — 93010 ELECTROCARDIOGRAM REPORT: CPT

## 2024-11-02 PROCEDURE — 80048 BASIC METABOLIC PNL TOTAL CA: CPT

## 2024-11-02 PROCEDURE — 36415 COLL VENOUS BLD VENIPUNCTURE: CPT

## 2024-11-02 PROCEDURE — 96374 THER/PROPH/DIAG INJ IV PUSH: CPT

## 2024-11-02 PROCEDURE — 85025 COMPLETE CBC W/AUTO DIFF WBC: CPT

## 2024-11-02 PROCEDURE — 71046 X-RAY EXAM CHEST 2 VIEWS: CPT | Mod: 26

## 2024-11-02 PROCEDURE — 99284 EMERGENCY DEPT VISIT MOD MDM: CPT

## 2024-11-02 PROCEDURE — 99285 EMERGENCY DEPT VISIT HI MDM: CPT | Mod: 25

## 2024-11-02 PROCEDURE — 93005 ELECTROCARDIOGRAM TRACING: CPT

## 2024-11-02 RX ORDER — KETOROLAC TROMETHAMINE 30 MG/ML
15 INJECTION INTRAMUSCULAR; INTRAVENOUS ONCE
Refills: 0 | Status: DISCONTINUED | OUTPATIENT
Start: 2024-11-02 | End: 2024-11-02

## 2024-11-02 RX ORDER — ALBUTEROL 90 MCG
2 AEROSOL (GRAM) INHALATION
Qty: 1 | Refills: 0
Start: 2024-11-02 | End: 2024-12-01

## 2024-11-02 RX ORDER — SODIUM CHLORIDE 9 MG/ML
1000 INJECTION, SOLUTION INTRAMUSCULAR; INTRAVENOUS; SUBCUTANEOUS ONCE
Refills: 0 | Status: COMPLETED | OUTPATIENT
Start: 2024-11-02 | End: 2024-11-02

## 2024-11-02 RX ADMIN — SODIUM CHLORIDE 1000 MILLILITER(S): 9 INJECTION, SOLUTION INTRAMUSCULAR; INTRAVENOUS; SUBCUTANEOUS at 21:25

## 2024-11-02 RX ADMIN — SODIUM CHLORIDE 1000 MILLILITER(S): 9 INJECTION, SOLUTION INTRAMUSCULAR; INTRAVENOUS; SUBCUTANEOUS at 20:20

## 2024-11-02 RX ADMIN — KETOROLAC TROMETHAMINE 15 MILLIGRAM(S): 30 INJECTION INTRAMUSCULAR; INTRAVENOUS at 20:20

## 2024-11-02 NOTE — ED PROVIDER NOTE - NSFOLLOWUPINSTRUCTIONS_ED_ALL_ED_FT
Thank you for visiting [facility] Emergency Department.      We saw you today for a viral syndrome.  Please rest. Drink plenty of fluids.  Use your albuterol as needed.  You may also use over the counter cough medications.    PAIN/ FEVER CONTROL:   You may take ibuprofen (Motrin, Advil) 600 mg (3 regular tablets) every 6 hours as needed for pain.  Please take with food.  Stop taking if you develop abdominal pain, dark/ bloody stools.  Do not mix with other NSAIDS (ie. Naproxen, Aleve, Celecoxib).  You may also take acetaminophen (Tylenol) 650-975mg (2-3 regular tablets) or 500-1000mg (1-2 extra strength tablets) every 6 hours as needed for pain.  Do not exceed 4000 mg in 1 day. These medications may be bought over the counter.    I recommend alternating the Ibuprofen and Tylenol so you are getting medications around the clock.  For example take the Ibuprofen, then 3 hours later take the Tylenol, then 3 hours later take the Ibuprofen, and repeat as needed.    Please know that no emergency visit is complete without follow-up with your primary care provider in 1 week.  Please bring copies of all discharge papers and results and show to your doctor.      Please continue taking all previous medications as instructed unless we discussed otherwise.     I appreciated your patience and hope you feel better soon.     Return to ER immediately if you develop fevers, chills, chest pain, shortness of breath, worsening and/or any concerning symptoms.

## 2024-11-02 NOTE — ED PROVIDER NOTE - PATIENT PORTAL LINK FT
You can access the FollowMyHealth Patient Portal offered by [facility] by registering at the following website: [email or web address]. By joining [facility]’s FollowMyHealth portal, you will also be able to view your health information using other applications (apps) compatible with our system.

## 2024-11-02 NOTE — ED PROVIDER NOTE - CLINICAL SUMMARY MEDICAL DECISION MAKING FREE TEXT BOX
35 y/o female w/ hx htn, iddm, asthma p/w head pressure, minimally productive cough, sore throat, chest congestion, body aches, nausea, and diarrhea x 2 days.  States her baby was recently sick with a virus.  Took 2 tylenol around 1pm with some improvement.  Also used her albuterol inhaler earlier today with improvement.  States about to run out of inhaler and requesting refill.  Denies recent travel.  Denies sob, abd pain, vomiting, dysuria.  VS notable for , T 99.6.  Nl RR, 95% O2  Pt clinically looks well.  Nontoxic, reassuring exam.  Occ faint insp wheeze RLL  Overall suspect likely URI/ viral syndrome.    Will send RVP, check basics, cxr, ekg  Trial IVF, pain meds/antipyretic  Pt declining nebs, re-eval/ rpt vs after fluids  Doubt ACS, PE, dissection 35 y/o female w/ hx htn, iddm, asthma p/w head pressure, minimally productive cough, sore throat, chest congestion, body aches, nausea, and diarrhea x 2 days.  States her baby was recently sick with a virus.  Took 2 tylenol around 1pm with some improvement.  Also used her albuterol inhaler earlier today with improvement.  States about to run out of inhaler and requesting refill.  Denies recent travel.  Denies sob, abd pain, vomiting, dysuria.  VS notable for , T 99.6.  Nl RR, 95% O2  Pt clinically looks well.  Nontoxic, reassuring exam.  Occ faint insp wheeze RLL  Overall suspect likely URI/ viral syndrome.    Will send RVP, check basics, cxr, ekg  Trial IVF, pain meds/antipyretic  Pt declining nebs, re-eval/ rpt vs after fluids  Doubt ACS, PE, dissection  --  EKG normal sinus @87bpm, no acute ischemic changes  Wet read CXR neg  Labs with wbc 12, na 133, glucose 423 with no anion gap  Pt given 2L fluid  Rpt FS  Rpt HR  Pt feeling better, will dc with supportive care, refill albuterol, f/u pmd, strict return precautions 35 y/o female w/ hx htn, iddm, asthma p/w head pressure, minimally productive cough, sore throat, chest congestion, body aches, nausea, and diarrhea x 2 days.  States her baby was recently sick with a virus.  Took 2 tylenol around 1pm with some improvement.  Also used her albuterol inhaler earlier today with improvement.  States about to run out of inhaler and requesting refill.  Denies recent travel.  Denies sob, abd pain, vomiting, dysuria.  VS notable for , T 99.6.  Nl RR, 95% O2  Pt clinically looks well.  Nontoxic, reassuring exam.  Occ faint insp wheeze RLL  Overall suspect likely URI/ viral syndrome.    Will send RVP, check basics, cxr, ekg  Trial IVF, pain meds/antipyretic  Pt declining nebs, re-eval/ rpt vs after fluids  Doubt ACS, PE, dissection  --  EKG normal sinus @87bpm, no acute ischemic changes  Wet read CXR neg  Labs with wbc 12, na 133, glucose 423 with no anion gap  Pt given 2L fluid  Rpt FS  Rpt HR 76  Pt feeling better, will dc with supportive care, refill albuterol, f/u pmd, strict return precautions 33 y/o female w/ hx htn, iddm, asthma p/w head pressure, minimally productive cough, sore throat, chest congestion, body aches, nausea, and diarrhea x 2 days.  States her baby was recently sick with a virus.  Took 2 tylenol around 1pm with some improvement.  Also used her albuterol inhaler earlier today with improvement.  States about to run out of inhaler and requesting refill.  Denies recent travel.  Denies sob, abd pain, vomiting, dysuria.  VS notable for , T 99.6.  Nl RR, 95% O2  Pt clinically looks well.  Nontoxic, reassuring exam.  Occ faint insp wheeze RLL  Overall suspect likely URI/ viral syndrome.    Will send RVP, check basics, cxr, ekg  Trial IVF, pain meds/antipyretic  Pt declining nebs, re-eval/ rpt vs after fluids  Doubt ACS, PE, dissection  --  EKG normal sinus @87bpm, no acute ischemic changes  Wet read CXR neg  Labs with wbc 12, na 133, glucose 423 with no anion gap  Pt given 2L fluid  Rpt   Rpt HR 76  Pt feeling better, will dc with supportive care, refill albuterol,   D/w pt importance of close f/u with pmd for further glycemic control, strict return precautions

## 2024-11-02 NOTE — ED ADULT TRIAGE NOTE - HOW PATIENT ADDRESSED, PROFILE
----- Message from Leisa Bowen sent at 12/30/2022  8:10 AM EST -----  Subject: Refill Request    QUESTIONS  Name of Medication? methylphenidate (RITALIN) 10 MG tablet  Patient-reported dosage and instructions? 10mg  How many days do you have left? 0  Preferred Pharmacy? [facility] [id number]  Pharmacy phone number (if available)? [phone]  Additional Information for Provider? We sent the medication to the wrong   pharmacy and now he is almost out needs this done asap. Send to [facility] [place]  ---------------------------------------------------------------------------  --------------,  Name of Medication? methylphenidate (CONCERTA) 54 MG extended release   tablet  Patient-reported dosage and instructions? 54mg  How many days do you have left? 0  Preferred Pharmacy? [facility] [id number]  Pharmacy phone number (if available)? [phone]  ---------------------------------------------------------------------------  --------------  Latonya GUEVARA  What is the best way for the office to contact you? OK to leave message on   voicemail  Preferred Call Back Phone Number? [phone]  ---------------------------------------------------------------------------  --------------  SCRIPT ANSWERS  Relationship to Patient?  Self Demi

## 2024-11-02 NOTE — ED PROVIDER NOTE - OBJECTIVE STATEMENT
35 y/o female w/ hx htn, iddm, asthma p/w head pressure, minimally productive cough, sore throat, chest congestion, body aches, nausea, and diarrhea x 2 days.  States her baby was recently sick with a virus.  Took 2 tylenol around 1pm with some improvement.  Also used her albuterol inhaler earlier today with improvement.  States about to run out of inhaler and requesting refill.  Denies recent travel.  Denies sob, abd pain, vomiting, dysuria.

## 2024-11-02 NOTE — ED PROVIDER NOTE - ATTENDING APP SHARED VISIT CONTRIBUTION OF CARE
33 y/o female w/ hx htn, iddm, asthma p/w head pressure, minimally productive cough, sore throat, chest congestion, body aches, nausea, and diarrhea x 2 days. no f/c/sob/abdominal pain/vomiting/dysuria/hematuria/leg edema/rash. no personal/FH of VTE. no hormone use. no recent travel/surgery/hospitalization.    On exam, , VS otherwise wnl,  GENERAL:  Awake, alert and in NAD, non-toxic appearing  ENMT: Airway patent, uvula midline and non-edematous. no tongue/lip swelling. no oropharyngeal exudate/erythema/lesions. no drooling/trismus/stridor. no anterior neck edema/erythema/ttp/LAD. no dysphonia.   EYES: conjunctiva clear  CARDIAC: Regular rate, regular rhythm.  Heart sounds S1, S2, no S3, S4. No murmurs, rubs or gallops.  RESPIRATORY: Breath sounds clear and equal in bilateral anterior lung fields, no wheezes/ronchi/crackles/stridor; pt breathing and speaking comfortably with no increased WOB, no accessory mm. use, no intercostal retractions, no nasal flaring  GI: abdomen soft, non-distended, non-tender, no rebound or guarding.  SKIN: warm and dry, no rashes  PSYCH: awake, alert, calm and cooperative  EXTREMITIES: no ble edema  NEURO: gcs 15    ddx: flu vs covid vs rsv vs pna    Plan:  - ekg  - cxr  - labs  - ns, toradol

## 2024-11-02 NOTE — ED PROVIDER NOTE - PHYSICAL EXAMINATION
CONSTITUTIONAL: Awake, alert.  Nontoxic, no acute distress.    HEAD: Normocephalic, atraumatic.    EYES: Conjunctivae clear without exudates or hemorrhage. Sclera is non-icteric.    ENT:   Ears: External ear normal appearing without tenderness  Nose: Normal appearing nose, nasal mucosa is pink and moist. The nasal septum is midline, no septal hematoma. Nares are patent bilaterally.  Throat: Oral mucosa is pink and moist with good dentition. Tongue normal in appearance without lesions and with good symmetrical movement. No buccal nodules or lesions are noted. The pharynx is normal in appearance without tonsillar swelling or exudates.  Uvula midline.  No trismus.  No submandibular/ submental lymphadenopathy.    NECK: supple, trachea midline.    HEART:  Normal rate, regular rhythm.  Heart sounds S1, S2.  No murmurs, rubs or gallops.    LUNGS:  No acute respiratory distress.  Non-tachypneic and non-labored.  Lungs are clear bilaterally with good aeration.  Occ faint insp wheeze to R lower lung    ABDOMEN: Normal appearing skin without lesions, rashes.  Normal bowel sounds x 4.  Soft, non-distended, non-tender in all four quadrants. No rebound or guarding. No hernias or masses palpable.  No pulsatile abdominal mass.   No CVA tenderness b/l.    MUSCULOSKELETAL:  Moving all extremities without issue.    SKIN: Skin in warm, dry and intact without rashes or lesions.  Appropriate color for ethnicity.    NEUROLOGICAL:  Patient is alert, oriented x person, place and time.  Face symmetric.  Ambulating steadily    PSYCH: Appropriate mood and affect. Good judgment and insight.

## 2024-11-02 NOTE — ED PROVIDER NOTE - DISCHARGE DATE
I called patient to set up his order for outpatient physical therapy. He was very frustrated when I told him [facility] no longer has outpatient physical therapy.  Stated his Doctors office told him he could have therapy there in [place]. I did let him know we combined with the [place] facility as a whole so now we are located in [place]. There is no outpatient therapy at [facility]. Just inpatient rehab.  Patient stated he didn't know why he was told he could go to [place] for therapy. I did let him know there might be a private practice therapist or another facility for therapy that is not [facility] and if that's what he wanted I could fax the order to the facility of his choice. He sighed after that and said no [place] is fine.     02-Nov-2024

## 2024-11-06 DIAGNOSIS — B34.9 VIRAL INFECTION, UNSPECIFIED: ICD-10-CM

## 2024-11-06 DIAGNOSIS — J02.9 ACUTE PHARYNGITIS, UNSPECIFIED: ICD-10-CM

## 2024-11-06 DIAGNOSIS — E11.9 TYPE 2 DIABETES MELLITUS WITHOUT COMPLICATIONS: ICD-10-CM

## 2024-11-06 DIAGNOSIS — I10 ESSENTIAL (PRIMARY) HYPERTENSION: ICD-10-CM

## 2024-11-06 DIAGNOSIS — J45.909 UNSPECIFIED ASTHMA, UNCOMPLICATED: ICD-10-CM

## 2025-01-28 NOTE — ED ADULT NURSE NOTE - ADDITIONAL COMPLAINTS
"  Patient ID: [id number]     Chief Complaint: Annual Exam        HPI:     Isacc Sharif is a 54 y.o. male here today for an annual wellness. Overall he feels well. No other complaints today.       Past Medical History:  has a past medical history of ADHD (attention deficit hyperactivity disorder), Anxiety, and Hypertension.    Surgical History:  has no past surgical history on file.    Family History: family history includes Heart disease in his father; Rheum arthritis in his mother.    Social History:  reports that he has never smoked. He uses smokeless tobacco. He reports current alcohol use. He reports that he does not use drugs.    Current Outpatient Medications   Medication Instructions    dextroamphetamine-amphetamine 30 mg Tab 30 mg, Oral, 2 times daily    olmesartan (BENICAR) 20 mg, Oral, Daily    testosterone cypionate (DEPOTESTOTERONE CYPIONATE) 50 mg, Intramuscular, Every 14 days    venlafaxine (EFFEXOR-XR) 75 mg, Oral, Daily       Patient has No Known Allergies.     No care team member to display     Subjective:     Review of Systems    12 point review of systems conducted, negative except as stated in the history of present illness. See HPI for details.      Objective:     Visit Vitals  /88 (BP Location: Left arm, Patient Position: Sitting)   Pulse 93   Temp 98.1 °F (36.7 °C) (Oral)   Ht 5' 10" (1.778 m)   Wt 92.1 kg (203 lb)   SpO2 98%   BMI 29.13 kg/m²       Physical Exam  Constitutional:       Appearance: Normal appearance. He is normal weight.   HENT:      Head: Normocephalic.      Right Ear: Tympanic membrane, ear canal and external ear normal.      Left Ear: Tympanic membrane, ear canal and external ear normal.      Nose: Nose normal.      Mouth/Throat:      Mouth: Mucous membranes are moist.      Pharynx: Oropharynx is clear.   Eyes:      Extraocular Movements: Extraocular movements intact.      Conjunctiva/sclera: Conjunctivae normal.      Pupils: Pupils are equal, round, and reactive to " light.   Cardiovascular:      Rate and Rhythm: Normal rate and regular rhythm.      Pulses: Normal pulses.      Heart sounds: Normal heart sounds.   Pulmonary:      Effort: Pulmonary effort is normal.      Breath sounds: Normal breath sounds.   Abdominal:      General: Abdomen is flat.      Palpations: Abdomen is soft.   Musculoskeletal:         General: Normal range of motion.      Cervical back: Neck supple.   Skin:     General: Skin is warm and dry.   Neurological:      General: No focal deficit present.      Mental Status: He is alert and oriented to person, place, and time.   Psychiatric:         Mood and Affect: Mood normal.         Behavior: Behavior normal.         No results found for this or any previous visit (from the past 24 hours).    Assessment:       ICD-10-CM ICD-9-CM   1. Wellness examination  Z00.00 V70.0   2. Primary hypertension  I10 401.9   3. Pain in joint involving multiple sites  M25.50 719.49   4. Fatigue, unspecified type  R53.83 780.79   5. Hypogonadism in male  E29.1 257.2   6. Mild major depression  F32.0 296.21   7. Prostate cancer screening  Z12.5 V76.44   8. Diabetes mellitus screening  Z13.1 V77.1   9. Thyroid disorder screen  Z13.29 V77.0   10. Lipid screening  Z13.220 V77.91        Plan:     Prostate Cancer Screening -   Lab Results   Component Value Date    PSA 0.70 01/31/2024     Colon Cancer Screening - Cologuard Negative on 11/11/24. Repeat in 3 years.   Eye Exam - Recommend annually. Last Eye Exam - 2/2024  Dental Exam - Recommend biannual exams.         1. Wellness examination  -     CBC Auto Differential; Future; Expected date: 01/28/2025  -     Comprehensive Metabolic Panel; Future; Expected date: 01/28/2025  -     Lipid Panel; Future; Expected date: 01/28/2025  -     Vitamin D; Future; Expected date: 01/28/2025    2. Primary hypertension  Assessment & Plan:  Well controlled  Refill Benicar 20 mg daily      Orders:  -     olmesartan (BENICAR) 20 MG tablet; Take 1 tablet  (20 mg total) by mouth once daily.  Dispense: 30 tablet; Refill: 11    3. Pain in joint involving multiple sites  -     HILARIO by IFA, w/Rflx; Future; Expected date: 01/28/2025    4. Fatigue, unspecified type  -     TSH; Future; Expected date: 01/28/2025  -     T4, Free; Future; Expected date: 01/28/2025  -     T3, Free; Future; Expected date: 01/28/2026    5. Hypogonadism in male  -     TESTOSTERONE, FREE (DIALYSIS) AND TOTAL, LC/MS/MS; Future; Expected date: 01/28/2025  -     testosterone cypionate (DEPOTESTOTERONE CYPIONATE) 100 mg/mL injection; Inject 0.5 mLs (50 mg total) into the muscle every 14 (fourteen) days.  Dispense: 10 mL; Refill: 0    6. Mild major depression  Assessment & Plan:  DC Luvox  Trial of Effexor XR 75 mg every morning    Orders:  -     venlafaxine (EFFEXOR-XR) 75 MG 24 hr capsule; Take 1 capsule (75 mg total) by mouth once daily.  Dispense: 30 capsule; Refill: 2    7. Prostate cancer screening  -     PSA, Screening; Future; Expected date: 01/28/2025    8. Diabetes mellitus screening  -     Hemoglobin A1C; Future; Expected date: 01/28/2025    9. Lipid screening  -     Lipid Panel; Future; Expected date: 01/28/2025          Follow up in about 4 weeks (around 2/25/2025). In addition to their scheduled follow up, the patient has also been instructed to follow up on as needed basis.               Additional Complaints

## 2025-01-29 NOTE — ED PROVIDER NOTE - NEUROLOGICAL, MLM
PROGRESS NOTE  {Tip! Ambient FAQs  Watch Video:3}     Subjective     Rochelle is a 24 year old here for Annual Exam       Review of Systems  {Select ROS or Use NoteWriter Above:[id number]}    SDOH Never Smoker       Objective {Show More Vitals   :3}  Vitals:    01/29/25 1059   BP: 100/59   Pulse: 68   Temp: 98 °F (36.7 °C)   TempSrc: Temporal   SpO2: 97%   Weight: 81.8 kg (180 lb 5.4 oz)   Height: 5' 1\" (1.549 m)   PainSc:  0   BMI (Calculated): 34.07     Physical Exam  {Select Exam or use NoteWriter Above :[id number]}           ASSESSMENT AND PLAN  {TIP! Problems (5)  Medications (4)  Dispense Hx:3}          {There are no diagnoses linked to this encounter. (Refresh or delete this SmartLink)}    {Follow Up Click Here to Navigate to Follow Up then  note:3}         fruits, vegetables, and salads, while reducing carbohydrate intake.   Regular exercise is also recommended. A comprehensive blood work will be conducted to assess her blood cell count, kidney function, liver function, electrolyte levels, cholesterol, and thyroid function. Additionally, her blood glucose and A1c levels will be checked to screen for diabetes and prediabetes. She will receive her influenza vaccine today. The results of her tests will be communicated to her via [place]. The administration of the meningitis B vaccine will be deferred until the completion of her breastfeeding period.    Follow-up  The patient is scheduled for a follow-up visit in 1 year for her annual physical examination.    1. Annual physical exam  -     CBC with Automated Differential  -     Comprehensive Metabolic Panel  -     Glycohemoglobin  -     Thyroid Stimulating Hormone Reflex  2. Need for immunization against influenza  -     INFLUENZA (FLULAVAL)  3. Obesity (BMI 30.0-34.9)      Return in about 1 year (around 1/29/2026) for annual physical.         Alert and oriented, no focal deficits, no motor or sensory deficits.

## 2025-03-14 NOTE — ED PROVIDER NOTE - DISCHARGE REVIEW MATERIAL PRESENTED
[FreeTextEntry1] : Zac presents to the office today to discuss colon cancer screening.  The patient has a history of morbid obesity status post lap band more than 20 years ago.  He has recently lost 100 pounds on GLP-1 RA.  He has mild nausea but denies any vomiting or abdominal pain.  On Mounjaro, he has noted that his stools are harder but has not seen any blood.  He also takes Dilaudid as needed for back pain.  He denies any FH of colon cancer and has never had a colonoscopy before.
[FreeTextEntry1] : Zac presents to the office today to discuss colon cancer screening.  The patient has a history of morbid obesity status post lap band more than 20 years ago.  He has recently lost 100 pounds on GLP-1 RA.  He has mild nausea but denies any vomiting or abdominal pain.  On Mounjaro, he has noted that his stools are harder but has not seen any blood.  He also takes Dilaudid as needed for back pain.  He denies any FH of colon cancer and has never had a colonoscopy before.
.

## 2025-03-20 ENCOUNTER — EMERGENCY (EMERGENCY)
Facility: HOSPITAL | Age: 35
LOS: 1 days | Discharge: HOME | End: 2025-03-20
Attending: STUDENT IN AN ORGANIZED HEALTH CARE EDUCATION/TRAINING PROGRAM | Admitting: STUDENT IN AN ORGANIZED HEALTH CARE EDUCATION/TRAINING PROGRAM
Payer: COMMERCIAL

## 2025-03-20 VITALS
WEIGHT: 175.05 LBS | OXYGEN SATURATION: 94 % | HEART RATE: 98 BPM | SYSTOLIC BLOOD PRESSURE: 143 MMHG | TEMPERATURE: 98 F | HEIGHT: 63 IN | DIASTOLIC BLOOD PRESSURE: 98 MMHG | RESPIRATION RATE: 16 BRPM

## 2025-03-20 VITALS
DIASTOLIC BLOOD PRESSURE: 81 MMHG | HEART RATE: 76 BPM | OXYGEN SATURATION: 96 % | RESPIRATION RATE: 16 BRPM | SYSTOLIC BLOOD PRESSURE: 114 MMHG | TEMPERATURE: 99 F

## 2025-03-20 DIAGNOSIS — H71.92 UNSPECIFIED CHOLESTEATOMA, LEFT EAR: Chronic | ICD-10-CM

## 2025-03-20 DIAGNOSIS — Z98.891 HISTORY OF UTERINE SCAR FROM PREVIOUS SURGERY: Chronic | ICD-10-CM

## 2025-03-20 LAB
ALBUMIN SERPL ELPH-MCNC: 4.1 G/DL — SIGNIFICANT CHANGE UP (ref 3.3–5)
ALP SERPL-CCNC: 76 U/L — SIGNIFICANT CHANGE UP (ref 40–120)
ALT FLD-CCNC: 14 U/L — SIGNIFICANT CHANGE UP (ref 10–45)
ANION GAP SERPL CALC-SCNC: 12 MMOL/L — SIGNIFICANT CHANGE UP (ref 5–17)
AST SERPL-CCNC: 13 U/L — SIGNIFICANT CHANGE UP (ref 10–40)
BASOPHILS # BLD AUTO: 0.03 K/UL — SIGNIFICANT CHANGE UP (ref 0–0.2)
BASOPHILS NFR BLD AUTO: 0.3 % — SIGNIFICANT CHANGE UP (ref 0–2)
BILIRUB DIRECT SERPL-MCNC: 0.1 MG/DL — SIGNIFICANT CHANGE UP (ref 0–0.3)
BILIRUB INDIRECT FLD-MCNC: 0.4 MG/DL — SIGNIFICANT CHANGE UP (ref 0.2–1)
BILIRUB SERPL-MCNC: 0.5 MG/DL — SIGNIFICANT CHANGE UP (ref 0.2–1.2)
BUN SERPL-MCNC: 9 MG/DL — SIGNIFICANT CHANGE UP (ref 7–23)
CALCIUM SERPL-MCNC: 8.9 MG/DL — SIGNIFICANT CHANGE UP (ref 8.4–10.5)
CHLORIDE SERPL-SCNC: 98 MMOL/L — SIGNIFICANT CHANGE UP (ref 96–108)
CO2 SERPL-SCNC: 24 MMOL/L — SIGNIFICANT CHANGE UP (ref 22–31)
CREAT SERPL-MCNC: 0.59 MG/DL — SIGNIFICANT CHANGE UP (ref 0.5–1.3)
EGFR: 120 ML/MIN/1.73M2 — SIGNIFICANT CHANGE UP
EGFR: 120 ML/MIN/1.73M2 — SIGNIFICANT CHANGE UP
EOSINOPHIL # BLD AUTO: 0.29 K/UL — SIGNIFICANT CHANGE UP (ref 0–0.5)
EOSINOPHIL NFR BLD AUTO: 3.2 % — SIGNIFICANT CHANGE UP (ref 0–6)
GLUCOSE SERPL-MCNC: 298 MG/DL — HIGH (ref 70–99)
HCG SERPL-ACNC: <1 MIU/ML — SIGNIFICANT CHANGE UP
HCT VFR BLD CALC: 43.3 % — SIGNIFICANT CHANGE UP (ref 34.5–45)
HGB BLD-MCNC: 14.7 G/DL — SIGNIFICANT CHANGE UP (ref 11.5–15.5)
IMM GRANULOCYTES NFR BLD AUTO: 0.4 % — SIGNIFICANT CHANGE UP (ref 0–0.9)
LIDOCAIN IGE QN: 36 U/L — SIGNIFICANT CHANGE UP (ref 7–60)
LYMPHOCYTES # BLD AUTO: 1.93 K/UL — SIGNIFICANT CHANGE UP (ref 1–3.3)
LYMPHOCYTES # BLD AUTO: 21 % — SIGNIFICANT CHANGE UP (ref 13–44)
MCHC RBC-ENTMCNC: 30.5 PG — SIGNIFICANT CHANGE UP (ref 27–34)
MCHC RBC-ENTMCNC: 33.9 G/DL — SIGNIFICANT CHANGE UP (ref 32–36)
MCV RBC AUTO: 89.8 FL — SIGNIFICANT CHANGE UP (ref 80–100)
MONOCYTES # BLD AUTO: 0.47 K/UL — SIGNIFICANT CHANGE UP (ref 0–0.9)
MONOCYTES NFR BLD AUTO: 5.1 % — SIGNIFICANT CHANGE UP (ref 2–14)
NEUTROPHILS # BLD AUTO: 6.43 K/UL — SIGNIFICANT CHANGE UP (ref 1.8–7.4)
NEUTROPHILS NFR BLD AUTO: 70 % — SIGNIFICANT CHANGE UP (ref 43–77)
NRBC BLD AUTO-RTO: 0 /100 WBCS — SIGNIFICANT CHANGE UP (ref 0–0)
PLATELET # BLD AUTO: 275 K/UL — SIGNIFICANT CHANGE UP (ref 150–400)
POTASSIUM SERPL-MCNC: 4 MMOL/L — SIGNIFICANT CHANGE UP (ref 3.5–5.3)
POTASSIUM SERPL-SCNC: 4 MMOL/L — SIGNIFICANT CHANGE UP (ref 3.5–5.3)
PROT SERPL-MCNC: 7.4 G/DL — SIGNIFICANT CHANGE UP (ref 6–8.3)
RBC # BLD: 4.82 M/UL — SIGNIFICANT CHANGE UP (ref 3.8–5.2)
RBC # FLD: 12.3 % — SIGNIFICANT CHANGE UP (ref 10.3–14.5)
SODIUM SERPL-SCNC: 134 MMOL/L — LOW (ref 135–145)
WBC # BLD: 9.19 K/UL — SIGNIFICANT CHANGE UP (ref 3.8–10.5)
WBC # FLD AUTO: 9.19 K/UL — SIGNIFICANT CHANGE UP (ref 3.8–10.5)

## 2025-03-20 PROCEDURE — 74177 CT ABD & PELVIS W/CONTRAST: CPT | Mod: 26

## 2025-03-20 PROCEDURE — 80048 BASIC METABOLIC PNL TOTAL CA: CPT

## 2025-03-20 PROCEDURE — 83690 ASSAY OF LIPASE: CPT

## 2025-03-20 PROCEDURE — 84702 CHORIONIC GONADOTROPIN TEST: CPT

## 2025-03-20 PROCEDURE — 96374 THER/PROPH/DIAG INJ IV PUSH: CPT | Mod: XU

## 2025-03-20 PROCEDURE — 74177 CT ABD & PELVIS W/CONTRAST: CPT | Mod: MC

## 2025-03-20 PROCEDURE — 96375 TX/PRO/DX INJ NEW DRUG ADDON: CPT

## 2025-03-20 PROCEDURE — 99284 EMERGENCY DEPT VISIT MOD MDM: CPT | Mod: 25

## 2025-03-20 PROCEDURE — 82962 GLUCOSE BLOOD TEST: CPT

## 2025-03-20 PROCEDURE — 99285 EMERGENCY DEPT VISIT HI MDM: CPT

## 2025-03-20 PROCEDURE — 80076 HEPATIC FUNCTION PANEL: CPT

## 2025-03-20 PROCEDURE — 36415 COLL VENOUS BLD VENIPUNCTURE: CPT

## 2025-03-20 PROCEDURE — 85025 COMPLETE CBC W/AUTO DIFF WBC: CPT

## 2025-03-20 RX ORDER — KETOROLAC TROMETHAMINE 30 MG/ML
30 INJECTION, SOLUTION INTRAMUSCULAR; INTRAVENOUS ONCE
Refills: 0 | Status: DISCONTINUED | OUTPATIENT
Start: 2025-03-20 | End: 2025-03-20

## 2025-03-20 RX ORDER — METHOCARBAMOL 500 MG/1
750 TABLET, FILM COATED ORAL ONCE
Refills: 0 | Status: COMPLETED | OUTPATIENT
Start: 2025-03-20 | End: 2025-03-20

## 2025-03-20 RX ORDER — OXYCODONE HYDROCHLORIDE AND ACETAMINOPHEN 10; 325 MG/1; MG/1
1 TABLET ORAL
Qty: 3 | Refills: 0
Start: 2025-03-20

## 2025-03-20 RX ADMIN — METHOCARBAMOL 750 MILLIGRAM(S): 500 TABLET, FILM COATED ORAL at 18:22

## 2025-03-20 RX ADMIN — KETOROLAC TROMETHAMINE 30 MILLIGRAM(S): 30 INJECTION, SOLUTION INTRAMUSCULAR; INTRAVENOUS at 18:22

## 2025-03-20 RX ADMIN — Medication 4 MILLIGRAM(S): at 17:00

## 2025-03-20 NOTE — ED PROVIDER NOTE - PHYSICAL EXAMINATION
VITAL SIGNS: I have reviewed nursing notes and confirm.  CONSTITUTIONAL: Well appearing, in no acute distress.   SKIN:  warm and dry, no acute rash.   HEAD:  normocephalic, atraumatic.  EYES: EOM intact; conjunctiva and sclera clear.  ENT: No nasal discharge; airway clear.   NECK: Supple.  CARD: S1, S2, Regular rate and rhythm.   RESP:  Clear to auscultation b/l, no wheezes, rales or rhonchi.  ABD: Normal bowel sounds; soft; non-distended; non-tender; no guarding/ rebound.  EXT: No C,T,L spine tenderness. 5/5 strength with b/l LE. Normal ROM. No peripheral edema. Pulses intact and equal b/l. negative straight leg raise b/l.   NEURO: Alert, oriented, grossly unremarkable

## 2025-03-20 NOTE — ED PROVIDER NOTE - OBJECTIVE STATEMENT
34 F, hx of DM, HTN, p/w L flank pain x 4 days. Pt was seen in [facility] ED 2 days ago. CT showed tiny kidney stone in kidney but otherwise no signs of obstruction or hydronephrosis. rest of CTAP non con negative. also found to be hyperglycemia but negative for DKA. UA was also negative for UTI. states the pain never improved since discharge. has hx of sciatica but pain doesn't radiate to the leg or anterior abdomen. no dysuria. no vaginal bleeding/discharge. denied cancer, steroid use. Denied urinary retention, saddle anesthesia, or weakness/numbness in extremities

## 2025-03-20 NOTE — ED ADULT NURSE REASSESSMENT NOTE - NS ED NURSE REASSESS COMMENT FT1
Pt dc'ed as per MD Manley for primary RN Bartolo.  Pt denies any discomfort at this time. DC instruction given by MD Manley and paper and explained to pt. States understand discharge instruction. Pt is alert and oriented, A&O4, steady gait noted.

## 2025-03-20 NOTE — ED PROVIDER NOTE - NSFOLLOWUPINSTRUCTIONS_ED_ALL_ED_FT
Flank pain is pain that is located on the side of the body between the upper abdomen and the spine. This area is called the flank. The pain may occur over a short period of time (acute), or it may be long-term or recurring (chronic). It may be mild or severe. Flank pain can be caused by many things, including:  Muscle soreness or injury.  Kidney infection, kidney stones, or kidney disease.  Stress.  A disease of the spine (vertebral disk disease).  A lung infection (pneumonia).  Fluid around the lungs (pulmonary edema).  A skin rash caused by the chickenpox virus (shingles).  Tumors that affect the back of the abdomen.  Gallbladder disease.  Follow these instructions at home:  A comparison of three sample cups showing dark yellow, yellow, and pale yellow urine.  Drink enough fluid to keep your urine pale yellow.  Rest as told by your health care provider.  Take over-the-counter and prescription medicines only as told by your health care provider.  Keep a journal to track what has caused your flank pain and what has made it feel better.  Keep all follow-up visits. This is important.  Contact a health care provider if:  Your pain is not controlled with medicine.  You have new symptoms.  Your pain gets worse.  Your symptoms last longer than 2–3 days.  You have trouble urinating or you are urinating very frequently.  Get help right away if:  You have trouble breathing or you are short of breath.  Your abdomen hurts or it is swollen or red.  You have nausea or vomiting.  You feel faint, or you faint.  You have blood in your urine.  You have flank pain and a fever.  These symptoms may represent a serious problem that is an emergency. Do not wait to see if the symptoms will go away. Get medical help right away. Call your local emergency services (911 in the [place] Do not drive yourself to the hospital.    Summary  Flank pain is pain that is located on the side of the body between the upper abdomen and the spine.  The pain may occur over a short period of time (acute), or it may be long-term or recurring (chronic). It may be mild or severe.  Flank pain can be caused by many things.  Contact your health care provider if your symptoms get worse or last longer than 2–3 days.

## 2025-03-20 NOTE — ED PROVIDER NOTE - CLINICAL SUMMARY MEDICAL DECISION MAKING FREE TEXT BOX
here with L flank pain. no UTI/pyelo, obstructive kidney stone from 2 days ago. no abdominal or CVA tenderness on exam. Will do CT w/ contrast to r/o other intraabdominal pathology. ?MSK pain. low suspicion for spinal emergencies based on exam. prior CT showed no signs of AAA. low suspicion for dissection based on exam at this time. labs, CT, pain control.

## 2025-03-20 NOTE — ED PROVIDER NOTE - PROGRESS NOTE DETAILS
Patient is updated on lab and/or imaging results. Tree in bud opacities in the right lower lobe. pt has no respiratory complain, doubt PNA. also, this is likely not relatde to L flank pain such it's in the R lower lobe. pt is made aware to f/u with PCP. otherwise CTAP is unremarkble. ?MSK pain. pain is slightly improved. pt agrees to go home. percocet sent. explained to use only when resting. no heavy machinery. Patient understands plan, return precaution and gives verbal feedback. Patient agrees to f/u with primary care and/or specialist. Discharge.

## 2025-03-20 NOTE — ED ADULT NURSE NOTE - OBJECTIVE STATEMENT
Pt alert, oriented, and ambulatory. Diagnosed with nonobstructive kidney stones on her R side x 2 days ago @ [facility]. Reports new pain now on her L flank. Denies dysuria, N/V/D. Additionally states that her sciatic back pain is responding to the flank pain and causing discomfort. Denies chest pain, SOB, dizziness, or weakness.

## 2025-03-24 DIAGNOSIS — E11.9 TYPE 2 DIABETES MELLITUS WITHOUT COMPLICATIONS: ICD-10-CM

## 2025-03-24 DIAGNOSIS — I10 ESSENTIAL (PRIMARY) HYPERTENSION: ICD-10-CM

## 2025-03-31 NOTE — PROGRESS NOTE ADULT - PROBLEM/PLAN-1
[Alert] : alert [Well Nourished] : well nourished [No Acute Distress] : no acute distress DISPLAY PLAN FREE TEXT [Well Developed] : well developed [Normal Sclera/Conjunctiva] : normal sclera/conjunctiva [EOMI] : extra ocular movement intact [No Proptosis] : no proptosis [Normal Oropharynx] : the oropharynx was normal [Thyroid Not Enlarged] : the thyroid was not enlarged [No Thyroid Nodules] : no palpable thyroid nodules [No Respiratory Distress] : no respiratory distress [No Accessory Muscle Use] : no accessory muscle use [Clear to Auscultation] : lungs were clear to auscultation bilaterally [Normal S1, S2] : normal S1 and S2 [Normal Rate] : heart rate was normal [Regular Rhythm] : with a regular rhythm [No Edema] : no peripheral edema [Pedal Pulses Normal] : the pedal pulses are present [Normal Bowel Sounds] : normal bowel sounds [Not Tender] : non-tender [Not Distended] : not distended [Soft] : abdomen soft [Normal Anterior Cervical Nodes] : no anterior cervical lymphadenopathy [Normal Posterior Cervical Nodes] : no posterior cervical lymphadenopathy [Normal Gait] : normal gait [No Rash] : no rash [No Tremors] : no tremors [Oriented x3] : oriented to person, place, and time [Acanthosis Nigricans] : no acanthosis nigricans

## 2025-04-03 ENCOUNTER — NON-APPOINTMENT (OUTPATIENT)
Age: 35
End: 2025-04-03

## 2025-04-14 ENCOUNTER — EMERGENCY (EMERGENCY)
Facility: HOSPITAL | Age: 35
LOS: 1 days | End: 2025-04-14
Attending: EMERGENCY MEDICINE | Admitting: EMERGENCY MEDICINE
Payer: COMMERCIAL

## 2025-04-14 VITALS
RESPIRATION RATE: 20 BRPM | OXYGEN SATURATION: 98 % | WEIGHT: 173.94 LBS | TEMPERATURE: 98 F | SYSTOLIC BLOOD PRESSURE: 121 MMHG | DIASTOLIC BLOOD PRESSURE: 85 MMHG | HEART RATE: 102 BPM | HEIGHT: 63 IN

## 2025-04-14 VITALS
TEMPERATURE: 99 F | OXYGEN SATURATION: 98 % | HEART RATE: 88 BPM | DIASTOLIC BLOOD PRESSURE: 74 MMHG | RESPIRATION RATE: 18 BRPM | SYSTOLIC BLOOD PRESSURE: 113 MMHG

## 2025-04-14 DIAGNOSIS — H71.92 UNSPECIFIED CHOLESTEATOMA, LEFT EAR: Chronic | ICD-10-CM

## 2025-04-14 DIAGNOSIS — Z98.891 HISTORY OF UTERINE SCAR FROM PREVIOUS SURGERY: Chronic | ICD-10-CM

## 2025-04-14 LAB
ALBUMIN SERPL ELPH-MCNC: 4 G/DL — SIGNIFICANT CHANGE UP (ref 3.3–5)
ALP SERPL-CCNC: 71 U/L — SIGNIFICANT CHANGE UP (ref 40–120)
ALT FLD-CCNC: 14 U/L — SIGNIFICANT CHANGE UP (ref 10–45)
ANION GAP SERPL CALC-SCNC: 17 MMOL/L — SIGNIFICANT CHANGE UP (ref 5–17)
APPEARANCE UR: CLEAR — SIGNIFICANT CHANGE UP
AST SERPL-CCNC: 14 U/L — SIGNIFICANT CHANGE UP (ref 10–40)
BASOPHILS # BLD AUTO: 0.06 K/UL — SIGNIFICANT CHANGE UP (ref 0–0.2)
BASOPHILS NFR BLD AUTO: 0.5 % — SIGNIFICANT CHANGE UP (ref 0–2)
BILIRUB SERPL-MCNC: 0.3 MG/DL — SIGNIFICANT CHANGE UP (ref 0.2–1.2)
BILIRUB UR-MCNC: NEGATIVE — SIGNIFICANT CHANGE UP
BUN SERPL-MCNC: 16 MG/DL — SIGNIFICANT CHANGE UP (ref 7–23)
CALCIUM SERPL-MCNC: 9.4 MG/DL — SIGNIFICANT CHANGE UP (ref 8.4–10.5)
CHLORIDE SERPL-SCNC: 104 MMOL/L — SIGNIFICANT CHANGE UP (ref 96–108)
CO2 SERPL-SCNC: 17 MMOL/L — LOW (ref 22–31)
COLOR SPEC: YELLOW — SIGNIFICANT CHANGE UP
CREAT SERPL-MCNC: 0.58 MG/DL — SIGNIFICANT CHANGE UP (ref 0.5–1.3)
DIFF PNL FLD: NEGATIVE — SIGNIFICANT CHANGE UP
EGFR: 121 ML/MIN/1.73M2 — SIGNIFICANT CHANGE UP
EGFR: 121 ML/MIN/1.73M2 — SIGNIFICANT CHANGE UP
EOSINOPHIL # BLD AUTO: 0.51 K/UL — HIGH (ref 0–0.5)
EOSINOPHIL NFR BLD AUTO: 4 % — SIGNIFICANT CHANGE UP (ref 0–6)
GLUCOSE SERPL-MCNC: 225 MG/DL — HIGH (ref 70–99)
GLUCOSE UR QL: 500 MG/DL
HCT VFR BLD CALC: 45.3 % — HIGH (ref 34.5–45)
HGB BLD-MCNC: 15.7 G/DL — HIGH (ref 11.5–15.5)
IMM GRANULOCYTES NFR BLD AUTO: 0.3 % — SIGNIFICANT CHANGE UP (ref 0–0.9)
KETONES UR-MCNC: ABNORMAL MG/DL
LEUKOCYTE ESTERASE UR-ACNC: NEGATIVE — SIGNIFICANT CHANGE UP
LYMPHOCYTES # BLD AUTO: 2.93 K/UL — SIGNIFICANT CHANGE UP (ref 1–3.3)
LYMPHOCYTES # BLD AUTO: 23 % — SIGNIFICANT CHANGE UP (ref 13–44)
MCHC RBC-ENTMCNC: 31.5 PG — SIGNIFICANT CHANGE UP (ref 27–34)
MCHC RBC-ENTMCNC: 34.7 G/DL — SIGNIFICANT CHANGE UP (ref 32–36)
MCV RBC AUTO: 90.8 FL — SIGNIFICANT CHANGE UP (ref 80–100)
MONOCYTES # BLD AUTO: 0.68 K/UL — SIGNIFICANT CHANGE UP (ref 0–0.9)
MONOCYTES NFR BLD AUTO: 5.3 % — SIGNIFICANT CHANGE UP (ref 2–14)
NEUTROPHILS # BLD AUTO: 8.5 K/UL — HIGH (ref 1.8–7.4)
NEUTROPHILS NFR BLD AUTO: 66.9 % — SIGNIFICANT CHANGE UP (ref 43–77)
NITRITE UR-MCNC: NEGATIVE — SIGNIFICANT CHANGE UP
NRBC BLD AUTO-RTO: 0 /100 WBCS — SIGNIFICANT CHANGE UP (ref 0–0)
PH UR: 5.5 — SIGNIFICANT CHANGE UP (ref 5–8)
PLATELET # BLD AUTO: 277 K/UL — SIGNIFICANT CHANGE UP (ref 150–400)
POTASSIUM SERPL-MCNC: 3.8 MMOL/L — SIGNIFICANT CHANGE UP (ref 3.5–5.3)
POTASSIUM SERPL-SCNC: 3.8 MMOL/L — SIGNIFICANT CHANGE UP (ref 3.5–5.3)
PROT SERPL-MCNC: 7.7 G/DL — SIGNIFICANT CHANGE UP (ref 6–8.3)
PROT UR-MCNC: NEGATIVE MG/DL — SIGNIFICANT CHANGE UP
RBC # BLD: 4.99 M/UL — SIGNIFICANT CHANGE UP (ref 3.8–5.2)
RBC # FLD: 12.5 % — SIGNIFICANT CHANGE UP (ref 10.3–14.5)
SODIUM SERPL-SCNC: 138 MMOL/L — SIGNIFICANT CHANGE UP (ref 135–145)
SP GR SPEC: 1.02 — SIGNIFICANT CHANGE UP (ref 1–1.03)
UROBILINOGEN FLD QL: 0.2 MG/DL — SIGNIFICANT CHANGE UP (ref 0.2–1)
WBC # BLD: 12.72 K/UL — HIGH (ref 3.8–10.5)
WBC # FLD AUTO: 12.72 K/UL — HIGH (ref 3.8–10.5)

## 2025-04-14 PROCEDURE — 74176 CT ABD & PELVIS W/O CONTRAST: CPT | Mod: MC

## 2025-04-14 PROCEDURE — 96374 THER/PROPH/DIAG INJ IV PUSH: CPT

## 2025-04-14 PROCEDURE — 81025 URINE PREGNANCY TEST: CPT

## 2025-04-14 PROCEDURE — 85025 COMPLETE CBC W/AUTO DIFF WBC: CPT

## 2025-04-14 PROCEDURE — 74176 CT ABD & PELVIS W/O CONTRAST: CPT | Mod: 26

## 2025-04-14 PROCEDURE — 36415 COLL VENOUS BLD VENIPUNCTURE: CPT

## 2025-04-14 PROCEDURE — 82962 GLUCOSE BLOOD TEST: CPT

## 2025-04-14 PROCEDURE — 99284 EMERGENCY DEPT VISIT MOD MDM: CPT | Mod: 25

## 2025-04-14 PROCEDURE — 81003 URINALYSIS AUTO W/O SCOPE: CPT

## 2025-04-14 PROCEDURE — 99284 EMERGENCY DEPT VISIT MOD MDM: CPT

## 2025-04-14 PROCEDURE — 80053 COMPREHEN METABOLIC PANEL: CPT

## 2025-04-14 RX ORDER — NAPROXEN SODIUM 275 MG
1 TABLET ORAL
Qty: 20 | Refills: 0
Start: 2025-04-14

## 2025-04-14 RX ORDER — KETOROLAC TROMETHAMINE 30 MG/ML
15 INJECTION, SOLUTION INTRAMUSCULAR; INTRAVENOUS ONCE
Refills: 0 | Status: DISCONTINUED | OUTPATIENT
Start: 2025-04-14 | End: 2025-04-14

## 2025-04-14 RX ADMIN — KETOROLAC TROMETHAMINE 15 MILLIGRAM(S): 30 INJECTION, SOLUTION INTRAMUSCULAR; INTRAVENOUS at 20:42

## 2025-04-14 NOTE — ED PROVIDER NOTE - OBJECTIVE STATEMENT
history of dm, sciatica, kidney stones, here with low back pain and left flank pain. Reports intermittent back pain x 1 month but new / increased flank pain x 3 days. Thinks she has kidney stone. No dysuria/ hematuria, fever, chills, nausea. Tried tylenol without relief.

## 2025-04-14 NOTE — ED ADULT NURSE NOTE - NSFALLUNIVINTERV_ED_ALL_ED
Bed/Stretcher in lowest position, wheels locked, appropriate side rails in place/Call bell, personal items and telephone in reach/Instruct patient to call for assistance before getting out of bed/chair/stretcher/Non-slip footwear applied when patient is off stretcher/[place] to call system/Physically safe environment - no spills, clutter or unnecessary equipment/Purposeful proactive rounding/Room/bathroom lighting operational, light cord in reach

## 2025-04-14 NOTE — ED ADULT NURSE NOTE - OBJECTIVE STATEMENT
34yF presents to the ED with reports of x3days of L flank/lower back pain worsening over last day. reports intermittent R lower back pain. reports hx of kidney stones (hx of lithotripsy). states dx with kidney stones ~d5korip ago, states pain went away and is now back. took tylenol and motrin ~8hours ago with minimal relief. denies N/V, fevers/chills, hematuria, dysuria/frequency, CP, SOB. A&Ox4. Breathing spontaneously and unlabored on room air.

## 2025-04-14 NOTE — ED PROVIDER NOTE - NSFOLLOWUPINSTRUCTIONS_ED_ALL_ED_FT
Please see your primary care provider for followup.  Call for appointment.  If you have any problems with followup, please call the ED Referral Coordinator at [phone].  Return to the ER if symptoms worsen or other concerns.    Back Pain    Back pain is very common in adults. The cause of back pain is rarely dangerous and the pain often gets better over time. The cause of your back pain may not be known and may include strain of muscles or ligaments, degeneration of the spinal disks, or arthritis. Occasionally the pain may radiate down your leg(s). Over-the-counter medicines to reduce pain and inflammation are often the most helpful. Stretching and remaining active frequently helps the healing process.     SEEK IMMEDIATE MEDICAL CARE IF YOU HAVE ANY OF THE FOLLOWING SYMPTOMS: bowel or bladder control problems, unusual weakness or numbness in your arms or legs, nausea or vomiting, abdominal pain, fever, dizziness/lightheadedness.    Flank Pain    WHAT YOU NEED TO KNOW:    Flank pain is felt in the area below your ribcage and above your hip bones, often in the lower back. Your pain may be dull or so severe that you cannot get comfortable. The pain may stay in one area or radiate to another area. It may worsen and lighten in waves. Flank pain is often a sign of problems with your urinary tract, such as a kidney stone or infection.     DISCHARGE INSTRUCTIONS:    Return to the emergency department if:     You have a fever.       Your heart is fluttering or jumping.       You see blood in your urine.       Your pain radiates into your lower abdomen and genital area.       You have intense pain in your low back next to your spine.       You are much more tired than usual and have no desire to eat.       You have a headache and your muscles jerk.     Contact your healthcare provider if:     You have an upset stomach and are vomiting.      You have to urinate more often, and with urgency.       Your pain worsens or does not improve, and you cannot get comfortable.       You pass a stone when you urinate.      You have questions or concerns about your condition or care.    Medicines: The following medicines may be ordered for you:    Pain medicine may help decrease or relieve your pain. Do not wait until the pain is severe before you take your medicine.       Antibiotics may help treat a urinary tract infection caused by bacteria.       Take your medicine as directed. Contact your healthcare provider if you think your medicine is not helping or if you have side effects. Tell him of her if you are allergic to any medicine. Keep a list of the medicines, vitamins, and herbs you take. Include the amounts, and when and why you take them. Bring the list or the pill bottles to follow-up visits. Carry your medicine list with you in case of an emergency.    Follow up with your healthcare provider in 1 to 2 days or as directed: Write down your questions so you remember to ask them during your visits.

## 2025-04-14 NOTE — ED PROVIDER NOTE - CLINICAL SUMMARY MEDICAL DECISION MAKING FREE TEXT BOX
lt flank pain and low back pain. pt concerned about kidney stone but sitting comfortably, abd soft non tender. labs/ct ordered. given toradol.   ua neg for infection. ct neg for obstructing stone/ acute pathology.   suspect pain musculoskeletal/ sciatica related. rec nsaids. f/u pmd. return precautions discussed

## 2025-04-14 NOTE — ED PROVIDER NOTE - PATIENT PORTAL LINK FT
You can access the FollowMyHealth Patient Portal offered by [facility] by registering at the following website: [email or web address]. By joining [facility]’s FollowMyHealth portal, you will also be able to view your health information using other applications (apps) compatible with our system.

## 2025-04-14 NOTE — ED ADULT TRIAGE NOTE - CHIEF COMPLAINT QUOTE
Pt c/o bilateral flank pain for about a month, worsened for the past 3 days. Denies fever, chills, dysuria. PMH of kidney stones.

## 2025-04-14 NOTE — ED ADULT NURSE NOTE - DRUG PRE-SCREENING (DAST -1)
"Chief Complaint   Patient presents with     Prenatal Care     23w5d       Initial /60   Ht 1.549 m (5' 1\")   Wt 72.7 kg (160 lb 3.2 oz)   LMP 10/17/2018 (Exact Date)   BMI 30.27 kg/m   Estimated body mass index is 30.27 kg/m  as calculated from the following:    Height as of this encounter: 1.549 m (5' 1\").    Weight as of this encounter: 72.7 kg (160 lb 3.2 oz).  BP completed using cuff size: regular    Questioned patient about current smoking habits.  Pt. has never smoked.          The following HM Due: NONE      Yusuf Carey MA               " Statement Selected

## 2025-04-15 ENCOUNTER — APPOINTMENT (OUTPATIENT)
Dept: UROLOGY | Facility: CLINIC | Age: 35
End: 2025-04-15
Payer: MEDICAID

## 2025-04-15 VITALS
HEIGHT: 63 IN | OXYGEN SATURATION: 98 % | BODY MASS INDEX: 30.83 KG/M2 | HEART RATE: 85 BPM | WEIGHT: 174 LBS | DIASTOLIC BLOOD PRESSURE: 97 MMHG | TEMPERATURE: 98.7 F | SYSTOLIC BLOOD PRESSURE: 134 MMHG

## 2025-04-15 DIAGNOSIS — N20.0 CALCULUS OF KIDNEY: ICD-10-CM

## 2025-04-15 PROCEDURE — 99203 OFFICE O/P NEW LOW 30 MIN: CPT

## 2025-04-16 PROBLEM — N20.0 KIDNEY STONE: Status: ACTIVE | Noted: 2025-04-16

## 2025-04-17 DIAGNOSIS — Z87.39 PERSONAL HISTORY OF OTHER DISEASES OF THE MUSCULOSKELETAL SYSTEM AND CONNECTIVE TISSUE: ICD-10-CM

## 2025-04-17 DIAGNOSIS — M54.50 LOW BACK PAIN, UNSPECIFIED: ICD-10-CM

## 2025-04-17 DIAGNOSIS — Z87.442 PERSONAL HISTORY OF URINARY CALCULI: ICD-10-CM

## 2025-04-17 DIAGNOSIS — E11.9 TYPE 2 DIABETES MELLITUS WITHOUT COMPLICATIONS: ICD-10-CM

## 2025-04-22 ENCOUNTER — NON-APPOINTMENT (OUTPATIENT)
Age: 35
End: 2025-04-22

## 2025-04-23 ENCOUNTER — NON-APPOINTMENT (OUTPATIENT)
Age: 35
End: 2025-04-23

## 2025-04-23 ENCOUNTER — APPOINTMENT (OUTPATIENT)
Dept: INTERNAL MEDICINE | Facility: CLINIC | Age: 35
End: 2025-04-23
Payer: MEDICAID

## 2025-04-23 VITALS
OXYGEN SATURATION: 98 % | HEART RATE: 85 BPM | BODY MASS INDEX: 30.82 KG/M2 | DIASTOLIC BLOOD PRESSURE: 92 MMHG | SYSTOLIC BLOOD PRESSURE: 132 MMHG | WEIGHT: 174 LBS

## 2025-04-23 DIAGNOSIS — M54.9 DORSALGIA, UNSPECIFIED: ICD-10-CM

## 2025-04-23 DIAGNOSIS — E04.1 NONTOXIC SINGLE THYROID NODULE: ICD-10-CM

## 2025-04-23 DIAGNOSIS — J45.909 UNSPECIFIED ASTHMA, UNCOMPLICATED: ICD-10-CM

## 2025-04-23 DIAGNOSIS — E11.9 TYPE 2 DIABETES MELLITUS W/OUT COMPLICATIONS: ICD-10-CM

## 2025-04-23 DIAGNOSIS — G89.29 DORSALGIA, UNSPECIFIED: ICD-10-CM

## 2025-04-23 PROCEDURE — 99385 PREV VISIT NEW AGE 18-39: CPT

## 2025-04-23 PROCEDURE — G0444 DEPRESSION SCREEN ANNUAL: CPT | Mod: 59

## 2025-04-23 RX ORDER — MONTELUKAST 10 MG/1
10 TABLET, FILM COATED ORAL DAILY
Qty: 90 | Refills: 1 | Status: ACTIVE | COMMUNITY
Start: 2025-04-23 | End: 1900-01-01

## 2025-04-23 RX ORDER — DICLOFENAC POTASSIUM 50 MG/1
50 TABLET, COATED ORAL EVERY 8 HOURS
Qty: 40 | Refills: 0 | Status: ACTIVE | COMMUNITY
Start: 2025-04-23 | End: 1900-01-01

## 2025-04-23 RX ORDER — FLUTICASONE PROPIONATE AND SALMETEROL 100; 50 UG/1; UG/1
100-50 POWDER RESPIRATORY (INHALATION) TWICE DAILY
Qty: 1 | Refills: 0 | Status: ACTIVE | COMMUNITY
Start: 2025-04-23 | End: 1900-01-01

## 2025-04-23 RX ORDER — BACLOFEN 20 MG/1
20 TABLET ORAL
Qty: 10 | Refills: 0 | Status: ACTIVE | COMMUNITY
Start: 2025-04-23 | End: 1900-01-01

## 2025-04-23 RX ORDER — PREGABALIN 50 MG/1
50 CAPSULE ORAL
Qty: 45 | Refills: 0 | Status: ACTIVE | COMMUNITY
Start: 2025-04-23 | End: 1900-01-01

## 2025-04-25 ENCOUNTER — APPOINTMENT (OUTPATIENT)
Dept: OTOLARYNGOLOGY | Facility: CLINIC | Age: 35
End: 2025-04-25

## 2025-04-29 ENCOUNTER — NON-APPOINTMENT (OUTPATIENT)
Age: 35
End: 2025-04-29

## 2025-04-30 ENCOUNTER — APPOINTMENT (OUTPATIENT)
Dept: UROLOGY | Facility: CLINIC | Age: 35
End: 2025-04-30

## 2025-05-01 ENCOUNTER — APPOINTMENT (OUTPATIENT)
Dept: OTOLARYNGOLOGY | Facility: CLINIC | Age: 35
End: 2025-05-01
Payer: MEDICAID

## 2025-05-01 DIAGNOSIS — H66.91 OTITIS MEDIA, UNSPECIFIED, RIGHT EAR: ICD-10-CM

## 2025-05-01 DIAGNOSIS — Z87.39 PERSONAL HISTORY OF OTHER DISEASES OF THE MUSCULOSKELETAL SYSTEM AND CONNECTIVE TISSUE: ICD-10-CM

## 2025-05-01 DIAGNOSIS — H71.92 UNSPECIFIED CHOLESTEATOMA, LEFT EAR: ICD-10-CM

## 2025-05-01 DIAGNOSIS — Z86.39 PERSONAL HISTORY OF OTHER ENDOCRINE, NUTRITIONAL AND METABOLIC DISEASE: ICD-10-CM

## 2025-05-01 DIAGNOSIS — Z87.09 PERSONAL HISTORY OF OTHER DISEASES OF THE RESPIRATORY SYSTEM: ICD-10-CM

## 2025-05-01 DIAGNOSIS — J31.0 CHRONIC RHINITIS: ICD-10-CM

## 2025-05-01 DIAGNOSIS — H71.91 UNSPECIFIED CHOLESTEATOMA, RIGHT EAR: ICD-10-CM

## 2025-05-01 DIAGNOSIS — T48.5X5A CHRONIC RHINITIS: ICD-10-CM

## 2025-05-01 DIAGNOSIS — Z78.9 OTHER SPECIFIED HEALTH STATUS: ICD-10-CM

## 2025-05-01 PROCEDURE — 99204 OFFICE O/P NEW MOD 45 MIN: CPT | Mod: 25

## 2025-05-01 PROCEDURE — 31231 NASAL ENDOSCOPY DX: CPT

## 2025-05-01 PROCEDURE — 69222 CLEAN OUT MASTOID CAVITY: CPT | Mod: LT

## 2025-05-14 ENCOUNTER — RX RENEWAL (OUTPATIENT)
Age: 35
End: 2025-05-14

## 2025-05-15 ENCOUNTER — EMERGENCY (EMERGENCY)
Facility: HOSPITAL | Age: 35
LOS: 1 days | End: 2025-05-15
Attending: STUDENT IN AN ORGANIZED HEALTH CARE EDUCATION/TRAINING PROGRAM | Admitting: STUDENT IN AN ORGANIZED HEALTH CARE EDUCATION/TRAINING PROGRAM
Payer: COMMERCIAL

## 2025-05-15 VITALS
RESPIRATION RATE: 18 BRPM | SYSTOLIC BLOOD PRESSURE: 117 MMHG | OXYGEN SATURATION: 98 % | TEMPERATURE: 98 F | HEART RATE: 83 BPM | DIASTOLIC BLOOD PRESSURE: 73 MMHG

## 2025-05-15 VITALS
DIASTOLIC BLOOD PRESSURE: 84 MMHG | OXYGEN SATURATION: 98 % | HEIGHT: 63 IN | SYSTOLIC BLOOD PRESSURE: 127 MMHG | TEMPERATURE: 98 F | WEIGHT: 171.3 LBS | HEART RATE: 79 BPM | RESPIRATION RATE: 18 BRPM

## 2025-05-15 DIAGNOSIS — Z98.891 HISTORY OF UTERINE SCAR FROM PREVIOUS SURGERY: Chronic | ICD-10-CM

## 2025-05-15 DIAGNOSIS — H71.92 UNSPECIFIED CHOLESTEATOMA, LEFT EAR: Chronic | ICD-10-CM

## 2025-05-15 LAB
ALBUMIN SERPL ELPH-MCNC: 4.4 G/DL — SIGNIFICANT CHANGE UP (ref 3.3–5)
ALP SERPL-CCNC: 56 U/L — SIGNIFICANT CHANGE UP (ref 40–120)
ALT FLD-CCNC: 12 U/L — SIGNIFICANT CHANGE UP (ref 10–45)
ANION GAP SERPL CALC-SCNC: 16 MMOL/L — SIGNIFICANT CHANGE UP (ref 5–17)
APPEARANCE UR: CLEAR — SIGNIFICANT CHANGE UP
AST SERPL-CCNC: 15 U/L — SIGNIFICANT CHANGE UP (ref 10–40)
BASE EXCESS BLDV CALC-SCNC: -2.3 MMOL/L — LOW (ref -2–3)
BASOPHILS # BLD AUTO: 0.03 K/UL — SIGNIFICANT CHANGE UP (ref 0–0.2)
BASOPHILS NFR BLD AUTO: 0.3 % — SIGNIFICANT CHANGE UP (ref 0–2)
BILIRUB DIRECT SERPL-MCNC: 0.1 MG/DL — SIGNIFICANT CHANGE UP (ref 0–0.3)
BILIRUB INDIRECT FLD-MCNC: 0.5 MG/DL — SIGNIFICANT CHANGE UP (ref 0.2–1)
BILIRUB SERPL-MCNC: 0.6 MG/DL — SIGNIFICANT CHANGE UP (ref 0.2–1.2)
BILIRUB UR-MCNC: NEGATIVE — SIGNIFICANT CHANGE UP
BUN SERPL-MCNC: 10 MG/DL — SIGNIFICANT CHANGE UP (ref 7–23)
CALCIUM SERPL-MCNC: 9.4 MG/DL — SIGNIFICANT CHANGE UP (ref 8.4–10.5)
CHLORIDE SERPL-SCNC: 102 MMOL/L — SIGNIFICANT CHANGE UP (ref 96–108)
CO2 BLDV-SCNC: 24 MMOL/L — SIGNIFICANT CHANGE UP (ref 22–26)
CO2 SERPL-SCNC: 19 MMOL/L — LOW (ref 22–31)
COLOR SPEC: YELLOW — SIGNIFICANT CHANGE UP
CREAT SERPL-MCNC: 0.58 MG/DL — SIGNIFICANT CHANGE UP (ref 0.5–1.3)
DIFF PNL FLD: NEGATIVE — SIGNIFICANT CHANGE UP
EGFR: 121 ML/MIN/1.73M2 — SIGNIFICANT CHANGE UP
EGFR: 121 ML/MIN/1.73M2 — SIGNIFICANT CHANGE UP
EOSINOPHIL # BLD AUTO: 0.2 K/UL — SIGNIFICANT CHANGE UP (ref 0–0.5)
EOSINOPHIL NFR BLD AUTO: 1.7 % — SIGNIFICANT CHANGE UP (ref 0–6)
GLUCOSE SERPL-MCNC: 183 MG/DL — HIGH (ref 70–99)
GLUCOSE UR QL: 100 MG/DL
HCG UR QL: NEGATIVE — SIGNIFICANT CHANGE UP
HCO3 BLDV-SCNC: 22 MMOL/L — SIGNIFICANT CHANGE UP (ref 22–29)
HCT VFR BLD CALC: 44.9 % — SIGNIFICANT CHANGE UP (ref 34.5–45)
HGB BLD-MCNC: 15.5 G/DL — SIGNIFICANT CHANGE UP (ref 11.5–15.5)
IMM GRANULOCYTES NFR BLD AUTO: 0.6 % — SIGNIFICANT CHANGE UP (ref 0–0.9)
KETONES UR QL: 15 MG/DL
LEUKOCYTE ESTERASE UR-ACNC: NEGATIVE — SIGNIFICANT CHANGE UP
LIDOCAIN IGE QN: 38 U/L — SIGNIFICANT CHANGE UP (ref 7–60)
LYMPHOCYTES # BLD AUTO: 1.98 K/UL — SIGNIFICANT CHANGE UP (ref 1–3.3)
LYMPHOCYTES # BLD AUTO: 17.2 % — SIGNIFICANT CHANGE UP (ref 13–44)
MCHC RBC-ENTMCNC: 30.8 PG — SIGNIFICANT CHANGE UP (ref 27–34)
MCHC RBC-ENTMCNC: 34.5 G/DL — SIGNIFICANT CHANGE UP (ref 32–36)
MCV RBC AUTO: 89.1 FL — SIGNIFICANT CHANGE UP (ref 80–100)
MONOCYTES # BLD AUTO: 0.57 K/UL — SIGNIFICANT CHANGE UP (ref 0–0.9)
MONOCYTES NFR BLD AUTO: 5 % — SIGNIFICANT CHANGE UP (ref 2–14)
NEUTROPHILS # BLD AUTO: 8.63 K/UL — HIGH (ref 1.8–7.4)
NEUTROPHILS NFR BLD AUTO: 75.2 % — SIGNIFICANT CHANGE UP (ref 43–77)
NITRITE UR-MCNC: NEGATIVE — SIGNIFICANT CHANGE UP
NRBC BLD AUTO-RTO: 0 /100 WBCS — SIGNIFICANT CHANGE UP (ref 0–0)
PCO2 BLDV: 38 MMHG — LOW (ref 39–42)
PH BLDV: 7.38 — SIGNIFICANT CHANGE UP (ref 7.32–7.43)
PH UR: 5.5 — SIGNIFICANT CHANGE UP (ref 5–8)
PLATELET # BLD AUTO: 295 K/UL — SIGNIFICANT CHANGE UP (ref 150–400)
PO2 BLDV: 35 MMHG — SIGNIFICANT CHANGE UP (ref 25–45)
POTASSIUM SERPL-MCNC: 4.2 MMOL/L — SIGNIFICANT CHANGE UP (ref 3.5–5.3)
POTASSIUM SERPL-SCNC: 4.2 MMOL/L — SIGNIFICANT CHANGE UP (ref 3.5–5.3)
PROT SERPL-MCNC: 8.1 G/DL — SIGNIFICANT CHANGE UP (ref 6–8.3)
PROT UR-MCNC: 30 MG/DL
RBC # BLD: 5.04 M/UL — SIGNIFICANT CHANGE UP (ref 3.8–5.2)
RBC # FLD: 12.4 % — SIGNIFICANT CHANGE UP (ref 10.3–14.5)
SAO2 % BLDV: 60 % — LOW (ref 67–88)
SODIUM SERPL-SCNC: 137 MMOL/L — SIGNIFICANT CHANGE UP (ref 135–145)
SP GR SPEC: 1.02 — SIGNIFICANT CHANGE UP (ref 1–1.03)
UROBILINOGEN FLD QL: 0.2 MG/DL — SIGNIFICANT CHANGE UP (ref 0.2–1)
WBC # BLD: 11.48 K/UL — HIGH (ref 3.8–10.5)
WBC # FLD AUTO: 11.48 K/UL — HIGH (ref 3.8–10.5)

## 2025-05-15 PROCEDURE — 81025 URINE PREGNANCY TEST: CPT

## 2025-05-15 PROCEDURE — 36415 COLL VENOUS BLD VENIPUNCTURE: CPT

## 2025-05-15 PROCEDURE — 81001 URINALYSIS AUTO W/SCOPE: CPT

## 2025-05-15 PROCEDURE — 82803 BLOOD GASES ANY COMBINATION: CPT

## 2025-05-15 PROCEDURE — 96374 THER/PROPH/DIAG INJ IV PUSH: CPT

## 2025-05-15 PROCEDURE — 99284 EMERGENCY DEPT VISIT MOD MDM: CPT

## 2025-05-15 PROCEDURE — 96375 TX/PRO/DX INJ NEW DRUG ADDON: CPT

## 2025-05-15 PROCEDURE — 85025 COMPLETE CBC W/AUTO DIFF WBC: CPT

## 2025-05-15 PROCEDURE — 83690 ASSAY OF LIPASE: CPT

## 2025-05-15 PROCEDURE — 80076 HEPATIC FUNCTION PANEL: CPT

## 2025-05-15 PROCEDURE — 82962 GLUCOSE BLOOD TEST: CPT

## 2025-05-15 PROCEDURE — 80048 BASIC METABOLIC PNL TOTAL CA: CPT

## 2025-05-15 PROCEDURE — 99284 EMERGENCY DEPT VISIT MOD MDM: CPT | Mod: 25

## 2025-05-15 RX ORDER — ONDANSETRON HCL/PF 4 MG/2 ML
4 VIAL (ML) INJECTION ONCE
Refills: 0 | Status: COMPLETED | OUTPATIENT
Start: 2025-05-15 | End: 2025-05-15

## 2025-05-15 RX ADMIN — Medication 20 MILLIGRAM(S): at 14:10

## 2025-05-15 RX ADMIN — Medication 1000 MILLILITER(S): at 14:10

## 2025-05-15 RX ADMIN — Medication 4 MILLIGRAM(S): at 14:10

## 2025-05-15 NOTE — ED PROVIDER NOTE - PATIENT PORTAL LINK FT
You can access the FollowMyHealth Patient Portal offered by [facility] by registering at the following website: [email or web address]. By joining [facility]’s FollowMyHealth portal, you will also be able to view your health information using other applications (apps) compatible with our system.

## 2025-05-15 NOTE — ED ADULT NURSE NOTE - BIRTH SEX
Female
Patient lives alone in an apartment and has HHA who comes 7-5pm daily.  Patient reports she was able to ambulate with RW independently and performs most ADLs independently.  Patient requires assistance with bathing and some aspects of dressing.

## 2025-05-15 NOTE — ED PROVIDER NOTE - PROGRESS NOTE DETAILS
Patient feeling improved workup reassuring belly soft nontender will discharge patient home instructed to follow-up with PCP to recheck her levels.

## 2025-05-15 NOTE — ED PROVIDER NOTE - IV ALTEPLASE INCLUSION HIDDEN
SAI REY  : 1960 11:25:43  ACCOUNT:  [id number]  HOME PHONE:  [phone]  WORK PHONE:  [phone]    Patient's daughter Toby calling back on triage.    Toby states she and her sister took patient's blood pressure and it was 72/52. Reviewed with Dr. Harrington's nurse and advised Toby to call 911 for patient to get emergency care. Advised Toby to not have patient stand up because he may lose consciousness with his BP that low. Toby verbalized understanding and agreement. Tracy Zimmer RN     show

## 2025-05-15 NOTE — ED ADULT NURSE NOTE - OBJECTIVE STATEMENT
pt presents to the ED with reports of N/V/D and epigastric abdominal pain. PMHx of type 2 DM, asthma and arthritis. pt reports x3days of N/V/D. states after that developed sharp epigastric abdominal pain. pt reports unable to tolerate PO at this time. A&Ox4. Breathing spontaneously and unlabored on room air.

## 2025-05-15 NOTE — ED PROVIDER NOTE - CPE EDP GASTRO NORM
Verified Results  CBC WITH AUTOMATED DIFFERENTIAL [id number] 05:55PM MELA JONES   [Dec 14, 2017 7:12AM ROBERT MELA]  Elevated AST likely 2/2 fatty liver (has gained weight) - diet, exercise, repeat in 3 months.     Test Name Result Flag Reference   WHITE BLOOD COUNT 5.7 K/mcL  4.2-11.0   RED CELL COUNT 4.62 mil/mcL  4.00-5.20   HEMOGLOBIN 13.5 g/dl  12.0-15.5   HEMATOCRIT 40.6 %  36.0-46.5   MEAN CORPUSCULAR VOLUME 87.9 fL  78.0-100.0   MEAN CORPUSCULAR HEMOGLOBIN 29.2 pg  26.0-34.0   MEAN CORPUSCULAR HGB CONC 33.3 g/dl  32.0-36.5   RDW-CV 13.1 %  11.0-15.0   PLATELET COUNT 295 K/mcL  140-450   JOHN% 47 %     LYM% 40 %     MON% 12 %     EOS% 1 %     BASO% 0 %     JOHN ABS 2.7 K/mcL  1.8-7.7   LYM ABS 2.3 K/mcL  1.0-4.8   MON ABS 0.7 K/mcL  0.3-0.9   EOS ABS 0.1 K/mcL  0.1-0.5   BASO ABS 0.0 K/mcL  0.0-0.3   DIFF TYPE      AUTOMATED DIFFERENTIAL     COMP METABOLIC PNL [id number] 05:55PM MELA JONES   [Dec 14, 2017 7:12AM ROBERT MELA]  Elevated AST likely 2/2 fatty liver (has gained weight) - diet, exercise, repeat in 3 months.     Test Name Result Flag Reference   SODIUM 140 mmol/L  135-145   POTASSIUM 4.3 mmol/L  3.4-5.1   CHLORIDE 105 mmol/L     CARBON DIOXIDE 23 mmol/L  21-32   ANION GAP 16 mmol/L  10-20   GLUCOSE 90 mg/dl  65-99   BUN 8 mg/dl  6-20   CREATININE 0.60 mg/dl  0.51-0.95   GFR EST.AFRICAN AMER >90     eGFR results = or >90 mL/min/1.73m2 = Normal kidney function.   GFR EST.NONAFRI AMER >90     eGFR results = or >90 mL/min/1.73m2 = Normal kidney function.   BUN/CREATININE RATIO 13  7-25   BILIRUBIN TOTAL 0.3 mg/dl  0.2-1.0   GOT/AST 76 Units/L H <38   ALKALINE PHOSPHATASE 58 Units/L     ALBUMIN 4.1 g/dl  3.6-5.1   TOTAL PROTEIN 7.5 g/dl  6.4-8.2   GLOBULIN (CALCULATED) 3.4 g/dl  2.0-4.0   A/G RATIO 1.2  1.0-2.4   CALCIUM 9.5 mg/dl  8.4-10.2   GPT/ALT 53 Units/L  <79   FASTING STATUS UNKNOWN hrs       TSH WITH REFLEX [id number] 05:55PM MELA JONES   [Dec 14, 2017 7:12AM ROBERT  MELA]  Elevated AST likely 2/2 fatty liver (has gained weight) - diet, exercise, repeat in 3 months.     Test Name Result Flag Reference   TSH 1.579 mcUnits/mL  0.350-5.000   Findings most consistent with euthyroid state, no additional testing suggested. TSH may be normal in patients with thyroid dysfunction and pituitary disease. Clinical correlation recommended.  (Reflex TSH algorithm is not recommended in hospitalized patients. A variety of drugs, as well as serious acute and chronic illnesses may alter thyroid function tests. Commonly implicated drugs include glucocorticoids, dopamine, carbamazepine, iodine, amiodarone, lithium and heparin.)       Message   Please call patient and let her know that her labs are normal aside from one of the liver enzyme tests, which was mildly elevated. This is likely due to a little bit of fat deposition in the liver and it should improve as she loses weight. I will plan to repeat the lab in a few months when she follows up with me to ensure it is stable/improving.       normal...

## 2025-05-15 NOTE — ED ADULT TRIAGE NOTE - CHIEF COMPLAINT QUOTE
Pt presents to ED here for abdominal pain n/v/d x 3 days. Pt A&Ox4, NAD and conversive in full sentences and ambulatory. Denies CP, SOB, fever or chills.

## 2025-05-15 NOTE — ED PROVIDER NOTE - PRINCIPAL DIAGNOSIS
"Nausea & vomiting

## 2025-05-15 NOTE — ED PROVIDER NOTE - CLINICAL SUMMARY MEDICAL DECISION MAKING FREE TEXT BOX
Patient present Emergency Department with nausea vomiting diarrhea after intake of pork.  Patient well-appearing in no acute distress soft abdomen nontender likely due to ingestion versus gastroenteritis versus due to cannabis use.  Low suspicion for  surgical abdomen given soft nontender.  Will obtain basic blood work treat discomfort p.o. trial and reassess.  Dispo pending above workup.

## 2025-05-15 NOTE — ED PROVIDER NOTE - OBJECTIVE STATEMENT
Patient is a 34-year-old female no significant past medical history present emergency room with nausea vomiting diarrhea.  Patient states the weekend she made a roasted pork's morning she began to have nausea vomiting diarrhea.  Reports for the past couple days unable to tolerate fluids properly.  She initially had epigastric pain but now has discomfort.  Denies any fevers chest pain shortness of breath recent travel sick contacts.  No one else who ate the same food has similar symptoms.  No prior occurrences.  Is a chronic marijuana smoker however has not smoked in the past week.

## 2025-05-15 NOTE — ED PROVIDER NOTE - NSFOLLOWUPINSTRUCTIONS_ED_ALL_ED_FT
Your blood work was reassuring although your sugar level is elevated.  You should have this rechecked by your primary care doctor. please stay well-hydrated with water and electrolytes.  Please stick with a bland diet for the next couple of days avoid alcohol, smoking, spicy food, greasy food.  Please come back to emergency room if you have severe pain persistent vomiting high fevers or any other emergent concerns.

## 2025-05-15 NOTE — ED ADULT NURSE NOTE - NSFALLUNIVINTERV_ED_ALL_ED
Bed/Stretcher in lowest position, wheels locked, appropriate side rails in place/Call bell, personal items and telephone in reach/Instruct patient to call for assistance before getting out of bed/chair/stretcher/Non-slip footwear applied when patient is off stretcher/[place] to call system/Physically safe environment - no spills, clutter or unnecessary equipment/Purposeful proactive rounding/Room/bathroom lighting operational, light cord in reach

## 2025-05-18 NOTE — ED ADULT NURSE NOTE - PAIN: PRESENCE, MLM
63-year-old male with past medical history of alcohol abuse hypertension COPD hypothyroidism anxiety insomnia presenting to the ED brought in by ambulance status post fall.  Per EMS patient found intoxicated and after by family.  Fall unwitnessed positive head trauma negative LOC.  Per EMS family are in the fall from the other room and quickly into the bathroom to find patient down.  Patient alert and oriented x 3.  Admits to drinking a pint of whiskey today.  Denies any pain. Unkown last tetanus vaccination. . pelvic pain/complains of pain/discomfort

## 2025-05-19 DIAGNOSIS — R11.2 NAUSEA WITH VOMITING, UNSPECIFIED: ICD-10-CM

## 2025-05-19 DIAGNOSIS — R73.9 HYPERGLYCEMIA, UNSPECIFIED: ICD-10-CM

## 2025-05-19 DIAGNOSIS — R10.13 EPIGASTRIC PAIN: ICD-10-CM

## 2025-05-19 DIAGNOSIS — R19.7 DIARRHEA, UNSPECIFIED: ICD-10-CM

## 2025-05-23 ENCOUNTER — APPOINTMENT (OUTPATIENT)
Dept: CT IMAGING | Facility: CLINIC | Age: 35
End: 2025-05-23

## 2025-06-02 ENCOUNTER — APPOINTMENT (OUTPATIENT)
Dept: OTOLARYNGOLOGY | Facility: CLINIC | Age: 35
End: 2025-06-02

## 2025-06-03 ENCOUNTER — OUTPATIENT (OUTPATIENT)
Dept: OUTPATIENT SERVICES | Facility: HOSPITAL | Age: 35
LOS: 1 days | End: 2025-06-03

## 2025-06-03 ENCOUNTER — APPOINTMENT (OUTPATIENT)
Dept: CT IMAGING | Facility: CLINIC | Age: 35
End: 2025-06-03
Payer: MEDICAID

## 2025-06-03 DIAGNOSIS — Z98.891 HISTORY OF UTERINE SCAR FROM PREVIOUS SURGERY: Chronic | ICD-10-CM

## 2025-06-03 DIAGNOSIS — H71.92 UNSPECIFIED CHOLESTEATOMA, LEFT EAR: Chronic | ICD-10-CM

## 2025-06-03 PROCEDURE — 70480 CT ORBIT/EAR/FOSSA W/O DYE: CPT | Mod: 26

## 2025-06-05 ENCOUNTER — RX RENEWAL (OUTPATIENT)
Age: 35
End: 2025-06-05

## 2025-06-12 ENCOUNTER — APPOINTMENT (OUTPATIENT)
Dept: OTOLARYNGOLOGY | Facility: CLINIC | Age: 35
End: 2025-06-12

## 2025-07-15 ENCOUNTER — RX RENEWAL (OUTPATIENT)
Age: 35
End: 2025-07-15

## 2025-07-21 ENCOUNTER — APPOINTMENT (OUTPATIENT)
Dept: OTOLARYNGOLOGY | Facility: CLINIC | Age: 35
End: 2025-07-21
Payer: MEDICAID

## 2025-07-21 PROCEDURE — 99213 OFFICE O/P EST LOW 20 MIN: CPT | Mod: 93

## 2025-07-24 ENCOUNTER — APPOINTMENT (OUTPATIENT)
Dept: INTERNAL MEDICINE | Facility: CLINIC | Age: 35
End: 2025-07-24

## 2025-07-29 ENCOUNTER — APPOINTMENT (OUTPATIENT)
Dept: INTERNAL MEDICINE | Facility: CLINIC | Age: 35
End: 2025-07-29

## 2025-08-07 ENCOUNTER — RX RENEWAL (OUTPATIENT)
Age: 35
End: 2025-08-07

## 2025-08-08 ENCOUNTER — OUTPATIENT (OUTPATIENT)
Dept: OUTPATIENT SERVICES | Facility: HOSPITAL | Age: 35
LOS: 1 days | End: 2025-08-08

## 2025-08-08 ENCOUNTER — APPOINTMENT (OUTPATIENT)
Dept: MRI IMAGING | Facility: CLINIC | Age: 35
End: 2025-08-08
Payer: MEDICAID

## 2025-08-08 DIAGNOSIS — H71.92 UNSPECIFIED CHOLESTEATOMA, LEFT EAR: Chronic | ICD-10-CM

## 2025-08-08 DIAGNOSIS — Z98.891 HISTORY OF UTERINE SCAR FROM PREVIOUS SURGERY: Chronic | ICD-10-CM

## 2025-08-08 PROCEDURE — 70553 MRI BRAIN STEM W/O & W/DYE: CPT | Mod: 26

## 2025-08-21 ENCOUNTER — APPOINTMENT (OUTPATIENT)
Dept: OTOLARYNGOLOGY | Facility: CLINIC | Age: 35
End: 2025-08-21

## (undated) DEVICE — DRAPE C ARM UNIVERSAL

## (undated) DEVICE — WARMING BLANKET UPPER ADULT

## (undated) DEVICE — TUBING RANGER FLUID IRRIGATION SET DISP

## (undated) DEVICE — BOSTON SCIENTIFIC UROLOK II SCOPE ADAPTOR

## (undated) DEVICE — SOL IRR BAG NS 0.9% 3000ML

## (undated) DEVICE — PACK CYSTO

## (undated) DEVICE — GLV 7.5 PROTEXIS (WHITE)

## (undated) DEVICE — VENODYNE/SCD SLEEVE CALF MEDIUM

## (undated) DEVICE — DRAPE TOWEL BLUE 17" X 24"